# Patient Record
Sex: MALE | Race: WHITE | NOT HISPANIC OR LATINO | Employment: OTHER | URBAN - METROPOLITAN AREA
[De-identification: names, ages, dates, MRNs, and addresses within clinical notes are randomized per-mention and may not be internally consistent; named-entity substitution may affect disease eponyms.]

---

## 2017-05-22 ENCOUNTER — APPOINTMENT (EMERGENCY)
Dept: RADIOLOGY | Facility: HOSPITAL | Age: 62
End: 2017-05-22
Payer: COMMERCIAL

## 2017-05-22 ENCOUNTER — APPOINTMENT (EMERGENCY)
Dept: CT IMAGING | Facility: HOSPITAL | Age: 62
End: 2017-05-22
Payer: COMMERCIAL

## 2017-05-22 ENCOUNTER — HOSPITAL ENCOUNTER (OUTPATIENT)
Facility: HOSPITAL | Age: 62
Setting detail: OBSERVATION
Discharge: HOME | End: 2017-05-24
Attending: EMERGENCY MEDICINE | Admitting: INTERNAL MEDICINE
Payer: COMMERCIAL

## 2017-05-22 DIAGNOSIS — R07.9 CHEST PAIN, UNSPECIFIED TYPE: Primary | ICD-10-CM

## 2017-05-22 LAB
ALBUMIN SERPL BCP-MCNC: 4 G/DL (ref 3.5–5)
ALP SERPL-CCNC: 88 U/L (ref 46–116)
ALT SERPL W P-5'-P-CCNC: 53 U/L (ref 12–78)
ANION GAP SERPL CALCULATED.3IONS-SCNC: 9 MMOL/L (ref 4–13)
AST SERPL W P-5'-P-CCNC: 33 U/L (ref 5–45)
ATRIAL RATE: 85 BPM
BASOPHILS # BLD AUTO: 0.05 THOUSANDS/ΜL (ref 0–0.1)
BASOPHILS NFR BLD AUTO: 1 % (ref 0–1)
BILIRUB SERPL-MCNC: 0.18 MG/DL (ref 0.2–1)
BUN SERPL-MCNC: 19 MG/DL (ref 5–25)
CALCIUM SERPL-MCNC: 9.3 MG/DL (ref 8.3–10.1)
CHLORIDE SERPL-SCNC: 104 MMOL/L (ref 100–108)
CO2 SERPL-SCNC: 26 MMOL/L (ref 21–32)
CREAT SERPL-MCNC: 1.11 MG/DL (ref 0.6–1.3)
EOSINOPHIL # BLD AUTO: 0.16 THOUSAND/ΜL (ref 0–0.61)
EOSINOPHIL NFR BLD AUTO: 2 % (ref 0–6)
ERYTHROCYTE [DISTWIDTH] IN BLOOD BY AUTOMATED COUNT: 12.9 % (ref 11.6–15.1)
GFR SERPL CREATININE-BSD FRML MDRD: >60 ML/MIN/1.73SQ M
GLUCOSE SERPL-MCNC: 102 MG/DL (ref 65–140)
HCT VFR BLD AUTO: 38.8 % (ref 36.5–49.3)
HGB BLD-MCNC: 13.5 G/DL (ref 12–17)
LYMPHOCYTES # BLD AUTO: 2.72 THOUSANDS/ΜL (ref 0.6–4.47)
LYMPHOCYTES NFR BLD AUTO: 27 % (ref 14–44)
MCH RBC QN AUTO: 31.3 PG (ref 26.8–34.3)
MCHC RBC AUTO-ENTMCNC: 34.8 G/DL (ref 31.4–37.4)
MCV RBC AUTO: 90 FL (ref 82–98)
MONOCYTES # BLD AUTO: 1.08 THOUSAND/ΜL (ref 0.17–1.22)
MONOCYTES NFR BLD AUTO: 11 % (ref 4–12)
NEUTROPHILS # BLD AUTO: 6.18 THOUSANDS/ΜL (ref 1.85–7.62)
NEUTS SEG NFR BLD AUTO: 59 % (ref 43–75)
NRBC BLD AUTO-RTO: 0 /100 WBCS
P AXIS: 66 DEGREES
PLATELET # BLD AUTO: 258 THOUSANDS/UL (ref 149–390)
PMV BLD AUTO: 12.4 FL (ref 8.9–12.7)
POTASSIUM SERPL-SCNC: 4 MMOL/L (ref 3.5–5.3)
PR INTERVAL: 138 MS
PROT SERPL-MCNC: 8.3 G/DL (ref 6.4–8.2)
QRS AXIS: 83 DEGREES
QRSD INTERVAL: 84 MS
QT INTERVAL: 330 MS
QTC INTERVAL: 392 MS
RBC # BLD AUTO: 4.31 MILLION/UL (ref 3.88–5.62)
SODIUM SERPL-SCNC: 139 MMOL/L (ref 136–145)
SPECIMEN SOURCE: NORMAL
T WAVE AXIS: 74 DEGREES
TROPONIN I BLD-MCNC: 0 NG/ML (ref 0–0.08)
TROPONIN I SERPL-MCNC: <0.02 NG/ML
TSH SERPL DL<=0.05 MIU/L-ACNC: 1.24 UIU/ML (ref 0.36–3.74)
VENTRICULAR RATE: 85 BPM
WBC # BLD AUTO: 10.19 THOUSAND/UL (ref 4.31–10.16)

## 2017-05-22 PROCEDURE — 99285 EMERGENCY DEPT VISIT HI MDM: CPT

## 2017-05-22 PROCEDURE — 96360 HYDRATION IV INFUSION INIT: CPT

## 2017-05-22 PROCEDURE — 71020 HB CHEST X-RAY 2VW FRONTAL&LATL: CPT

## 2017-05-22 PROCEDURE — 71275 CT ANGIOGRAPHY CHEST: CPT

## 2017-05-22 PROCEDURE — 85025 COMPLETE CBC W/AUTO DIFF WBC: CPT

## 2017-05-22 PROCEDURE — 84484 ASSAY OF TROPONIN QUANT: CPT

## 2017-05-22 PROCEDURE — 74175 CTA ABDOMEN W/CONTRAST: CPT

## 2017-05-22 PROCEDURE — 84484 ASSAY OF TROPONIN QUANT: CPT | Performed by: PHYSICIAN ASSISTANT

## 2017-05-22 PROCEDURE — 93005 ELECTROCARDIOGRAM TRACING: CPT

## 2017-05-22 PROCEDURE — 84443 ASSAY THYROID STIM HORMONE: CPT | Performed by: EMERGENCY MEDICINE

## 2017-05-22 PROCEDURE — 80053 COMPREHEN METABOLIC PANEL: CPT

## 2017-05-22 PROCEDURE — 36415 COLL VENOUS BLD VENIPUNCTURE: CPT

## 2017-05-22 RX ORDER — ACETAMINOPHEN 325 MG/1
975 TABLET ORAL ONCE
Status: COMPLETED | OUTPATIENT
Start: 2017-05-22 | End: 2017-05-22

## 2017-05-22 RX ORDER — ACETAMINOPHEN 325 MG/1
650 TABLET ORAL EVERY 4 HOURS PRN
Status: DISCONTINUED | OUTPATIENT
Start: 2017-05-22 | End: 2017-05-24 | Stop reason: HOSPADM

## 2017-05-22 RX ORDER — ASPIRIN 325 MG
325 TABLET ORAL ONCE
Status: COMPLETED | OUTPATIENT
Start: 2017-05-22 | End: 2017-05-22

## 2017-05-22 RX ORDER — PANTOPRAZOLE SODIUM 40 MG/1
40 TABLET, DELAYED RELEASE ORAL
Status: DISCONTINUED | OUTPATIENT
Start: 2017-05-23 | End: 2017-05-24 | Stop reason: HOSPADM

## 2017-05-22 RX ORDER — SIMVASTATIN 40 MG
40 TABLET ORAL
COMMUNITY
Start: 2015-09-07 | End: 2017-11-25

## 2017-05-22 RX ORDER — PRAVASTATIN SODIUM 80 MG/1
80 TABLET ORAL
Status: DISCONTINUED | OUTPATIENT
Start: 2017-05-23 | End: 2017-05-24 | Stop reason: HOSPADM

## 2017-05-22 RX ORDER — DIPHENOXYLATE HYDROCHLORIDE AND ATROPINE SULFATE 2.5; .025 MG/1; MG/1
1 TABLET ORAL DAILY
COMMUNITY
End: 2021-08-19 | Stop reason: HOSPADM

## 2017-05-22 RX ORDER — MORPHINE SULFATE 2 MG/ML
2 INJECTION, SOLUTION INTRAMUSCULAR; INTRAVENOUS EVERY 4 HOURS PRN
Status: DISCONTINUED | OUTPATIENT
Start: 2017-05-22 | End: 2017-05-24 | Stop reason: HOSPADM

## 2017-05-22 RX ORDER — ASPIRIN 81 MG/1
81 TABLET, CHEWABLE ORAL DAILY
Status: DISCONTINUED | OUTPATIENT
Start: 2017-05-23 | End: 2017-05-24 | Stop reason: HOSPADM

## 2017-05-22 RX ORDER — FLUTICASONE PROPIONATE 50 MCG
SPRAY, SUSPENSION (ML) NASAL
COMMUNITY
Start: 2016-08-24 | End: 2017-11-25

## 2017-05-22 RX ORDER — ONDANSETRON 2 MG/ML
4 INJECTION INTRAMUSCULAR; INTRAVENOUS EVERY 6 HOURS PRN
Status: DISCONTINUED | OUTPATIENT
Start: 2017-05-22 | End: 2017-05-24 | Stop reason: HOSPADM

## 2017-05-22 RX ORDER — MAGNESIUM HYDROXIDE/ALUMINUM HYDROXICE/SIMETHICONE 120; 1200; 1200 MG/30ML; MG/30ML; MG/30ML
30 SUSPENSION ORAL EVERY 6 HOURS PRN
Status: DISCONTINUED | OUTPATIENT
Start: 2017-05-22 | End: 2017-05-24 | Stop reason: HOSPADM

## 2017-05-22 RX ORDER — NITROGLYCERIN 0.4 MG/1
0.4 TABLET SUBLINGUAL
Status: DISCONTINUED | OUTPATIENT
Start: 2017-05-22 | End: 2017-05-24 | Stop reason: HOSPADM

## 2017-05-22 RX ORDER — HEPARIN SODIUM 5000 [USP'U]/ML
5000 INJECTION, SOLUTION INTRAVENOUS; SUBCUTANEOUS EVERY 8 HOURS SCHEDULED
Status: DISCONTINUED | OUTPATIENT
Start: 2017-05-22 | End: 2017-05-24 | Stop reason: HOSPADM

## 2017-05-22 RX ORDER — FENOFIBRATE 145 MG/1
145 TABLET, COATED ORAL DAILY
Status: DISCONTINUED | OUTPATIENT
Start: 2017-05-23 | End: 2017-05-24 | Stop reason: HOSPADM

## 2017-05-22 RX ADMIN — ASPIRIN 325 MG: 325 TABLET ORAL at 22:15

## 2017-05-22 RX ADMIN — ACETAMINOPHEN 975 MG: 325 TABLET, FILM COATED ORAL at 17:21

## 2017-05-22 RX ADMIN — IOHEXOL 100 ML: 350 INJECTION, SOLUTION INTRAVENOUS at 18:05

## 2017-05-22 RX ADMIN — HEPARIN SODIUM 5000 UNITS: 5000 INJECTION, SOLUTION INTRAVENOUS; SUBCUTANEOUS at 22:15

## 2017-05-22 RX ADMIN — SODIUM CHLORIDE 1000 ML: 0.9 INJECTION, SOLUTION INTRAVENOUS at 17:22

## 2017-05-23 LAB
ALBUMIN SERPL BCP-MCNC: 3.3 G/DL (ref 3.5–5)
ALP SERPL-CCNC: 74 U/L (ref 46–116)
ALT SERPL W P-5'-P-CCNC: 41 U/L (ref 12–78)
ANION GAP SERPL CALCULATED.3IONS-SCNC: 9 MMOL/L (ref 4–13)
AST SERPL W P-5'-P-CCNC: 25 U/L (ref 5–45)
BILIRUB SERPL-MCNC: 0.23 MG/DL (ref 0.2–1)
BUN SERPL-MCNC: 15 MG/DL (ref 5–25)
CALCIUM SERPL-MCNC: 8.8 MG/DL (ref 8.3–10.1)
CHLORIDE SERPL-SCNC: 106 MMOL/L (ref 100–108)
CO2 SERPL-SCNC: 25 MMOL/L (ref 21–32)
CREAT SERPL-MCNC: 1.05 MG/DL (ref 0.6–1.3)
ERYTHROCYTE [DISTWIDTH] IN BLOOD BY AUTOMATED COUNT: 12.8 % (ref 11.6–15.1)
GFR SERPL CREATININE-BSD FRML MDRD: >60 ML/MIN/1.73SQ M
GLUCOSE SERPL-MCNC: 115 MG/DL (ref 65–140)
HCT VFR BLD AUTO: 36.6 % (ref 36.5–49.3)
HGB BLD-MCNC: 11.9 G/DL (ref 12–17)
MCH RBC QN AUTO: 29.6 PG (ref 26.8–34.3)
MCHC RBC AUTO-ENTMCNC: 32.5 G/DL (ref 31.4–37.4)
MCV RBC AUTO: 91 FL (ref 82–98)
PLATELET # BLD AUTO: 226 THOUSANDS/UL (ref 149–390)
PMV BLD AUTO: 12.5 FL (ref 8.9–12.7)
POTASSIUM SERPL-SCNC: 3.9 MMOL/L (ref 3.5–5.3)
PROT SERPL-MCNC: 6.9 G/DL (ref 6.4–8.2)
RBC # BLD AUTO: 4.02 MILLION/UL (ref 3.88–5.62)
SODIUM SERPL-SCNC: 140 MMOL/L (ref 136–145)
TROPONIN I SERPL-MCNC: <0.02 NG/ML
WBC # BLD AUTO: 6.86 THOUSAND/UL (ref 4.31–10.16)

## 2017-05-23 PROCEDURE — 85027 COMPLETE CBC AUTOMATED: CPT | Performed by: PHYSICIAN ASSISTANT

## 2017-05-23 PROCEDURE — 80053 COMPREHEN METABOLIC PANEL: CPT | Performed by: PHYSICIAN ASSISTANT

## 2017-05-23 PROCEDURE — 84484 ASSAY OF TROPONIN QUANT: CPT | Performed by: INTERNAL MEDICINE

## 2017-05-23 RX ORDER — TEMAZEPAM 15 MG/1
15 CAPSULE ORAL
Status: DISCONTINUED | OUTPATIENT
Start: 2017-05-23 | End: 2017-05-24 | Stop reason: HOSPADM

## 2017-05-23 RX ADMIN — HEPARIN SODIUM 5000 UNITS: 5000 INJECTION, SOLUTION INTRAVENOUS; SUBCUTANEOUS at 06:21

## 2017-05-23 RX ADMIN — HEPARIN SODIUM 5000 UNITS: 5000 INJECTION, SOLUTION INTRAVENOUS; SUBCUTANEOUS at 13:17

## 2017-05-23 RX ADMIN — TEMAZEPAM 15 MG: 15 CAPSULE ORAL at 22:19

## 2017-05-23 RX ADMIN — ACETAMINOPHEN 650 MG: 325 TABLET, FILM COATED ORAL at 16:33

## 2017-05-23 RX ADMIN — FENOFIBRATE 145 MG: 145 TABLET ORAL at 08:55

## 2017-05-23 RX ADMIN — PANTOPRAZOLE SODIUM 40 MG: 40 TABLET, DELAYED RELEASE ORAL at 06:21

## 2017-05-23 RX ADMIN — ASPIRIN 81 MG: 81 TABLET, CHEWABLE ORAL at 08:55

## 2017-05-23 RX ADMIN — ACETAMINOPHEN 650 MG: 325 TABLET, FILM COATED ORAL at 09:03

## 2017-05-23 RX ADMIN — HEPARIN SODIUM 5000 UNITS: 5000 INJECTION, SOLUTION INTRAVENOUS; SUBCUTANEOUS at 22:19

## 2017-05-23 RX ADMIN — PRAVASTATIN SODIUM 80 MG: 80 TABLET ORAL at 16:33

## 2017-05-24 ENCOUNTER — APPOINTMENT (OUTPATIENT)
Dept: NUCLEAR MEDICINE | Facility: HOSPITAL | Age: 62
End: 2017-05-24
Payer: COMMERCIAL

## 2017-05-24 ENCOUNTER — APPOINTMENT (OUTPATIENT)
Dept: NON INVASIVE DIAGNOSTICS | Facility: HOSPITAL | Age: 62
End: 2017-05-24
Payer: COMMERCIAL

## 2017-05-24 VITALS
SYSTOLIC BLOOD PRESSURE: 132 MMHG | HEIGHT: 68 IN | WEIGHT: 145.06 LBS | DIASTOLIC BLOOD PRESSURE: 81 MMHG | HEART RATE: 87 BPM | TEMPERATURE: 98.3 F | RESPIRATION RATE: 18 BRPM | BODY MASS INDEX: 21.99 KG/M2 | OXYGEN SATURATION: 97 %

## 2017-05-24 LAB
CHOLEST SERPL-MCNC: 175 MG/DL (ref 50–200)
HDLC SERPL-MCNC: 28 MG/DL (ref 40–60)
LDLC SERPL CALC-MCNC: 69 MG/DL (ref 0–100)
TRIGL SERPL-MCNC: 390 MG/DL

## 2017-05-24 PROCEDURE — 78452 HT MUSCLE IMAGE SPECT MULT: CPT

## 2017-05-24 PROCEDURE — A9502 TC99M TETROFOSMIN: HCPCS

## 2017-05-24 PROCEDURE — 80061 LIPID PANEL: CPT | Performed by: INTERNAL MEDICINE

## 2017-05-24 PROCEDURE — 93017 CV STRESS TEST TRACING ONLY: CPT

## 2017-05-24 RX ADMIN — FENOFIBRATE 145 MG: 145 TABLET ORAL at 09:18

## 2017-05-24 RX ADMIN — HEPARIN SODIUM 5000 UNITS: 5000 INJECTION, SOLUTION INTRAVENOUS; SUBCUTANEOUS at 05:56

## 2017-05-24 RX ADMIN — PANTOPRAZOLE SODIUM 40 MG: 40 TABLET, DELAYED RELEASE ORAL at 05:56

## 2017-05-24 RX ADMIN — ASPIRIN 81 MG: 81 TABLET, CHEWABLE ORAL at 09:18

## 2017-05-24 RX ADMIN — HEPARIN SODIUM 5000 UNITS: 5000 INJECTION, SOLUTION INTRAVENOUS; SUBCUTANEOUS at 13:34

## 2017-05-26 ENCOUNTER — HOSPITAL ENCOUNTER (OUTPATIENT)
Dept: RADIOLOGY | Facility: HOSPITAL | Age: 62
Discharge: HOME | End: 2017-05-26
Payer: COMMERCIAL

## 2017-05-26 ENCOUNTER — TRANSCRIBE ORDERS (OUTPATIENT)
Dept: ADMINISTRATIVE | Facility: HOSPITAL | Age: 62
End: 2017-05-26

## 2017-05-26 DIAGNOSIS — M54.5 LOW BACK PAIN, UNSPECIFIED BACK PAIN LATERALITY, UNSPECIFIED CHRONICITY, WITH SCIATICA PRESENCE UNSPECIFIED: Primary | ICD-10-CM

## 2017-05-26 DIAGNOSIS — M54.5 LOW BACK PAIN, UNSPECIFIED BACK PAIN LATERALITY, UNSPECIFIED CHRONICITY, WITH SCIATICA PRESENCE UNSPECIFIED: ICD-10-CM

## 2017-05-26 PROCEDURE — 72110 X-RAY EXAM L-2 SPINE 4/>VWS: CPT

## 2017-05-26 PROCEDURE — 73030 X-RAY EXAM OF SHOULDER: CPT

## 2017-05-26 PROCEDURE — 72070 X-RAY EXAM THORAC SPINE 2VWS: CPT

## 2017-09-19 ENCOUNTER — HOSPITAL ENCOUNTER (OUTPATIENT)
Dept: RADIOLOGY | Facility: HOSPITAL | Age: 62
Discharge: HOME | End: 2017-09-19
Payer: COMMERCIAL

## 2017-09-19 ENCOUNTER — TRANSCRIBE ORDERS (OUTPATIENT)
Dept: ADMINISTRATIVE | Facility: HOSPITAL | Age: 62
End: 2017-09-19

## 2017-09-19 DIAGNOSIS — M77.8 LEFT SHOULDER TENDONITIS: ICD-10-CM

## 2017-09-19 DIAGNOSIS — M77.8 LEFT SHOULDER TENDONITIS: Primary | ICD-10-CM

## 2017-09-19 PROCEDURE — 73030 X-RAY EXAM OF SHOULDER: CPT

## 2017-09-25 ENCOUNTER — TRANSCRIBE ORDERS (OUTPATIENT)
Dept: ADMINISTRATIVE | Facility: HOSPITAL | Age: 62
End: 2017-09-25

## 2017-09-25 DIAGNOSIS — G89.29 CHRONIC LEFT SHOULDER PAIN: Primary | ICD-10-CM

## 2017-09-25 DIAGNOSIS — M25.512 CHRONIC LEFT SHOULDER PAIN: Primary | ICD-10-CM

## 2017-09-29 ENCOUNTER — HOSPITAL ENCOUNTER (OUTPATIENT)
Dept: MRI IMAGING | Facility: HOSPITAL | Age: 62
Discharge: HOME | End: 2017-09-29
Payer: COMMERCIAL

## 2017-09-29 DIAGNOSIS — G89.29 CHRONIC LEFT SHOULDER PAIN: ICD-10-CM

## 2017-09-29 DIAGNOSIS — M25.512 CHRONIC LEFT SHOULDER PAIN: ICD-10-CM

## 2017-09-29 PROCEDURE — 73221 MRI JOINT UPR EXTREM W/O DYE: CPT

## 2017-10-06 ENCOUNTER — TRANSCRIBE ORDERS (OUTPATIENT)
Dept: ADMINISTRATIVE | Facility: HOSPITAL | Age: 62
End: 2017-10-06

## 2017-10-06 DIAGNOSIS — M54.12 BRACHIAL NEURITIS: Primary | ICD-10-CM

## 2017-10-12 ENCOUNTER — HOSPITAL ENCOUNTER (OUTPATIENT)
Dept: MRI IMAGING | Facility: HOSPITAL | Age: 62
Discharge: HOME | End: 2017-10-12
Payer: COMMERCIAL

## 2017-10-12 DIAGNOSIS — M54.12 BRACHIAL NEURITIS: ICD-10-CM

## 2017-10-12 PROCEDURE — 72141 MRI NECK SPINE W/O DYE: CPT

## 2017-11-02 ENCOUNTER — HOSPITAL ENCOUNTER (EMERGENCY)
Facility: HOSPITAL | Age: 62
Discharge: HOME | End: 2017-11-02
Attending: EMERGENCY MEDICINE | Admitting: EMERGENCY MEDICINE
Payer: COMMERCIAL

## 2017-11-02 VITALS
HEART RATE: 97 BPM | DIASTOLIC BLOOD PRESSURE: 82 MMHG | OXYGEN SATURATION: 99 % | BODY MASS INDEX: 22.12 KG/M2 | RESPIRATION RATE: 18 BRPM | SYSTOLIC BLOOD PRESSURE: 132 MMHG | WEIGHT: 145.5 LBS | TEMPERATURE: 97.7 F

## 2017-11-02 DIAGNOSIS — S16.1XXA CERVICAL MUSCLE STRAIN, INITIAL ENCOUNTER: ICD-10-CM

## 2017-11-02 DIAGNOSIS — J06.9 VIRAL URI: Primary | ICD-10-CM

## 2017-11-02 LAB
BILIRUB UR QL STRIP: NEGATIVE
CLARITY UR: CLEAR
CLARITY, POC: YELLOW
COLOR UR: YELLOW
COLOR, POC: CLEAR
GLUCOSE UR STRIP-MCNC: NEGATIVE MG/DL
HGB UR QL STRIP.AUTO: NEGATIVE
KETONES UR STRIP-MCNC: NEGATIVE MG/DL
LEUKOCYTE ESTERASE UR QL STRIP: NEGATIVE
NITRITE UR QL STRIP: NEGATIVE
PH UR STRIP.AUTO: 7 [PH] (ref 4.5–8)
PROT UR STRIP-MCNC: NEGATIVE MG/DL
SP GR UR STRIP.AUTO: 1.02 (ref 1–1.03)
UROBILINOGEN UR QL STRIP.AUTO: 0.2 E.U./DL

## 2017-11-02 PROCEDURE — 99283 EMERGENCY DEPT VISIT LOW MDM: CPT

## 2017-11-02 PROCEDURE — 81002 URINALYSIS NONAUTO W/O SCOPE: CPT | Performed by: EMERGENCY MEDICINE

## 2017-11-02 PROCEDURE — 81003 URINALYSIS AUTO W/O SCOPE: CPT

## 2017-11-02 RX ORDER — CYCLOBENZAPRINE HCL 10 MG
10 TABLET ORAL 3 TIMES DAILY PRN
Qty: 20 TABLET | Refills: 0 | Status: SHIPPED | OUTPATIENT
Start: 2017-11-02 | End: 2017-11-25

## 2017-11-02 RX ORDER — IBUPROFEN 600 MG/1
600 TABLET ORAL EVERY 6 HOURS PRN
Qty: 30 TABLET | Refills: 0 | Status: SHIPPED | OUTPATIENT
Start: 2017-11-02 | End: 2017-11-25

## 2017-11-02 RX ORDER — IBUPROFEN 600 MG/1
600 TABLET ORAL ONCE
Status: COMPLETED | OUTPATIENT
Start: 2017-11-02 | End: 2017-11-02

## 2017-11-02 RX ADMIN — IBUPROFEN 600 MG: 600 TABLET, FILM COATED ORAL at 09:11

## 2017-11-02 NOTE — ED NOTES
Unable to urinate at this time  Give urine cup and showed where bathroom is for when he is able to go       Jay Ayala, RN  11/02/17 1946

## 2017-11-02 NOTE — DISCHARGE INSTRUCTIONS
Viral Syndrome   WHAT YOU NEED TO KNOW:   Viral syndrome is a term used for a viral infection that has no clear cause  Viruses are spread easily from person to person through the air and on shared items  DISCHARGE INSTRUCTIONS:   Call 911 for the following:   · You have a seizure  · You cannot be woken  · You have chest pain or trouble breathing  Seek care immediately if:   · You have a stiff neck, a bad headache, and sensitivity to light  · You feel weak, dizzy, or confused  · You stop urinating or urinate a lot less than normal      · You cough up blood or thick, yellow or green, mucus  · You have severe abdominal pain or your abdomen is larger than usual   Contact your healthcare provider if:   · Your symptoms do not get better with treatment, or get worse, after 3 days  · You have a rash or ear pain  · You have burning when you urinate  · You have questions or concerns about your condition or care  Medicines: You may  need any of the following:  · Acetaminophen  decreases pain and fever  It is available without a doctor's order  Ask how much medicine to take and how often to take it  Follow directions  Acetaminophen can cause liver damage if not taken correctly  · NSAIDs , such as ibuprofen, help decrease swelling, pain, and fever  NSAIDs can cause stomach bleeding or kidney problems in certain people  If you take blood thinner medicine, always ask your healthcare provider if NSAIDs are safe for you  Always read the medicine label and follow directions  · Cold medicine  helps decrease swelling, control a cough, and relieve chest or nasal congestion  · Saline nasal spray  helps decrease nasal congestion  · Take your medicine as directed  Contact your healthcare provider if you think your medicine is not helping or if you have side effects  Tell him of her if you are allergic to any medicine  Keep a list of the medicines, vitamins, and herbs you take  Include the amounts, and when and why you take them  Bring the list or the pill bottles to follow-up visits  Carry your medicine list with you in case of an emergency  Manage your symptoms:   · Drink liquids as directed  to prevent dehydration  Ask how much liquid to drink each day and which liquids are best for you  Ask if you should drink an oral rehydration solution (ORS)  An ORS has the right amounts of water, salts, and sugar you need to replace body fluids  This may help prevent dehydration caused by vomiting or diarrhea  Do not drink liquids with caffeine  Drinks with caffeine can make dehydration worse  · Get plenty of rest  to help your body heal  Take naps throughout the day  Ask your healthcare provider when you can return to work and your normal activities  · Use a cool mist humidifier  to help you breathe easier if you have nasal or chest congestion  Ask your healthcare provider how to use a cool mist humidifier  · Eat honey or use cough drops  to help decrease throat discomfort  Ask your healthcare provider how much honey you should eat each day  Cough drops are available without a doctor's order  Follow directions for taking cough drops  · Do not smoke and stay away from others who smoke  Nicotine and other chemicals in cigarettes and cigars can cause lung damage  Smoking can also delay healing  Ask your healthcare provider for information if you currently smoke and need help to quit  E-cigarettes or smokeless tobacco still contain nicotine  Talk to your healthcare provider before you use these products  · Wash your hands frequently  to prevent the spread of germs to others  Use soap and water  Use gel hand  when soap and water are not available  Wash your hands after you use the bathroom, cough, or sneeze  Wash your hands before you prepare or eat food    Follow up with your healthcare provider as directed:  Write down your questions so you remember to ask them during your visits  © 2017 [facility] Information is for End User's use only and may not be sold, redistributed or otherwise used for commercial purposes  All illustrations and images included in CareNotes® are the copyrighted property of A D [employer] M [employer]  or [employer] Jackson  The above information is an  only  It is not intended as medical advice for individual conditions or treatments  Talk to your doctor, nurse or pharmacist before following any medical regimen to see if it is safe and effective for you  Cervical Strain   WHAT YOU NEED TO KNOW:   A cervical strain is a stretched or torn muscle or tendon in your neck  Tendons are strong tissues that connect muscles to bones  Common causes of cervical strains include a car accident, a fall, or a sports injury  DISCHARGE INSTRUCTIONS:   Seek care immediately if:   · You have pain or numbness from your shoulder down to your hand  · You have problems with your vision, hearing, or balance  · You feel confused or cannot concentrate  · You have problems with movement and strength  Contact your healthcare provider if:   · You have increased swelling or pain in your neck  · You have questions or concerns about your condition or care  Medicines: You may need any of the following:  · Acetaminophen  decreases pain and fever  It is available without a doctor's order  Ask how much to take and how often to take it  Follow directions  Read the labels of all other medicines you are using to see if they also contain acetaminophen, or ask your doctor or pharmacist  Acetaminophen can cause liver damage if not taken correctly  Do not use more than 4 grams (4,000 milligrams) total of acetaminophen in one day  · NSAIDs , such as ibuprofen, help decrease swelling, pain, and fever  This medicine is available with or without a doctor's order  NSAIDs can cause stomach bleeding or kidney problems in certain people   If you take blood thinner medicine, always ask your healthcare provider if NSAIDs are safe for you  Always read the medicine label and follow directions  · Muscle relaxers  help decrease pain and muscle spasms  · Prescription pain medicine  may be given  Ask your healthcare provider how to take this medicine safely  Some prescription pain medicines contain acetaminophen  Do not take other medicines that contain acetaminophen without talking to your healthcare provider  Too much acetaminophen may cause liver damage  Prescription pain medicine may cause constipation  Ask your healthcare provider how to prevent or treat constipation  · Take your medicine as directed  Contact your healthcare provider if you think your medicine is not helping or if you have side effects  Tell him or her if you are allergic to any medicine  Keep a list of the medicines, vitamins, and herbs you take  Include the amounts, and when and why you take them  Bring the list or the pill bottles to follow-up visits  Carry your medicine list with you in case of an emergency  Manage your symptoms:   · Apply heat  on your neck for 15 to 20 minutes, 4 to 6 times a day or as directed  Heat helps decrease pain, stiffness, and muscle spasms  · Begin gentle neck exercises  as soon as you can move your neck without pain  Exercises will help decrease stiffness and improve the strength and movement of your neck  Ask your healthcare provider what kind of exercises you should do  · Gradually return to your usual activities as directed  Stop if you have pain  Avoid activities that can cause more damage to your neck, such as heavy lifting or strenuous exercise  · Sleep without a pillow  to help decrease pain  Instead, roll a small towel tightly and place it under your neck  · Go to physical therapy as directed  A physical therapist teaches you exercises to help improve movement and strength, and to decrease pain  Prevent neck injury:   · Drive safely  Make sure everyone in your car wears a seatbelt  A seatbelt can save your life if you are in an accident  Do not use your cell phone when you are driving  This could distract you and cause an accident  Pull over if you need to make a call or send a text message  · Wear helmets, lifejackets, and protective gear  Always wear a helmet when you ride a bike or motorcycle, go skiing, or play sports that could cause a head injury  Wear protective equipment when you play sports  Wear a lifejacket when you are on a boat or doing water sports  Follow up with your healthcare provider as directed: You may be referred to an orthopedist or physical therapies  Write down your questions so you remember to ask them during your visits  © 2017 [employer] [employer] Information is for End User's use only and may not be sold, redistributed or otherwise used for commercial purposes  All illustrations and images included in CareNotes® are the copyrighted property of A D [employer] M [employer]  or [employer]in Jackson  The above information is an  only  It is not intended as medical advice for individual conditions or treatments  Talk to your doctor, nurse or pharmacist before following any medical regimen to see if it is safe and effective for you

## 2017-11-02 NOTE — ED ATTENDING ATTESTATION
Adalberto Carranza MD, saw and evaluated the patient  I have discussed the patient with the resident/non-physician practitioner and agree with the resident's/non-physician practitioner's findings, Plan of Care, and MDM as documented in the resident's/non-physician practitioner's note, except where noted  All available labs and Radiology studies were reviewed  At this point I agree with the current assessment done in the Emergency Department  I have conducted an independent evaluation of this patient a history and physical is as follows:      Critical Care Time  CritCare Time     Pt  Has a h/o copd but not taking any meds    C/o subjective fevers/chill/sore throat/body aches/ear fullness/some loose stools since yest   No sick contacts  C/o pain behind L ear  No headaches  Pt  Didn't get the flu shot this year        Well-appearing, no distress, TM's clear, swelling in nasal turbinates, L neck tenderness in paraspinal musculature, ooropharynx erythematous, no exudate, RRR, CTA b/l, abd  -nontender, ext  - no edema

## 2017-11-02 NOTE — ED PROVIDER NOTES
History  Chief Complaint   Patient presents with    Headache     accompanied by body aches, sneezing started yesterday     Patient is a 65year old male with PMH significant for COPD presenting with sore throat, ear fullness, malaise, body aches, non-bloody loose bowel movements, and subjective fevers and chills that began yesterday  He states he felt very tired yesterday, went home from work and slept for most of the day  He also admits to urinary frequency and dark colored urine, denies nausea, vomiting, or abdominal pain  He also admits to neck and shoulder pain that has been present for several weeks, he states he has cervical stenosis and rotator cuff tendonitis and has been going to physical therapy biweekly for the past 4 weeks  Yesterday he noticed worsening pain behind his left ear and neck, feels like cramping and stiffness, but denies vision changes, headaches, lightheadedness or dizziness  He has not has the flu shot this year, denies any sick contacts at home or at work  History provided by:  Patient   used: No        Prior to Admission Medications   Prescriptions Last Dose Informant Patient Reported? Taking? Fenofibrate (TRICOR PO)   Yes No   Sig: Take by mouth daily   fluticasone (FLONASE) 50 mcg/act nasal spray   Yes No   Sig: instill 1 spray into each nostril twice a day   multivitamin (THERAGRAN) TABS   Yes No   Sig: Take 1 tablet by mouth   omeprazole (PriLOSEC) 20 mg delayed release capsule   Yes No   Sig: Take 20 mg by mouth daily     simvastatin (ZOCOR) 40 mg tablet   Yes No   Si mg      Facility-Administered Medications: None       Past Medical History:   Diagnosis Date    COPD (chronic obstructive pulmonary disease) (HCC)     GERD (gastroesophageal reflux disease)     Hyperlipidemia        Past Surgical History:   Procedure Laterality Date    CHOLECYSTECTOMY      CYST REMOVAL      FEMUR SURGERY      KNEE SURGERY      ROTATOR CUFF REPAIR History reviewed  No pertinent family history  I have reviewed and agree with the history as documented  Social History   Substance Use Topics    Smoking status: Current Every Day Smoker     Packs/day: 0 50    Smokeless tobacco: Never Used    Alcohol use Yes      Comment: occasional         Review of Systems   Constitutional: Positive for appetite change, chills and fatigue  Negative for fever  HENT: Positive for congestion and sore throat  Negative for hearing loss and trouble swallowing  Eyes: Negative for pain, redness and visual disturbance  Respiratory: Negative for cough, chest tightness, shortness of breath, wheezing and stridor  Cardiovascular: Negative for chest pain  Gastrointestinal: Positive for diarrhea  Negative for abdominal pain, blood in stool, constipation, nausea and vomiting  Genitourinary: Positive for frequency  Negative for dysuria  Skin: Negative for pallor and rash  Neurological: Negative for dizziness and light-headedness  All other systems reviewed and are negative  Physical Exam  ED Triage Vitals [11/02/17 0838]   Temperature Pulse Respirations Blood Pressure SpO2   97 7 °F (36 5 °C) 97 18 132/82 99 %      Temp Source Heart Rate Source Patient Position - Orthostatic VS BP Location FiO2 (%)   Oral -- -- -- --      Pain Score       5           Orthostatic Vital Signs  Vitals:    11/02/17 0838   BP: 132/82   Pulse: 97       Physical Exam   Constitutional: He is oriented to person, place, and time  He appears well-developed and well-nourished  No distress  HENT:   Head: Normocephalic and atraumatic  Right Ear: External ear normal    Left Ear: External ear normal    Nose: Nose normal    Mouth/Throat: Oropharynx is clear and moist    Eyes: Conjunctivae and EOM are normal  Pupils are equal, round, and reactive to light  Neck: Neck supple  Cervical ROM decreased with rotation and side bending, ropey paraspinal muscles, left worse than right   No ecchymosis or erythema behind left ear  No tenderness to palpation of mastoid    Cardiovascular: Normal rate, regular rhythm and normal heart sounds  No murmur heard  Pulmonary/Chest: Effort normal and breath sounds normal  No respiratory distress  He has no wheezes  He has no rales  Abdominal: Soft  Bowel sounds are normal  He exhibits no distension  There is no tenderness  There is no rebound and no guarding  Musculoskeletal: He exhibits no edema  Lymphadenopathy:     He has no cervical adenopathy  Neurological: He is alert and oriented to person, place, and time  He exhibits normal muscle tone  Skin: Skin is warm  No rash noted  He is not diaphoretic  No erythema  No pallor  Psychiatric: He has a normal mood and affect  His behavior is normal    Vitals reviewed        ED Medications  Medications   ibuprofen (MOTRIN) tablet 600 mg (600 mg Oral Given 11/2/17 0911)       Diagnostic Studies  Results Reviewed     Procedure Component Value Units Date/Time    POCT urinalysis dipstick [[id number]]  (Normal) Resulted:  11/02/17 0932    Lab Status:  Final result Specimen:  Urine Updated:  11/02/17 0932     Color, UA clear     Clarity, UA yellow    ED Urine Macroscopic [[id number]]  (Normal) Collected:  11/02/17 0946    Lab Status:  Final result Specimen:  Urine Updated:  11/02/17 0932     Color, UA Yellow     Clarity, UA Clear     pH, UA 7 0     Leukocytes, UA Negative     Nitrite, UA Negative     Protein, UA Negative mg/dl      Glucose, UA Negative mg/dl      Ketones, UA Negative mg/dl      Urobilinogen, UA 0 2 E U /dl      Bilirubin, UA Negative     Blood, UA Negative     Specific Gravity, UA 1 020    Narrative:       CLIN[facility]K RESULT                 No orders to display         Procedures  Procedures      Phone Consults  ED Phone Contact    ED Course  ED Course            MDM  Number of Diagnoses or Management Options  Cervical muscle strain, initial encounter:   Viral URI:   Diagnosis management comments: Symptoms consistent with a likely viral URI, pain behind left ear is consistent with cervical muscle strain in the setting of chronic musculoskeletal pain and rotator cuff tendonitis  Instructed patient to take Motrin and Flexeril as needed for pain, and to follow-up with his PCP  Amount and/or Complexity of Data Reviewed  Discuss the patient with other providers: yes      CritCare Time    I reviewed all testing with the patient  I gave oral return precautions for what to return for in addition to the written return precautions  The patient verbalized understanding of the discharge instructions and warnings that would necessitate return to the Emergency Department  I specifically highlighted areas of special concern regarding the written and verbal discharge instructions and return precautions  All questions were answered prior to discharge  Disposition  Final diagnoses:   Viral URI   Cervical muscle strain, initial encounter     Time reflects when diagnosis was documented in both MDM as applicable and the Disposition within this note     Time User Action Codes Description Comment    11/2/2017  9:24 AM Meenakshi Freyald Add [J06 9,  B97 89] Viral URI     11/2/2017  9:24 AM Donovan Link [J06 9,  B97 89] Viral URI     11/2/2017  9:31 AM Duana Juan Add [J06 9,  B97 89] Viral URI     11/2/2017  9:32 AM Duana Juan Add Marky Mayans  1XXA] Cervical muscle strain, initial encounter       ED Disposition     ED Disposition Condition Comment    Discharge  [place] discharge to home/self care      Condition at discharge: Good        Follow-up Information     Follow up With Specialties Details Why Sneha Stone MD  Schedule an appointment as soon as possible for a visit in 1 week Follow-up, or sooner if symptoms worsen [phone]  [facility]  [phone]          Patient's Medications   Discharge Prescriptions    CYCLOBENZAPRINE (FLEXERIL) 10 MG TABLET    Take 1 tablet by mouth 3 (three) times a day as needed for muscle spasms       Start Date: 11/2/2017 End Date: --       Order Dose: 10 mg       Quantity: 20 tablet    Refills: 0    IBUPROFEN (MOTRIN) 600 MG TABLET    Take 1 tablet by mouth every 6 (six) hours as needed for mild pain       Start Date: 11/2/2017 End Date: --       Order Dose: 600 mg       Quantity: 30 tablet    Refills: 0     No discharge procedures on file  ED Provider  Attending physically available and evaluated Elvin Barry I managed the patient along with the ED Attending      Electronically Signed by         Carry Gosselin, DO  11/02/17 8135

## 2017-11-25 ENCOUNTER — HOSPITAL ENCOUNTER (EMERGENCY)
Facility: HOSPITAL | Age: 62
Discharge: HOME | End: 2017-11-25
Attending: EMERGENCY MEDICINE | Admitting: EMERGENCY MEDICINE
Payer: COMMERCIAL

## 2017-11-25 ENCOUNTER — APPOINTMENT (EMERGENCY)
Dept: RADIOLOGY | Facility: HOSPITAL | Age: 62
End: 2017-11-25
Payer: COMMERCIAL

## 2017-11-25 VITALS
HEART RATE: 84 BPM | SYSTOLIC BLOOD PRESSURE: 142 MMHG | TEMPERATURE: 98.7 F | OXYGEN SATURATION: 95 % | RESPIRATION RATE: 16 BRPM | WEIGHT: 145.8 LBS | BODY MASS INDEX: 22.17 KG/M2 | DIASTOLIC BLOOD PRESSURE: 68 MMHG

## 2017-11-25 DIAGNOSIS — J06.9 UPPER RESPIRATORY INFECTION: Primary | ICD-10-CM

## 2017-11-25 LAB
ALBUMIN SERPL BCP-MCNC: 4.2 G/DL (ref 3.5–5)
ALP SERPL-CCNC: 73 U/L (ref 46–116)
ALT SERPL W P-5'-P-CCNC: 54 U/L (ref 12–78)
ANION GAP SERPL CALCULATED.3IONS-SCNC: 11 MMOL/L (ref 4–13)
AST SERPL W P-5'-P-CCNC: 33 U/L (ref 5–45)
BASOPHILS # BLD AUTO: 0.06 THOUSANDS/ΜL (ref 0–0.1)
BASOPHILS NFR BLD AUTO: 1 % (ref 0–1)
BILIRUB DIRECT SERPL-MCNC: 0.1 MG/DL (ref 0–0.2)
BILIRUB SERPL-MCNC: 0.28 MG/DL (ref 0.2–1)
BUN SERPL-MCNC: 21 MG/DL (ref 5–25)
CALCIUM SERPL-MCNC: 9.5 MG/DL (ref 8.3–10.1)
CHLORIDE SERPL-SCNC: 104 MMOL/L (ref 100–108)
CO2 SERPL-SCNC: 24 MMOL/L (ref 21–32)
CREAT SERPL-MCNC: 1.08 MG/DL (ref 0.6–1.3)
EOSINOPHIL # BLD AUTO: 0.03 THOUSAND/ΜL (ref 0–0.61)
EOSINOPHIL NFR BLD AUTO: 0 % (ref 0–6)
ERYTHROCYTE [DISTWIDTH] IN BLOOD BY AUTOMATED COUNT: 13.3 % (ref 11.6–15.1)
GFR SERPL CREATININE-BSD FRML MDRD: 74 ML/MIN/1.73SQ M
GLUCOSE SERPL-MCNC: 94 MG/DL (ref 65–140)
HCT VFR BLD AUTO: 39.7 % (ref 36.5–49.3)
HGB BLD-MCNC: 13.4 G/DL (ref 12–17)
LIPASE SERPL-CCNC: 268 U/L (ref 73–393)
LYMPHOCYTES # BLD AUTO: 2.24 THOUSANDS/ΜL (ref 0.6–4.47)
LYMPHOCYTES NFR BLD AUTO: 19 % (ref 14–44)
MAGNESIUM SERPL-MCNC: 2.1 MG/DL (ref 1.6–2.6)
MCH RBC QN AUTO: 31.2 PG (ref 26.8–34.3)
MCHC RBC AUTO-ENTMCNC: 33.8 G/DL (ref 31.4–37.4)
MCV RBC AUTO: 92 FL (ref 82–98)
MONOCYTES # BLD AUTO: 1.35 THOUSAND/ΜL (ref 0.17–1.22)
MONOCYTES NFR BLD AUTO: 11 % (ref 4–12)
NEUTROPHILS # BLD AUTO: 8.22 THOUSANDS/ΜL (ref 1.85–7.62)
NEUTS SEG NFR BLD AUTO: 69 % (ref 43–75)
NRBC BLD AUTO-RTO: 0 /100 WBCS
PLATELET # BLD AUTO: 257 THOUSANDS/UL (ref 149–390)
PMV BLD AUTO: 12.2 FL (ref 8.9–12.7)
POTASSIUM SERPL-SCNC: 4.3 MMOL/L (ref 3.5–5.3)
PROT SERPL-MCNC: 8.3 G/DL (ref 6.4–8.2)
RBC # BLD AUTO: 4.3 MILLION/UL (ref 3.88–5.62)
SODIUM SERPL-SCNC: 139 MMOL/L (ref 136–145)
WBC # BLD AUTO: 11.9 THOUSAND/UL (ref 4.31–10.16)

## 2017-11-25 PROCEDURE — 83690 ASSAY OF LIPASE: CPT | Performed by: EMERGENCY MEDICINE

## 2017-11-25 PROCEDURE — 96374 THER/PROPH/DIAG INJ IV PUSH: CPT

## 2017-11-25 PROCEDURE — 96375 TX/PRO/DX INJ NEW DRUG ADDON: CPT

## 2017-11-25 PROCEDURE — 80048 BASIC METABOLIC PNL TOTAL CA: CPT | Performed by: EMERGENCY MEDICINE

## 2017-11-25 PROCEDURE — 80076 HEPATIC FUNCTION PANEL: CPT | Performed by: EMERGENCY MEDICINE

## 2017-11-25 PROCEDURE — 36415 COLL VENOUS BLD VENIPUNCTURE: CPT | Performed by: EMERGENCY MEDICINE

## 2017-11-25 PROCEDURE — 93005 ELECTROCARDIOGRAM TRACING: CPT | Performed by: EMERGENCY MEDICINE

## 2017-11-25 PROCEDURE — 99284 EMERGENCY DEPT VISIT MOD MDM: CPT

## 2017-11-25 PROCEDURE — 83735 ASSAY OF MAGNESIUM: CPT | Performed by: EMERGENCY MEDICINE

## 2017-11-25 PROCEDURE — 85025 COMPLETE CBC W/AUTO DIFF WBC: CPT | Performed by: EMERGENCY MEDICINE

## 2017-11-25 PROCEDURE — 96361 HYDRATE IV INFUSION ADD-ON: CPT

## 2017-11-25 PROCEDURE — 71020 HB CHEST X-RAY 2VW FRONTAL&LATL: CPT

## 2017-11-25 RX ORDER — ROSUVASTATIN CALCIUM 5 MG/1
5 TABLET, COATED ORAL DAILY
COMMUNITY
End: 2020-02-09 | Stop reason: DRUGHIGH

## 2017-11-25 RX ORDER — AZITHROMYCIN 250 MG/1
500 TABLET, FILM COATED ORAL ONCE
Status: COMPLETED | OUTPATIENT
Start: 2017-11-25 | End: 2017-11-25

## 2017-11-25 RX ORDER — BENZONATATE 100 MG/1
100 CAPSULE ORAL EVERY 8 HOURS
Qty: 21 CAPSULE | Refills: 0 | Status: SHIPPED | OUTPATIENT
Start: 2017-11-25 | End: 2018-09-24

## 2017-11-25 RX ORDER — KETOROLAC TROMETHAMINE 30 MG/ML
15 INJECTION, SOLUTION INTRAMUSCULAR; INTRAVENOUS ONCE
Status: COMPLETED | OUTPATIENT
Start: 2017-11-25 | End: 2017-11-25

## 2017-11-25 RX ORDER — ONDANSETRON 2 MG/ML
4 INJECTION INTRAMUSCULAR; INTRAVENOUS ONCE
Status: COMPLETED | OUTPATIENT
Start: 2017-11-25 | End: 2017-11-25

## 2017-11-25 RX ORDER — AZITHROMYCIN 250 MG/1
250 TABLET, FILM COATED ORAL DAILY
Qty: 4 TABLET | Refills: 0 | Status: SHIPPED | OUTPATIENT
Start: 2017-11-25 | End: 2017-11-29

## 2017-11-25 RX ADMIN — KETOROLAC TROMETHAMINE 15 MG: 30 INJECTION, SOLUTION INTRAMUSCULAR at 18:44

## 2017-11-25 RX ADMIN — ONDANSETRON 4 MG: 2 INJECTION INTRAMUSCULAR; INTRAVENOUS at 18:44

## 2017-11-25 RX ADMIN — AZITHROMYCIN 500 MG: 250 TABLET, FILM COATED ORAL at 19:34

## 2017-11-25 RX ADMIN — SODIUM CHLORIDE 1000 ML: 0.9 INJECTION, SOLUTION INTRAVENOUS at 18:40

## 2017-11-25 NOTE — ED NOTES
Patient reports feeling like his heart is racing when laying down        Dong Brothers RN  11/25/17 0862

## 2017-11-25 NOTE — ED PROVIDER NOTES
History  Chief Complaint   Patient presents with    Cough     seen here two weeks ago diagnosed with URI, feels as though symptoms have not improved  64 YO male presents with URI symptoms  Pt states he did have a URI weeks prior and was seen in the ED at the time of onset  These symptoms did improve somewhat with supportive care, he does state the cough never completely resolved  Pt states he woke this morning with worsening cough, runny nose, sinus pressure, sore throat, loss of voice this morning  Pt complains of a headache, constant nausea and abdominal discomfort with coughing  States he was playing with a nephew yesterday who had similar symptoms  Pt denies CP/SOB/F/C/D/C, no dysuria, burning on urination or blood in urine  History provided by:  Patient   used: No    Cough   Cough characteristics:  Non-productive  Severity:  Moderate  Onset quality:  Gradual  Duration:  1 day  Timing:  Constant  Progression:  Unchanged  Chronicity:  New  Smoker: yes    Context: sick contacts and upper respiratory infection    Relieved by:  Nothing  Worsened by:  Nothing  Ineffective treatments:  None tried  Associated symptoms: rhinorrhea, sinus congestion and sore throat    Associated symptoms: no chest pain, no ear pain, no fever, no rash and no shortness of breath    Rhinorrhea:     Quality:  Clear    Severity:  Moderate    Duration:  1 day    Timing:  Constant    Progression:  Unchanged  Sore throat:     Severity:  Moderate    Onset quality:  Gradual    Duration:  1 day    Timing:  Constant    Progression:  Unchanged      Prior to Admission Medications   Prescriptions Last Dose Informant Patient Reported? Taking? Fenofibrate (TRICOR PO)   Yes Yes   Sig: Take by mouth daily   multivitamin (THERAGRAN) TABS   Yes Yes   Sig: Take 1 tablet by mouth   omeprazole (PriLOSEC) 20 mg delayed release capsule   Yes Yes   Sig: Take 20 mg by mouth daily     rosuvastatin (CRESTOR) 5 mg tablet   Yes Yes Sig: Take 5 mg by mouth daily      Facility-Administered Medications: None       Past Medical History:   Diagnosis Date    COPD (chronic obstructive pulmonary disease) (HCC)     GERD (gastroesophageal reflux disease)     Hyperlipidemia        Past Surgical History:   Procedure Laterality Date    CHOLECYSTECTOMY      CYST REMOVAL      FEMUR SURGERY      KNEE SURGERY      ROTATOR CUFF REPAIR         History reviewed  No pertinent family history  I have reviewed and agree with the history as documented  Social History   Substance Use Topics    Smoking status: Current Every Day Smoker     Packs/day: 0 50    Smokeless tobacco: Never Used    Alcohol use Yes      Comment: occasional         Review of Systems   Constitutional: Negative for fever  HENT: Positive for rhinorrhea and sore throat  Negative for dental problem and ear pain  Eyes: Negative for visual disturbance  Respiratory: Positive for cough  Negative for shortness of breath  Cardiovascular: Negative for chest pain  Gastrointestinal: Negative for abdominal pain, nausea and vomiting  Genitourinary: Negative for dysuria and frequency  Musculoskeletal: Negative for neck pain and neck stiffness  Skin: Negative for rash  Neurological: Negative for dizziness, weakness and light-headedness  Psychiatric/Behavioral: Negative for agitation, behavioral problems and confusion  All other systems reviewed and are negative        Physical Exam  ED Triage Vitals [11/25/17 1634]   Temperature Pulse Respirations Blood Pressure SpO2   98 7 °F (37 1 °C) (!) 116 18 170/88 95 %      Temp Source Heart Rate Source Patient Position - Orthostatic VS BP Location FiO2 (%)   Temporal Monitor Sitting Right arm --      Pain Score       6           Orthostatic Vital Signs  Vitals:    11/25/17 1634 11/25/17 1850 11/25/17 1938   BP: 170/88 141/67 142/68   Pulse: (!) 116 86 84   Patient Position - Orthostatic VS: Sitting Lying Sitting       Physical Exam Constitutional: He is oriented to person, place, and time  He appears well-developed and well-nourished  HENT:   Head: Normocephalic and atraumatic  Mouth/Throat: Oropharynx is clear and moist    Eyes: EOM are normal    Neck: Normal range of motion  Cardiovascular: Normal rate, regular rhythm and normal heart sounds  Pulmonary/Chest: Effort normal and breath sounds normal    Abdominal: Soft  Musculoskeletal: Normal range of motion  Neurological: He is alert and oriented to person, place, and time  Skin: Skin is warm and dry  Psychiatric: He has a normal mood and affect  His behavior is normal    Nursing note and vitals reviewed  ED Medications  Medications   sodium chloride 0 9 % bolus 1,000 mL (0 mL Intravenous Stopped 11/25/17 1938)   ondansetron (ZOFRAN) injection 4 mg (4 mg Intravenous Given 11/25/17 1844)   ketorolac (TORADOL) 30 mg/mL injection 15 mg (15 mg Intravenous Given 11/25/17 1844)   azithromycin (ZITHROMAX) tablet 500 mg (500 mg Oral Given 11/25/17 1934)       Diagnostic Studies  Results Reviewed     Procedure Component Value Units Date/Time    Basic metabolic panel [[id number]] Collected:  11/25/17 1839    Lab Status:  Final result Specimen:  Blood from Arm, Left Updated:  11/25/17 1859     Sodium 139 mmol/L      Potassium 4 3 mmol/L      Chloride 104 mmol/L      CO2 24 mmol/L      Anion Gap 11 mmol/L      BUN 21 mg/dL      Creatinine 1 08 mg/dL      Glucose 94 mg/dL      Calcium 9 5 mg/dL      eGFR 74 ml/min/1 73sq m     Narrative:         National Kidney Disease Education Program recommendations are as follows:  GFR calculation is accurate only with a steady state creatinine  Chronic Kidney disease less than 60 ml/min/1 73 sq  meters  Kidney failure less than 15 ml/min/1 73 sq  meters      Hepatic function panel [[id number]]  (Abnormal) Collected:  11/25/17 1839    Lab Status:  Final result Specimen:  Blood from Arm, Left Updated:  11/25/17 1859     Total Bilirubin 0 28 mg/dL Bilirubin, Direct 0 10 mg/dL      Alkaline Phosphatase 73 U/L      AST 33 U/L      ALT 54 U/L      Total Protein 8 3 (H) g/dL      Albumin 4 2 g/dL     Magnesium [[id number]]  (Normal) Collected:  11/25/17 1839    Lab Status:  Final result Specimen:  Blood from Arm, Left Updated:  11/25/17 1859     Magnesium 2 1 mg/dL     Lipase [[id number]]  (Normal) Collected:  11/25/17 1839    Lab Status:  Final result Specimen:  Blood from Arm, Left Updated:  11/25/17 1859     Lipase 268 u/L     CBC and differential [[id number]]  (Abnormal) Collected:  11/25/17 1839    Lab Status:  Final result Specimen:  Blood from Arm, Left Updated:  11/25/17 1848     WBC 11 90 (H) Thousand/uL      RBC 4 30 Million/uL      Hemoglobin 13 4 g/dL      Hematocrit 39 7 %      MCV 92 fL      MCH 31 2 pg      MCHC 33 8 g/dL      RDW 13 3 %      MPV 12 2 fL      Platelets 226 Thousands/uL      nRBC 0 /100 WBCs      Neutrophils Relative 69 %      Lymphocytes Relative 19 %      Monocytes Relative 11 %      Eosinophils Relative 0 %      Basophils Relative 1 %      Neutrophils Absolute 8 22 (H) Thousands/µL      Lymphocytes Absolute 2 24 Thousands/µL      Monocytes Absolute 1 35 (H) Thousand/µL      Eosinophils Absolute 0 03 Thousand/µL      Basophils Absolute 0 06 Thousands/µL                  XR chest 2 views   ED Interpretation by Kim Vázquez MD (11/25 1908)   No PNA                 Procedures  ECG 12 Lead Documentation  Date/Time: 11/25/2017 7:28 PM  Performed by: Dimitris Cruz by: Elio MARAVILLA     ECG reviewed by me, the ED Provider: yes    Patient location:  ED  Interpretation:     Interpretation: normal    Rate:     ECG rate:  106    ECG rate assessment: normal    Rhythm:     Rhythm: sinus rhythm and sinus tachycardia    Ectopy:     Ectopy: none    QRS:     QRS axis:  Normal    QRS intervals:  Normal  Conduction:     Conduction: normal    ST segments:     ST segments:  Normal  T waves:     T waves: normal             Phone Contacts  ED Phone Contact    ED Course  ED Course                                MDM  Number of Diagnoses or Management Options  Upper respiratory infection: new and requires workup  Diagnosis management comments: 1  URI - Pt with continuing symptoms for the last 2 weeks, worsening after contact with sick family member  Pt is having headaches and abdominal pain, will obtain CBC for markers of inflammation, electrolytes, LFT's and lipase  Will check CXR for PNA  Give fluids, anti-emetics  Amount and/or Complexity of Data Reviewed  Clinical lab tests: ordered and reviewed  Tests in the radiology section of CPT®: ordered and reviewed  Independent visualization of images, tracings, or specimens: yes    Patient Progress  Patient progress: stable    CritCare Time    Disposition  Final diagnoses:   Upper respiratory infection     Time reflects when diagnosis was documented in both MDM as applicable and the Disposition within this note     Time User Action Codes Description Comment    11/25/2017  7:24 PM Grecia Tim [J06 9] Upper respiratory infection       ED Disposition     ED Disposition Condition Comment    Discharge  Grover Thomas discharge to home/self care  Condition at discharge: Stable        Follow-up Information    None       Discharge Medication List as of 11/25/2017  7:27 PM      START taking these medications    Details   azithromycin (ZITHROMAX) 250 mg tablet Take 1 tablet by mouth daily for 4 days, Starting Sat 11/25/2017, Until Wed 11/29/2017, Print      benzonatate (TESSALON PERLES) 100 mg capsule Take 1 capsule by mouth every 8 (eight) hours, Starting Sat 11/25/2017, Print         CONTINUE these medications which have NOT CHANGED    Details   Fenofibrate (TRICOR PO) Take by mouth daily, Until Discontinued, Historical Med      multivitamin (THERAGRAN) TABS Take 1 tablet by mouth, Until Discontinued, Historical Med      omeprazole (PriLOSEC) 20 mg delayed release capsule Take 20 mg by mouth daily  , Until Discontinued, Historical Med      rosuvastatin (CRESTOR) 5 mg tablet Take 5 mg by mouth daily, Historical Med           No discharge procedures on file      ED Provider  Electronically Signed by           Telma Oneill MD  11/25/17 6790

## 2017-11-25 NOTE — ED NOTES
Dr Sepulveda Score at pts bedside      Caylavalentin Fuentes, [facility], [id number] [facility]  11/25/17 4550

## 2017-11-26 LAB
ATRIAL RATE: 106 BPM
P AXIS: 72 DEGREES
PR INTERVAL: 142 MS
QRS AXIS: 84 DEGREES
QRSD INTERVAL: 80 MS
QT INTERVAL: 312 MS
QTC INTERVAL: 414 MS
T WAVE AXIS: 70 DEGREES
VENTRICULAR RATE: 106 BPM

## 2017-11-26 NOTE — DISCHARGE INSTRUCTIONS
Start taking the azithromycin tomorrow, take this once daily for the next 4 days  You can try the tessalon for cough  Call your family doctor, you should be seen in the office to make sure your symptoms are improving  Upper Respiratory Infection   WHAT YOU NEED TO KNOW:   An upper respiratory infection is also called the common cold  It is an infection that can affect your nose, throat, ears, and sinuses  For healthy people, the common cold is usually not serious and does not need special treatment  Cold symptoms are usually worst for the first 3 to 5 days  Most people get better in 7 to 14 days  You may continue to cough for 2 to 3 weeks  Colds are caused by viruses and do not get better with antibiotics  DISCHARGE INSTRUCTIONS:   Return to the emergency department if:   · You have chest pain or trouble breathing  Contact your healthcare provider if:   · You have a fever over 102ºF (39°C)  · Your sore throat gets worse or you see white or yellow spots in your throat  · Your symptoms get worse after 3 to 5 days or your cold is not better in 14 days  · You have a rash anywhere on your skin  · You have large, tender lumps in your neck  · You have thick, green or yellow drainage from your nose  · You cough up thick yellow, green, or bloody mucus  · You have vomiting for more than 24 hours and cannot keep fluids down  · You have a bad earache  · You have questions or concerns about your condition or care  Medicines: You may need any of the following:  · Decongestants  help reduce nasal congestion and help you breathe more easily  If you take decongestant pills, they may make you feel restless or cause problems with your sleep  Do not use decongestant sprays for more than a few days  · Cough suppressants  help reduce coughing  Ask your healthcare provider which type of cough medicine is best for you  · NSAIDs , such as ibuprofen, help decrease swelling, pain, and fever  NSAIDs can cause stomach bleeding or kidney problems in certain people  If you take blood thinner medicine, always ask your healthcare provider if NSAIDs are safe for you  Always read the medicine label and follow directions  · Acetaminophen  decreases pain and fever  It is available without a doctor's order  Ask how much to take and how often to take it  Follow directions  Read the labels of all other medicines you are using to see if they also contain acetaminophen, or ask your doctor or pharmacist  Acetaminophen can cause liver damage if not taken correctly  Do not use more than 4 grams (4,000 milligrams) total of acetaminophen in one day  · Take your medicine as directed  Contact your healthcare provider if you think your medicine is not helping or if you have side effects  Tell him or her if you are allergic to any medicine  Keep a list of the medicines, vitamins, and herbs you take  Include the amounts, and when and why you take them  Bring the list or the pill bottles to follow-up visits  Carry your medicine list with you in case of an emergency  Follow up with your healthcare provider as directed:  Write down your questions so you remember to ask them during your visits  Self-care:   · Rest as much as possible  Slowly start to do more each day  · Drink more liquids as directed  Liquids will help thin and loosen mucus so you can cough it up  Liquids will also help prevent dehydration  Liquids that help prevent dehydration include water, fruit juice, and broth  Do not drink liquids that contain caffeine  Caffeine can increase your risk for dehydration  Ask your healthcare provider how much liquid to drink each day  · Soothe a sore throat  Gargle with warm salt water  This helps your sore throat feel better  Make salt water by dissolving ¼ teaspoon salt in 1 cup warm water  You may also suck on hard candy or throat lozenges  You may use a sore throat spray      · Use a humidifier or vaporizer  Use a cool mist humidifier or a vaporizer to increase air moisture in your home  This may make it easier for you to breathe and help decrease your cough  · Use saline nasal drops as directed  These help relieve congestion  · Apply petroleum-based jelly around the outside of your nostrils  This can decrease irritation from blowing your nose  · Do not smoke  Nicotine and other chemicals in cigarettes and cigars can make your symptoms worse  They can also cause infections such as bronchitis or pneumonia  Ask your healthcare provider for information if you currently smoke and need help to quit  E-cigarettes or smokeless tobacco still contain nicotine  Talk to your healthcare provider before you use these products  Prevent spreading your cold to others:   · Try to stay away from other people during the first 2 to 3 days of your cold when it is more easily spread  · Do not share food or drinks  · Do not share hand towels with household members  · Wash your hands often, especially after you blow your nose  Turn away from other people and cover your mouth and nose with a tissue when you sneeze or cough  © 2017 [address] [facility] Information is for End User's use only and may not be sold, redistributed or otherwise used for commercial purposes  All illustrations and images included in CareNotes® are the copyrighted property of "[employer]  or [employer] Jackson  The above information is an  only  It is not intended as medical advice for individual conditions or treatments  Talk to your doctor, nurse or pharmacist before following any medical regimen to see if it is safe and effective for you

## 2018-09-24 ENCOUNTER — HOSPITAL ENCOUNTER (EMERGENCY)
Facility: HOSPITAL | Age: 63
Discharge: HOME | End: 2018-09-24
Attending: EMERGENCY MEDICINE
Payer: COMMERCIAL

## 2018-09-24 ENCOUNTER — APPOINTMENT (EMERGENCY)
Dept: CT IMAGING | Facility: HOSPITAL | Age: 63
End: 2018-09-24
Payer: COMMERCIAL

## 2018-09-24 VITALS
BODY MASS INDEX: 25.09 KG/M2 | RESPIRATION RATE: 18 BRPM | OXYGEN SATURATION: 96 % | TEMPERATURE: 98.2 F | SYSTOLIC BLOOD PRESSURE: 125 MMHG | HEART RATE: 75 BPM | DIASTOLIC BLOOD PRESSURE: 75 MMHG | WEIGHT: 165 LBS

## 2018-09-24 DIAGNOSIS — S16.1XXA STRAIN OF NECK MUSCLE, INITIAL ENCOUNTER: ICD-10-CM

## 2018-09-24 DIAGNOSIS — S29.012A UPPER BACK STRAIN, INITIAL ENCOUNTER: ICD-10-CM

## 2018-09-24 DIAGNOSIS — S09.90XA CLOSED HEAD INJURY, INITIAL ENCOUNTER: Primary | ICD-10-CM

## 2018-09-24 PROCEDURE — 99284 EMERGENCY DEPT VISIT MOD MDM: CPT

## 2018-09-24 PROCEDURE — 70450 CT HEAD/BRAIN W/O DYE: CPT

## 2018-09-24 PROCEDURE — 72128 CT CHEST SPINE W/O DYE: CPT

## 2018-09-24 PROCEDURE — 72125 CT NECK SPINE W/O DYE: CPT

## 2018-09-24 RX ORDER — CYCLOBENZAPRINE HCL 10 MG
10 TABLET ORAL 2 TIMES DAILY PRN
Qty: 20 TABLET | Refills: 0 | Status: SHIPPED | OUTPATIENT
Start: 2018-09-24 | End: 2020-02-09 | Stop reason: ALTCHOICE

## 2018-09-24 NOTE — DISCHARGE INSTRUCTIONS
Head Injury   WHAT YOU NEED TO KNOW:   A head injury is most often caused by a blow to the head  This may occur from a fall, bicycle injury, sports injury, being struck in the head, or a motor vehicle accident  DISCHARGE INSTRUCTIONS:   Call 911 or have someone else call for any of the following:   · You cannot be woken  · You have a seizure  · You stop responding to others or you faint  · You have blurry or double vision  · Your speech becomes slurred or confused  · You have arm or leg weakness, loss of feeling, or new problems with coordination  · Your pupils are larger than usual or one pupil is a different size than the other  · You have blood or clear fluid coming out of your ears or nose  Return to the emergency department if:   · You have repeated or forceful vomiting  · You feel confused  · Your headache gets worse or becomes severe  · You or someone caring for you notices that you are harder to wake than usual   Contact your healthcare provider if:   · Your symptoms last longer than 6 weeks after the injury  · You have questions or concerns about your condition or care  Medicines:   · Acetaminophen  decreases pain  Acetaminophen is available without a doctor's order  Ask how much to take and how often to take it  Follow directions  Acetaminophen can cause liver damage if not taken correctly  · Take your medicine as directed  Contact your healthcare provider if you think your medicine is not helping or if you have side effects  Tell him or her if you are allergic to any medicine  Keep a list of the medicines, vitamins, and herbs you take  Include the amounts, and when and why you take them  Bring the list or the pill bottles to follow-up visits  Carry your medicine list with you in case of an emergency  Self-care:   · Rest  or do quiet activities for 24 to 48 hours  Limit your time watching TV, using the computer, or doing tasks that require a lot of thinking  Slowly return to your normal activities as directed  Do not play sports or do activities that may cause you to get hit in the head  Ask your healthcare provider when you can return to sports  · Apply ice  on your head for 15 to 20 minutes every hour or as directed  Use an ice pack, or put crushed ice in a plastic bag  Cover it with a towel before you apply it to your skin  Ice helps prevent tissue damage and decreases swelling and pain  · Have someone stay with you for 24 hours  or as directed  This person can monitor you for complications and call [phone]  When you are awake the person should ask you a few questions to see if you are thinking clearly  An example would be to ask your name or your address  Prevent another head injury:   · Wear a helmet that fits properly  Do this when you play sports, or ride a bike, scooter, or skateboard  Helmets help decrease your risk of a serious head injury  Talk to your healthcare provider about other ways you can protect yourself if you play sports  · Wear your seat belt every time you are in a car  This helps to decrease your risk for a head injury if you are in a car accident  Follow up with your healthcare provider as directed:  Write down your questions so you remember to ask them during your visits  © 2017 [address] Information is for End User's use only and may not be sold, redistributed or otherwise used for commercial purposes  All illustrations and images included in CareNotes® are the copyrighted property of A D A M , Inc  or [employer] Jackson  The above information is an  only  It is not intended as medical advice for individual conditions or treatments  Talk to your doctor, nurse or pharmacist before following any medical regimen to see if it is safe and effective for you  Cervical Strain   WHAT YOU NEED TO KNOW:   A cervical strain is a stretched or torn muscle or tendon in your neck   Tendons are strong tissues that connect muscles to bones  Common causes of cervical strains include a car accident, a fall, or a sports injury  DISCHARGE INSTRUCTIONS:   Return to the emergency department if:   · You have pain or numbness from your shoulder down to your hand  · You have problems with your vision, hearing, or balance  · You feel confused or cannot concentrate  · You have problems with movement and strength  Contact your healthcare provider if:   · You have increased swelling or pain in your neck  · You have questions or concerns about your condition or care  Medicines: You may need any of the following:  · Acetaminophen  decreases pain and fever  It is available without a doctor's order  Ask how much to take and how often to take it  Follow directions  Read the labels of all other medicines you are using to see if they also contain acetaminophen, or ask your doctor or pharmacist  Acetaminophen can cause liver damage if not taken correctly  Do not use more than 4 grams (4,000 milligrams) total of acetaminophen in one day  · NSAIDs , such as ibuprofen, help decrease swelling, pain, and fever  This medicine is available with or without a doctor's order  NSAIDs can cause stomach bleeding or kidney problems in certain people  If you take blood thinner medicine, always ask your healthcare provider if NSAIDs are safe for you  Always read the medicine label and follow directions  · Muscle relaxers  help decrease pain and muscle spasms  · Prescription pain medicine  may be given  Ask your healthcare provider how to take this medicine safely  Some prescription pain medicines contain acetaminophen  Do not take other medicines that contain acetaminophen without talking to your healthcare provider  Too much acetaminophen may cause liver damage  Prescription pain medicine may cause constipation  Ask your healthcare provider how to prevent or treat constipation  · Take your medicine as directed  Contact your healthcare provider if you think your medicine is not helping or if you have side effects  Tell him or her if you are allergic to any medicine  Keep a list of the medicines, vitamins, and herbs you take  Include the amounts, and when and why you take them  Bring the list or the pill bottles to follow-up visits  Carry your medicine list with you in case of an emergency  Manage your symptoms:   · Apply heat  on your neck for 15 to 20 minutes, 4 to 6 times a day or as directed  Heat helps decrease pain, stiffness, and muscle spasms  · Begin gentle neck exercises  as soon as you can move your neck without pain  Exercises will help decrease stiffness and improve the strength and movement of your neck  Ask your healthcare provider what kind of exercises you should do  · Gradually return to your usual activities as directed  Stop if you have pain  Avoid activities that can cause more damage to your neck, such as heavy lifting or strenuous exercise  · Sleep without a pillow  to help decrease pain  Instead, roll a small towel tightly and place it under your neck  · Go to physical therapy as directed  A physical therapist teaches you exercises to help improve movement and strength, and to decrease pain  Prevent neck injury:   · Drive safely  Make sure everyone in your car wears a seatbelt  A seatbelt can save your life if you are in an accident  Do not use your cell phone when you are driving  This could distract you and cause an accident  Pull over if you need to make a call or send a text message  · Wear helmets, lifejackets, and protective gear  Always wear a helmet when you ride a bike or motorcycle, go skiing, or play sports that could cause a head injury  Wear protective equipment when you play sports  Wear a lifejacket when you are on a boat or doing water sports  Follow up with your healthcare provider as directed: You may be referred to an orthopedist or physical therapies   Write down your questions so you remember to ask them during your visits  © 2017 [address] Information is for End User's use only and may not be sold, redistributed or otherwise used for commercial purposes  All illustrations and images included in CareNotes® are the copyrighted property of A D A M [employer] Inc  or [employer]enzin Jackson  The above information is an  only  It is not intended as medical advice for individual conditions or treatments  Talk to your doctor, nurse or pharmacist before following any medical regimen to see if it is safe and effective for you

## 2018-09-24 NOTE — ED NOTES
Patient transported to [address] [place] [address] Drive, [facility]  09/24/18 9595

## 2018-09-24 NOTE — ED PROVIDER NOTES
History  Chief Complaint   Patient presents with    Head Injury     Patient reports playing with grandson and lost balance and hit his head on drawer of desk  Denies LOC, reports he was dazed  +nausea  Pt  Was playing with his grandson yest  And fell backwards , hitting his head , neck and upper back against a wooden desk door  ?LOC, felt dazed for about 10 sec , +nausea, no vomiting  Pt  Ate okay today  Prior to Admission Medications   Prescriptions Last Dose Informant Patient Reported? Taking? ALBUTEROL IN   Yes Yes   Sig: Inhale as needed   Budesonide-Formoterol Fumarate (SYMBICORT IN)   Yes Yes   Sig: Inhale 2 puffs 2 (two) times a day   Fenofibrate (TRICOR PO)   Yes Yes   Sig: Take by mouth daily   multivitamin (THERAGRAN) TABS   Yes Yes   Sig: Take 1 tablet by mouth   omeprazole (PriLOSEC) 20 mg delayed release capsule   Yes Yes   Sig: Take 20 mg by mouth daily  rosuvastatin (CRESTOR) 5 mg tablet   Yes Yes   Sig: Take 5 mg by mouth daily      Facility-Administered Medications: None       Past Medical History:   Diagnosis Date    COPD (chronic obstructive pulmonary disease) (HCC)     Fibromyalgia     GERD (gastroesophageal reflux disease)     Hyperlipidemia        Past Surgical History:   Procedure Laterality Date    CHOLECYSTECTOMY      CYST REMOVAL      FEMUR SURGERY      KNEE SURGERY      ROTATOR CUFF REPAIR         History reviewed  No pertinent family history  I have reviewed and agree with the history as documented  Social History   Substance Use Topics    Smoking status: Current Every Day Smoker     Packs/day: 0 50    Smokeless tobacco: Never Used    Alcohol use Yes      Comment: occasional         Review of Systems   Constitutional: Negative for appetite change, fatigue and fever  HENT: Negative for rhinorrhea and sore throat  Respiratory: Negative for cough, shortness of breath and wheezing  Cardiovascular: Negative for chest pain and leg swelling  Gastrointestinal: Positive for nausea  Negative for abdominal pain, diarrhea and vomiting  Genitourinary: Negative for dysuria and flank pain  Musculoskeletal: Positive for back pain and neck pain  Skin: Negative for rash  Neurological: Positive for headaches  Negative for syncope  Psychiatric/Behavioral:        Mood normal       Physical Exam  Physical Exam   Constitutional: He is oriented to person, place, and time  He appears well-developed and well-nourished  HENT:   Head: Normocephalic and atraumatic  Mouth/Throat: Oropharynx is clear and moist    Eyes: Pupils are equal, round, and reactive to light  Neck: Neck supple  Lower cspine/upper tspine tenderness, mild (no point tenderness)   Cardiovascular: Normal rate and regular rhythm  Pulmonary/Chest: Effort normal and breath sounds normal    Abdominal: Soft  There is no tenderness  Musculoskeletal: Normal range of motion  Neurological: He is alert and oriented to person, place, and time  Skin: Skin is warm and dry  Nursing note and vitals reviewed  Vital Signs  ED Triage Vitals [09/24/18 1152]   Temperature Pulse Respirations Blood Pressure SpO2   98 2 °F (36 8 °C) 94 16 156/78 96 %      Temp Source Heart Rate Source Patient Position - Orthostatic VS BP Location FiO2 (%)   Oral Monitor Sitting Right arm --      Pain Score       5           Vitals:    09/24/18 1152 09/24/18 1347   BP: 156/78 125/75   Pulse: 94 75   Patient Position - Orthostatic VS: Sitting Sitting       Visual Acuity  Visual Acuity      Most Recent Value   L Pupil Size (mm)  6   R Pupil Size (mm)  6          ED Medications  Medications - No data to display    Diagnostic Studies  Results Reviewed     None                 CT thoracic spine without contrast   Final Result by Cielo Helton MD (09/24 1412)      No acute osseous abnormality in the thoracic spine              Workstation performed: [id number]         CT cervical spine without contrast   Final Result by Dian Muro MD (09/24 1401)      No cervical spine fracture or traumatic malalignment  Workstation performed: [id number]         CT head without contrast   Final Result by Dian Muro MD (09/24 1405)      No acute intracranial abnormality  Workstation performed: [id number]                    Procedures  Procedures       Phone Contacts  ED Phone Contact    ED Course                               MDM  Number of Diagnoses or Management Options  Closed head injury, initial encounter:   Strain of neck muscle, initial encounter:   Upper back strain, initial encounter:      Amount and/or Complexity of Data Reviewed  Tests in the radiology section of CPT®: ordered and reviewed    Risk of Complications, Morbidity, and/or Mortality  Presenting problems: moderate      CritCare Time    Disposition  Final diagnoses:   Closed head injury, initial encounter   Strain of neck muscle, initial encounter   Upper back strain, initial encounter     Time reflects when diagnosis was documented in both MDM as applicable and the Disposition within this note     Time User Action Codes Description Comment    9/24/2018  2:42 PM Patience Velazquez Add [S09 90XA] Closed head injury, initial encounter     9/24/2018  2:42 PM Min Velazquez Add Cayenne Copping  1XXA] Strain of neck muscle, initial encounter     9/24/2018  2:42 PM Min Velazquez Add [S29 012A] Upper back strain, initial encounter       ED Disposition     ED Disposition Condition Comment    Discharge  [place] discharge to home/self care      Condition at discharge: Stable        Follow-up Information     Follow up With Specialties Details Why Contact Info    Skye Aburto MD Family Medicine   [place] [id number] [address] [place]   [phone]            Discharge Medication List as of 9/24/2018  2:43 PM      CONTINUE these medications which have NOT CHANGED    Details   ALBUTEROL IN Inhale as needed, Historical Med      Budesonide-Formoterol Fumarate (SYMBICORT IN) Inhale 2 puffs 2 (two) times a day, Historical Med      Fenofibrate (TRICOR PO) Take by mouth daily, Until Discontinued, Historical Med      multivitamin (THERAGRAN) TABS Take 1 tablet by mouth, Until Discontinued, Historical Med      omeprazole (PriLOSEC) 20 mg delayed release capsule Take 20 mg by mouth daily  , Until Discontinued, Historical Med      rosuvastatin (CRESTOR) 5 mg tablet Take 5 mg by mouth daily, Historical Med           No discharge procedures on file      ED Provider  Electronically Signed by           Yousif Tsai MD  09/25/18 4181

## 2018-10-01 ENCOUNTER — HOSPITAL ENCOUNTER (OUTPATIENT)
Dept: RADIOLOGY | Facility: HOSPITAL | Age: 63
Discharge: HOME | End: 2018-10-01
Payer: COMMERCIAL

## 2018-10-01 ENCOUNTER — TRANSCRIBE ORDERS (OUTPATIENT)
Dept: ADMINISTRATIVE | Facility: HOSPITAL | Age: 63
End: 2018-10-01

## 2018-10-01 DIAGNOSIS — R05.9 COUGH: Primary | ICD-10-CM

## 2018-10-01 DIAGNOSIS — R05.9 COUGH: ICD-10-CM

## 2018-10-01 PROCEDURE — 71046 X-RAY EXAM CHEST 2 VIEWS: CPT

## 2018-12-20 ENCOUNTER — HOSPITAL ENCOUNTER (OUTPATIENT)
Dept: RADIOLOGY | Facility: HOSPITAL | Age: 63
Discharge: HOME | End: 2018-12-20
Payer: OTHER MISCELLANEOUS

## 2018-12-20 ENCOUNTER — TRANSCRIBE ORDERS (OUTPATIENT)
Dept: ADMINISTRATIVE | Facility: HOSPITAL | Age: 63
End: 2018-12-20

## 2018-12-20 DIAGNOSIS — S90.02XA CONTUSION OF LEFT ANKLE, INITIAL ENCOUNTER: ICD-10-CM

## 2018-12-20 DIAGNOSIS — S90.02XA CONTUSION OF LEFT ANKLE, INITIAL ENCOUNTER: Primary | ICD-10-CM

## 2018-12-20 PROCEDURE — 73610 X-RAY EXAM OF ANKLE: CPT

## 2019-02-17 ENCOUNTER — HOSPITAL ENCOUNTER (EMERGENCY)
Facility: HOSPITAL | Age: 64
Discharge: HOME | End: 2019-02-17
Attending: EMERGENCY MEDICINE | Admitting: EMERGENCY MEDICINE
Payer: COMMERCIAL

## 2019-02-17 ENCOUNTER — APPOINTMENT (EMERGENCY)
Dept: CT IMAGING | Facility: HOSPITAL | Age: 64
End: 2019-02-17
Payer: COMMERCIAL

## 2019-02-17 ENCOUNTER — APPOINTMENT (EMERGENCY)
Dept: RADIOLOGY | Facility: HOSPITAL | Age: 64
End: 2019-02-17
Payer: COMMERCIAL

## 2019-02-17 VITALS
BODY MASS INDEX: 25.74 KG/M2 | OXYGEN SATURATION: 98 % | HEART RATE: 89 BPM | DIASTOLIC BLOOD PRESSURE: 85 MMHG | SYSTOLIC BLOOD PRESSURE: 163 MMHG | TEMPERATURE: 97.6 F | RESPIRATION RATE: 22 BRPM | WEIGHT: 169.31 LBS

## 2019-02-17 DIAGNOSIS — S52.502A CLOSED FRACTURE OF DISTAL END OF LEFT RADIUS, UNSPECIFIED FRACTURE MORPHOLOGY, INITIAL ENCOUNTER: ICD-10-CM

## 2019-02-17 DIAGNOSIS — S52.601A CLOSED FRACTURE OF DISTAL END OF RIGHT ULNA, UNSPECIFIED FRACTURE MORPHOLOGY, INITIAL ENCOUNTER: ICD-10-CM
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
## 2019-02-17 DIAGNOSIS — S22.32XA CLOSED FRACTURE OF ONE RIB OF LEFT SIDE, INITIAL ENCOUNTER: ICD-10-CM

## 2019-02-17 PROCEDURE — 99284 EMERGENCY DEPT VISIT MOD MDM: CPT

## 2019-02-17 PROCEDURE — 73110 X-RAY EXAM OF WRIST: CPT

## 2019-02-17 PROCEDURE — 71250 CT THORAX DX C-: CPT

## 2019-02-17 PROCEDURE — 71101 X-RAY EXAM UNILAT RIBS/CHEST: CPT

## 2019-02-17 RX ORDER — IBUPROFEN 400 MG/1
400 TABLET ORAL ONCE
Status: COMPLETED | OUTPATIENT
Start: 2019-02-17 | End: 2019-02-17

## 2019-02-17 RX ORDER — ACETAMINOPHEN 325 MG/1
650 TABLET ORAL ONCE
Status: COMPLETED | OUTPATIENT
Start: 2019-02-17 | End: 2019-02-17

## 2019-02-17 RX ORDER — IBUPROFEN 600 MG/1
600 TABLET ORAL EVERY 6 HOURS PRN
Qty: 30 TABLET | Refills: 0 | Status: SHIPPED | OUTPATIENT
Start: 2019-02-17 | End: 2021-08-19 | Stop reason: HOSPADM

## 2019-02-17 RX ORDER — OXYCODONE HYDROCHLORIDE AND ACETAMINOPHEN 5; 325 MG/1; MG/1
1 TABLET ORAL EVERY 6 HOURS PRN
Qty: 9 TABLET | Refills: 0 | Status: SHIPPED | OUTPATIENT
Start: 2019-02-17 | End: 2019-02-20

## 2019-02-17 RX ADMIN — IBUPROFEN 400 MG: 400 TABLET ORAL at 03:09

## 2019-02-17 RX ADMIN — ACETAMINOPHEN 650 MG: 325 TABLET, FILM COATED ORAL at 03:09

## 2019-02-17 NOTE — ED PROVIDER NOTES
History  Chief Complaint   Patient presents with    Rib Pain     Patient presents via EMS, tripped and fell at wedding yesterday evening, striking left hand/wrist and ribs on the left side  Hand swollen  Reports pain with breathing leading to him feeling "short of breath"  Denies head strike/loc  This is a 59year old male who states was at a wedding yesterday and tripped and fell injuring his left wrist and rib cage  He states he is SOB with inspiration  He denies taking anything for pain  He states he went home and tried to sleep but due to the pain was unable  He was brought to the ED by EMS and states will take a taxi home  History provided by:  Patient and medical records   used: No        Prior to Admission Medications   Prescriptions Last Dose Informant Patient Reported? Taking? ALBUTEROL IN   Yes No   Sig: Inhale as needed   Budesonide-Formoterol Fumarate (SYMBICORT IN)   Yes No   Sig: Inhale 2 puffs 2 (two) times a day   Fenofibrate (TRICOR PO)   Yes No   Sig: Take by mouth daily   cyclobenzaprine (FLEXERIL) 10 mg tablet   No No   Sig: Take 1 tablet (10 mg total) by mouth 2 (two) times a day as needed for muscle spasms   multivitamin (THERAGRAN) TABS   Yes No   Sig: Take 1 tablet by mouth   omeprazole (PriLOSEC) 20 mg delayed release capsule   Yes No   Sig: Take 20 mg by mouth daily  rosuvastatin (CRESTOR) 5 mg tablet   Yes No   Sig: Take 5 mg by mouth daily      Facility-Administered Medications: None       Past Medical History:   Diagnosis Date    COPD (chronic obstructive pulmonary disease) (HCC)     Fibromyalgia     GERD (gastroesophageal reflux disease)     Hyperlipidemia        Past Surgical History:   Procedure Laterality Date    ANKLE FRACTURE SURGERY      Left Side    CHOLECYSTECTOMY      CYST REMOVAL      FEMUR SURGERY      KNEE SURGERY      ROTATOR CUFF REPAIR         History reviewed  No pertinent family history    I have reviewed and agree with the history as documented  Social History     Tobacco Use    Smoking status: Current Every Day Smoker     Packs/day: 0 50    Smokeless tobacco: Never Used   Substance Use Topics    Alcohol use: Yes     Comment: occasional     Drug use: No        Review of Systems   Musculoskeletal:        Left wrist and rib pain    All other systems reviewed and are negative  Physical Exam  Physical Exam   Constitutional: He is oriented to person, place, and time  He appears well-developed and well-nourished  No distress  HENT:   Head: Normocephalic and atraumatic  Eyes: Pupils are equal, round, and reactive to light  EOM are normal    Neck: Normal range of motion  Neck supple  Cardiovascular: Normal rate, regular rhythm and normal heart sounds  Pulmonary/Chest: Effort normal    Left lobe exhibits rubbing in all fields     Abdominal: Soft  Bowel sounds are normal  He exhibits no distension  There is no tenderness  Musculoskeletal: He exhibits edema, tenderness and deformity  Left wrist - slight deformity, edema and TTP  Unable to flex or extend  + radial pulse    Left ribs TTP  No step offs palpated ribs 7-12        Neurological: He is alert and oriented to person, place, and time  He displays normal reflexes  No cranial nerve deficit or sensory deficit  He exhibits normal muscle tone  Coordination normal    Skin: Skin is warm and dry  Capillary refill takes less than 2 seconds  He is not diaphoretic  Psychiatric: He has a normal mood and affect  His behavior is normal  Judgment and thought content normal    Nursing note and vitals reviewed        Vital Signs  ED Triage Vitals [02/17/19 0245]   Temperature Pulse Respirations Blood Pressure SpO2   97 6 °F (36 4 °C) 89 22 163/85 98 %      Temp Source Heart Rate Source Patient Position - Orthostatic VS BP Location FiO2 (%)   Oral Monitor Sitting Right arm --      Pain Score       Worst Possible Pain           Vitals:    02/17/19 0245   BP: 163/85   Pulse: 89 Patient Position - Orthostatic VS: Sitting       Visual Acuity      ED Medications  Medications   acetaminophen (TYLENOL) tablet 650 mg (650 mg Oral Given 2/17/19 0309)   ibuprofen (MOTRIN) tablet 400 mg (400 mg Oral Given 2/17/19 0309)       Diagnostic Studies  Results Reviewed     None                 CT chest without contrast   Final Result by Luz Blackwood DO (02/17 0041)      Nondisplaced fracture of the left lateral 5th rib  The study was marked in [facility] for immediate notification  Workstation performed: [id number]         XR wrist 3+ views LEFT    (Results Pending)   XR ribs with pa chest min 3 views LEFT    (Results Pending)              Procedures  Procedures       Phone Contacts  ED Phone Contact    ED Course  ED Course as of Feb 17 0507   Sun Feb 17, 2019   5841 Preliminary reading CXR ribs - negative  Pt states that they are really painful  Will CT  Xray left wrist + fracture distal radius and ulnar - will splint  5694 IMPRESSION:     Nondisplaced fracture of the left lateral 5th rib       [id number] Radiology results reviewed and discussed with pt  Pt verbalizes understanding of all dc instructions and follow up                                     MDM  Number of Diagnoses or Management Options  Diagnosis management comments: Differential diagnosis  Left rib fracture   Left wrist fracture  Pneumothorax    Plan  CXR with left ribs  Wrist xray  Tylenol and motrin        Disposition  Final diagnoses:   Closed fracture of distal end of left radius, unspecified fracture morphology, initial encounter   Closed fracture of distal end of right ulna, unspecified fracture morphology, initial encounter   Fall, initial encounter   Closed fracture of one rib of left side, initial encounter - 5th rib     Time reflects when diagnosis was documented in both MDM as applicable and the Disposition within this note     Time User Action Codes Description Comment    2/17/2019  3:37 AM Garrett Tim [S20 212A] Rib contusion, left, initial encounter     2/17/2019  3:37 AM Sang Homans Add [S52 502A] Closed fracture of distal end of left radius, unspecified fracture morphology, initial encounter     2/17/2019  3:37 AM Sang Homans Add [S52 601A] Closed fracture of distal end of right ulna, unspecified fracture morphology, initial encounter     2/17/2019  3:38 AM Sang Homans Add Iris Hallmark  MKBM] Fall, initial encounter     2/17/2019  3:38 AM Sang Homans Modify [J34 634K] Rib contusion, left, initial encounter     2/17/2019  3:38 AM Sang Homans Modify [S21  YHWP] Fall, initial encounter     2/17/2019  4:35 AM Sang Homans Remove [M81 837I] Rib contusion, left, initial encounter     2/17/2019  4:36 AM Sang Homans Add [S22 32XA] Closed fracture of one rib of left side, initial encounter     2/17/2019  4:36 AM Sang Homans Remove [S22 32XA] Closed fracture of one rib of left side, initial encounter     2/17/2019  4:36 AM Sang Homans Add [S22 32XA] Closed fracture of one rib of left side, initial encounter     2/17/2019  4:36 AM Sang Homans Modify [S22 32XA] Closed fracture of one rib of left side, initial encounter 5th rib      ED Disposition     ED Disposition Condition Date/Time Comment    Discharge Stable Sun Feb 17, 2019  3:37 AM Olamide Weaver discharge to home/self care              Follow-up Information     Follow up With Specialties Details Why Contact Info Additional Information    Bing Candelaria MD Family Medicine Schedule an appointment as soon as possible for a visit in 2 days  [facility] [address] Emergency Department Emergency Medicine  If symptoms worsen [facility] [id number]  [phone] [facility], [address] [place], [place], [address] [place] [address] Orthopedic Surgery Schedule an appointment as soon as possible for a visit in 2 days  [address] [facility] [address] [phone] [address] [facility], [address] [place] [address],  [address] [place] [address], [phone]          Discharge Medication List as of 2/17/2019  4:39 AM      START taking these medications    Details   ibuprofen (MOTRIN) 600 mg tablet Take 1 tablet (600 mg total) by mouth every 6 (six) hours as needed for mild pain or moderate pain, Starting Sun 2/17/2019, Print      oxyCODONE-acetaminophen (PERCOCET) 5-325 mg per tablet Take 1 tablet by mouth every 6 (six) hours as needed for severe pain for up to 3 daysMax Daily Amount: 4 tablets, Starting Sun 2/17/2019, Until Wed 2/20/2019, Print         CONTINUE these medications which have NOT CHANGED    Details   ALBUTEROL IN Inhale as needed, Historical Med      Budesonide-Formoterol Fumarate (SYMBICORT IN) Inhale 2 puffs 2 (two) times a day, Historical Med      cyclobenzaprine (FLEXERIL) 10 mg tablet Take 1 tablet (10 mg total) by mouth 2 (two) times a day as needed for muscle spasms, Starting Mon 9/24/2018, Print      Fenofibrate (TRICOR PO) Take by mouth daily, Until Discontinued, Historical Med      multivitamin (THERAGRAN) TABS Take 1 tablet by mouth, Until Discontinued, Historical Med      omeprazole (PriLOSEC) 20 mg delayed release capsule Take 20 mg by mouth daily  , Until Discontinued, Historical Med      rosuvastatin (CRESTOR) 5 mg tablet Take 5 mg by mouth daily, Historical Med           No discharge procedures on file      ED Provider  Electronically Signed by           Vanna Cannon  02/17/19 0199

## 2019-02-17 NOTE — DISCHARGE INSTRUCTIONS
You have a left 5th rib fracture  You also have a wrist fracture and have a splint in place  You are not to remove the splint - you need to see ortho  You are to use the incentive spirometer every hour while awake    You are to take the percocet as needed for severe pain -  do not drink alcohol or drive machinery while taking  You may take motrin for pain  Follow up with ortho and your PCP

## 2019-03-06 ENCOUNTER — HOSPITAL ENCOUNTER (EMERGENCY)
Facility: HOSPITAL | Age: 64
Discharge: HOME | End: 2019-03-06
Attending: EMERGENCY MEDICINE
Payer: COMMERCIAL

## 2019-03-06 ENCOUNTER — APPOINTMENT (EMERGENCY)
Dept: RADIOLOGY | Facility: HOSPITAL | Age: 64
End: 2019-03-06
Payer: COMMERCIAL

## 2019-03-06 VITALS
WEIGHT: 164 LBS | OXYGEN SATURATION: 98 % | SYSTOLIC BLOOD PRESSURE: 123 MMHG | DIASTOLIC BLOOD PRESSURE: 89 MMHG | HEART RATE: 82 BPM | BODY MASS INDEX: 24.94 KG/M2 | TEMPERATURE: 97.8 F | RESPIRATION RATE: 20 BRPM

## 2019-03-06 DIAGNOSIS — M25.532 LEFT WRIST PAIN: Primary | ICD-10-CM

## 2019-03-06 PROCEDURE — 99283 EMERGENCY DEPT VISIT LOW MDM: CPT

## 2019-03-06 PROCEDURE — 73110 X-RAY EXAM OF WRIST: CPT

## 2019-03-06 RX ORDER — IBUPROFEN 600 MG/1
600 TABLET ORAL ONCE
Status: COMPLETED | OUTPATIENT
Start: 2019-03-06 | End: 2019-03-06

## 2019-03-06 RX ADMIN — IBUPROFEN 600 MG: 600 TABLET ORAL at 18:15

## 2019-03-06 NOTE — ED PROVIDER NOTES
History  Chief Complaint   Patient presents with    Wrist Injury     Patient reports he fx L wrist on the February the16th, had sx on the 19th involving hardware  Patient reports falling today on L hand, now c/o increased pain to wrist  L radial pulse present, denies numbness/tingling  This is a 62year old male presenting for left wrist pain after a fall about 1 5 hours prior to arrival  He was leaving work when he slipped on ice and caught himself with his left hand out stretched  He is now having pain to the left wrist  No numbness, tingling, or weakness  He fractured this wrist 20 days ago and had surgery with plate placement 18 days ago  He was still having some minor soreness after his surgery but the pain is much worse now  Mild edema but no visible deformity  The pain is throughout the whole wrist but does not extend into the hand or proximal forearm  He also was having some right knee pain but that is now resolved  No head injury, LOC, nausea, vomiting, abdominal pain, chest pain, shortness of breath  No treatments for this yet  Prior to Admission Medications   Prescriptions Last Dose Informant Patient Reported? Taking? ALBUTEROL IN   Yes No   Sig: Inhale as needed   Budesonide-Formoterol Fumarate (SYMBICORT IN)   Yes No   Sig: Inhale 2 puffs 2 (two) times a day   Fenofibrate (TRICOR PO)   Yes No   Sig: Take by mouth daily   cyclobenzaprine (FLEXERIL) 10 mg tablet   No No   Sig: Take 1 tablet (10 mg total) by mouth 2 (two) times a day as needed for muscle spasms   ibuprofen (MOTRIN) 600 mg tablet   No No   Sig: Take 1 tablet (600 mg total) by mouth every 6 (six) hours as needed for mild pain or moderate pain   multivitamin (THERAGRAN) TABS   Yes No   Sig: Take 1 tablet by mouth   omeprazole (PriLOSEC) 20 mg delayed release capsule   Yes No   Sig: Take 20 mg by mouth daily     rosuvastatin (CRESTOR) 5 mg tablet   Yes No   Sig: Take 5 mg by mouth daily      Facility-Administered Medications: None       Past Medical History:   Diagnosis Date    COPD (chronic obstructive pulmonary disease) (HCC)     Fibromyalgia     GERD (gastroesophageal reflux disease)     Hyperlipidemia        Past Surgical History:   Procedure Laterality Date    ANKLE FRACTURE SURGERY      Left Side    CHOLECYSTECTOMY      CYST REMOVAL      FEMUR SURGERY      KNEE SURGERY      ROTATOR CUFF REPAIR      WRIST FRACTURE SURGERY Left        History reviewed  No pertinent family history  I have reviewed and agree with the history as documented  Social History     Tobacco Use    Smoking status: Current Every Day Smoker     Packs/day: 0 50    Smokeless tobacco: Never Used   Substance Use Topics    Alcohol use: Yes     Comment: occasional     Drug use: No        Review of Systems   Constitutional: Negative for fever  Respiratory: Negative for chest tightness and shortness of breath  Cardiovascular: Negative for chest pain  Gastrointestinal: Negative for abdominal pain, nausea and vomiting  Musculoskeletal: Positive for arthralgias  Negative for back pain  Skin: Positive for wound  Neurological: Negative for dizziness, syncope, weakness, numbness and headaches  Physical Exam  Physical Exam   Constitutional: He appears well-developed and well-nourished  No distress  HENT:   Head: Normocephalic and atraumatic  Mouth/Throat: Oropharynx is clear and moist    Eyes: Pupils are equal, round, and reactive to light  Conjunctivae and EOM are normal    Cardiovascular: Normal rate, regular rhythm and normal heart sounds  Pulmonary/Chest: Effort normal and breath sounds normal  No stridor  No respiratory distress  He has no wheezes  He has no rales  Musculoskeletal:   Left wrist: There is a healed surgical scar on the palmar aspect of the distal forearm  Mild edema to the wrist, no erythema or acute wound  Tenderness to palpation throughout the wrist, no tenderness to the hand or proximal forearm   Sensation intact throughout  3/5  strength compared to the right  FAROM of the fingers, ROM of the wrist slightly limited  2+ radial and ulnar pulses bilaterally  Right knee: There is a mild abrasion to the knee but no bony tenderness, full range of motion, no pain  Neurological: He is alert  GCS eye subscore is 4  GCS verbal subscore is 5  GCS motor subscore is 6  Skin: He is not diaphoretic  Nursing note and vitals reviewed  Vital Signs  ED Triage Vitals [03/06/19 1712]   Temperature Pulse Respirations Blood Pressure SpO2   97 8 °F (36 6 °C) 82 20 123/89 98 %      Temp Source Heart Rate Source Patient Position - Orthostatic VS BP Location FiO2 (%)   Oral Monitor Sitting Right arm --      Pain Score       6           Vitals:    03/06/19 1712   BP: 123/89   Pulse: 82   Patient Position - Orthostatic VS: Sitting       Visual Acuity      ED Medications  Medications   ibuprofen (MOTRIN) tablet 600 mg (600 mg Oral Given 3/6/19 1815)       Diagnostic Studies  Results Reviewed     None                 XR wrist 3+ views LEFT   ED Interpretation by Deandre Castillo PA-C (03/06 1805)   No acute fracture      Final Result by Lauren Forman MD (03/06 1836)      Volar plate screw fixation of the distal radial fracture satisfactory alignment  Hardware intact  Stable displaced ulnar styloid process fracture  Significant soft tissue swelling surrounds the fracture site  Workstation performed: [id number]                    Procedures  Procedures       Phone Contacts  ED Phone Contact    ED Course                               MDM  Number of Diagnoses or Management Options  Left wrist pain:   Diagnosis management comments: Patient with recent left wrist surgery s/p distal radius fracture and new [address] injury today  No numbness, tingling, or weakness  Xray with stable fracture and hardware appears intact  Likely wrist sprain  Patient has a splint at home that he will use   Continue to ice, tylenol, and motrin  If pain persisting for longer than a few days, he will call his surgeon at [facility] for follow up  Patient understands and in agreement  Amount and/or Complexity of Data Reviewed  Tests in the radiology section of CPT®: ordered and reviewed  Independent visualization of images, tracings, or specimens: yes    Patient Progress  Patient progress: stable      Disposition  Final diagnoses:   Left wrist pain     Time reflects when diagnosis was documented in both MDM as applicable and the Disposition within this note     Time User Action Codes Description Comment    3/6/2019  6:43 PM Raymond Tim [M25 [id number]] Left wrist pain       ED Disposition     ED Disposition Condition Date/Time Comment    Discharge Stable Wed Mar 6, 2019  6:43 PM Troy Valdez discharge to home/self care              Follow-up Information     Follow up With Specialties Details Why Contact Info Additional [address] [facility] \[facility] [place] [facility]\"" Orthopedic Surgery Schedule an appointment as soon as possible for a visit in 3 days  [address] [facility] [address]  [address] [place] [address], [address] [facility],  [address] [place] [address], [phone] [address] [facility] Emergency Department Emergency Medicine Go to  If symptoms worsen [facility] [address]  [phone] [place] ED, [address] [facility]  , [place], [place], [address]          Discharge Medication List as of 3/6/2019  6:45 PM      CONTINUE these medications which have NOT CHANGED    Details   ALBUTEROL IN Inhale as needed, Historical Med      Budesonide-Formoterol Fumarate (SYMBICORT IN) Inhale 2 puffs 2 (two) times a day, Historical Med      cyclobenzaprine (FLEXERIL) 10 mg tablet Take 1 tablet (10 mg total) by mouth 2 (two) times a day as needed for muscle spasms, Starting Mon 9/24/2018, Print      Fenofibrate (TRICOR PO) Take by mouth daily, Until Discontinued, Historical Med      ibuprofen (MOTRIN) 600 mg tablet Take 1 tablet (600 mg total) by mouth every 6 (six) hours as needed for mild pain or moderate pain, Starting Sun 2/17/2019, Print      multivitamin (THERAGRAN) TABS Take 1 tablet by mouth, Until Discontinued, Historical Med      omeprazole (PriLOSEC) 20 mg delayed release capsule Take 20 mg by mouth daily  , Until Discontinued, Historical Med      rosuvastatin (CRESTOR) 5 mg tablet Take 5 mg by mouth daily, Historical Med           No discharge procedures on file      ED Provider  Electronically Signed by           Zachary Stovall PA-C  03/06/19 1353

## 2019-05-14 ENCOUNTER — TRANSCRIBE ORDERS (OUTPATIENT)
Dept: ADMINISTRATIVE | Facility: HOSPITAL | Age: 64
End: 2019-05-14

## 2019-05-14 ENCOUNTER — HOSPITAL ENCOUNTER (OUTPATIENT)
Dept: RADIOLOGY | Facility: HOSPITAL | Age: 64
Discharge: HOME | End: 2019-05-14
Payer: COMMERCIAL

## 2019-05-14 DIAGNOSIS — M24.672: ICD-10-CM

## 2019-05-14 DIAGNOSIS — M25.572 ACUTE LEFT ANKLE PAIN: Primary | ICD-10-CM

## 2019-05-14 DIAGNOSIS — M25.572 ACUTE LEFT ANKLE PAIN: ICD-10-CM

## 2019-05-14 PROCEDURE — 73610 X-RAY EXAM OF ANKLE: CPT

## 2019-11-08 ENCOUNTER — HOSPITAL ENCOUNTER (OUTPATIENT)
Dept: RADIOLOGY | Facility: HOSPITAL | Age: 64
Discharge: HOME | End: 2019-11-08
Payer: COMMERCIAL

## 2019-11-08 ENCOUNTER — TRANSCRIBE ORDERS (OUTPATIENT)
Dept: ADMINISTRATIVE | Facility: HOSPITAL | Age: 64
End: 2019-11-08

## 2019-11-08 DIAGNOSIS — M79.641 BILATERAL HAND PAIN: Primary | ICD-10-CM

## 2019-11-08 DIAGNOSIS — M79.641 BILATERAL HAND PAIN: ICD-10-CM

## 2019-11-08 DIAGNOSIS — M79.642 BILATERAL HAND PAIN: ICD-10-CM

## 2019-11-08 DIAGNOSIS — M79.642 BILATERAL HAND PAIN: Primary | ICD-10-CM

## 2019-11-08 PROCEDURE — 73120 X-RAY EXAM OF HAND: CPT

## 2019-12-16 ENCOUNTER — TRANSCRIBE ORDERS (OUTPATIENT)
Dept: ADMINISTRATIVE | Facility: HOSPITAL | Age: 64
End: 2019-12-16

## 2019-12-16 ENCOUNTER — HOSPITAL ENCOUNTER (OUTPATIENT)
Dept: RADIOLOGY | Facility: HOSPITAL | Age: 64
Discharge: HOME | End: 2019-12-16
Payer: COMMERCIAL

## 2019-12-16 DIAGNOSIS — J43.1 PANACINAR EMPHYSEMA (HCC): Primary | ICD-10-CM

## 2019-12-16 DIAGNOSIS — J43.1 PANACINAR EMPHYSEMA (HCC): ICD-10-CM

## 2019-12-16 PROCEDURE — 71046 X-RAY EXAM CHEST 2 VIEWS: CPT

## 2020-02-09 ENCOUNTER — HOSPITAL ENCOUNTER (INPATIENT)
Facility: HOSPITAL | Age: 65
LOS: 2 days | Discharge: HOME | DRG: 190 | End: 2020-02-11
Attending: EMERGENCY MEDICINE | Admitting: INTERNAL MEDICINE
Payer: COMMERCIAL

## 2020-02-09 ENCOUNTER — APPOINTMENT (EMERGENCY)
Dept: RADIOLOGY | Facility: HOSPITAL | Age: 65
DRG: 190 | End: 2020-02-09
Payer: COMMERCIAL

## 2020-02-09 DIAGNOSIS — R00.0 SINUS TACHYCARDIA: Primary | ICD-10-CM

## 2020-02-09 DIAGNOSIS — R65.10 SIRS (SYSTEMIC INFLAMMATORY RESPONSE SYNDROME) (HCC): ICD-10-CM

## 2020-02-09 DIAGNOSIS — J44.9 COPD (CHRONIC OBSTRUCTIVE PULMONARY DISEASE) (HCC): ICD-10-CM

## 2020-02-09 DIAGNOSIS — R05.9 COUGH: ICD-10-CM

## 2020-02-09 DIAGNOSIS — R50.9 FEVER: ICD-10-CM

## 2020-02-09 DIAGNOSIS — J44.1 COPD EXACERBATION (HCC): ICD-10-CM

## 2020-02-09 PROBLEM — N18.30 CKD (CHRONIC KIDNEY DISEASE) STAGE 3, GFR 30-59 ML/MIN (HCC): Status: ACTIVE | Noted: 2020-02-09

## 2020-02-09 PROBLEM — F17.200 NICOTINE DEPENDENCE: Status: ACTIVE | Noted: 2020-02-09

## 2020-02-09 PROBLEM — J15.9 COMMUNITY ACQUIRED BACTERIAL PNEUMONIA: Status: ACTIVE | Noted: 2020-02-09

## 2020-02-09 LAB
ALBUMIN SERPL BCP-MCNC: 4.1 G/DL (ref 3.5–5)
ALP SERPL-CCNC: 78 U/L (ref 46–116)
ALT SERPL W P-5'-P-CCNC: 39 U/L (ref 12–78)
ANION GAP SERPL CALCULATED.3IONS-SCNC: 12 MMOL/L (ref 4–13)
APTT PPP: 26 SECONDS (ref 23–37)
AST SERPL W P-5'-P-CCNC: 25 U/L (ref 5–45)
ATRIAL RATE: 110 BPM
BASOPHILS # BLD AUTO: 0.07 THOUSANDS/ΜL (ref 0–0.1)
BASOPHILS NFR BLD AUTO: 1 % (ref 0–1)
BILIRUB SERPL-MCNC: 0.51 MG/DL (ref 0.2–1)
BUN SERPL-MCNC: 19 MG/DL (ref 5–25)
CALCIUM SERPL-MCNC: 9.2 MG/DL (ref 8.3–10.1)
CHLORIDE SERPL-SCNC: 100 MMOL/L (ref 100–108)
CO2 SERPL-SCNC: 24 MMOL/L (ref 21–32)
CREAT SERPL-MCNC: 1.38 MG/DL (ref 0.6–1.3)
EOSINOPHIL # BLD AUTO: 0.08 THOUSAND/ΜL (ref 0–0.61)
EOSINOPHIL NFR BLD AUTO: 1 % (ref 0–6)
ERYTHROCYTE [DISTWIDTH] IN BLOOD BY AUTOMATED COUNT: 12.6 % (ref 11.6–15.1)
FLUAV RNA NPH QL NAA+PROBE: NORMAL
FLUBV RNA NPH QL NAA+PROBE: NORMAL
GFR SERPL CREATININE-BSD FRML MDRD: 54 ML/MIN/1.73SQ M
GLUCOSE SERPL-MCNC: 140 MG/DL (ref 65–140)
HCT VFR BLD AUTO: 40.3 % (ref 36.5–49.3)
HGB BLD-MCNC: 13.3 G/DL (ref 12–17)
IMM GRANULOCYTES # BLD AUTO: 0.07 THOUSAND/UL (ref 0–0.2)
IMM GRANULOCYTES NFR BLD AUTO: 1 % (ref 0–2)
INR PPP: 1 (ref 0.84–1.19)
LACTATE SERPL-SCNC: 1.8 MMOL/L (ref 0.5–2)
LIPASE SERPL-CCNC: 213 U/L (ref 73–393)
LYMPHOCYTES # BLD AUTO: 0.91 THOUSANDS/ΜL (ref 0.6–4.47)
LYMPHOCYTES NFR BLD AUTO: 9 % (ref 14–44)
MCH RBC QN AUTO: 30.8 PG (ref 26.8–34.3)
MCHC RBC AUTO-ENTMCNC: 33 G/DL (ref 31.4–37.4)
MCV RBC AUTO: 93 FL (ref 82–98)
MONOCYTES # BLD AUTO: 1.14 THOUSAND/ΜL (ref 0.17–1.22)
MONOCYTES NFR BLD AUTO: 11 % (ref 4–12)
NEUTROPHILS # BLD AUTO: 8.29 THOUSANDS/ΜL (ref 1.85–7.62)
NEUTS SEG NFR BLD AUTO: 77 % (ref 43–75)
NRBC BLD AUTO-RTO: 0 /100 WBCS
NT-PROBNP SERPL-MCNC: 162 PG/ML
P AXIS: 35 DEGREES
PLATELET # BLD AUTO: 201 THOUSANDS/UL (ref 149–390)
PLATELET # BLD AUTO: 235 THOUSANDS/UL (ref 149–390)
PMV BLD AUTO: 12.2 FL (ref 8.9–12.7)
PMV BLD AUTO: 12.5 FL (ref 8.9–12.7)
POTASSIUM SERPL-SCNC: 4.1 MMOL/L (ref 3.5–5.3)
PR INTERVAL: 138 MS
PROCALCITONIN SERPL-MCNC: 0.11 NG/ML
PROT SERPL-MCNC: 8.4 G/DL (ref 6.4–8.2)
PROTHROMBIN TIME: 13.3 SECONDS (ref 11.6–14.5)
QRS AXIS: 81 DEGREES
QRSD INTERVAL: 80 MS
QT INTERVAL: 298 MS
QTC INTERVAL: 403 MS
RBC # BLD AUTO: 4.32 MILLION/UL (ref 3.88–5.62)
RSV RNA NPH QL NAA+PROBE: NORMAL
SODIUM SERPL-SCNC: 136 MMOL/L (ref 136–145)
T WAVE AXIS: 57 DEGREES
TROPONIN I SERPL-MCNC: <0.02 NG/ML
TROPONIN I SERPL-MCNC: <0.02 NG/ML
VENTRICULAR RATE: 110 BPM
WBC # BLD AUTO: 10.56 THOUSAND/UL (ref 4.31–10.16)

## 2020-02-09 PROCEDURE — 83880 ASSAY OF NATRIURETIC PEPTIDE: CPT | Performed by: EMERGENCY MEDICINE

## 2020-02-09 PROCEDURE — 84484 ASSAY OF TROPONIN QUANT: CPT | Performed by: INTERNAL MEDICINE

## 2020-02-09 PROCEDURE — 96361 HYDRATE IV INFUSION ADD-ON: CPT

## 2020-02-09 PROCEDURE — 99223 1ST HOSP IP/OBS HIGH 75: CPT | Performed by: INTERNAL MEDICINE

## 2020-02-09 PROCEDURE — 83605 ASSAY OF LACTIC ACID: CPT | Performed by: EMERGENCY MEDICINE

## 2020-02-09 PROCEDURE — 96374 THER/PROPH/DIAG INJ IV PUSH: CPT

## 2020-02-09 PROCEDURE — 83690 ASSAY OF LIPASE: CPT | Performed by: EMERGENCY MEDICINE

## 2020-02-09 PROCEDURE — 85730 THROMBOPLASTIN TIME PARTIAL: CPT | Performed by: EMERGENCY MEDICINE

## 2020-02-09 PROCEDURE — 96375 TX/PRO/DX INJ NEW DRUG ADDON: CPT

## 2020-02-09 PROCEDURE — 87040 BLOOD CULTURE FOR BACTERIA: CPT | Performed by: EMERGENCY MEDICINE

## 2020-02-09 PROCEDURE — 93010 ELECTROCARDIOGRAM REPORT: CPT | Performed by: INTERNAL MEDICINE

## 2020-02-09 PROCEDURE — 99285 EMERGENCY DEPT VISIT HI MDM: CPT

## 2020-02-09 PROCEDURE — 85049 AUTOMATED PLATELET COUNT: CPT | Performed by: INTERNAL MEDICINE

## 2020-02-09 PROCEDURE — 93005 ELECTROCARDIOGRAM TRACING: CPT

## 2020-02-09 PROCEDURE — 85025 COMPLETE CBC W/AUTO DIFF WBC: CPT | Performed by: EMERGENCY MEDICINE

## 2020-02-09 PROCEDURE — 36415 COLL VENOUS BLD VENIPUNCTURE: CPT | Performed by: EMERGENCY MEDICINE

## 2020-02-09 PROCEDURE — 84145 PROCALCITONIN (PCT): CPT | Performed by: INTERNAL MEDICINE

## 2020-02-09 PROCEDURE — 85610 PROTHROMBIN TIME: CPT | Performed by: EMERGENCY MEDICINE

## 2020-02-09 PROCEDURE — 80053 COMPREHEN METABOLIC PANEL: CPT | Performed by: EMERGENCY MEDICINE

## 2020-02-09 PROCEDURE — 71046 X-RAY EXAM CHEST 2 VIEWS: CPT

## 2020-02-09 PROCEDURE — 87631 RESP VIRUS 3-5 TARGETS: CPT | Performed by: EMERGENCY MEDICINE

## 2020-02-09 PROCEDURE — 84484 ASSAY OF TROPONIN QUANT: CPT | Performed by: EMERGENCY MEDICINE

## 2020-02-09 PROCEDURE — 99285 EMERGENCY DEPT VISIT HI MDM: CPT | Performed by: EMERGENCY MEDICINE

## 2020-02-09 PROCEDURE — 94640 AIRWAY INHALATION TREATMENT: CPT

## 2020-02-09 RX ORDER — METHYLPREDNISOLONE SODIUM SUCCINATE 125 MG/2ML
125 INJECTION, POWDER, LYOPHILIZED, FOR SOLUTION INTRAMUSCULAR; INTRAVENOUS ONCE
Status: COMPLETED | OUTPATIENT
Start: 2020-02-09 | End: 2020-02-09

## 2020-02-09 RX ORDER — ASPIRIN 325 MG
325 TABLET ORAL ONCE
Status: COMPLETED | OUTPATIENT
Start: 2020-02-09 | End: 2020-02-09

## 2020-02-09 RX ORDER — PRAVASTATIN SODIUM 80 MG/1
80 TABLET ORAL
Status: DISCONTINUED | OUTPATIENT
Start: 2020-02-09 | End: 2020-02-11 | Stop reason: HOSPADM

## 2020-02-09 RX ORDER — LEVALBUTEROL 1.25 MG/.5ML
1.25 SOLUTION, CONCENTRATE RESPIRATORY (INHALATION) EVERY 6 HOURS
Status: DISCONTINUED | OUTPATIENT
Start: 2020-02-09 | End: 2020-02-11

## 2020-02-09 RX ORDER — NICOTINE 21 MG/24HR
1 PATCH, TRANSDERMAL 24 HOURS TRANSDERMAL DAILY
Status: DISCONTINUED | OUTPATIENT
Start: 2020-02-09 | End: 2020-02-11 | Stop reason: HOSPADM

## 2020-02-09 RX ORDER — CALCIUM CARBONATE 200(500)MG
1000 TABLET,CHEWABLE ORAL DAILY PRN
Status: DISCONTINUED | OUTPATIENT
Start: 2020-02-09 | End: 2020-02-11 | Stop reason: HOSPADM

## 2020-02-09 RX ORDER — SODIUM CHLORIDE, SODIUM GLUCONATE, SODIUM ACETATE, POTASSIUM CHLORIDE, MAGNESIUM CHLORIDE, SODIUM PHOSPHATE, DIBASIC, AND POTASSIUM PHOSPHATE .53; .5; .37; .037; .03; .012; .00082 G/100ML; G/100ML; G/100ML; G/100ML; G/100ML; G/100ML; G/100ML
100 INJECTION, SOLUTION INTRAVENOUS CONTINUOUS
Status: DISCONTINUED | OUTPATIENT
Start: 2020-02-09 | End: 2020-02-10

## 2020-02-09 RX ORDER — GUAIFENESIN 600 MG
600 TABLET, EXTENDED RELEASE 12 HR ORAL 2 TIMES DAILY
Status: DISCONTINUED | OUTPATIENT
Start: 2020-02-09 | End: 2020-02-11 | Stop reason: HOSPADM

## 2020-02-09 RX ORDER — ICOSAPENT ETHYL 500 MG/1
2 CAPSULE ORAL 2 TIMES DAILY
COMMUNITY

## 2020-02-09 RX ORDER — FLUTICASONE PROPIONATE 50 MCG
1 SPRAY, SUSPENSION (ML) NASAL DAILY
Status: DISCONTINUED | OUTPATIENT
Start: 2020-02-09 | End: 2020-02-11 | Stop reason: HOSPADM

## 2020-02-09 RX ORDER — PREGABALIN 75 MG/1
150 CAPSULE ORAL 2 TIMES DAILY
Status: DISCONTINUED | OUTPATIENT
Start: 2020-02-09 | End: 2020-02-11 | Stop reason: HOSPADM

## 2020-02-09 RX ORDER — SIMETHICONE 80 MG
80 TABLET,CHEWABLE ORAL 4 TIMES DAILY PRN
Status: DISCONTINUED | OUTPATIENT
Start: 2020-02-09 | End: 2020-02-11 | Stop reason: HOSPADM

## 2020-02-09 RX ORDER — FENOFIBRATE 145 MG/1
145 TABLET, COATED ORAL DAILY
COMMUNITY

## 2020-02-09 RX ORDER — FENOFIBRATE 145 MG/1
145 TABLET, COATED ORAL DAILY
Status: DISCONTINUED | OUTPATIENT
Start: 2020-02-09 | End: 2020-02-11 | Stop reason: HOSPADM

## 2020-02-09 RX ORDER — AZITHROMYCIN 250 MG/1
500 TABLET, FILM COATED ORAL EVERY 24 HOURS
Status: DISCONTINUED | OUTPATIENT
Start: 2020-02-10 | End: 2020-02-11 | Stop reason: HOSPADM

## 2020-02-09 RX ORDER — PREGABALIN 150 MG/1
150 CAPSULE ORAL 2 TIMES DAILY
COMMUNITY
End: 2021-08-30

## 2020-02-09 RX ORDER — MAGNESIUM HYDROXIDE/ALUMINUM HYDROXICE/SIMETHICONE 120; 1200; 1200 MG/30ML; MG/30ML; MG/30ML
30 SUSPENSION ORAL EVERY 6 HOURS PRN
Status: DISCONTINUED | OUTPATIENT
Start: 2020-02-09 | End: 2020-02-11 | Stop reason: HOSPADM

## 2020-02-09 RX ORDER — IPRATROPIUM BROMIDE AND ALBUTEROL SULFATE 2.5; .5 MG/3ML; MG/3ML
3 SOLUTION RESPIRATORY (INHALATION) EVERY 4 HOURS PRN
Status: DISCONTINUED | OUTPATIENT
Start: 2020-02-09 | End: 2020-02-11 | Stop reason: HOSPADM

## 2020-02-09 RX ORDER — METHYLPREDNISOLONE SODIUM SUCCINATE 40 MG/ML
40 INJECTION, POWDER, LYOPHILIZED, FOR SOLUTION INTRAMUSCULAR; INTRAVENOUS EVERY 8 HOURS
Status: DISCONTINUED | OUTPATIENT
Start: 2020-02-09 | End: 2020-02-10

## 2020-02-09 RX ORDER — IPRATROPIUM BROMIDE AND ALBUTEROL SULFATE 2.5; .5 MG/3ML; MG/3ML
3 SOLUTION RESPIRATORY (INHALATION)
Status: DISCONTINUED | OUTPATIENT
Start: 2020-02-09 | End: 2020-02-09

## 2020-02-09 RX ORDER — ACETAMINOPHEN 325 MG/1
975 TABLET ORAL ONCE
Status: COMPLETED | OUTPATIENT
Start: 2020-02-09 | End: 2020-02-09

## 2020-02-09 RX ORDER — FLUTICASONE PROPIONATE 50 MCG
1 SPRAY, SUSPENSION (ML) NASAL DAILY
Status: ON HOLD | COMMUNITY
End: 2021-09-08 | Stop reason: ALTCHOICE

## 2020-02-09 RX ORDER — ROSUVASTATIN CALCIUM 10 MG/1
5 TABLET, COATED ORAL
COMMUNITY
End: 2022-05-11

## 2020-02-09 RX ORDER — LORATADINE 10 MG/1
10 TABLET ORAL DAILY
Status: DISCONTINUED | OUTPATIENT
Start: 2020-02-09 | End: 2020-02-11 | Stop reason: HOSPADM

## 2020-02-09 RX ORDER — LANOLIN ALCOHOL/MO/W.PET/CERES
3 CREAM (GRAM) TOPICAL
Status: DISCONTINUED | OUTPATIENT
Start: 2020-02-09 | End: 2020-02-11 | Stop reason: HOSPADM

## 2020-02-09 RX ORDER — BENZONATATE 100 MG/1
100 CAPSULE ORAL 3 TIMES DAILY PRN
Status: DISCONTINUED | OUTPATIENT
Start: 2020-02-09 | End: 2020-02-11 | Stop reason: HOSPADM

## 2020-02-09 RX ORDER — IBUPROFEN 400 MG/1
400 TABLET ORAL ONCE
Status: DISCONTINUED | OUTPATIENT
Start: 2020-02-09 | End: 2020-02-09

## 2020-02-09 RX ORDER — NICOTINE 21 MG/24HR
1 PATCH, TRANSDERMAL 24 HOURS TRANSDERMAL DAILY
Status: DISCONTINUED | OUTPATIENT
Start: 2020-02-09 | End: 2020-02-09 | Stop reason: SDUPTHER

## 2020-02-09 RX ORDER — DOCUSATE SODIUM 100 MG/1
100 CAPSULE, LIQUID FILLED ORAL 2 TIMES DAILY
Status: DISCONTINUED | OUTPATIENT
Start: 2020-02-09 | End: 2020-02-11 | Stop reason: HOSPADM

## 2020-02-09 RX ORDER — CETIRIZINE HYDROCHLORIDE 10 MG/1
10 TABLET ORAL DAILY
COMMUNITY

## 2020-02-09 RX ORDER — CHLORAL HYDRATE 500 MG
2000 CAPSULE ORAL 2 TIMES DAILY
Status: DISCONTINUED | OUTPATIENT
Start: 2020-02-09 | End: 2020-02-11 | Stop reason: HOSPADM

## 2020-02-09 RX ORDER — SODIUM CHLORIDE FOR INHALATION 0.9 %
VIAL, NEBULIZER (ML) INHALATION
Status: COMPLETED
Start: 2020-02-09 | End: 2020-02-09

## 2020-02-09 RX ORDER — ALBUTEROL SULFATE 2.5 MG/3ML
5 SOLUTION RESPIRATORY (INHALATION) ONCE
Status: COMPLETED | OUTPATIENT
Start: 2020-02-09 | End: 2020-02-09

## 2020-02-09 RX ORDER — ACETAMINOPHEN 325 MG/1
650 TABLET ORAL EVERY 6 HOURS PRN
Status: DISCONTINUED | OUTPATIENT
Start: 2020-02-09 | End: 2020-02-10

## 2020-02-09 RX ORDER — ONDANSETRON 2 MG/ML
4 INJECTION INTRAMUSCULAR; INTRAVENOUS EVERY 6 HOURS PRN
Status: DISCONTINUED | OUTPATIENT
Start: 2020-02-09 | End: 2020-02-11 | Stop reason: HOSPADM

## 2020-02-09 RX ORDER — IBUPROFEN AND FAMOTIDINE 26.6; 8 MG/1; MG/1
1 TABLET, FILM COATED ORAL 2 TIMES DAILY
COMMUNITY
End: 2020-11-16 | Stop reason: ALTCHOICE

## 2020-02-09 RX ORDER — SODIUM CHLORIDE FOR INHALATION 0.9 %
3 VIAL, NEBULIZER (ML) INHALATION
Status: DISCONTINUED | OUTPATIENT
Start: 2020-02-10 | End: 2020-02-11

## 2020-02-09 RX ORDER — PANTOPRAZOLE SODIUM 20 MG/1
20 TABLET, DELAYED RELEASE ORAL
Status: DISCONTINUED | OUTPATIENT
Start: 2020-02-09 | End: 2020-02-11 | Stop reason: HOSPADM

## 2020-02-09 RX ADMIN — AZITHROMYCIN MONOHYDRATE 500 MG: 500 INJECTION, POWDER, LYOPHILIZED, FOR SOLUTION INTRAVENOUS at 11:16

## 2020-02-09 RX ADMIN — ASPIRIN 325 MG ORAL TABLET 325 MG: 325 PILL ORAL at 19:24

## 2020-02-09 RX ADMIN — SODIUM CHLORIDE, SODIUM GLUCONATE, SODIUM ACETATE, POTASSIUM CHLORIDE, MAGNESIUM CHLORIDE, SODIUM PHOSPHATE, DIBASIC, AND POTASSIUM PHOSPHATE 100 ML/HR: .53; .5; .37; .037; .03; .012; .00082 INJECTION, SOLUTION INTRAVENOUS at 23:55

## 2020-02-09 RX ADMIN — Medication 2000 MG: at 22:20

## 2020-02-09 RX ADMIN — PREGABALIN 150 MG: 75 CAPSULE ORAL at 22:20

## 2020-02-09 RX ADMIN — MELATONIN 3 MG: 3 TAB ORAL at 22:41

## 2020-02-09 RX ADMIN — DOCUSATE SODIUM 100 MG: 100 CAPSULE, LIQUID FILLED ORAL at 16:41

## 2020-02-09 RX ADMIN — SODIUM CHLORIDE, SODIUM GLUCONATE, SODIUM ACETATE, POTASSIUM CHLORIDE, MAGNESIUM CHLORIDE, SODIUM PHOSPHATE, DIBASIC, AND POTASSIUM PHOSPHATE 100 ML/HR: .53; .5; .37; .037; .03; .012; .00082 INJECTION, SOLUTION INTRAVENOUS at 13:18

## 2020-02-09 RX ADMIN — CEFTRIAXONE 1000 MG: 1 INJECTION, POWDER, FOR SOLUTION INTRAMUSCULAR; INTRAVENOUS at 10:48

## 2020-02-09 RX ADMIN — IPRATROPIUM BROMIDE 0.5 MG: 0.5 SOLUTION RESPIRATORY (INHALATION) at 09:46

## 2020-02-09 RX ADMIN — SODIUM CHLORIDE 1000 ML: 0.9 INJECTION, SOLUTION INTRAVENOUS at 09:37

## 2020-02-09 RX ADMIN — ENOXAPARIN SODIUM 40 MG: 40 INJECTION SUBCUTANEOUS at 11:19

## 2020-02-09 RX ADMIN — PANTOPRAZOLE SODIUM 20 MG: 20 TABLET, DELAYED RELEASE ORAL at 12:29

## 2020-02-09 RX ADMIN — DOCUSATE SODIUM 100 MG: 100 CAPSULE, LIQUID FILLED ORAL at 11:14

## 2020-02-09 RX ADMIN — MORPHINE SULFATE 1 MG: 2 INJECTION, SOLUTION INTRAMUSCULAR; INTRAVENOUS at 19:08

## 2020-02-09 RX ADMIN — LEVALBUTEROL HYDROCHLORIDE 1.25 MG: 1.25 SOLUTION, CONCENTRATE RESPIRATORY (INHALATION) at 20:10

## 2020-02-09 RX ADMIN — PREGABALIN 150 MG: 75 CAPSULE ORAL at 12:29

## 2020-02-09 RX ADMIN — METHYLPREDNISOLONE SODIUM SUCCINATE 40 MG: 40 INJECTION, POWDER, FOR SOLUTION INTRAMUSCULAR; INTRAVENOUS at 22:20

## 2020-02-09 RX ADMIN — PRAVASTATIN SODIUM 80 MG: 80 TABLET ORAL at 16:41

## 2020-02-09 RX ADMIN — METHYLPREDNISOLONE SODIUM SUCCINATE 125 MG: 125 INJECTION, POWDER, FOR SOLUTION INTRAMUSCULAR; INTRAVENOUS at 09:51

## 2020-02-09 RX ADMIN — LORATADINE 10 MG: 10 TABLET ORAL at 12:29

## 2020-02-09 RX ADMIN — GUAIFENESIN 600 MG: 600 TABLET ORAL at 11:14

## 2020-02-09 RX ADMIN — Medication 2000 MG: at 13:18

## 2020-02-09 RX ADMIN — ACETAMINOPHEN 975 MG: 325 TABLET ORAL at 09:12

## 2020-02-09 RX ADMIN — NICOTINE 1 PATCH: 14 PATCH TRANSDERMAL at 11:18

## 2020-02-09 RX ADMIN — ISODIUM CHLORIDE 3 ML: 0.03 SOLUTION RESPIRATORY (INHALATION) at 20:10

## 2020-02-09 RX ADMIN — GUAIFENESIN 600 MG: 600 TABLET ORAL at 22:20

## 2020-02-09 RX ADMIN — FAMOTIDINE 20 MG: 10 INJECTION, SOLUTION INTRAVENOUS at 09:50

## 2020-02-09 RX ADMIN — FENOFIBRATE 145 MG: 145 TABLET, FILM COATED ORAL at 12:29

## 2020-02-09 RX ADMIN — ALBUTEROL SULFATE 5 MG: 2.5 SOLUTION RESPIRATORY (INHALATION) at 09:46

## 2020-02-09 RX ADMIN — FLUTICASONE PROPIONATE 1 SPRAY: 50 SPRAY, METERED NASAL at 13:18

## 2020-02-09 NOTE — H&P
H&P- Sy Nicolas 1955, 62 y o  male MRN: [id number]    Unit/Bed#: [facility] 2 -01 Encounter: [id number]    Primary Care Provider: Liz Duenas MD   Date and time admitted to hospital: 2/9/2020  8:55 AM        Nicotine dependence  Assessment & Plan  Patient will be given a nicotine patch, he was counseled for greater than 5 minutes that smoking may lead to significant health affects  GERD (gastroesophageal reflux disease)  Assessment & Plan  Continue current regimen    Hyperlipidemia  Assessment & Plan  Patient on Vascepa as well as Tricor and Crestor as an outpatient- auto substituted for this hospitalization    * COPD [facility]chronic obstructive [facility] disease) ([facility] )  Assessment & Plan  Patient presenting with COPD exacerbation  They are being admitted for nebulizer treatments as well as IV steriods  The meet admission criteria due to multiple nebs in the Emergency room, low oxygen saturations, and respiratory difficulty  Will continue treatments at this time with low threshold for BIPAP or other advanced airway management  Pulmonary consultation if needed to be ordered  Did discuss strict smoking cessation for greater than 5 minutes  VTE Prophylaxis: Enoxaparin (Lovenox)  / foot pump applied   Code Status:  Full code  POLST: There is no POLST form on file for this patient (pre-hospital)  Discussion with family:  Patient    Anticipated Length of Stay:  Patient will be admitted on an Inpatient basis with an anticipated length of stay of  greater than 2 midnights  Justification for Hospital Stay:  COPD with exacerbation and respiratory accessory muscle use    Total Time for Visit, including Counseling / Coordination of Care: 30 minutes  Greater than 50% of this total time spent on direct patient counseling and coordination of care      Chief Complaint:   Shortness of breath, pneumonia, COPD exacerbate    History of Present Illness:    Sy Nicolas is a 62 y o  male who presents with shortness of breath difficulty breathing  The patient reports he was at the bar last night he was drinking [employer] when he felt significantly short of breath and had cough  He states he has not felt right since at least December  He knows when he is sick because he cannot smoke as many cigarettes  He reported that at the bar last night he was dancing on the dance floor and felt significantly short of breath  He then came to the emergency room in the morning after he had sobered up  He now reports cough with productive green sputum  He has no recent travel or trip history  Denies any weight gain  Review of Systems:    A complete and comprehensive 14 point organ system review was performed and all other systems are negative other than stated above in the HPI    Past Medical and Surgical History:     Past Medical History:   Diagnosis Date    COPD (chronic obstructive pulmonary disease) ([address] )     Fibromyalgia     GERD (gastroesophageal reflux disease)     Hyperlipidemia        Past Surgical History:   Procedure Laterality Date    ANKLE FRACTURE SURGERY      Left Side    CHOLECYSTECTOMY      CYST REMOVAL      FEMUR SURGERY      KNEE SURGERY      ROTATOR CUFF REPAIR      WRIST FRACTURE SURGERY Left        Meds/Allergies:    Prior to Admission medications    Medication Sig Start Date End Date Taking? Authorizing Provider   cetirizine (ZyrTEC) 10 mg tablet Take 10 mg by mouth daily   Yes Historical Provider, MD   fenofibrate (TRICOR) 145 mg tablet Take 145 mg by mouth daily   Yes Historical Provider, MD   fluticasone (FLONASE) 50 mcg/act nasal spray 1 spray into each nostril daily   Yes Historical Provider, MD   fluticasone-umeclidinium-vilanterol (TRELEGY ELLIPTA) 100-62 5-25 MCG/INH inhaler Inhale 1 puff daily Rinse mouth after use     Yes Historical Provider, MD   ibuprofen (MOTRIN) 600 mg tablet Take 1 tablet (600 mg total) by mouth every 6 (six) hours as needed for mild pain or moderate pain 2/17/19  Yes Enzo Diaz ,    Ibuprofen-Famotidine 800-26 6 MG TABS Take 1 tablet by mouth 2 (two) times a day   Yes Historical Provider, MD   Icosapent Ethyl (VASCEPA) 0 5 g CAPS Take 2 capsules by mouth 2 (two) times a day   Yes Historical Provider, MD   multivitamin (THERAGRAN) TABS Take 1 tablet by mouth daily    Yes Historical Provider, MD   omeprazole (PriLOSEC) 20 mg delayed release capsule Take 20 mg by mouth daily  Yes Historical Provider, MD   pregabalin (LYRICA) 150 mg capsule Take 150 mg by mouth 2 (two) times a day   Yes Historical Provider, MD   rosuvastatin (CRESTOR) 10 MG tablet Take 10 mg by mouth daily at bedtime   Yes Historical Provider, MD   ALBUTEROL IN Inhale as needed    Historical Provider, MD   Budesonide-Formoterol Fumarate (SYMBICORT IN) Inhale 2 puffs 2 (two) times a day  2/9/20  Historical Provider, MD   cyclobenzaprine (FLEXERIL) 10 mg tablet Take 1 tablet (10 mg total) by mouth 2 (two) times a day as needed for muscle spasms 9/24/18 2/9/20  Jose Zuluaga MD   Fenofibrate (TRICOR PO) Take by mouth daily  2/9/20  Historical Provider, MD   rosuvastatin (CRESTOR) 5 mg tablet Take 5 mg by mouth daily  2/9/20  Historical Provider, MD     I have reviewed home medications with patient personally  Allergies: No Known Allergies    Social History:     Marital Status: /Civil Union   Occupation:  Working    Substance Use History:   Social History     Substance and Sexual Activity   Alcohol Use Yes    Comment: occasional      Social History     Tobacco Use   Smoking Status Current Every Day Smoker    Packs/day: 0 50   Smokeless Tobacco Never Used     Social History     Substance and Sexual Activity   Drug Use No       Family History:    History reviewed  No pertinent family history      Physical Exam:     Vitals:   Blood Pressure: 111/70 (02/09/20 1151)  Pulse: 103 (02/09/20 1151)  Temperature: 98 5 °F (36 9 °C) (02/09/20 1151)  Temp Source: Oral (02/09/20 1151)  Respirations: 18 (02/09/20 1151)  Height: 5' 8" (172 7 cm) (02/09/20 1151)  Weight - Scale: 75 2 kg (165 lb 12 6 oz) (02/09/20 1151)  SpO2: 97 % (02/09/20 1151)      General: ill appearing, no acute distress- strong scent of tobacco smoke  HEENT: atraumatic, PERRLA, moist mucosa, normal pharynx, normal tonsils and adenoids, normal tongue, no fluid in sinuses  Neck: Trachea midline, no carotid bruit, no masses  Respiratory:  Expiratory wheezing with decreased breath sounds on the left lower lobe, significant respiratory distress and costal retraction  Cardiovascular: RRR, no m/r/g, Normal S1 and S2  Abdomen: Soft, non-tender, non-distended, normal bowel sounds in all quadrants, no hepatosplenomegaly, no tympany  Rectal: deferred  Musculoskeletal: normal ROM in upper and lower extremities  Integumentary: warm, dry, and pink, with no rash, purpura, or petechia  Heme/Lymph: no lymphadenopathy, no bruises  Neurological: Cranial Nerves II-XII grossly intact  Psychiatric: cooperative with normal mood, affect, and cognition    Additional Data:     Lab Results: I have personally reviewed pertinent reports        Results from last 7 days   Lab Units 02/09/20  1136 02/09/20  0936   WBC Thousand/uL  --  10 56*   HEMOGLOBIN g/dL  --  13 3   HEMATOCRIT %  --  40 3   PLATELETS Thousands/uL 201 235   NEUTROS PCT %  --  77*   LYMPHS PCT %  --  9*   MONOS PCT %  --  11   EOS PCT %  --  1     Results from last 7 days   Lab Units 02/09/20  0936   SODIUM mmol/L 136   POTASSIUM mmol/L 4 1   CHLORIDE mmol/L 100   CO2 mmol/L 24   BUN mg/dL 19   CREATININE mg/dL 1 38*   ANION GAP mmol/L 12   CALCIUM mg/dL 9 2   ALBUMIN g/dL 4 1   TOTAL BILIRUBIN mg/dL 0 51   ALK PHOS U/L 78   ALT U/L 39   AST U/L 25   GLUCOSE RANDOM mg/dL 140     Results from last 7 days   Lab Units 02/09/20  0936   INR  1 00             Results from last 7 days   Lab Units 02/09/20  0936   LACTIC ACID mmol/L 1 8       Imaging: I have personally reviewed pertinent reports  XR chest 2 views   Final Result by Colin Pak MD (02/09 1030)      Left lower lobe pneumonia  The study was marked in [facility] for immediate notification  Workstation performed: [id number]             EKG, Pathology, and Other Studies Reviewed on Admission:   · Review chest x-ray, left lower lobe pneumonia present    Allscripts / Epic Records Reviewed: Yes     ** Please Note: This note has been constructed using a voice recognition system   **

## 2020-02-09 NOTE — ASSESSMENT & PLAN NOTE
Patient presenting with COPD exacerbation  They are being admitted for nebulizer treatments as well as IV steriods  The meet admission criteria due to multiple nebs in the Emergency room, low oxygen saturations, and respiratory difficulty  Will continue treatments at this time with low threshold for BIPAP or other advanced airway management  Pulmonary consultation if needed to be ordered  Did discuss strict smoking cessation for greater than 5 minutes

## 2020-02-09 NOTE — ASSESSMENT & PLAN NOTE
Patient on Vascepa as well as Tricor and Crestor as an outpatient- auto substituted for this hospitalization

## 2020-02-09 NOTE — ASSESSMENT & PLAN NOTE
Patient with evidence of left lower lobe pneumonia, no recent hospitalization - will check rapid flu, initiate antibiotics

## 2020-02-09 NOTE — H&P
H&P- Gia Pierre 1955, 62 y o  male MRN: [id number]    Unit/Bed#: [facility] -01 Encounter: [id number]    Primary Care Provider: Jeanie Anand MD   Date and time admitted to hospital: 2/9/2020  8:55 AM        Community acquired bacterial pneumonia  Assessment & Plan  Patient with evidence of left lower lobe pneumonia, no recent hospitalization - will check rapid flu, initiate antibiotics     CKD (chronic kidney disease) stage 3, GFR 30-59 ml/min ([facility])  Assessment & Plan  Renal function, slightly elevated, will give intravenous fluid - recheck in a m  Nicotine dependence  Assessment & Plan  Patient will be given a nicotine patch, he was counseled for greater than 5 minutes that smoking may lead to significant health affects  GERD (gastroesophageal reflux disease)  Assessment & Plan  Continue current regimen    Hyperlipidemia  Assessment & Plan  Patient on Vascepa as well as Tricor and Crestor as an outpatient- auto substituted for this hospitalization    * COPD (chronic obstructive pulmonary disease) ([facility] [address] )  Assessment & Plan  Patient presenting with COPD exacerbation  They are being admitted for nebulizer treatments as well as IV steriods  The meet admission criteria due to multiple nebs in the Emergency room, low oxygen saturations, and respiratory difficulty  Will continue treatments at this time with low threshold for BIPAP or other advanced airway management  Pulmonary consultation if needed to be ordered  Did discuss strict smoking cessation for greater than 5 minutes  VTE Prophylaxis: Enoxaparin (Lovenox)  / sequential compression device   Code Status:  Full  POLST: POLST form is on file already (pre-hospital) and There is no POLST form on file for this patient (pre-hospital)  Discussion with family:  Patient    Anticipated Length of Stay:  Patient will be admitted on an Inpatient basis with an anticipated length of stay of  greater than 2 midnights     Justification for Hospital Stay: COPD with significant respiratory difficulty    Total Time for Visit, including Counseling / Coordination of Care: 30 minutes  Greater than 50% of this total time spent on direct patient counseling and coordination of care  Chief Complaint:   Shortness of breath difficulty breathing    History of Present Illness:    Carli Ross is a 62 y o  male who presents with  shortness of breath difficulty breathing x1 day  He reports that he was at the bar drinking [employer] when he went on the dance floor and felt short of breath  He states he is able to at least dance for 45-50 minutes however he started developing cough and shortness of breath  He states that he has not felt well since at least December  He has had difficulty with smoking cessation, however would be agreeable to nicotine patches  He denies any recent travel history, he has not had any long car rides  He reports fever this morning, and also fatigue  His other medical problems include hyperlipidemia for which he takes a Vascepa as well as Crestor  He states good medication compliance  He denies any chest pain, no heart palpitations, no vomiting, no nausea  Review of Systems:    A complete and comprehensive 14 point organ system review was performed and all other systems are negative other than stated above in the HPI    Past Medical and Surgical History:     Past Medical History:   Diagnosis Date    COPD [facility]chronic obstructive pulmonary disease) ([facility] [address] )     Fibromyalgia     GERD (gastroesophageal reflux disease)     Hyperlipidemia        Past Surgical History:   Procedure Laterality Date    ANKLE FRACTURE SURGERY      Left Side    CHOLECYSTECTOMY      CYST REMOVAL      FEMUR SURGERY      KNEE SURGERY      ROTATOR CUFF REPAIR      WRIST FRACTURE SURGERY Left        Meds/Allergies:    Prior to Admission medications    Medication Sig Start Date End Date Taking?  Authorizing Provider   cetirizine (ZyrTEC) 10 mg tablet Take 10 mg by mouth daily   Yes Historical Provider, MD   fenofibrate (TRICOR) 145 mg tablet Take 145 mg by mouth daily   Yes Historical Provider, MD   fluticasone (FLONASE) 50 mcg/act nasal spray 1 spray into each nostril daily   Yes Historical Provider, MD   fluticasone-umeclidinium-vilanterol (TRELEGY ELLIPTA) 100-62 5-25 MCG/INH inhaler Inhale 1 puff daily Rinse mouth after use  Yes Historical Provider, MD   ibuprofen (MOTRIN) 600 mg tablet Take 1 tablet (600 mg total) by mouth every 6 (six) hours as needed for mild pain or moderate pain 2/17/19  Yes Verna Cristina ,    Ibuprofen-Famotidine 800-26 6 MG TABS Take 1 tablet by mouth 2 (two) times a day   Yes Historical Provider, MD   Icosapent Ethyl (VASCEPA) 0 5 g CAPS Take 2 capsules by mouth 2 (two) times a day   Yes Historical Provider, MD   multivitamin (THERAGRAN) TABS Take 1 tablet by mouth daily    Yes Historical Provider, MD   omeprazole (PriLOSEC) 20 mg delayed release capsule Take 20 mg by mouth daily  Yes Historical Provider, MD   pregabalin (LYRICA) 150 mg capsule Take 150 mg by mouth 2 (two) times a day   Yes Historical Provider, MD   rosuvastatin (CRESTOR) 10 MG tablet Take 10 mg by mouth daily at bedtime   Yes Historical Provider, MD   ALBUTEROL IN Inhale as needed    Historical Provider, MD   Budesonide-Formoterol Fumarate (SYMBICORT IN) Inhale 2 puffs 2 (two) times a day  2/9/20  Historical Provider, MD   cyclobenzaprine (FLEXERIL) 10 mg tablet Take 1 tablet (10 mg total) by mouth 2 (two) times a day as needed for muscle spasms 9/24/18 2/9/20  Mildred Durbin MD   Fenofibrate (TRICOR PO) Take by mouth daily  2/9/20  Historical Provider, MD   rosuvastatin (CRESTOR) 5 mg tablet Take 5 mg by mouth daily  2/9/20  Historical Provider, MD BHANDARI have reviewed home medications with patient personally      Allergies: No Known Allergies    Social History:     Marital Status: /Civil Union       Substance Use History:   Social History Substance and Sexual Activity   Alcohol Use Yes    Comment: occasional      Social History     Tobacco Use   Smoking Status Current Every Day Smoker    Packs/day: 0 50   Smokeless Tobacco Never Used     Social History     Substance and Sexual Activity   Drug Use No       Family History:    History reviewed  No pertinent family history  Physical Exam:     Vitals:   Blood Pressure: 111/70 (02/09/20 1151)  Pulse: 103 (02/09/20 1151)  Temperature: 98 5 °F (36 9 °C) (02/09/20 1151)  Temp Source: Oral (02/09/20 1151)  Respirations: 18 (02/09/20 1151)  Height: 5' 8" (172 7 cm) (02/09/20 1151)  Weight - Scale: 75 2 kg (165 lb 12 6 oz) (02/09/20 1151)  SpO2: 97 % (02/09/20 1151)      General:  Ill-appearing  HEENT: atraumatic, PERRLA, moist mucosa, normal pharynx, normal tonsils and adenoids, normal tongue, no fluid in sinuses  Neck: Trachea midline, no carotid bruit, no masses  Respiratory:  Wheezing, accessory muscle use and retractions  Cardiovascular: RRR, no m/r/g, Normal S1 and S2  Abdomen: Soft, non-tender, non-distended, normal bowel sounds in all quadrants, no hepatosplenomegaly, no tympany  Rectal: deferred  Musculoskeletal: normal ROM in upper and lower extremities  Integumentary: warm, dry, and pink, with no rash, purpura, or petechia  Heme/Lymph: no lymphadenopathy, no bruises  Neurological: Cranial Nerves II-XII grossly intact  Psychiatric: cooperative with normal mood, affect, and cognition    Additional Data:     Lab Results: I have personally reviewed pertinent reports        Results from last 7 days   Lab Units 02/09/20  1136 02/09/20  0936   WBC Thousand/uL  --  10 56*   HEMOGLOBIN g/dL  --  13 3   HEMATOCRIT %  --  40 3   PLATELETS Thousands/uL 201 235   NEUTROS PCT %  --  77*   LYMPHS PCT %  --  9*   MONOS PCT %  --  11   EOS PCT %  --  1     Results from last 7 days   Lab Units 02/09/20  0936   SODIUM mmol/L 136   POTASSIUM mmol/L 4 1   CHLORIDE mmol/L 100   CO2 mmol/L 24   BUN mg/dL 19 CREATININE mg/dL 1 38*   ANION GAP mmol/L 12   CALCIUM mg/dL 9 2   ALBUMIN g/dL 4 1   TOTAL BILIRUBIN mg/dL 0 51   ALK PHOS U/L 78   ALT U/L 39   AST U/L 25   GLUCOSE RANDOM mg/dL 140     Results from last 7 days   Lab Units 02/09/20  0936   INR  1 00             Results from last 7 days   Lab Units 02/09/20  0936   LACTIC ACID mmol/L 1 8       Imaging: I have personally reviewed pertinent reports  XR chest 2 views   Final Result by Tonny David MD (02/09 1030)      Left lower lobe pneumonia  The study was marked in [facility] for immediate notification  Workstation performed: [id number]             EKG, Pathology, and Other Studies Reviewed on Admission:   · Chest x-ray personally reviewed with evidence of left lobe pneumonia    Allscripts / Epic Records Reviewed: Yes     ** Please Note: This note has been constructed using a voice recognition system   **

## 2020-02-09 NOTE — ASSESSMENT & PLAN NOTE
Patient will be given a nicotine patch, he was counseled for greater than 5 minutes that smoking may lead to significant health affects

## 2020-02-09 NOTE — ED NOTES
Pt to be transported to [facility] [place] [facility] bed 228 at this by wheelchair      Erin Grain  [id number]

## 2020-02-09 NOTE — PLAN OF CARE
Problem: PAIN - ADULT  Goal: Verbalizes/displays adequate comfort level or baseline comfort level  Description  Interventions:  - Encourage patient to monitor pain and request assistance  - Assess pain using appropriate pain scale  - Administer analgesics based on type and severity of pain and evaluate response  - Implement non-pharmacological measures as appropriate and evaluate response  - Consider cultural and social influences on pain and pain management  - Notify physician/advanced practitioner if interventions unsuccessful or patient reports new pain  Outcome: Progressing     Problem: INFECTION - ADULT  Goal: Absence or prevention of progression during hospitalization  Description  INTERVENTIONS:  - Assess and monitor for signs and symptoms of infection  - Monitor lab/diagnostic results  - Monitor all insertion sites, i e  indwelling lines, tubes, and drains  - Monitor endotracheal if appropriate and nasal secretions for changes in amount and color  - [place] appropriate cooling/warming therapies per order  - Administer medications as ordered  - Instruct and encourage patient and family to use good hand hygiene technique  - Identify and instruct in appropriate isolation precautions for identified infection/condition  Outcome: Progressing     Problem: SAFETY ADULT  Goal: Patient will remain free of falls  Description  INTERVENTIONS:  - Assess patient frequently for physical needs  -  Identify cognitive and physical deficits and behaviors that affect risk of falls    -  [place] fall precautions as indicated by assessment   - Educate patient/family on patient safety including physical limitations  - Instruct patient to call for assistance with activity based on assessment  - Modify environment to reduce risk of injury  - Consider OT/PT consult to assist with strengthening/mobility  Outcome: Progressing  Goal: Maintain or return to baseline ADL function  Description  INTERVENTIONS:  -  Assess patient's ability to carry out ADLs; assess patient's baseline for ADL function and identify physical deficits which impact ability to perform ADLs (bathing, care of mouth/teeth, toileting, grooming, dressing, etc )  - Assess/evaluate cause of self-care deficits   - Assess range of motion  - Assess patient's mobility; develop plan if impaired  - Assess patient's need for assistive devices and provide as appropriate  - Encourage maximum independence but intervene and supervise when necessary  - Involve family in performance of ADLs  - Assess for home care needs following discharge   - Consider OT consult to assist with ADL evaluation and planning for discharge  - Provide patient education as appropriate  Outcome: Progressing  Goal: Maintain or return mobility status to optimal level  Description  INTERVENTIONS:  - Assess patient's baseline mobility status (ambulation, transfers, stairs, etc )    - Identify cognitive and physical deficits and behaviors that affect mobility  - Identify mobility aids required to assist with transfers and/or ambulation (gait belt, sit-to-stand, lift, walker, cane, etc )  - [place] fall precautions as indicated by assessment  - Record patient progress and toleration of activity level on Mobility SBAR; progress patient to next Phase/Stage  - Instruct patient to call for assistance with activity based on assessment  - Consider rehabilitation consult to assist with strengthening/weightbearing, etc   Outcome: Progressing     Problem: DISCHARGE PLANNING  Goal: Discharge to home or other facility with appropriate resources  Description  INTERVENTIONS:  - Identify barriers to discharge w/patient and caregiver  - Arrange for needed discharge resources and transportation as appropriate  - Identify discharge learning needs (meds, wound care, etc )  - Arrange for interpretive services to assist at discharge as needed  - Refer to Case Management Department for coordinating discharge planning if the patient needs post-hospital services based on physician/advanced practitioner order or complex needs related to functional status, cognitive ability, or social support system  Outcome: Progressing     Problem: Knowledge Deficit  Goal: Patient/family/caregiver demonstrates understanding of disease process, treatment plan, medications, and discharge instructions  Description  Complete learning assessment and assess knowledge base    Interventions:  - Provide teaching at level of understanding  - Provide teaching via preferred learning methods  Outcome: Progressing

## 2020-02-09 NOTE — ASSESSMENT & PLAN NOTE
Ankle Sprain  An ankle sprain is an injury to the strong, fibrous tissues (ligaments) that hold the bones of your ankle joint together.   CAUSES  An ankle sprain is usually caused by a fall or by twisting your ankle. Ankle sprains most commonly occur when you step on the outer edge of your foot, and your ankle turns inward. People who participate in sports are more prone to these types of injuries.   SYMPTOMS   · Pain in your ankle. The pain may be present at rest or only when you are trying to stand or walk.  · Swelling.  · Bruising. Bruising may develop immediately or within 1 to 2 days after your injury.  · Difficulty standing or walking, particularly when turning corners or changing directions.  DIAGNOSIS   Your caregiver will ask you details about your injury and perform a physical exam of your ankle to determine if you have an ankle sprain. During the physical exam, your caregiver will press on and apply pressure to specific areas of your foot and ankle. Your caregiver will try to move your ankle in certain ways. An X-ray exam may be done to be sure a bone was not broken or a ligament did not separate from one of the bones in your ankle (avulsion fracture).   TREATMENT   Certain types of braces can help stabilize your ankle. Your caregiver can make a recommendation for this. Your caregiver may recommend the use of medicine for pain. If your sprain is severe, your caregiver may refer you to a surgeon who helps to restore function to parts of your skeletal system (orthopedist) or a physical therapist.  HOME CARE INSTRUCTIONS   · Apply ice to your injury for 1-2 days or as directed by your caregiver. Applying ice helps to reduce inflammation and pain.  ¨ Put ice in a plastic bag.  ¨ Place a towel between your skin and the bag.  ¨ Leave the ice on for 15-20 minutes at a time, every 2 hours while you are awake.  · Only take over-the-counter or prescription medicines for pain, discomfort, or fever as directed by  Patient presenting with COPD exacerbation  They are being admitted for nebulizer treatments as well as IV steriods  The meet admission criteria due to multiple nebs in the Emergency room, low oxygen saturations, and respiratory difficulty  Will continue treatments at this time with low threshold for BIPAP or other advanced airway management  Pulmonary consultation if needed to be ordered  Did discuss strict smoking cessation for greater than 5 minutes  your caregiver.  · Elevate your injured ankle above the level of your heart as much as possible for 2-3 days.  · If your caregiver recommends crutches, use them as instructed. Gradually put weight on the affected ankle. Continue to use crutches or a cane until you can walk without feeling pain in your ankle.  · If you have a plaster splint, wear the splint as directed by your caregiver. Do not rest it on anything harder than a pillow for the first 24 hours. Do not put weight on it. Do not get it wet. You may take it off to take a shower or bath.  · You may have been given an elastic bandage to wear around your ankle to provide support. If the elastic bandage is too tight (you have numbness or tingling in your foot or your foot becomes cold and blue), adjust the bandage to make it comfortable.  · If you have an air splint, you may blow more air into it or let air out to make it more comfortable. You may take your splint off at night and before taking a shower or bath. Wiggle your toes in the splint several times per day to decrease swelling.  SEEK MEDICAL CARE IF:   · You have rapidly increasing bruising or swelling.  · Your toes feel extremely cold or you lose feeling in your foot.  · Your pain is not relieved with medicine.  SEEK IMMEDIATE MEDICAL CARE IF:  · Your toes are numb or blue.  · You have severe pain that is increasing.  MAKE SURE YOU:   · Understand these instructions.  · Will watch your condition.  · Will get help right away if you are not doing well or get worse.     This information is not intended to replace advice given to you by your health care provider. Make sure you discuss any questions you have with your health care provider.     Document Released: 12/18/2006 Document Revised: 01/08/2016 Document Reviewed: 12/29/2012  [employer] Interactive Patient Education ©2016 [employer]

## 2020-02-09 NOTE — ED PROVIDER NOTES
History  Chief Complaint   Patient presents with    Flu Symptoms     Fever & generalized body aches with cough and generalized abdominal pain that began last night; Pt did not take mny medications for symptoms       History provided by:  Patient   used: No    Flu Symptoms   Presenting symptoms: cough    Presenting symptoms: no diarrhea, no fever, no headaches, no nausea, no shortness of breath, no sore throat and no vomiting    Severity:  Moderate  Onset quality:  Gradual  Duration:  12 hours  Progression:  Worsening  Chronicity:  New  Relieved by:  Nothing  Worsened by:  Nothing  Ineffective treatments:  None tried  Associated symptoms: chills    Associated symptoms: no neck stiffness and no syncope    Risk factors comment:  COPD      Prior to Admission Medications   Prescriptions Last Dose Informant Patient Reported? Taking? ALBUTEROL IN   Yes No   Sig: Inhale as needed   Ibuprofen-Famotidine 800-26 6 MG TABS   Yes Yes   Sig: Take 1 tablet by mouth 2 (two) times a day   Icosapent Ethyl (VASCEPA) 0 5 g CAPS   Yes Yes   Sig: Take 2 capsules by mouth 2 (two) times a day   cetirizine (ZyrTEC) 10 mg tablet   Yes Yes   Sig: Take 10 mg by mouth daily   fenofibrate (TRICOR) 145 mg tablet   Yes Yes   Sig: Take 145 mg by mouth daily   fluticasone (FLONASE) 50 mcg/act nasal spray   Yes Yes   Si spray into each nostril daily   fluticasone-umeclidinium-vilanterol (TRELEGY ELLIPTA) 100-62 5-25 MCG/INH inhaler   Yes Yes   Sig: Inhale 1 puff daily Rinse mouth after use  ibuprofen (MOTRIN) 600 mg tablet   No Yes   Sig: Take 1 tablet (600 mg total) by mouth every 6 (six) hours as needed for mild pain or moderate pain   multivitamin (THERAGRAN) TABS   Yes Yes   Sig: Take 1 tablet by mouth daily    omeprazole (PriLOSEC) 20 mg delayed release capsule   Yes Yes   Sig: Take 20 mg by mouth daily     pregabalin (LYRICA) 150 mg capsule   Yes Yes   Sig: Take 150 mg by mouth 2 (two) times a day   rosuvastatin (CRESTOR) 10 MG tablet   Yes Yes   Sig: Take 10 mg by mouth daily at bedtime      Facility-Administered Medications: None       Past Medical History:   Diagnosis Date    COPD (chronic obstructive pulmonary disease) (HCC)     Fibromyalgia     GERD (gastroesophageal reflux disease)     Hyperlipidemia        Past Surgical History:   Procedure Laterality Date    ANKLE FRACTURE SURGERY      Left Side    CHOLECYSTECTOMY      CYST REMOVAL      FEMUR SURGERY      KNEE SURGERY      ROTATOR CUFF REPAIR      WRIST FRACTURE SURGERY Left        History reviewed  No pertinent family history  I have reviewed and agree with the history as documented  Social History     Tobacco Use    Smoking status: Current Every Day Smoker     Packs/day: 0 50    Smokeless tobacco: Never Used   Substance Use Topics    Alcohol use: Yes     Comment: occasional     Drug use: No        Review of Systems   Constitutional: Positive for chills  Negative for fever  HENT: Negative for facial swelling, sore throat and trouble swallowing  Eyes: Negative for pain and visual disturbance  Respiratory: Positive for cough  Negative for chest tightness and shortness of breath  Cardiovascular: Negative for chest pain and leg swelling  Gastrointestinal: Negative for abdominal pain, blood in stool, diarrhea, nausea and vomiting  Genitourinary: Negative for dysuria and flank pain  Musculoskeletal: Negative for back pain, neck pain and neck stiffness  Skin: Negative for pallor and rash  Allergic/Immunologic: Negative for environmental allergies and immunocompromised state  Neurological: Negative for dizziness and headaches  Hematological: Negative for adenopathy  Does not bruise/bleed easily  Psychiatric/Behavioral: Negative for agitation and behavioral problems  All other systems reviewed and are negative  Physical Exam  Physical Exam   Constitutional: He is oriented to person, place, and time   He appears well-developed and well-nourished  No distress  HENT:   Head: Normocephalic and atraumatic  Eyes: EOM are normal    Neck: Normal range of motion  Neck supple  Cardiovascular: Normal rate, regular rhythm, normal heart sounds and intact distal pulses  Pulmonary/Chest: No accessory muscle usage or stridor  Tachypnea noted  He has wheezes (Mild expiratory wheezing)  Abdominal: Soft  Bowel sounds are normal  There is no tenderness  There is no rebound and no guarding  Musculoskeletal: Normal range of motion  Neurological: He is alert and oriented to person, place, and time  Skin: Skin is warm and dry  Psychiatric: He has a normal mood and affect  Nursing note and vitals reviewed        Vital Signs  ED Triage Vitals   Temperature Pulse Respirations Blood Pressure SpO2   02/09/20 0904 02/09/20 0859 02/09/20 0859 02/09/20 0859 02/09/20 0859   99 6 °F (37 6 °C) (!) 116 (!) 24 146/70 94 %      Temp Source Heart Rate Source Patient Position - Orthostatic VS BP Location FiO2 (%)   02/09/20 0904 02/09/20 0859 02/09/20 1104 02/09/20 1104 --   Temporal Monitor Lying Left arm       Pain Score       02/09/20 0859       Worst Possible Pain           Vitals:    02/09/20 1030 02/09/20 1104 02/09/20 1151 02/09/20 1635   BP: 122/58 138/58 111/70 114/66   Pulse: 104 (!) 107 103 103   Patient Position - Orthostatic VS:  Lying Lying Lying         Visual Acuity      ED Medications  Medications   docusate sodium (COLACE) capsule 100 mg (100 mg Oral Given 2/9/20 1641)   ondansetron (ZOFRAN) injection 4 mg (has no administration in time range)   aluminum-magnesium hydroxide-simethicone (MYLANTA) 200-200-20 mg/5 mL oral suspension 30 mL (has no administration in time range)   calcium carbonate (TUMS) chewable tablet 1,000 mg (has no administration in time range)   simethicone (MYLICON) chewable tablet 80 mg (has no administration in time range)   nicotine (NICODERM CQ) 14 mg/24hr TD 24 hr patch 1 patch (1 patch Transdermal Medication Applied 2/9/20 1118)   enoxaparin (LOVENOX) subcutaneous injection 40 mg (40 mg Subcutaneous Given 2/9/20 1119)   acetaminophen (TYLENOL) tablet 650 mg (has no administration in time range)   methylPREDNISolone sodium succinate (Solu-MEDROL) injection 40 mg (has no administration in time range)   guaiFENesin (MUCINEX) 12 hr tablet 600 mg (600 mg Oral Given 2/9/20 1114)   benzonatate (TESSALON PERLES) capsule 100 mg (has no administration in time range)   azithromycin (ZITHROMAX) tablet 500 mg (has no administration in time range)   ceftriaxone (ROCEPHIN) 1 g/50 mL in dextrose IVPB (has no administration in time range)   ipratropium-albuterol (DUO-NEB) 0 5-2 5 mg/3 mL inhalation solution 3 mL (3 mL Nebulization Not Given 2/9/20 1433)   ipratropium-albuterol (DUO-NEB) 0 5-2 5 mg/3 mL inhalation solution 3 mL (has no administration in time range)   loratadine (CLARITIN) tablet 10 mg (10 mg Oral Given 2/9/20 1229)   fenofibrate (TRICOR) tablet 145 mg (145 mg Oral Given 2/9/20 1229)   fluticasone (FLONASE) 50 mcg/act nasal spray 1 spray (1 spray Nasal Given 2/9/20 1318)   fish oil capsule 2,000 mg (2,000 mg Oral Given 2/9/20 1318)   pantoprazole (PROTONIX) EC tablet 20 mg (20 mg Oral Given 2/9/20 1229)   pregabalin (LYRICA) capsule 150 mg (150 mg Oral Given 2/9/20 1229)   pravastatin (PRAVACHOL) tablet 80 mg (80 mg Oral Given 2/9/20 1641)   multi-electrolyte (PLASMALYTE-A/ISOLYTE-S PH 7 4) IV solution (100 mL/hr Intravenous New Bag 2/9/20 1318)   acetaminophen (TYLENOL) tablet 975 mg (975 mg Oral Given 2/9/20 0912)   sodium chloride 0 9 % bolus 1,000 mL (0 mL Intravenous Stopped 2/9/20 1033)   albuterol inhalation solution 5 mg (5 mg Nebulization Given 2/9/20 0946)   ipratropium (ATROVENT) 0 02 % inhalation solution 0 5 mg (0 5 mg Nebulization Given 2/9/20 0946)   methylPREDNISolone sodium succinate (Solu-MEDROL) injection 125 mg (125 mg Intravenous Given 2/9/20 0951)   famotidine (PEPCID) injection 20 mg (20 mg Intravenous Given 2/9/20 0950)   ceftriaxone (ROCEPHIN) 1 g/50 mL in dextrose IVPB (0 mg Intravenous Stopped 2/9/20 1118)   azithromycin (ZITHROMAX) 500 mg in sodium chloride 0 9% 250mL IVPB 500 mg (500 mg Intravenous New Bag 2/9/20 1116)       Diagnostic Studies  Results Reviewed     Procedure Component Value Units Date/Time    Blood culture #1 [[id number]] Collected:  02/09/20 0910    Lab Status:  Preliminary result Specimen:  Blood from Arm, Right Updated:  02/09/20 1601     Blood Culture Received in Microbiology Lab  Culture in Progress  Blood culture #2 [[id number]] Collected:  02/09/20 1010    Lab Status:  Preliminary result Specimen:  Blood from Hand, Left Updated:  02/09/20 1601     Blood Culture Received in Microbiology Lab  Culture in Progress      Procalcitonin [[id number]]  (Normal) Collected:  02/09/20 1136    Lab Status:  Final result Specimen:  Blood from Arm, Left Updated:  02/09/20 1521     Procalcitonin 0 11 ng/ml     Platelet count [[id number]]  (Normal) Collected:  02/09/20 1136    Lab Status:  Final result Specimen:  Blood from Arm, Left Updated:  02/09/20 1143     Platelets 076 Thousands/uL      MPV 12 2 fL     Sputum culture and Gram stain [[id number]]     Lab Status:  No result Specimen:  Induced Sputum     Lipase [[id number]]  (Normal) Collected:  02/09/20 0941    Lab Status:  Final result Specimen:  Blood from Arm, Right Updated:  02/09/20 1036     Lipase 213 u/L     Influenza A/B and RSV PCR [[id number]]  (Normal) Collected:  02/09/20 0936    Lab Status:  Final result Specimen:  Nasopharyngeal from Nose Updated:  02/09/20 1029     INFLUENZA A PCR None Detected     INFLUENZA B PCR None Detected     RSV PCR None Detected    NT-BNP PRO [[id number]]  (Abnormal) Collected:  02/09/20 0936    Lab Status:  Final result Specimen:  Blood from Arm, Right Updated:  02/09/20 1022     NT-proBNP 162 pg/mL     Troponin I [[id number]]  (Normal) Collected:  02/09/20 0936    Lab Status:  Final result Specimen: Blood from Arm, Right Updated:  02/09/20 1017     Troponin I <0 02 ng/mL     Lactic acid, plasma x2 [[id number]]  (Normal) Collected:  02/09/20 0936    Lab Status:  Final result Specimen:  Blood from Arm, Right Updated:  02/09/20 1017     LACTIC ACID 1 8 mmol/L     Narrative:       Result may be elevated if tourniquet was used during collection      Comprehensive metabolic panel [[id number]]  (Abnormal) Collected:  02/09/20 0936    Lab Status:  Final result Specimen:  Blood from Arm, Right Updated:  02/09/20 1014     Sodium 136 mmol/L      Potassium 4 1 mmol/L      Chloride 100 mmol/L      CO2 24 mmol/L      ANION GAP 12 mmol/L      BUN 19 mg/dL      Creatinine 1 38 mg/dL      Glucose 140 mg/dL      Calcium 9 2 mg/dL      AST 25 U/L      ALT 39 U/L      Alkaline Phosphatase 78 U/L      Total Protein 8 4 g/dL      Albumin 4 1 g/dL      Total Bilirubin 0 51 mg/dL      eGFR 54 ml/min/1 73sq m     Narrative:       [place] guidelines for Chronic Kidney Disease (CKD):     Stage 1 with normal or high GFR (GFR > 90 mL/min/1 73 square meters)    Stage 2 Mild CKD (GFR = 60-89 mL/min/1 73 square meters)    Stage 3A Moderate CKD (GFR = 45-59 mL/min/1 73 square meters)    Stage 3B Moderate CKD (GFR = 30-44 mL/min/1 73 square meters)    Stage 4 Severe CKD (GFR = 15-29 mL/min/1 73 square meters)    Stage 5 End Stage CKD (GFR <15 mL/min/1 73 square meters)  Note: GFR calculation is accurate only with a steady state creatinine    Protime-INR [[id number]]  (Normal) Collected:  02/09/20 0936    Lab Status:  Final result Specimen:  Blood from Arm, Right Updated:  02/09/20 1005     Protime 13 3 seconds      INR 1 00    APTT [[id number]]  (Normal) Collected:  02/09/20 0936    Lab Status:  Final result Specimen:  Blood from Arm, Right Updated:  02/09/20 1005     PTT 26 seconds     CBC and differential [[id number]]  (Abnormal) Collected:  02/09/20 0936    Lab Status:  Final result Specimen:  Blood from Arm, Right Updated:  02/09/20 0948     WBC 10 56 Thousand/uL      RBC 4 32 Million/uL      Hemoglobin 13 3 g/dL      Hematocrit 40 3 %      MCV 93 fL      MCH 30 8 pg      MCHC 33 0 g/dL      RDW 12 6 %      MPV 12 5 fL      Platelets 692 Thousands/uL      nRBC 0 /100 WBCs      Neutrophils Relative 77 %      Immat GRANS % 1 %      Lymphocytes Relative 9 %      Monocytes Relative 11 %      Eosinophils Relative 1 %      Basophils Relative 1 %      Neutrophils Absolute 8 29 Thousands/µL      Immature Grans Absolute 0 07 Thousand/uL      Lymphocytes Absolute 0 91 Thousands/µL      Monocytes Absolute 1 14 Thousand/µL      Eosinophils Absolute 0 08 Thousand/µL      Basophils Absolute 0 07 Thousands/µL                  XR chest 2 views   Final Result by Pau Zavala MD (02/09 1030)      Left lower lobe pneumonia  The study was marked in [facility] for immediate notification  Workstation performed: [id number]                    Procedures  ECG 12 Lead Documentation Only  Date/Time: 2/9/2020 9:35 AM  Performed by: Michelle Jara MD  Authorized by: Michelle Jara MD     Indications / Diagnosis:  Cough, tachypnea  ECG reviewed by me, the ED Provider: yes    Patient location:  ED  Interpretation:     Interpretation: normal    Rate:     ECG rate:  110    ECG rate assessment: tachycardic    Rhythm:     Rhythm: sinus tachycardia    Ectopy:     Ectopy: none    QRS:     QRS axis:  Normal  Conduction:     Conduction: normal    ST segments:     ST segments:  Normal  T waves:     T waves: normal               ED Course  ED Course as of Feb 09 1805   Sun Feb 09, 2020   1038 WBC(!): 10 56   1038 Hemoglobin: 13 3   1038 Platelet Count: 141   1038 Sodium: 136   1038 Potassium: 4 1   1038 Anion Gap: 12   1038 BUN: 19   1038 Creatinine(!): 1 38   1038 Troponin I: <0 02   1038 LACTIC ACID: 1 8   1038 INFLU A PCR: None Detected   1038 INFLU B PCR: None Detected   1038 Labs reviewed, LLL Pneumonia noted   We will give Ceftriaxone and Azithromycin  RSV PCR: None Detected                   Initial Sepsis Screening     Row Name 02/09/20 1038                Is the patient's history suggestive of a new or worsening infection?  --        Suspected source of infection  pneumonia  -SA        Are two or more of the following signs & symptoms of infection both present and new to the patient? (!) Yes (Proceed)  -SA        Indicate SIRS criteria  Tachycardia > 90 bpm;Tachypnea > 20 resp per min  -SA        If the answer is yes to both questions, suspicion of sepsis is present  --        If severe sepsis is present AND tissue hypoperfusion perists in the hour after fluid resuscitation or lactate > 4, the patient meets criteria for SEPTIC SHOCK  --        Are any of the following organ dysfunction criteria present within 6 hours of suspected infection and SIRS criteria that are NOT considered to be chronic conditions?   No  -SA        Organ dysfunction  --        Date of presentation of severe sepsis  --        Time of presentation of severe sepsis  --        Tissue hypoperfusion persists in the hour after crystalloid fluid administration, evidenced, by either:  --        Was hypotension present within one hour of the conclusion of crystalloid fluid administration?  --        Date of presentation of septic shock  --        Time of presentation of septic shock  --          User Key  (r) = Recorded By, (t) = Taken By, (c) = Cosigned By    [address] Name Provider Rose Valadez MD Physician                  MDM  Number of Diagnoses or Management Options  [facility] ): new and requires workup  Cough: new and requires workup  Fever: new and requires workup  Sinus tachycardia: new and requires workup  Diagnosis management comments: 61-year-old male, history of COPD, comes in with complaints of cough, generalized body aches, fever, upper abdominal pain, states that he went out to a dance, had a few drinks, symptoms started today morning; of note, patient states that since start of the year he has been having bronchitis symptoms and just got back to work last week, and when he noted that he was febrile he just call 911 she was not feeling good  On exam, patient is conscious, alert, speaking full sentences, vital signs noted for elevated temperature, tachypnea, tachycardia, low oxygen saturation, lung exam shows mild bilateral expiratory wheezing, coarse breaths, heart sounds regular, tachycardia, no peripheral edema or calf tenderness swelling/tenderness; abdomen is soft, mild tenderness epigastrium noted  Differential diagnosis:  Viral URI, COPD exacerbation, pneumonia, gastritis, pancreatitis, SIRS, sepsis  Will check sepsis/cardiac workup including labs, lipase, blood cultures, x-rays, EKG, chest x-ray, give breathing treatment, Solu-Medrol Pepcid         Amount and/or Complexity of Data Reviewed  Clinical lab tests: reviewed and ordered  Tests in the radiology section of CPT®: ordered and reviewed  Tests in the medicine section of CPT®: ordered and reviewed  Discuss the patient with other providers: yes  Independent visualization of images, tracings, or specimens: yes          Disposition  Final diagnoses:   Sinus tachycardia   Fever   COPD exacerbation ([facility] )   Cough   SIRS (systemic inflammatory response syndrome) ([facility] )     Time reflects when diagnosis was documented in both MDM as applicable and the Disposition within this note     Time User Action Codes Description Comment    2/9/2020  9:33 AM Renetta Fast Add [R00 0] Sinus tachycardia     2/9/2020  9:33 AM Renetta Fast Add [R50 9] Fever     2/9/2020  9:33 AM Renetta Fast Add [J44 1] [facility]     2/9/2020  9:33 AM Renetta Fast Add [R05] Cough     2/9/2020  6:04 PM Renetta Fast Add [R65 10] SIRS (systemic inflammatory response syndrome) [facility])       ED Disposition     ED Disposition Condition Date/Time Comment    Admit Stable Sun Feb 9, 2020 11:07 AM Case was discussed with Dr Philomena Moon and the patient's admission status was agreed to be Admission Status: inpatient status to the service of Dr Philomena Moon  Follow-up Information    None         Current Discharge Medication List      CONTINUE these medications which have NOT CHANGED    Details   cetirizine (ZyrTEC) 10 mg tablet Take 10 mg by mouth daily      fenofibrate (TRICOR) 145 mg tablet Take 145 mg by mouth daily      fluticasone (FLONASE) 50 mcg/act nasal spray 1 spray into each nostril daily      fluticasone-umeclidinium-vilanterol (TRELEGY ELLIPTA) 100-62 5-25 MCG/INH inhaler Inhale 1 puff daily Rinse mouth after use  ibuprofen (MOTRIN) 600 mg tablet Take 1 tablet (600 mg total) by mouth every 6 (six) hours as needed for mild pain or moderate pain  Qty: 30 tablet, Refills: 0    Associated Diagnoses: Closed fracture of distal end of left radius, unspecified fracture morphology, initial encounter; Closed fracture of distal end of right ulna, unspecified fracture morphology, initial encounter      Ibuprofen-Famotidine 800-26 6 MG TABS Take 1 tablet by mouth 2 (two) times a day      Icosapent Ethyl (VASCEPA) 0 5 g CAPS Take 2 capsules by mouth 2 (two) times a day      multivitamin (THERAGRAN) TABS Take 1 tablet by mouth daily       omeprazole (PriLOSEC) 20 mg delayed release capsule Take 20 mg by mouth daily  pregabalin (LYRICA) 150 mg capsule Take 150 mg by mouth 2 (two) times a day      rosuvastatin (CRESTOR) 10 MG tablet Take 10 mg by mouth daily at bedtime      ALBUTEROL IN Inhale as needed           No discharge procedures on file      ED Provider  Electronically Signed by           Jessica Camarena MD  02/09/20 6065

## 2020-02-10 LAB
ANION GAP SERPL CALCULATED.3IONS-SCNC: 11 MMOL/L (ref 4–13)
ATRIAL RATE: 118 BPM
BASOPHILS # BLD AUTO: 0.02 THOUSANDS/ΜL (ref 0–0.1)
BASOPHILS NFR BLD AUTO: 0 % (ref 0–1)
BUN SERPL-MCNC: 23 MG/DL (ref 5–25)
CALCIUM SERPL-MCNC: 8.8 MG/DL (ref 8.3–10.1)
CHLORIDE SERPL-SCNC: 104 MMOL/L (ref 100–108)
CO2 SERPL-SCNC: 25 MMOL/L (ref 21–32)
CREAT SERPL-MCNC: 1.35 MG/DL (ref 0.6–1.3)
EOSINOPHIL # BLD AUTO: 0 THOUSAND/ΜL (ref 0–0.61)
EOSINOPHIL NFR BLD AUTO: 0 % (ref 0–6)
ERYTHROCYTE [DISTWIDTH] IN BLOOD BY AUTOMATED COUNT: 12.7 % (ref 11.6–15.1)
GFR SERPL CREATININE-BSD FRML MDRD: 55 ML/MIN/1.73SQ M
GLUCOSE SERPL-MCNC: 194 MG/DL (ref 65–140)
HCT VFR BLD AUTO: 37.7 % (ref 36.5–49.3)
HGB BLD-MCNC: 12 G/DL (ref 12–17)
IMM GRANULOCYTES # BLD AUTO: 0.12 THOUSAND/UL (ref 0–0.2)
IMM GRANULOCYTES NFR BLD AUTO: 1 % (ref 0–2)
LYMPHOCYTES # BLD AUTO: 0.72 THOUSANDS/ΜL (ref 0.6–4.47)
LYMPHOCYTES NFR BLD AUTO: 8 % (ref 14–44)
MAGNESIUM SERPL-MCNC: 2.2 MG/DL (ref 1.6–2.6)
MCH RBC QN AUTO: 30.2 PG (ref 26.8–34.3)
MCHC RBC AUTO-ENTMCNC: 31.8 G/DL (ref 31.4–37.4)
MCV RBC AUTO: 95 FL (ref 82–98)
MONOCYTES # BLD AUTO: 0.44 THOUSAND/ΜL (ref 0.17–1.22)
MONOCYTES NFR BLD AUTO: 5 % (ref 4–12)
NEUTROPHILS # BLD AUTO: 7.28 THOUSANDS/ΜL (ref 1.85–7.62)
NEUTS SEG NFR BLD AUTO: 86 % (ref 43–75)
NRBC BLD AUTO-RTO: 0 /100 WBCS
P AXIS: 63 DEGREES
PLATELET # BLD AUTO: 225 THOUSANDS/UL (ref 149–390)
PMV BLD AUTO: 12.9 FL (ref 8.9–12.7)
POTASSIUM SERPL-SCNC: 4.1 MMOL/L (ref 3.5–5.3)
PR INTERVAL: 152 MS
PROCALCITONIN SERPL-MCNC: 0.1 NG/ML
QRS AXIS: 77 DEGREES
QRSD INTERVAL: 84 MS
QT INTERVAL: 308 MS
QTC INTERVAL: 431 MS
RBC # BLD AUTO: 3.98 MILLION/UL (ref 3.88–5.62)
SODIUM SERPL-SCNC: 140 MMOL/L (ref 136–145)
T WAVE AXIS: 53 DEGREES
TROPONIN I SERPL-MCNC: <0.02 NG/ML
TROPONIN I SERPL-MCNC: <0.02 NG/ML
VENTRICULAR RATE: 118 BPM
WBC # BLD AUTO: 8.58 THOUSAND/UL (ref 4.31–10.16)

## 2020-02-10 PROCEDURE — 80048 BASIC METABOLIC PNL TOTAL CA: CPT | Performed by: INTERNAL MEDICINE

## 2020-02-10 PROCEDURE — 97163 PT EVAL HIGH COMPLEX 45 MIN: CPT

## 2020-02-10 PROCEDURE — 94640 AIRWAY INHALATION TREATMENT: CPT

## 2020-02-10 PROCEDURE — 83735 ASSAY OF MAGNESIUM: CPT | Performed by: INTERNAL MEDICINE

## 2020-02-10 PROCEDURE — 99232 SBSQ HOSP IP/OBS MODERATE 35: CPT | Performed by: INTERNAL MEDICINE

## 2020-02-10 PROCEDURE — 93010 ELECTROCARDIOGRAM REPORT: CPT | Performed by: INTERNAL MEDICINE

## 2020-02-10 PROCEDURE — 85025 COMPLETE CBC W/AUTO DIFF WBC: CPT | Performed by: INTERNAL MEDICINE

## 2020-02-10 PROCEDURE — 97166 OT EVAL MOD COMPLEX 45 MIN: CPT

## 2020-02-10 PROCEDURE — 84484 ASSAY OF TROPONIN QUANT: CPT | Performed by: INTERNAL MEDICINE

## 2020-02-10 PROCEDURE — 84145 PROCALCITONIN (PCT): CPT | Performed by: INTERNAL MEDICINE

## 2020-02-10 RX ORDER — ACETAMINOPHEN 325 MG/1
650 TABLET ORAL EVERY 6 HOURS PRN
Status: DISCONTINUED | OUTPATIENT
Start: 2020-02-10 | End: 2020-02-11 | Stop reason: HOSPADM

## 2020-02-10 RX ORDER — METHYLPREDNISOLONE SODIUM SUCCINATE 40 MG/ML
40 INJECTION, POWDER, LYOPHILIZED, FOR SOLUTION INTRAMUSCULAR; INTRAVENOUS EVERY 12 HOURS SCHEDULED
Status: DISCONTINUED | OUTPATIENT
Start: 2020-02-10 | End: 2020-02-11 | Stop reason: HOSPADM

## 2020-02-10 RX ADMIN — SODIUM CHLORIDE, SODIUM GLUCONATE, SODIUM ACETATE, POTASSIUM CHLORIDE, MAGNESIUM CHLORIDE, SODIUM PHOSPHATE, DIBASIC, AND POTASSIUM PHOSPHATE 100 ML/HR: .53; .5; .37; .037; .03; .012; .00082 INJECTION, SOLUTION INTRAVENOUS at 10:04

## 2020-02-10 RX ADMIN — ISODIUM CHLORIDE 3 ML: 0.03 SOLUTION RESPIRATORY (INHALATION) at 19:45

## 2020-02-10 RX ADMIN — LEVALBUTEROL HYDROCHLORIDE 1.25 MG: 1.25 SOLUTION, CONCENTRATE RESPIRATORY (INHALATION) at 19:45

## 2020-02-10 RX ADMIN — Medication 2000 MG: at 08:18

## 2020-02-10 RX ADMIN — NICOTINE 1 PATCH: 14 PATCH TRANSDERMAL at 08:19

## 2020-02-10 RX ADMIN — ACETAMINOPHEN 650 MG: 325 TABLET ORAL at 08:16

## 2020-02-10 RX ADMIN — ALUMINUM HYDROXIDE, MAGNESIUM HYDROXIDE, AND SIMETHICONE 30 ML: 200; 200; 20 SUSPENSION ORAL at 08:16

## 2020-02-10 RX ADMIN — LEVALBUTEROL HYDROCHLORIDE 1.25 MG: 1.25 SOLUTION, CONCENTRATE RESPIRATORY (INHALATION) at 13:57

## 2020-02-10 RX ADMIN — CEFTRIAXONE SODIUM 1000 MG: 10 INJECTION, POWDER, FOR SOLUTION INTRAVENOUS at 11:46

## 2020-02-10 RX ADMIN — FENOFIBRATE 145 MG: 145 TABLET, FILM COATED ORAL at 08:18

## 2020-02-10 RX ADMIN — ISODIUM CHLORIDE 3 ML: 0.03 SOLUTION RESPIRATORY (INHALATION) at 01:16

## 2020-02-10 RX ADMIN — METHYLPREDNISOLONE SODIUM SUCCINATE 40 MG: 40 INJECTION, POWDER, FOR SOLUTION INTRAMUSCULAR; INTRAVENOUS at 05:33

## 2020-02-10 RX ADMIN — PREGABALIN 150 MG: 75 CAPSULE ORAL at 17:10

## 2020-02-10 RX ADMIN — MORPHINE SULFATE 2 MG: 2 INJECTION, SOLUTION INTRAMUSCULAR; INTRAVENOUS at 10:04

## 2020-02-10 RX ADMIN — LORATADINE 10 MG: 10 TABLET ORAL at 08:17

## 2020-02-10 RX ADMIN — ISODIUM CHLORIDE 3 ML: 0.03 SOLUTION RESPIRATORY (INHALATION) at 08:28

## 2020-02-10 RX ADMIN — MELATONIN 3 MG: 3 TAB ORAL at 21:15

## 2020-02-10 RX ADMIN — PRAVASTATIN SODIUM 80 MG: 80 TABLET ORAL at 15:45

## 2020-02-10 RX ADMIN — GUAIFENESIN 600 MG: 600 TABLET ORAL at 08:17

## 2020-02-10 RX ADMIN — BENZONATATE 100 MG: 100 CAPSULE ORAL at 09:28

## 2020-02-10 RX ADMIN — PANTOPRAZOLE SODIUM 20 MG: 20 TABLET, DELAYED RELEASE ORAL at 05:33

## 2020-02-10 RX ADMIN — LEVALBUTEROL HYDROCHLORIDE 1.25 MG: 1.25 SOLUTION, CONCENTRATE RESPIRATORY (INHALATION) at 08:28

## 2020-02-10 RX ADMIN — LEVALBUTEROL HYDROCHLORIDE 1.25 MG: 1.25 SOLUTION, CONCENTRATE RESPIRATORY (INHALATION) at 01:16

## 2020-02-10 RX ADMIN — DOCUSATE SODIUM 100 MG: 100 CAPSULE, LIQUID FILLED ORAL at 08:17

## 2020-02-10 RX ADMIN — ENOXAPARIN SODIUM 40 MG: 40 INJECTION SUBCUTANEOUS at 08:18

## 2020-02-10 RX ADMIN — ISODIUM CHLORIDE 3 ML: 0.03 SOLUTION RESPIRATORY (INHALATION) at 13:58

## 2020-02-10 RX ADMIN — GUAIFENESIN 600 MG: 600 TABLET ORAL at 17:10

## 2020-02-10 RX ADMIN — PREGABALIN 150 MG: 75 CAPSULE ORAL at 08:17

## 2020-02-10 RX ADMIN — METHYLPREDNISOLONE SODIUM SUCCINATE 40 MG: 40 INJECTION, POWDER, FOR SOLUTION INTRAMUSCULAR; INTRAVENOUS at 21:15

## 2020-02-10 RX ADMIN — DOCUSATE SODIUM 100 MG: 100 CAPSULE, LIQUID FILLED ORAL at 17:09

## 2020-02-10 RX ADMIN — METHYLPREDNISOLONE SODIUM SUCCINATE 40 MG: 40 INJECTION, POWDER, FOR SOLUTION INTRAMUSCULAR; INTRAVENOUS at 13:27

## 2020-02-10 RX ADMIN — FLUTICASONE PROPIONATE 1 SPRAY: 50 SPRAY, METERED NASAL at 08:18

## 2020-02-10 RX ADMIN — BENZONATATE 100 MG: 100 CAPSULE ORAL at 17:10

## 2020-02-10 RX ADMIN — AZITHROMYCIN 500 MG: 250 TABLET, FILM COATED ORAL at 11:46

## 2020-02-10 RX ADMIN — Medication 2000 MG: at 17:09

## 2020-02-10 NOTE — PROGRESS NOTES
Progress Note - Gia Pierre 1955, 62 y o  male MRN: [id number]    Unit/Bed#: [place] [address] -01 Encounter: [id number]    Primary Care Provider: Jeanie Anand MD   Date and time admitted to hospital: 2020  8:55 AM        * COPD (chronic obstructive pulmonary disease) ([facility] [address] )  Assessment & Plan  Patient presenting with COPD exacerbation    -likely secondary to left lower lobe pneumonia  -will taper Solu-Medrol to 40 mg IV b i d   -continue nebulizers    Community acquired bacterial pneumonia  Assessment & Plan  Flu negative  Chest x-ray reveals left lower lobe pneumonia  Continue ceftriaxone, azithromycin    CKD (chronic kidney disease) stage 3, GFR 30-59 ml/min ([facility])  Assessment & Plan  Creatinine stable at 1 35, will monitor renal function    Nicotine dependence  Assessment & Plan  Patient will be given a nicotine patch, he was counseled for greater than 5 minutes that smoking may lead to significant health affects  GERD (gastroesophageal reflux disease)  Assessment & Plan  Continue current regimen    Hyperlipidemia  Assessment & Plan  Patient on Vascepa as well as Tricor and Crestor as an outpatient- auto substituted for this hospitalization            VTE Pharmacologic Prophylaxis:   Pharmacologic:  Lovenox    Patient Centered Rounds: I have performed bedside rounds with nursing staff today  Time Spent for Care: 20 minutes  More than 50% of total time spent on counseling and coordination of care as described above      Current Length of Stay: 1 day(s)    Current Patient Status: Inpatient   Certification Statement: The patient will continue to require additional inpatient hospital stay due to COPD exacerbation, pneumonia    Discharge Plan / Estimated Discharge Date:  24-48 hours    Code Status: Level 1 - Full Code      Subjective:   Patient seen and examined at bedside, complaining of cough and pleuritic chest pain    Objective:     Vitals:   Temp (24hrs), Av 8 °F (36 6 °C), Min:97 7 °F (36 5 °C), Max:97 9 °F (36 6 °C)    Temp:  [97 7 °F (36 5 °C)-97 9 °F (36 6 °C)] 97 9 °F (36 6 °C)  HR:  [] 111  Resp:  [18] 18  BP: (111-147)/() 111/61  SpO2:  [92 %-95 %] 92 %  Body mass index is 24 77 kg/m²  Input and Output Summary (last 24 hours): Intake/Output Summary (Last 24 hours) at 2/10/2020 1708  Last data filed at 2/10/2020 1004  Gross per 24 hour   Intake 450 ml   Output 1500 ml   Net -1050 ml       Physical Exam:    Constitutional: Patient is oriented to person, place and time, no acute distress  HEENT:  Normocephalic, atraumatic  Cardiovascular: Normal S1S2, RRR, No murmurs/rubs/gallops appreciated  Pulmonary:  Bilateral air entry, No rhonchi/rales/wheezing appreciated  Abdominal: Soft, Bowel sounds present, Non-tender, Non-distended  Extremities:  No cyanosis, clubbing or edema  Neurological: Cranial nerves II-XII grossly intact, sensation intact, otherwise no focal neurological symptoms  Additional Data:     Labs:    Results from last 7 days   Lab Units 02/10/20  0434   WBC Thousand/uL 8 58   HEMOGLOBIN g/dL 12 0   HEMATOCRIT % 37 7   PLATELETS Thousands/uL 225   NEUTROS PCT % 86*   LYMPHS PCT % 8*   MONOS PCT % 5   EOS PCT % 0     Results from last 7 days   Lab Units 02/10/20  0434 02/09/20  0936   POTASSIUM mmol/L 4 1 4 1   CHLORIDE mmol/L 104 100   CO2 mmol/L 25 24   BUN mg/dL 23 19   CREATININE mg/dL 1 35* 1 38*   CALCIUM mg/dL 8 8 9 2   ALK PHOS U/L  --  78   ALT U/L  --  39   AST U/L  --  25     Results from last 7 days   Lab Units 02/09/20  0936   INR  1 00        I Have Reviewed All Lab Data Listed Above  Recent Cultures (last 7 days):     Results from last 7 days   Lab Units 02/09/20  1010 02/09/20  0910   BLOOD CULTURE  No Growth at 24 hrs  No Growth at 24 hrs         Last 24 Hours Medication List:     Current Facility-Administered Medications:  acetaminophen 650 mg Oral Q6H PRN Charis Ivory MD    aluminum-magnesium hydroxide-simethicone 30 mL Oral Q6H PRN Young Lavon, DO    azithromycin 500 mg Oral Q24H Shaun D Gill, DO    benzonatate 100 mg Oral TID PRN Young Lavon, DO    calcium carbonate 1,000 mg Oral Daily PRN Shaun D Gill, DO    cefTRIAXone 1,000 mg Intravenous Q24H Shaun D Gill, DO Last Rate: 1,000 mg (02/10/20 1146)   docusate sodium 100 mg Oral BID Shaun D Gill, DO    enoxaparin 40 mg Subcutaneous Daily Shaun D Gill, DO    fenofibrate 145 mg Oral Daily Shaun D Gill, DO    fish oil 2,000 mg Oral BID Shaun D Gill, DO    fluticasone 1 spray Nasal Daily Shaun D Gill, DO    guaiFENesin 600 mg Oral BID Shaun D Gill, DO    ipratropium-albuterol 3 mL Nebulization Q4H PRN Vu Rivera, DO    levalbuterol 1 25 mg Nebulization Q6H Shaun D Gill, DO    loratadine 10 mg Oral Daily Shaun D Gill, DO    melatonin 3 mg Oral HS Minerva Martinez PA-C    methylPREDNISolone sodium succinate 40 mg Intravenous Q12H [address] Matt Saldivar MD    morphine injection 2 mg Intravenous Q4H PRN Young Lavon, DO    nicotine 1 patch Transdermal Daily Shaun D Gill, DO    ondansetron 4 mg Intravenous Q6H PRN Shaun D Gill, DO    pantoprazole 20 mg Oral Early Morning Shaun D Gill, DO    pravastatin 80 mg Oral Daily With Dinner Shaun D Gill, DO    pregabalin 150 mg Oral BID Shaun D Gill, DO    simethicone 80 mg Oral 4x Daily PRN Shaun D Gill, DO    sodium chloride 3 mL Nebulization Q6H Young Lavon, DO         Today, Patient Was Seen By: Matt Saldivar MD

## 2020-02-10 NOTE — PROGRESS NOTES
Patient complaining of "chest pain" due to "coughing"; states he thinks his pain would be better if he could stop coughing  RN administered cough medication, with patient reporting no relief  RN then administered pain medication, as patient states pain is 7/10  Upon re-assessment, patient resting in bed in no apparent distress  Patient currently working with PT/OT; no signs of chest pain  Will continue to monitor      Faustine Buerger, RN 2/10/2020 10:55 AM

## 2020-02-10 NOTE — PLAN OF CARE
Problem: OCCUPATIONAL THERAPY ADULT  Goal: Performs self-care activities at highest level of function for planned discharge setting  See evaluation for individualized goals  Description  Treatment Interventions: ADL retraining, Functional transfer training, Endurance training, Patient/family training, Equipment evaluation/education, Energy conservation, Activityengagement          See flowsheet documentation for full assessment, interventions and recommendations  Note:   Limitation: Decreased ADL status, Decreased Safe judgement during ADL, Decreased endurance, Decreased self-care trans, Decreased high-level ADLs  Prognosis: Good  Assessment: Pt is 62 y o male adm to [address] [place] [address] on 2/9/20 presenting w/ Flu symptoms and SOB  Pt dxd w/ Pneumonia  XR chest (+) L lower lobe pneumonia  Comorbidities affecting pts functional performance include COPD, Fibromyalgia, and Hyperlipidemia  Active OT orders and activity orders are Activity as Tolerated  Pt lives w/ wife in a one level apt w/ 3 "steep" MARIELENA  At baseline, pt reports I w/ ADLs/IADLs and functional mobility w/o use of AD, (+) , and reports 0 falls PTA  Pt is FT employed  Upon evaluation, pt currently requires Supervisoin for UB ADLS, Min A for LB ADLS, Min A for toileting, Supervision for transfers and Min A for functional mobility 2* decreased endurance, impaired balance, increased pain, and decreased activity tolerance  These impairments, as well as pt's MARIELENA environment and increased difficulty performing ADLs/IADLs limit pts ability to engage in all areas of occupation  Pt scored 55/100 on the Barthel Index  Based on this OT evaluation and the pts functional performance deficits, pt has been identified as a Moderate complexity evaluation   Pt would benefit from continued OT services during hospital stay to address deficits and improve level of functional independence in these Occupational Performance areas: grooming, bathing/shower, dressing, toileting, laundry, meal prep, and functional mobility/transfers  OT recommends Home with family support   Pt will be on OT caseload 3-5x/wk to address the following goals to  in 10-14 days:     OT Discharge Recommendation: Home with family support  OT - OK to Discharge: Yes(when medically cleared )

## 2020-02-10 NOTE — QUICK NOTE
Patient seen and examined  Noted to be tachycardic and with chest pain, rated as dull 2/10  Currently zero on examination  He has multiple cardiac risk factors  - EKG obtained  No evidence of coronary ischemia  Sinus Tachycardia noted  Physical exam :  Gen: A and O x 3 in NAD   Respiratory exam with wheezing  CV: Tachycardic no heard m/r/g  Abd: Soft  Extremities: No Edema  Neuro: CN II-XII intact        A: Atypical Chest Pain      P: Full dose Aspirin 325 mg given  Morphine 1 mg IV now  Suspect tachycardic making symptom worse  Change to levabuterol from Duoneb  Serial Set of cardiac enzymes - First set is negative      D/W Nursing        DO IDALIA

## 2020-02-10 NOTE — PHYSICAL THERAPY NOTE
PHYSICAL THERAPY EVALUATION          Patient Name: Ginger Walsh  CMHXP'Q Date: 2/10/2020   PT EVALUATION    62 y o     [id number]    Cough [R05]  Sinus tachycardia [R00 0]  COP[facility] exacerbation ([facility] ) [J44 1]  Fever [R50 9]    Past Medical History:   Diagnosis Date    [facility]     Fibromyalgia     GERD (gastroesophageal reflux disease)     Hyperlipidemia      Past Surgical History:   Procedure Laterality Date    ANKLE FRACTURE SURGERY      Left Side    CHOLECYSTECTOMY      CYST REMOVAL      FEMUR SURGERY      KNEE SURGERY      ROTATOR CUFF REPAIR      WRIST FRACTURE SURGERY Left         02/10/20 1056   Note Type   Note type Eval only   Pain Assessment   Pain Assessment 0-10   Pain Score 3   Pain Location Chest   Hospital Pain Intervention(s) Repositioned; Ambulation/increased activity; Rest   Response to Interventions tolerated   Home Living   Type of [address] One level;Stairs to enter without rails   Bathroom Shower/Tub Tub/shower unit   Bathroom Toilet Standard   Bathroom Equipment Commode  (pt unsure if he has BSC)   [address] Walker;Cane;Crutches   Additional Comments pt reports residing in one level apt, 3 MARIELENA w no rails   Prior Function   Level of [place] Independent with ADLs and functional mobility   Lives With Spouse   Receives Help From Family   ADL Assistance Independent   IADLs Independent   Falls in the last 6 months 0   Vocational Full time employment   Comments pta pt reports being indep, amb w/o an AD, working FT and +  resides w wife who also works FT  denies use of DME at baseline   Restrictions/Precautions   Weight Bearing Precautions Per Order No   Other Precautions Multiple lines;Telemetry; Fall Risk   General   Additional Pertinent History pt admitted 2/9/20 for COPD, reports one episode of feeling lightheaded w mobility during hospital admission   Family/Caregiver Present No   Cognition   Overall Cognitive Status WFL   Arousal/Participation Cooperative   Attention Within functional limits   Orientation Level Oriented X4   Memory Within functional limits   Following Commands Follows one step commands without difficulty   RLE Assessment   RLE Assessment X  (reports RLE is his "bad leg", hx of femur fx w eamon placement)   Strength RLE   R Hip Flexion 4/5   R Hip ABduction 3+/5   R Ankle Dorsiflexion 4+/5   R Knee Extension 4/5   LLE Assessment   LLE Assessment X   Strength LLE   L Hip Flexion 4/5   L Hip ABduction 3+/5   L Knee Extension 4-/5   L Ankle Dorsiflexion 4+/5   Coordination   Movements are Fluid and Coordinated 0   Coordination and Movement Description unstable, off balance w ambulation   Sensation WFL   Bed Mobility   Supine to Sit 5  Supervision   Additional items HOB elevated; Bedrails; Increased time required;Verbal cues   Sit to Supine 5  Supervision   Additional items HOB elevated; Bedrails; Increased time required;Verbal cues   Additional Comments verbal cues for safety, pt w good use of bed rails to transf in bed   Transfers   Sit to Stand 5  Supervision   Additional items Bedrails;Verbal cues; Increased time required   Stand to Sit 5  Supervision   Additional items Bedrails; Increased time required;Verbal cues; Assist x 1   Stand pivot 4  Minimal assistance   Additional items Assist x 1; Increased time required;Verbal cues   Additional Comments close supervision needed for transf 2* to pt feeling lightheaded w observed changes in balance and stability   Ambulation/Elevation   Gait pattern Excessively slow; Short stride  (upon ambulation noted instability, poor balance )   Gait Assistance 4  Minimal assist   Additional items Assist x 1;Verbal cues; Tactile cues  (tactile cues for safety to maintain upright balance)   Assistive Device None   Distance 50'  (pt require transf chair to return to room given sx)   Stair Management Assistance Not tested   Balance   Static Sitting Good   Dynamic Sitting Fair +   Static Standing Fair -  (to poor + given sx and instability)   Dynamic Standing Poor +   Ambulatory Poor +  (given sx of lightheadedness, off balance)   Endurance Deficit   Endurance Deficit No  (BPs recorded: 143/116 EOB, 141/112 standing, 147/84 post amb)   Activity Tolerance   Activity Tolerance Patient tolerated treatment well;Treatment limited secondary to medical complications (Comment)  (impaired balance, sx of lightheadedness)   Medical Staff Made Aware Lyndsey OT   Nurse Made Aware Sanna RN  (per nsg pt received morphine, may be contributing to sx)   Assessment   Prognosis Good   Problem List Decreased strength; Impaired balance;Decreased mobility; Decreased coordination;Pain   Assessment Violet Cadena is a 62 y o  male admitted to [facility] on 2/9/2020 for COPD (chronic obstructive pulmonary disease) ([facility] )  Pt  has a past medical history of COPD (chronic obstructive pulmonary disease) ([facility] ), Fibromyalgia, GERD (gastroesophageal reflux disease), and Hyperlipidemia    PT was consulted and pt was seen on 2/10/2020 for mobility assessment and d/c planning  Pt presents medium fall risk precautions, PIV, continuous telemetry, monitoring abn vitals  Pt lives with his wife in a one level apt with 3 MARIELENA and accessible bed and bath  Prior to admission pt reports being indep w ADLs/IADLs, amb w/o an AD, + and working FT  Pt is currently functioning at a supervision assistance x1 level for bed mobility, supervision assistance x1 level for transfers, minimum assistance x1 level for ambulation with no assistive device  Assessment of mobility limited on IE secondary to onset of lightheadedness, off balance, instability w ambulation  Pt require min Ax1 and use of transf chair to return to room for safety given sx    Pt will benefit from continued skilled IP PT to address the above mentioned impairments  in order to maximize recovery and increase functional independence when completing mobility and ADLs  At this time PT recommendations for d/c are home w family support pending progress and achievement of IP PT goals as pt likely to resume PLOF with resolution of sx (lightheaded, off balance)  Barriers to Discharge Inaccessible home environment;Decreased caregiver support   Barriers to Discharge Comments MARIELENA, lives alone   Goals   Patient Goals to go home   STG Expiration Date 02/17/20   Short Term Goal #1 1)  Pt will perform bed mobility with Alejandra demonstrating appropriate technique 100% of the time in order to improve function  2)  Perform all transfers with Alejandra demonstrating safe and appropriate technique 100% of the time in order to improve ability to negotiate safely in home environment  3) Amb with least restrictive AD > 200'x1 with mod I in order to demonstrate ability to negotiate in home environment  4)  Improve overall strength and balance 1/2 grade in order to optimize ability to perform functional tasks and reduce fall risk  5) Increase activity tolerance to 45 minutes in order to improve endurance to functional tasks  6)  Negotiate stairs using most appropriate technique and S in order to be able to negotiate safely in home environment  7) PT for ongoing patient and family/caregiver education, DME needs and d/c planning in order to promote highest level of function in least restrictive environment  PT Treatment Day 0   Plan   Treatment/Interventions Functional transfer training;LE strengthening/ROM; Elevations; Therapeutic exercise;Patient/family training;Equipment eval/education; Bed mobility;Gait training;Spoke to nursing;OT;Continued evaluation   PT Frequency 2-3x/wk   Recommendation   Recommendation Home with family support   Equipment Recommended   (pending progress)   PT - OK to Discharge No   Additional Comments no to home pending progress   Modified Cumming Scale   Modified Roxy Scale 4   Barthel Index Feeding 10   Bathing 0   Grooming Score 5   Dressing Score 5   Bladder Score 10   Bowels Score 10   Toilet Use Score 5   Transfers (Bed/Chair) Score 10   Mobility (Level Surface) Score 0   Stairs Score 0   Barthel Index Score 55   History: co - morbidities, age, social background (rebecca), fall risk, multiple lines  Exam: impairments in systems including musculoskeletal (ROM, strength, posture), neuromuscular (balance, gait, transfers, motor function and coordination), joint integrity (reports L eamon placement following femur fx w residual L sided weakness), integumentary (skin integrity), cardiopulmonary (monitoring for orthostatic BP), cognition, barthel index 55  Clinical: unstable/unpredictable  Complexity:high      Chalmers Hamman, PT

## 2020-02-10 NOTE — OCCUPATIONAL THERAPY NOTE
Occupational Therapy Evaluation     Patient Name: José Miguel Aquino  NRQOL'E Date: 2/10/2020  Problem List  Principal Problem:    COPD (chronic obstructive pulmonary disease) ([facility] [address] )  Active Problems:    Hyperlipidemia    GERD (gastroesophageal reflux disease)    Nicotine dependence    CKD (chronic kidney disease) stage 3, GFR 30-59 ml/min ([facility] [address] )    Community acquired bacterial pneumonia    Past Medical History  Past Medical History:   Diagnosis Date    COPD (chronic obstructive pulmonary disease) ([facility] [address] )     Fibromyalgia     GERD (gastroesophageal reflux disease)     Hyperlipidemia      Past Surgical History  Past Surgical History:   Procedure Laterality Date    ANKLE FRACTURE SURGERY      Left Side    CHOLECYSTECTOMY      CYST REMOVAL      FEMUR SURGERY      KNEE SURGERY      ROTATOR CUFF REPAIR      WRIST FRACTURE SURGERY Left              02/10/20 1055   Note Type   Note type Eval only   Restrictions/Precautions   Weight Bearing Precautions Per Order No   Other Precautions Fall Risk;Pain;Multiple lines  (Des)   Pain Assessment   Pain Assessment 0-10   Pain Score 3   Pain Location Chest   Pain Orientation Mid   Effect of Pain on Daily Activities pain limiting activity tolerance   Hospital Pain Intervention(s) Medication (See MAR); Repositioned; Ambulation/increased activity; Emotional support; Rest   Response to Interventions tolerated OT   Home Living   Type of [address] One level;Stairs to enter without rails  (3 "steep" MARIELENA)   Bathroom Shower/Tub Tub/shower unit   Bathroom Toilet Standard   Bathroom Equipment Commode  (He thinks he has a commode)   [address] Walker;Cane;Crutches  (however, does not use)   Additional Comments pt has to walk outside and around apt building for laundry   Prior Function   Level of [place] Independent with ADLs and functional mobility   Lives With Spouse   Receives Help From Family   ADL Assistance Independent   IADLs Independent   Falls in the last 6 months 0   Vocational Full time employment   Comments At baseline, pt reports I w/ ADLs/IADLs and functional mobility w/o use of AD, (+) , and reports 0 falls PTA  Lifestyle   Autonomy At baseline, pt reports I w/ ADLs/IADLs and functional mobility w/o use of AD, (+) , and reports 0 falls PTA  Reciprocal Relationships wife   Service to Others works full time   Intrinsic Gratification playing computer games   Psychosocial   Psychosocial (WDL) WDL   ADL   Where Assessed Edge of bed   Eating Assistance [address]  4  [address]  [address] 4  Minimal Assistance   Bed Mobility   Supine to Sit 5  Supervision   Additional items Assist x 1;Bedrails;HOB elevated; Increased time required;Verbal cues   Sit to Supine 5  Supervision   Additional items Assist x 1;Bedrails; Increased time required;Verbal cues   Additional Comments Pt lying supine at end of session  Call bell and phone within reach  All needs met and no further questions for OT at this time  Transfers   Sit to Stand 5  Supervision   Additional items Assist x 1;Bedrails; Increased time required;Verbal cues   Stand to Sit 5  Supervision   Additional items Assist x 1;Bedrails; Increased time required;Verbal cues   Additional Comments BP EOB: 143/116; standin/112; supine: 147/84; cues for safety    Functional Mobility   Functional Mobility 4  Minimal assistance   Additional Comments assist x1, cues for safety/steadying  1 LOB noted 2* increased dizziness/lightheadedness reported requiring seated rest break     Additional items   (No AD)   Balance   Static Sitting Fair +   Dynamic Sitting Fair +   Static Standing Fair - Dynamic Standing Poor +   Ambulatory Poor +   Activity Tolerance   Activity Tolerance Patient limited by fatigue; Other (Comment)  (pt limited by impaired balance)   Medical Staff Made Aware WYATT Gil   Nurse Made Aware Sanna RN   RUE Assessment   RUE Assessment WFL   RUE Strength   RUE Overall Strength Within Functional Limits - able to perform ADL tasks with strength  (4+/5)   LUE Assessment   LUE Assessment WFL   LUE Strength   LUE Overall Strength Within Functional Limits - able to perform ADL tasks with strength  (4+/5)   Hand Function   Gross Motor Coordination Functional   Fine Motor Coordination Functional   Sensation   Light Touch No apparent deficits   Proprioception   Proprioception No apparent deficits   Vision-Basic Assessment   Current Vision Wears glasses all the time   Vision - Complex Assessment   Ocular Range of Motion [facility]   Acuity Able to read clock/calendar on wall without difficulty   Perception   Inattention/Neglect Appears intact   Cognition   Overall Cognitive Status [facility]   Arousal/Participation Alert; Responsive; Cooperative   Attention Within functional limits   Orientation Level Oriented X4   Memory Within functional limits   Following Commands Follows one step commands without difficulty   Assessment   Limitation Decreased ADL status; Decreased Safe judgement during ADL;Decreased endurance;Decreased self-care trans;Decreased high-level ADLs   Prognosis Good   Assessment Pt is 62 y o male adm to [address] on 2/9/20 presenting w/ Flu symptoms and SOB  Pt dxd w/ Pneumonia  XR chest (+) L lower lobe pneumonia  Comorbidities affecting pts functional performance include COPD, Fibromyalgia, and Hyperlipidemia  Active OT orders and activity orders are Activity as Tolerated  Pt lives w/ wife in a one level apt w/ 3 "steep" MARIELENA  At baseline, pt reports I w/ ADLs/IADLs and functional mobility w/o use of AD, (+) , and reports 0 falls PTA  Pt is FT employed   Upon evaluation, pt currently requires Supervisoin for UB ADLS, Min A for LB ADLS, Min A for toileting, Supervision for transfers and Min A for functional mobility 2* decreased endurance, impaired balance, increased pain, and decreased activity tolerance  These impairments, as well as pt's MARIELENA environment and increased difficulty performing ADLs/IADLs limit pts ability to engage in all areas of occupation  Pt scored 55/100 on the Barthel Index  Based on this OT evaluation and the pts functional performance deficits, pt has been identified as a Moderate complexity evaluation  Pt would benefit from continued OT services during hospital stay to address deficits and improve level of functional independence in these Occupational Performance areas: grooming, bathing/shower, dressing, toileting, laundry, meal prep, and functional mobility/transfers  OT recommends Home with family support  Pt will be on OT caseload 3-5x/wk to address the following goals to  in 10-14 days:   Goals   Patient Goals to go home   LTG Time Frame 10-14   Long Term Goal Please refer to LTGs listed below:   Plan   Treatment Interventions ADL retraining;Functional transfer training; Endurance training;Patient/family training;Equipment evaluation/education; Energy conservation; Activityengagement   Goal Expiration Date 20   OT Treatment Day 0   OT Frequency 3-5x/wk   Recommendation   OT Discharge Recommendation Home with family support   OT - OK to Discharge Yes  (when medically cleared )   Barthel Index   Feeding 10   Bathing 0   Grooming Score 5   Dressing Score 5   Bladder Score 10   Bowels Score 10   Toilet Use Score 5   Transfers (Bed/Chair) Score 10   Mobility (Level Surface) Score 0   Stairs Score 0   Barthel Index Score 55   Modified Davis Scale   Modified Davis Scale 4       Goals:   1  Pt will improve activity tolerance to G for 30 min treatment session to increase activity engagement     2  Pt will increase bed mobility to Mod I and transfer EOB to participate in functional activity and decrease caregiver assistance required  3  Pt will complete light grooming task w/ Mod I w/ G sequencing  4  Pt will increase independence in UB ADLs w/ use of DME to Mod I w/ G balance/safety demonstrated to increase functional activity engagement  5  Pt will increase independence in LB ADLs w/ use of DME to Mod I w/ G balance/safety demonstrated to increase functional activity engagement  6  Pt will complete toileting w/ Mod I w/ G hygiene/thoroughness w/ use of DME as needed  7  Pt will improve functional transfers on/off all surfaces using least restrictive device to Mod I to complete functional activities  8  Pt will improve functional mobility during ADL/IADL tasks to Mod I using least restrictive device w/ G safety awareness demonstrated  9  Pt will demonstrate G carryover of learned safety techniques and proper body mechanics in functional activities with use of least restrictive device  10  Pt will participate in simulated IADL task w/ Mod I to increase endurance and safety during functional activities  11  Pt will verbalize 3 potential fall risks in environment and carryover G safety through functional activity to decrease fall risk in home environment  12  Pt will demonstrate G carryover of learned energy conservation techniques during ADLs/IADLs to increase endurance during functional tasks         Collette Cook, OTS

## 2020-02-10 NOTE — ASSESSMENT & PLAN NOTE
Patient presenting with COPD exacerbation    -likely secondary to left lower lobe pneumonia  -will taper Solu-Medrol to 40 mg IV b i d   -continue nebulizers

## 2020-02-10 NOTE — PLAN OF CARE
Problem: PHYSICAL THERAPY ADULT  Goal: Performs mobility at highest level of function for planned discharge setting  See evaluation for individualized goals  Description  Treatment/Interventions: Functional transfer training, LE strengthening/ROM, Elevations, Therapeutic exercise, Patient/family training, Equipment eval/education, Bed mobility, Gait training, Spoke to nursing, OT, Continued evaluation  Equipment Recommended: (pending progress)       See flowsheet documentation for full assessment, interventions and recommendations  Note:   Prognosis: Good  Problem List: Decreased strength, Impaired balance, Decreased mobility, Decreased coordination, Pain  Assessment: Marcia Edouard is a 62 y o  male admitted to [facility] on 2/9/2020 for COPD (chronic obstructive pulmonary disease) ([facility] [address] )  Pt  has a past medical history of COPD (chronic obstructive pulmonary disease) ([facility] [address] ), Fibromyalgia, GERD (gastroesophageal reflux disease), and Hyperlipidemia    PT was consulted and pt was seen on 2/10/2020 for mobility assessment and d/c planning  Pt presents medium fall risk precautions, PIV, continuous telemetry, monitoring abn vitals  Pt lives with his wife in a one level apt with 3 MARIELENA and accessible bed and bath  Prior to admission pt reports being indep w ADLs/IADLs, amb w/o an AD, + and working FT  Pt is currently functioning at a supervision assistance x1 level for bed mobility, supervision assistance x1 level for transfers, minimum assistance x1 level for ambulation with no assistive device  Assessment of mobility limited on IE secondary to onset of lightheadedness, off balance, instability w ambulation  Pt require min Ax1 and use of transf chair to return to room for safety given sx  Pt will benefit from continued skilled IP PT to address the above mentioned impairments  in order to maximize recovery and increase functional independence when completing mobility and ADLs   At this time PT recommendations for d/c are home w family support pending progress and achievement of IP PT goals as pt likely to resume PLOF with resolution of sx (lightheaded, off balance)  Barriers to Discharge: Inaccessible home environment, Decreased caregiver support  Barriers to Discharge Comments: MARIELENA, lives alone  Recommendation: Home with family support     PT - OK to Discharge: No    See flowsheet documentation for full assessment

## 2020-02-10 NOTE — PLAN OF CARE
Problem: PAIN - ADULT  Goal: Verbalizes/displays adequate comfort level or baseline comfort level  Description  Interventions:  - Encourage patient to monitor pain and request assistance  - Assess pain using appropriate pain scale  - Administer analgesics based on type and severity of pain and evaluate response  - Implement non-pharmacological measures as appropriate and evaluate response  - Consider cultural and social influences on pain and pain management  - Notify physician/advanced practitioner if interventions unsuccessful or patient reports new pain  Outcome: Progressing     Problem: INFECTION - ADULT  Goal: Absence or prevention of progression during hospitalization  Description  INTERVENTIONS:  - Assess and monitor for signs and symptoms of infection  - Monitor lab/diagnostic results  - Monitor all insertion sites, i e  indwelling lines, tubes, and drains  - Monitor endotracheal if appropriate and nasal secretions for changes in amount and color  - [place] appropriate cooling/warming therapies per order  - Administer medications as ordered  - Instruct and encourage patient and family to use good hand hygiene technique  - Identify and instruct in appropriate isolation precautions for identified infection/condition  Outcome: Progressing     Problem: SAFETY ADULT  Goal: Patient will remain free of falls  Description  INTERVENTIONS:  - Assess patient frequently for physical needs  -  Identify cognitive and physical deficits and behaviors that affect risk of falls    -  [place] fall precautions as indicated by assessment   - Educate patient/family on patient safety including physical limitations  - Instruct patient to call for assistance with activity based on assessment  - Modify environment to reduce risk of injury  - Consider OT/PT consult to assist with strengthening/mobility  Outcome: Progressing  Goal: Maintain or return to baseline ADL function  Description  INTERVENTIONS:  -  Assess patient's ability to carry out ADLs; assess patient's baseline for ADL function and identify physical deficits which impact ability to perform ADLs (bathing, care of mouth/teeth, toileting, grooming, dressing, etc )  - Assess/evaluate cause of self-care deficits   - Assess range of motion  - Assess patient's mobility; develop plan if impaired  - Assess patient's need for assistive devices and provide as appropriate  - Encourage maximum independence but intervene and supervise when necessary  - Involve family in performance of ADLs  - Assess for home care needs following discharge   - Consider OT consult to assist with ADL evaluation and planning for discharge  - Provide patient education as appropriate  Outcome: Progressing  Goal: Maintain or return mobility status to optimal level  Description  INTERVENTIONS:  - Assess patient's baseline mobility status (ambulation, transfers, stairs, etc )    - Identify cognitive and physical deficits and behaviors that affect mobility  - Identify mobility aids required to assist with transfers and/or ambulation (gait belt, sit-to-stand, lift, walker, cane, etc )  - [place] fall precautions as indicated by assessment  - Record patient progress and toleration of activity level on Mobility SBAR; progress patient to next Phase/Stage  - Instruct patient to call for assistance with activity based on assessment  - Consider rehabilitation consult to assist with strengthening/weightbearing, etc   Outcome: Progressing     Problem: DISCHARGE PLANNING  Goal: Discharge to home or other facility with appropriate resources  Description  INTERVENTIONS:  - Identify barriers to discharge w/patient and caregiver  - Arrange for needed discharge resources and transportation as appropriate  - Identify discharge learning needs (meds, wound care, etc )  - Arrange for interpretive services to assist at discharge as needed  - Refer to Case Management Department for coordinating discharge planning if the patient needs post-hospital services based on physician/advanced practitioner order or complex needs related to functional status, cognitive ability, or social support system  Outcome: Progressing     Problem: Knowledge Deficit  Goal: Patient/family/caregiver demonstrates understanding of disease process, treatment plan, medications, and discharge instructions  Description  Complete learning assessment and assess knowledge base  Interventions:  - Provide teaching at level of understanding  - Provide teaching via preferred learning methods  Outcome: Progressing     Problem: Potential for Falls  Goal: Patient will remain free of falls  Description  INTERVENTIONS:  - Assess patient frequently for physical needs  -  Identify cognitive and physical deficits and behaviors that affect risk of falls    -  [place] fall precautions as indicated by assessment   - Educate patient/family on patient safety including physical limitations  - Instruct patient to call for assistance with activity based on assessment  - Modify environment to reduce risk of injury  - Consider OT/PT consult to assist with strengthening/mobility  Outcome: Progressing     Problem: CARDIOVASCULAR - ADULT  Goal: Maintains optimal cardiac output and hemodynamic stability  Description  INTERVENTIONS:  - Monitor I/O, vital signs and rhythm  - Monitor for S/S and trends of decreased cardiac output  - Administer and titrate ordered vasoactive medications to optimize hemodynamic stability  - Assess quality of pulses, skin color and temperature  - Assess for signs of decreased coronary artery perfusion  - Instruct patient to report change in severity of symptoms  Outcome: Progressing     Problem: RESPIRATORY - ADULT  Goal: Achieves optimal ventilation and oxygenation  Description  INTERVENTIONS:  - Assess for changes in respiratory status  - Assess for changes in mentation and behavior  - Position to facilitate oxygenation and minimize respiratory effort  - Oxygen administered by appropriate delivery if ordered  - Initiate smoking cessation education as indicated  - Encourage broncho-pulmonary hygiene including cough, deep breathe, Incentive Spirometry  - Assess the need for suctioning and aspirate as needed  - Assess and instruct to report SOB or any respiratory difficulty  - Respiratory Therapy support as indicated  Outcome: Progressing     Problem: MUSCULOSKELETAL - ADULT  Goal: Maintain or return mobility to safest level of function  Description  INTERVENTIONS:  - Assess patient's ability to carry out ADLs; assess patient's baseline for ADL function and identify physical deficits which impact ability to perform ADLs (bathing, care of mouth/teeth, toileting, grooming, dressing, etc )  - Assess/evaluate cause of self-care deficits   - Assess range of motion  - Assess patient's mobility  - Assess patient's need for assistive devices and provide as appropriate  - Encourage maximum independence but intervene and supervise when necessary  - Involve family in performance of ADLs  - Assess for home care needs following discharge   - Consider OT consult to assist with ADL evaluation and planning for discharge  - Provide patient education as appropriate  Outcome: Progressing  Goal: Maintain proper alignment of affected body part  Description  INTERVENTIONS:  - Support, maintain and protect limb and body alignment  - Provide patient/ family with appropriate education  Outcome: Progressing

## 2020-02-10 NOTE — UTILIZATION REVIEW
Initial Clinical Review    Admission: Date/Time/Statement: Admission Orders (From admission, onward)     Ordered        02/09/20 1055  Inpatient Admission  Once                   Orders Placed This Encounter   Procedures    Inpatient Admission     Standing Status:   Standing     Number of Occurrences:   1     Order Specific Question:   Admitting Physician     Answer:   Kay Strauss [[id number]]     Order Specific Question:   Level of Care     Answer:   Med Surg [16]     Order Specific Question:   Bed Type     Answer:   Nik [4]     Order Specific Question:   Estimated length of stay     Answer:   More than 2 Midnights     Order Specific Question:   Certification     Answer:   I certify that inpatient services are medically necessary for this patient for a duration of greater than two midnights  See H&P and MD Progress Notes for additional information about the patient's course of treatment  ED Arrival Information     Expected Arrival Acuity Means of Arrival Escorted By Service Admission Type    - 2/9/2020 08:55 Urgent Ambulance [facility] ([address] [facility]) Hospitalist Urgent    Arrival Complaint    fever        Chief Complaint   Patient presents with    Flu Symptoms     Fever & generalized body aches with cough and generalized abdominal pain that began last night; Pt did not take mny medications for symptoms     Assessment/Plan: 61 yo male presents to ED with SOB while on Dance floor  Normally can dance for 45 - 50 mionutes but today developed, SOB  Also reports fever this morning and fatigue  On Exam:  Fever, tachycardia, Tachypnea,  Mild wheezes  LLL Pneumonia noted on CXR  Rec'd  IV Abxs, IVF's, IV Steroid, & Nebs in ED  Admitted to IP with Cpmmunity Acquired Pneumonia, COPD Exac  Plan: IV Abxs,  IV steroids, Nebs  Low threshold for Bipap  H/O CKD 3 - Cr slightly elevated - IVF's and recheck in am  Pt had an episode of Chest Pain after admission - dull 2/10  No evidence of coronary ischemia  Given ASA and MS IV  Change to levabuterol from Duoneb due to tachycardia    2/10: + MCGRATH; C/O Chest pain - pt thinks it is due to coughing  Given Cough med w/o relief   Pain 7/10 - given MS IV x 1      ED Triage Vitals   Temperature Pulse Respirations Blood Pressure SpO2   02/09/20 0904 02/09/20 0859 02/09/20 0859 02/09/20 0859 02/09/20 0859   99 6 °F (37 6 °C) (!) 116 (!) 24 146/70 94 %      Temp Source Heart Rate Source Patient Position - Orthostatic VS BP Location FiO2 (%)   02/09/20 0904 02/09/20 0859 02/09/20 1104 02/09/20 1104 --   Temporal Monitor Lying Left arm       Pain Score       02/09/20 0859       Worst Possible Pain        Wt Readings from Last 1 Encounters:   02/10/20 73 9 kg (162 lb 14 7 oz)     Additional Vital Signs:   02/10/20 10:40:26  -- 111Abn -- 143/116 93 % -- --   02/10/20 07:16:12  97 7 °F (36 5 °C) 88 18 115/53 93 % -- --   02/10/20 0116  -- -- -- -- 93 % None (Room air) --   02/09/20 16:35:44  98 5 °F (36 9 °C) 103 18 114/66 95 % None (Room air) Lying   02/09/20 11:51:24  98 5 °F (36 9 °C) 103 18 111/70 97 % None (Room air) Lying   02/09/20 1104  -- 107Abn 18 138/58 95 % None (Room air) Lying   02/09/20 1030  -- 104 22 122/58 93 % -- --   02/09/20 1027  99 °F (37 2 °C) 104 20 122/58 93 % None (Room air) --   02/09/20 0916  -- -- -- -- -- Nasal cannula @ 2 L --   02/09/20 0909  100 1 °F (37 8 °C)             Pertinent Labs/Diagnostic Test Results:   Results from last 7 days   Lab Units 02/10/20  0434 02/09/20  1136 02/09/20  0936   WBC Thousand/uL 8 58  --  10 56*   HEMOGLOBIN g/dL 12 0  --  13 3   HEMATOCRIT % 37 7  --  40 3   PLATELETS Thousands/uL 225 201 235   NEUTROS ABS Thousands/µL 7 28  --  8 29*     Results from last 7 days   Lab Units 02/10/20  0434 02/09/20  0936   SODIUM mmol/L 140 136   POTASSIUM mmol/L 4 1 4 1   CHLORIDE mmol/L 104 100   CO2 mmol/L 25 24   ANION GAP mmol/L 11 12   BUN mg/dL 23 19   CREATININE mg/dL 1 35* 1 38*   EGFR ml/min/1 73sq m 55 54   CALCIUM mg/dL 8 8 9  2   MAGNESIUM mg/dL 2 2  --      Results from last 7 days   Lab Units 02/09/20  0936   AST U/L 25   ALT U/L 39   ALK PHOS U/L 78   TOTAL PROTEIN g/dL 8 4*   ALBUMIN g/dL 4 1   TOTAL BILIRUBIN mg/dL 0 51     Results from last 7 days   Lab Units 02/10/20  0434 02/09/20  0936   GLUCOSE RANDOM mg/dL 194* 140     Results from last 7 days   Lab Units 02/10/20  0434 02/09/20  2352 02/09/20 2005 02/09/20  0936   TROPONIN I ng/mL <0 02 <0 02 <0 02 <0 02     Results from last 7 days   Lab Units 02/09/20  0936   PROTIME seconds 13 3   INR  1 00   PTT seconds 26         Results from last 7 days   Lab Units 02/09/20  1136   PROCALCITONIN ng/ml 0 11     Results from last 7 days   Lab Units 02/09/20  0936   LACTIC ACID mmol/L 1 8     Results from last 7 days   Lab Units 02/09/20  0936   NT-PRO BNP pg/mL 162*     Results from last 7 days   Lab Units 02/09/20  0941   LIPASE u/L 213     Results from last 7 days   Lab Units 02/09/20  0936   INFLUENZA A PCR  None Detected   INFLUENZA B PCR  None Detected   RSV PCR  None Detected     Results from last 7 days   Lab Units 02/09/20  1010 02/09/20  0910   BLOOD CULTURE  Received in Microbiology Lab  Culture in Progress  Received in Microbiology Lab  Culture in Progress  2/9 CXR :Left lower lobe pneumonia    2/9 EKG: Sinus Tachycardia    ED Treatment:   Medication Administration from 02/09/2020 0855 to 02/09/2020 1150       Date/Time Order Dose Route Action     02/09/2020 0912 acetaminophen (TYLENOL) tablet 975 mg 975 mg Oral Given     02/09/2020 0937 sodium chloride 0 9 % bolus 1,000 mL 1,000 mL Intravenous New Bag     02/09/2020 0946 albuterol inhalation solution 5 mg 5 mg Nebulization Given     02/09/2020 0946 ipratropium (ATROVENT) 0 02 % inhalation solution 0 5 mg 0 5 mg Nebulization Given     02/09/2020 0951 methylPREDNISolone sodium succinate (Solu-MEDROL) injection 125 mg 125 mg Intravenous Given     02/09/2020 0950 famotidine (PEPCID) injection 20 mg 20 mg Intravenous Given 02/09/2020 1117 ceftriaxone (ROCEPHIN) 1 g/50 mL in dextrose IVPB   Intravenous Restarted     02/09/2020 1048 ceftriaxone (ROCEPHIN) 1 g/50 mL in dextrose IVPB 1,000 mg Intravenous New Bag     02/09/2020 1116 azithromycin (ZITHROMAX) 500 mg in sodium chloride 0 9% 250mL IVPB 500 mg 500 mg Intravenous New Bag     02/09/2020 1114 docusate sodium (COLACE) capsule 100 mg 100 mg Oral Given     02/09/2020 1118 nicotine (NICODERM CQ) 14 mg/24hr TD 24 hr patch 1 patch 1 patch Transdermal Medication Applied     02/09/2020 1119 enoxaparin (LOVENOX) subcutaneous injection 40 mg 40 mg Subcutaneous Given     02/09/2020 1114 guaiFENesin (MUCINEX) 12 hr tablet 600 mg 600 mg Oral Given        Past Medical History:   Diagnosis Date    COPD (chronic obstructive pulmonary disease) (HCC)     Fibromyalgia     GERD (gastroesophageal reflux disease)     Hyperlipidemia      Present on Admission:   COPD (chronic obstructive pulmonary disease) (HCC)   Hyperlipidemia   GERD (gastroesophageal reflux disease)   Nicotine dependence      Admitting Diagnosis: Cough [R05]  Sinus tachycardia [R00 0]  COPD exacerbation (HCC) [J44 1]  Fever [R50 9]     Age/Sex: 62 y o  male  Admission Orders:  Scheduled Medications:  Medications:  azithromycin 500 mg Oral Q24H   cefTRIAXone 1,000 mg Intravenous Q24H   docusate sodium 100 mg Oral BID   enoxaparin 40 mg Subcutaneous Daily   fenofibrate 145 mg Oral Daily   fish oil 2,000 mg Oral BID   fluticasone 1 spray Nasal Daily   guaiFENesin 600 mg Oral BID   levalbuterol 1 25 mg Nebulization Q6H   loratadine 10 mg Oral Daily   melatonin 3 mg Oral HS   methylPREDNISolone sodium succinate 40 mg Intravenous Q8H   nicotine 1 patch Transdermal Daily   pantoprazole 20 mg Oral Early Morning   pravastatin 80 mg Oral Daily With Dinner   pregabalin 150 mg Oral BID   sodium chloride 3 mL Nebulization Q6H     Continuous IV Infusions:  multi-electrolyte 100 mL/hr Intravenous Continuous     PRN Meds:  acetaminophen 650 mg Oral Q6H PRN x 1 2/10   aluminum-magnesium hydroxide-simethicone 30 mL Oral Q6H PRN x 1 2/10   benzonatate 100 mg Oral TID PRN x 1 2/10   calcium carbonate 1,000 mg Oral Daily PRN   ipratropium-albuterol 3 mL Nebulization Q4H PRN   morphine injection 2 mg Intravenous Q4H PRN x 1 2/10   ondansetron 4 mg Intravenous Q6H PRN   simethicone 80 mg Oral 4x Daily PRN     SCD's      Network Utilization Review Department  [email or web address]  ATTENTION: Please call with any questions or concerns to [phone] and carefully listen to the prompts so that you are directed to the right person  All voicemails are confidential   Angela Gonzalez all requests for admission clinical reviews, approved or denied determinations and any other requests to dedicated fax number below belonging to the campus where the patient is receiving treatment   List of dedicated fax numbers for the Facilities:  [address] [place] [address] DENIALS (Administrative/Medical Necessity) [phone]   [address] [place] [address] (Maternity/NICU/Pediatrics) [phone]   [facility] [phone]   Roxy Henderson [phone]   [facility] [phone]   Anders Buerger [phone]   [address] [place] [address] [phone]   [facility]  [phone]   [address] [facility]  [address] [facility] [address] [facility] [phone]

## 2020-02-10 NOTE — PLAN OF CARE
Problem: PAIN - ADULT  Goal: Verbalizes/displays adequate comfort level or baseline comfort level  Description  Interventions:  - Encourage patient to monitor pain and request assistance  - Assess pain using appropriate pain scale  - Administer analgesics based on type and severity of pain and evaluate response  - Implement non-pharmacological measures as appropriate and evaluate response  - Consider cultural and social influences on pain and pain management  - Notify physician/advanced practitioner if interventions unsuccessful or patient reports new pain  Outcome: Progressing     Problem: INFECTION - ADULT  Goal: Absence or prevention of progression during hospitalization  Description  INTERVENTIONS:  - Assess and monitor for signs and symptoms of infection  - Monitor lab/diagnostic results  - Monitor all insertion sites, i e  indwelling lines, tubes, and drains  - Monitor endotracheal if appropriate and nasal secretions for changes in amount and color  - [place] appropriate cooling/warming therapies per order  - Administer medications as ordered  - Instruct and encourage patient and family to use good hand hygiene technique  - Identify and instruct in appropriate isolation precautions for identified infection/condition  Outcome: Progressing     Problem: SAFETY ADULT  Goal: Patient will remain free of falls  Description  INTERVENTIONS:  - Assess patient frequently for physical needs  -  Identify cognitive and physical deficits and behaviors that affect risk of falls    -  [place] fall precautions as indicated by assessment   - Educate patient/family on patient safety including physical limitations  - Instruct patient to call for assistance with activity based on assessment  - Modify environment to reduce risk of injury  - Consider OT/PT consult to assist with strengthening/mobility  Outcome: Progressing  Goal: Maintain or return to baseline ADL function  Description  INTERVENTIONS:  -  Assess patient's ability to carry out ADLs; assess patient's baseline for ADL function and identify physical deficits which impact ability to perform ADLs (bathing, care of mouth/teeth, toileting, grooming, dressing, etc )  - Assess/evaluate cause of self-care deficits   - Assess range of motion  - Assess patient's mobility; develop plan if impaired  - Assess patient's need for assistive devices and provide as appropriate  - Encourage maximum independence but intervene and supervise when necessary  - Involve family in performance of ADLs  - Assess for home care needs following discharge   - Consider OT consult to assist with ADL evaluation and planning for discharge  - Provide patient education as appropriate  Outcome: Progressing  Goal: Maintain or return mobility status to optimal level  Description  INTERVENTIONS:  - Assess patient's baseline mobility status (ambulation, transfers, stairs, etc )    - Identify cognitive and physical deficits and behaviors that affect mobility  - Identify mobility aids required to assist with transfers and/or ambulation (gait belt, sit-to-stand, lift, walker, cane, etc )  - [place] fall precautions as indicated by assessment  - Record patient progress and toleration of activity level on Mobility SBAR; progress patient to next Phase/Stage  - Instruct patient to call for assistance with activity based on assessment  - Consider rehabilitation consult to assist with strengthening/weightbearing, etc   Outcome: Progressing     Problem: DISCHARGE PLANNING  Goal: Discharge to home or other facility with appropriate resources  Description  INTERVENTIONS:  - Identify barriers to discharge w/patient and caregiver  - Arrange for needed discharge resources and transportation as appropriate  - Identify discharge learning needs (meds, wound care, etc )  - Arrange for interpretive services to assist at discharge as needed  - Refer to Case Management Department for coordinating discharge planning if the patient needs post-hospital services based on physician/advanced practitioner order or complex needs related to functional status, cognitive ability, or social support system  Outcome: Progressing     Problem: Knowledge Deficit  Goal: Patient/family/caregiver demonstrates understanding of disease process, treatment plan, medications, and discharge instructions  Description  Complete learning assessment and assess knowledge base    Interventions:  - Provide teaching at level of understanding  - Provide teaching via preferred learning methods  Outcome: Progressing

## 2020-02-10 NOTE — UTILIZATION REVIEW
Notification of Inpatient Admission/Inpatient Authorization Request   This is a Notification of Inpatient Admission for [address]  Be advised that this patient was admitted to our facility under Inpatient Status  Contact Maddie Joanna at [phone] for additional admission information  [facility] DEPT  DEDICATED -[phone]  Patient Name:   Alfa Briceño   YOB: 1955       [address]   [address]:   [facility]  Tax ID: [id number]  NPI: [id number] Attending Provider/NPI: Marla Griffin Md [[id number]]   Place of Service Code: 24     Place of Service Name:  [address] [place] [address]   Start Date: 2/9/20 1055     Discharge Date & Time: No discharge date for patient encounter  Type of Admission: Inpatient Status Discharge Disposition   (if discharged): Home/Self Care   Patient Diagnoses: Cough [R05]  Sinus tachycardia [R00 0]  COPD exacerbation (HCC) [J44 1]  Fever [R50 9]     Orders: Admission Orders (From admission, onward)     Ordered        02/09/20 1055  Inpatient Admission  Once                    Assigned Utilization Review Contact: Vazquez Urrutia  Utilization   Network Utilization Review Department  Phone: [phone]; Fax [phone]  Email: [email or web address]   ATTENTION PAYERS: Please call the assigned Utilization  directly with any questions or concerns ALL voicemails in the department are confidential  Send all requests for admission clinical reviews, approved or denied determinations and any other requests to dedicated fax number belonging to the campus where the patient is receiving treatment

## 2020-02-10 NOTE — SOCIAL WORK
CM obtained all of the following info about the pt  HOME: Pt lives in an apartment with three MARIELENA; pt claims difficulty with steps, but just takes his time  LIVES W/: Wife  : Rosemary Urbina, (Spouse), [phone] (H), [phone] [facility]), [phone] (M)  INDEPENDENT: Yes  No DME, STR, HHC or D&A issues  Hx of VNA and MH; pt reports a long time ago he was hospitalized as an IP in a [facility] unit as he had a "nervous breakdown "    Pt was offered VNA for COPD, but he reported he will be headed back to work as soon as he is discharged and he wouldnt be getting home until late at night  CM will send a referral to -[facility]NA to see if they can accommodate a late night appointment, but also, pt will NOT be homebound, so he may not qualify for VNA after all  Services TBD after a response from -VNA; pt prefers -VNA to   stay within network  PCP: Dr Phan ; pt wanted his PCP top be made aware of his hospitalization  Sent an [employer] to OP CM to make PCP aware of IP admission  PHARMACY: [employer] located at [address]: Employed  INSURANCE: [address]: Wife would be making medical decisions if needed  Pts inquired about who he would speak to to have a DNR instituted; CM suggested he speak with the attending  TRANSPORTATION AT D/C: Friend     CM reviewed d/c planning process including the following: identifying help at home, patient preference for d/c planning needs, [facility] Meds to Bed program, availability of treatment team to discuss questions or concerns patient and/or family may have regarding understanding medications and recognizing signs and symptoms once discharged  CM also encouraged patient to follow up with all recommended appointments after discharge  Patient advised of importance for patient and family to participate in managing patients medical well being  Patient/caregiver received discharge checklist  Content reviewed  Patient/caregiver encouraged to participate in discharge plan of care prior to discharge home      GIL Gold  2/10/2020  2034

## 2020-02-11 VITALS
WEIGHT: 167.11 LBS | BODY MASS INDEX: 25.33 KG/M2 | SYSTOLIC BLOOD PRESSURE: 127 MMHG | RESPIRATION RATE: 19 BRPM | HEART RATE: 87 BPM | TEMPERATURE: 97.9 F | DIASTOLIC BLOOD PRESSURE: 80 MMHG | HEIGHT: 68 IN | OXYGEN SATURATION: 99 %

## 2020-02-11 LAB
ANION GAP SERPL CALCULATED.3IONS-SCNC: 11 MMOL/L (ref 4–13)
BASOPHILS # BLD AUTO: 0.03 THOUSANDS/ΜL (ref 0–0.1)
BASOPHILS NFR BLD AUTO: 0 % (ref 0–1)
BUN SERPL-MCNC: 21 MG/DL (ref 5–25)
CALCIUM SERPL-MCNC: 8.8 MG/DL (ref 8.3–10.1)
CHLORIDE SERPL-SCNC: 104 MMOL/L (ref 100–108)
CO2 SERPL-SCNC: 26 MMOL/L (ref 21–32)
CREAT SERPL-MCNC: 1.08 MG/DL (ref 0.6–1.3)
EOSINOPHIL # BLD AUTO: 0 THOUSAND/ΜL (ref 0–0.61)
EOSINOPHIL NFR BLD AUTO: 0 % (ref 0–6)
ERYTHROCYTE [DISTWIDTH] IN BLOOD BY AUTOMATED COUNT: 13.2 % (ref 11.6–15.1)
GFR SERPL CREATININE-BSD FRML MDRD: 73 ML/MIN/1.73SQ M
GLUCOSE SERPL-MCNC: 144 MG/DL (ref 65–140)
HCT VFR BLD AUTO: 34 % (ref 36.5–49.3)
HGB BLD-MCNC: 11 G/DL (ref 12–17)
IMM GRANULOCYTES # BLD AUTO: 0.19 THOUSAND/UL (ref 0–0.2)
IMM GRANULOCYTES NFR BLD AUTO: 1 % (ref 0–2)
LYMPHOCYTES # BLD AUTO: 1.33 THOUSANDS/ΜL (ref 0.6–4.47)
LYMPHOCYTES NFR BLD AUTO: 7 % (ref 14–44)
MCH RBC QN AUTO: 30.6 PG (ref 26.8–34.3)
MCHC RBC AUTO-ENTMCNC: 32.4 G/DL (ref 31.4–37.4)
MCV RBC AUTO: 95 FL (ref 82–98)
MONOCYTES # BLD AUTO: 1.41 THOUSAND/ΜL (ref 0.17–1.22)
MONOCYTES NFR BLD AUTO: 8 % (ref 4–12)
NEUTROPHILS # BLD AUTO: 15.69 THOUSANDS/ΜL (ref 1.85–7.62)
NEUTS SEG NFR BLD AUTO: 84 % (ref 43–75)
NRBC BLD AUTO-RTO: 0 /100 WBCS
PLATELET # BLD AUTO: 225 THOUSANDS/UL (ref 149–390)
PMV BLD AUTO: 12.8 FL (ref 8.9–12.7)
POTASSIUM SERPL-SCNC: 4 MMOL/L (ref 3.5–5.3)
RBC # BLD AUTO: 3.59 MILLION/UL (ref 3.88–5.62)
SODIUM SERPL-SCNC: 141 MMOL/L (ref 136–145)
WBC # BLD AUTO: 18.65 THOUSAND/UL (ref 4.31–10.16)

## 2020-02-11 PROCEDURE — 97530 THERAPEUTIC ACTIVITIES: CPT

## 2020-02-11 PROCEDURE — 85025 COMPLETE CBC W/AUTO DIFF WBC: CPT | Performed by: INTERNAL MEDICINE

## 2020-02-11 PROCEDURE — 97110 THERAPEUTIC EXERCISES: CPT

## 2020-02-11 PROCEDURE — 80048 BASIC METABOLIC PNL TOTAL CA: CPT | Performed by: INTERNAL MEDICINE

## 2020-02-11 PROCEDURE — 97116 GAIT TRAINING THERAPY: CPT

## 2020-02-11 PROCEDURE — 99239 HOSP IP/OBS DSCHRG MGMT >30: CPT | Performed by: INTERNAL MEDICINE

## 2020-02-11 PROCEDURE — 94640 AIRWAY INHALATION TREATMENT: CPT

## 2020-02-11 RX ORDER — PREDNISONE 10 MG/1
TABLET ORAL
Qty: 30 TABLET | Refills: 0 | Status: SHIPPED | OUTPATIENT
Start: 2020-02-12 | End: 2021-08-19 | Stop reason: HOSPADM

## 2020-02-11 RX ORDER — LEVALBUTEROL 1.25 MG/.5ML
1.25 SOLUTION, CONCENTRATE RESPIRATORY (INHALATION) EVERY 6 HOURS PRN
Status: DISCONTINUED | OUTPATIENT
Start: 2020-02-11 | End: 2020-02-11 | Stop reason: HOSPADM

## 2020-02-11 RX ORDER — GUAIFENESIN 600 MG
600 TABLET, EXTENDED RELEASE 12 HR ORAL 2 TIMES DAILY
Qty: 10 TABLET | Refills: 0 | Status: SHIPPED | OUTPATIENT
Start: 2020-02-11 | End: 2021-08-19 | Stop reason: HOSPADM

## 2020-02-11 RX ORDER — NICOTINE 21 MG/24HR
1 PATCH, TRANSDERMAL 24 HOURS TRANSDERMAL DAILY
Qty: 28 PATCH | Refills: 0 | Status: ON HOLD | OUTPATIENT
Start: 2020-02-12 | End: 2021-09-08 | Stop reason: ALTCHOICE

## 2020-02-11 RX ORDER — CEFDINIR 300 MG/1
300 CAPSULE ORAL EVERY 12 HOURS SCHEDULED
Qty: 8 CAPSULE | Refills: 0 | Status: SHIPPED | OUTPATIENT
Start: 2020-02-12 | End: 2020-02-16

## 2020-02-11 RX ORDER — SODIUM CHLORIDE FOR INHALATION 0.9 %
3 VIAL, NEBULIZER (ML) INHALATION EVERY 6 HOURS PRN
Status: DISCONTINUED | OUTPATIENT
Start: 2020-02-11 | End: 2020-02-11 | Stop reason: HOSPADM

## 2020-02-11 RX ORDER — BENZONATATE 100 MG/1
100 CAPSULE ORAL 3 TIMES DAILY PRN
Qty: 20 CAPSULE | Refills: 0 | Status: SHIPPED | OUTPATIENT
Start: 2020-02-11 | End: 2021-08-19 | Stop reason: HOSPADM

## 2020-02-11 RX ADMIN — PANTOPRAZOLE SODIUM 20 MG: 20 TABLET, DELAYED RELEASE ORAL at 06:35

## 2020-02-11 RX ADMIN — GUAIFENESIN 600 MG: 600 TABLET ORAL at 08:09

## 2020-02-11 RX ADMIN — NICOTINE 1 PATCH: 14 PATCH TRANSDERMAL at 08:09

## 2020-02-11 RX ADMIN — ISODIUM CHLORIDE 3 ML: 0.03 SOLUTION RESPIRATORY (INHALATION) at 01:42

## 2020-02-11 RX ADMIN — Medication 2000 MG: at 08:09

## 2020-02-11 RX ADMIN — PREGABALIN 150 MG: 75 CAPSULE ORAL at 08:09

## 2020-02-11 RX ADMIN — LEVALBUTEROL HYDROCHLORIDE 1.25 MG: 1.25 SOLUTION, CONCENTRATE RESPIRATORY (INHALATION) at 01:42

## 2020-02-11 RX ADMIN — AZITHROMYCIN 500 MG: 250 TABLET, FILM COATED ORAL at 11:44

## 2020-02-11 RX ADMIN — ENOXAPARIN SODIUM 40 MG: 40 INJECTION SUBCUTANEOUS at 08:09

## 2020-02-11 RX ADMIN — METHYLPREDNISOLONE SODIUM SUCCINATE 40 MG: 40 INJECTION, POWDER, FOR SOLUTION INTRAMUSCULAR; INTRAVENOUS at 08:09

## 2020-02-11 RX ADMIN — DOCUSATE SODIUM 100 MG: 100 CAPSULE, LIQUID FILLED ORAL at 08:09

## 2020-02-11 RX ADMIN — FENOFIBRATE 145 MG: 145 TABLET, FILM COATED ORAL at 08:09

## 2020-02-11 RX ADMIN — CEFTRIAXONE SODIUM 1000 MG: 10 INJECTION, POWDER, FOR SOLUTION INTRAVENOUS at 10:41

## 2020-02-11 RX ADMIN — FLUTICASONE PROPIONATE 1 SPRAY: 50 SPRAY, METERED NASAL at 08:10

## 2020-02-11 RX ADMIN — LORATADINE 10 MG: 10 TABLET ORAL at 08:09

## 2020-02-11 NOTE — TREATMENT PLAN
[facility]  Internal Medicine  [address] [facility], [address] [place] [address]  [phone]      RE:  Emily Rice 62 y o  male  : 1955    To whom it may concern,     Emily Rice was hospitalized under my care from 2020 to 20  Please excuse from all appointments and/or work related activities during this interim  Patient may return to work at previous activity level on/or after 20    Feel free to contact me with any questions if necessary  Sincerely,                 JR Jones [facility] Internal Medicine       Diplomate, [employer] Board of Internal Medicine

## 2020-02-11 NOTE — PROGRESS NOTES
I agree with previous nurses assessment  Pt resting in bed comfortably and all needs met  Will continue to monitor patient

## 2020-02-11 NOTE — NURSING NOTE
Patient discharged 2/11/2020 1:45 PM  AVS and discharge instructions reviewed with patient  All questions answered  Patient to be transported home via personal vehicle by family      Gracy Gamez RN 2/11/2020 1:47 PM

## 2020-02-11 NOTE — PLAN OF CARE
Problem: PAIN - ADULT  Goal: Verbalizes/displays adequate comfort level or baseline comfort level  Description  Interventions:  - Encourage patient to monitor pain and request assistance  - Assess pain using appropriate pain scale  - Administer analgesics based on type and severity of pain and evaluate response  - Implement non-pharmacological measures as appropriate and evaluate response  - Consider cultural and social influences on pain and pain management  - Notify physician/advanced practitioner if interventions unsuccessful or patient reports new pain  Outcome: Progressing     Problem: INFECTION - ADULT  Goal: Absence or prevention of progression during hospitalization  Description  INTERVENTIONS:  - Assess and monitor for signs and symptoms of infection  - Monitor lab/diagnostic results  - Monitor all insertion sites, i e  indwelling lines, tubes, and drains  - Monitor endotracheal if appropriate and nasal secretions for changes in amount and color  - [place] appropriate cooling/warming therapies per order  - Administer medications as ordered  - Instruct and encourage patient and family to use good hand hygiene technique  - Identify and instruct in appropriate isolation precautions for identified infection/condition  Outcome: Progressing     Problem: SAFETY ADULT  Goal: Patient will remain free of falls  Description  INTERVENTIONS:  - Assess patient frequently for physical needs  -  Identify cognitive and physical deficits and behaviors that affect risk of falls    -  [place] fall precautions as indicated by assessment   - Educate patient/family on patient safety including physical limitations  - Instruct patient to call for assistance with activity based on assessment  - Modify environment to reduce risk of injury  - Consider OT/PT consult to assist with strengthening/mobility  Outcome: Progressing  Goal: Maintain or return to baseline ADL function  Description  INTERVENTIONS:  -  Assess patient's ability to carry out ADLs; assess patient's baseline for ADL function and identify physical deficits which impact ability to perform ADLs (bathing, care of mouth/teeth, toileting, grooming, dressing, etc )  - Assess/evaluate cause of self-care deficits   - Assess range of motion  - Assess patient's mobility; develop plan if impaired  - Assess patient's need for assistive devices and provide as appropriate  - Encourage maximum independence but intervene and supervise when necessary  - Involve family in performance of ADLs  - Assess for home care needs following discharge   - Consider OT consult to assist with ADL evaluation and planning for discharge  - Provide patient education as appropriate  Outcome: Progressing  Goal: Maintain or return mobility status to optimal level  Description  INTERVENTIONS:  - Assess patient's baseline mobility status (ambulation, transfers, stairs, etc )    - Identify cognitive and physical deficits and behaviors that affect mobility  - Identify mobility aids required to assist with transfers and/or ambulation (gait belt, sit-to-stand, lift, walker, cane, etc )  - [place] fall precautions as indicated by assessment  - Record patient progress and toleration of activity level on Mobility SBAR; progress patient to next Phase/Stage  - Instruct patient to call for assistance with activity based on assessment  - Consider rehabilitation consult to assist with strengthening/weightbearing, etc   Outcome: Progressing     Problem: DISCHARGE PLANNING  Goal: Discharge to home or other facility with appropriate resources  Description  INTERVENTIONS:  - Identify barriers to discharge w/patient and caregiver  - Arrange for needed discharge resources and transportation as appropriate  - Identify discharge learning needs (meds, wound care, etc )  - Arrange for interpretive services to assist at discharge as needed  - Refer to Case Management Department for coordinating discharge planning if the patient needs post-hospital services based on physician/advanced practitioner order or complex needs related to functional status, cognitive ability, or social support system  Outcome: Progressing     Problem: Knowledge Deficit  Goal: Patient/family/caregiver demonstrates understanding of disease process, treatment plan, medications, and discharge instructions  Description  Complete learning assessment and assess knowledge base  Interventions:  - Provide teaching at level of understanding  - Provide teaching via preferred learning methods  Outcome: Progressing     Problem: Potential for Falls  Goal: Patient will remain free of falls  Description  INTERVENTIONS:  - Assess patient frequently for physical needs  -  Identify cognitive and physical deficits and behaviors that affect risk of falls    -  [place] fall precautions as indicated by assessment   - Educate patient/family on patient safety including physical limitations  - Instruct patient to call for assistance with activity based on assessment  - Modify environment to reduce risk of injury  - Consider OT/PT consult to assist with strengthening/mobility  Outcome: Progressing     Problem: CARDIOVASCULAR - ADULT  Goal: Maintains optimal cardiac output and hemodynamic stability  Description  INTERVENTIONS:  - Monitor I/O, vital signs and rhythm  - Monitor for S/S and trends of decreased cardiac output  - Administer and titrate ordered vasoactive medications to optimize hemodynamic stability  - Assess quality of pulses, skin color and temperature  - Assess for signs of decreased coronary artery perfusion  - Instruct patient to report change in severity of symptoms  Outcome: Progressing     Problem: RESPIRATORY - ADULT  Goal: Achieves optimal ventilation and oxygenation  Description  INTERVENTIONS:  - Assess for changes in respiratory status  - Assess for changes in mentation and behavior  - Position to facilitate oxygenation and minimize respiratory effort  - Oxygen administered by appropriate delivery if ordered  - Initiate smoking cessation education as indicated  - Encourage broncho-pulmonary hygiene including cough, deep breathe, Incentive Spirometry  - Assess the need for suctioning and aspirate as needed  - Assess and instruct to report SOB or any respiratory difficulty  - Respiratory Therapy support as indicated  Outcome: Progressing     Problem: MUSCULOSKELETAL - ADULT  Goal: Maintain or return mobility to safest level of function  Description  INTERVENTIONS:  - Assess patient's ability to carry out ADLs; assess patient's baseline for ADL function and identify physical deficits which impact ability to perform ADLs (bathing, care of mouth/teeth, toileting, grooming, dressing, etc )  - Assess/evaluate cause of self-care deficits   - Assess range of motion  - Assess patient's mobility  - Assess patient's need for assistive devices and provide as appropriate  - Encourage maximum independence but intervene and supervise when necessary  - Involve family in performance of ADLs  - Assess for home care needs following discharge   - Consider OT consult to assist with ADL evaluation and planning for discharge  - Provide patient education as appropriate  Outcome: Progressing  Goal: Maintain proper alignment of affected body part  Description  INTERVENTIONS:  - Support, maintain and protect limb and body alignment  - Provide patient/ family with appropriate education  Outcome: Progressing

## 2020-02-11 NOTE — PHYSICAL THERAPY NOTE
Physical Therapy Progress Note     02/11/20 1323   Pain Assessment   Pain Assessment No/denies pain   Pain Score No Pain   Restrictions/Precautions   Weight Bearing Precautions Per Order No   Other Precautions Fall Risk   General   Chart Reviewed Yes   Response to Previous Treatment Patient with no complaints from previous session  Family/Caregiver Present No   Subjective   Subjective Willing to participate in therapy this PM    Bed Mobility   Supine to Sit 5  Supervision   Additional items Assist x 1;HOB elevated; Bedrails;Leg ; Increased time required;Verbal cues;LE management   Sit to Supine 5  Supervision   Additional items Assist x 1;Bedrails;Leg ; Increased time required;Verbal cues;LE management   Transfers   Sit to Stand 5  Supervision   Additional items Assist x 1;Bedrails; Increased time required;Verbal cues   Stand to Sit 5  Supervision   Additional items Assist x 1;Bedrails; Increased time required;Verbal cues   Ambulation/Elevation   Gait pattern Decreased foot clearance; Forward Flexion; Short stride; Excessively slow; Inconsistent naresh; Shuffling   Gait Assistance 5  Supervision   Additional items Assist x 1; Tactile cues; Verbal cues   Assistive Device None   Distance 100' x 2 with stair training in between   Stair Management Assistance 5  Supervision   Additional items Assist x 1; Tactile cues; Verbal cues   Stair Management Technique Two rails; Step to pattern; Foreward; Alternating pattern;Reciprocal   Number of Stairs 10   Balance   Static Sitting Good   Dynamic Sitting Fair +   Static Standing Fair   Dynamic Standing Fair -   Ambulatory Fair -   Endurance Deficit   Endurance Deficit No   Activity Tolerance   Activity Tolerance Patient tolerated treatment well   Nurse Made Aware Yes   Exercises   TKR Sitting;10 reps;AAROM; Bilateral   Assessment   Prognosis Good   Problem List Decreased strength;Decreased range of motion;Decreased endurance; Impaired balance;Decreased mobility   Assessment Pt  supine in bed upon my arrival  Pt  eager to participate in therapy this PM, with hopes of going home today  Able to complete all transfers practicing proper technique with no noted LOB  Progressed with an amb  trial with use of no AD and standbyA of therapist with cues provided for LE sequencing  Progressed to stair training, being able to complete practicing proper technique with no noted LOB  Pt  returned to room and repositioned supine in bed at end of treatment session  PT will continue to recommend d/c home with family support as needed when medically stable  Barriers to Discharge Decreased caregiver support   Barriers to Discharge Comments Lives alone  Goals   Patient Goals To go home today  STG Expiration Date 02/17/20   PT Treatment Day 1   Plan   Treatment/Interventions Functional transfer training;LE strengthening/ROM; Elevations; Therapeutic exercise; Endurance training;Gait training;Bed mobility;Spoke to nursing;Spoke to case management   Progress Improving as expected   PT Frequency 2-3x/wk   Recommendation   Recommendation Home with family support   Equipment Recommended Other (Comment)  (none at present, continue to monitor)   PT - OK to Discharge Yes  (if d/c when medically stable )     Reena Galicia, PTA

## 2020-02-11 NOTE — NURSING NOTE
Nurse assumed pt care at 0300  Pt was awake and concerned about ordering brkfst at 0400  At 0500 pt wanted to shower & shave  I agree with previous nurse's assessment with the exception of pt needing a front wheeled walker  Pt ambulated independently and was a A & O x4  Pt was safe in bed with call bell and table within reach

## 2020-02-11 NOTE — PLAN OF CARE
Problem: PHYSICAL THERAPY ADULT  Goal: Performs mobility at highest level of function for planned discharge setting  See evaluation for individualized goals  Description  Treatment/Interventions: Functional transfer training, LE strengthening/ROM, Elevations, Therapeutic exercise, Patient/family training, Equipment eval/education, Bed mobility, Gait training, Spoke to nursing, OT, Continued evaluation  Equipment Recommended: (pending progress)       See flowsheet documentation for full assessment, interventions and recommendations  Outcome: Progressing  Note:   Prognosis: Good  Problem List: Decreased strength, Decreased range of motion, Decreased endurance, Impaired balance, Decreased mobility  Assessment: Pt  supine in bed upon my arrival  Pt  eager to participate in therapy this PM, with hopes of going home today  Able to complete all transfers practicing proper technique with no noted LOB  Progressed with an amb  trial with use of no AD and standbyA of therapist with cues provided for LE sequencing  Progressed to stair training, being able to complete practicing proper technique with no noted LOB  Pt  returned to room and repositioned supine in bed at end of treatment session  PT will continue to recommend d/c home with family support as needed when medically stable  Barriers to Discharge: Decreased caregiver support  Barriers to Discharge Comments: Lives alone  Recommendation: Home with family support     PT - OK to Discharge: Yes(if d/c when medically stable )    See flowsheet documentation for full assessment

## 2020-02-11 NOTE — PLAN OF CARE
Problem: PAIN - ADULT  Goal: Verbalizes/displays adequate comfort level or baseline comfort level  Description  Interventions:  - Encourage patient to monitor pain and request assistance  - Assess pain using appropriate pain scale  - Administer analgesics based on type and severity of pain and evaluate response  - Implement non-pharmacological measures as appropriate and evaluate response  - Consider cultural and social influences on pain and pain management  - Notify physician/advanced practitioner if interventions unsuccessful or patient reports new pain  2/11/2020 1304 by Franco Sullivan RN  Outcome: Adequate for Discharge  2/11/2020 0753 by Franco Sullivan RN  Outcome: Progressing     Problem: INFECTION - ADULT  Goal: Absence or prevention of progression during hospitalization  Description  INTERVENTIONS:  - Assess and monitor for signs and symptoms of infection  - Monitor lab/diagnostic results  - Monitor all insertion sites, i e  indwelling lines, tubes, and drains  - Monitor endotracheal if appropriate and nasal secretions for changes in amount and color  - [place] appropriate cooling/warming therapies per order  - Administer medications as ordered  - Instruct and encourage patient and family to use good hand hygiene technique  - Identify and instruct in appropriate isolation precautions for identified infection/condition  2/11/2020 1304 by Franco Sullivan RN  Outcome: Adequate for Discharge  2/11/2020 0753 by Franco Sullivan RN  Outcome: Progressing     Problem: SAFETY ADULT  Goal: Patient will remain free of falls  Description  INTERVENTIONS:  - Assess patient frequently for physical needs  -  Identify cognitive and physical deficits and behaviors that affect risk of falls    -  [place] fall precautions as indicated by assessment   - Educate patient/family on patient safety including physical limitations  - Instruct patient to call for assistance with activity based on assessment  - Modify environment to reduce risk of injury  - Consider OT/PT consult to assist with strengthening/mobility  2/11/2020 1304 by Sebastián Fajardo RN  Outcome: Adequate for Discharge  2/11/2020 0753 by Sebastián Fajardo RN  Outcome: Progressing  Goal: Maintain or return to baseline ADL function  Description  INTERVENTIONS:  -  Assess patient's ability to carry out ADLs; assess patient's baseline for ADL function and identify physical deficits which impact ability to perform ADLs (bathing, care of mouth/teeth, toileting, grooming, dressing, etc )  - Assess/evaluate cause of self-care deficits   - Assess range of motion  - Assess patient's mobility; develop plan if impaired  - Assess patient's need for assistive devices and provide as appropriate  - Encourage maximum independence but intervene and supervise when necessary  - Involve family in performance of ADLs  - Assess for home care needs following discharge   - Consider OT consult to assist with ADL evaluation and planning for discharge  - Provide patient education as appropriate  2/11/2020 1304 by Sebastián Fajardo RN  Outcome: Adequate for Discharge  2/11/2020 0753 by Sebastián Fajardo RN  Outcome: Progressing  Goal: Maintain or return mobility status to optimal level  Description  INTERVENTIONS:  - Assess patient's baseline mobility status (ambulation, transfers, stairs, etc )    - Identify cognitive and physical deficits and behaviors that affect mobility  - Identify mobility aids required to assist with transfers and/or ambulation (gait belt, sit-to-stand, lift, walker, cane, etc )  - [place] fall precautions as indicated by assessment  - Record patient progress and toleration of activity level on Mobility SBAR; progress patient to next Phase/Stage  - Instruct patient to call for assistance with activity based on assessment  - Consider rehabilitation consult to assist with strengthening/weightbearing, etc   2/11/2020 1304 by Sebastián Fajardo RN  Outcome: Adequate for Discharge  2/11/2020 0753 by Sebastián Fajardo RN  Outcome: Progressing     Problem: DISCHARGE PLANNING  Goal: Discharge to home or other facility with appropriate resources  Description  INTERVENTIONS:  - Identify barriers to discharge w/patient and caregiver  - Arrange for needed discharge resources and transportation as appropriate  - Identify discharge learning needs (meds, wound care, etc )  - Arrange for interpretive services to assist at discharge as needed  - Refer to Case Management Department for coordinating discharge planning if the patient needs post-hospital services based on physician/advanced practitioner order or complex needs related to functional status, cognitive ability, or social support system  2/11/2020 1304 by Sebastián Fajardo RN  Outcome: Adequate for Discharge  2/11/2020 0753 by Sebastián Fajardo RN  Outcome: Progressing     Problem: Knowledge Deficit  Goal: Patient/family/caregiver demonstrates understanding of disease process, treatment plan, medications, and discharge instructions  Description  Complete learning assessment and assess knowledge base  Interventions:  - Provide teaching at level of understanding  - Provide teaching via preferred learning methods  2/11/2020 1304 by Sebastián Fajardo RN  Outcome: Adequate for Discharge  2/11/2020 0753 by Sebastián Fajardo RN  Outcome: Progressing     Problem: Potential for Falls  Goal: Patient will remain free of falls  Description  INTERVENTIONS:  - Assess patient frequently for physical needs  -  Identify cognitive and physical deficits and behaviors that affect risk of falls    -  [place] fall precautions as indicated by assessment   - Educate patient/family on patient safety including physical limitations  - Instruct patient to call for assistance with activity based on assessment  - Modify environment to reduce risk of injury  - Consider OT/PT consult to assist with strengthening/mobility  2/11/2020 1304 by Sebastián Fajardo RN  Outcome: Adequate for Discharge  2/11/2020 0753 by Sebastián Fajardo RN  Outcome: Progressing     Problem: CARDIOVASCULAR - ADULT  Goal: Maintains optimal cardiac output and hemodynamic stability  Description  INTERVENTIONS:  - Monitor I/O, vital signs and rhythm  - Monitor for S/S and trends of decreased cardiac output  - Administer and titrate ordered vasoactive medications to optimize hemodynamic stability  - Assess quality of pulses, skin color and temperature  - Assess for signs of decreased coronary artery perfusion  - Instruct patient to report change in severity of symptoms  2/11/2020 1304 by Franco Sullivan RN  Outcome: Adequate for Discharge  2/11/2020 0753 by Franco Sullivan RN  Outcome: Progressing     Problem: RESPIRATORY - ADULT  Goal: Achieves optimal ventilation and oxygenation  Description  INTERVENTIONS:  - Assess for changes in respiratory status  - Assess for changes in mentation and behavior  - Position to facilitate oxygenation and minimize respiratory effort  - Oxygen administered by appropriate delivery if ordered  - Initiate smoking cessation education as indicated  - Encourage broncho-pulmonary hygiene including cough, deep breathe, Incentive Spirometry  - Assess the need for suctioning and aspirate as needed  - Assess and instruct to report SOB or any respiratory difficulty  - Respiratory Therapy support as indicated  2/11/2020 1304 by Franco Sullivan RN  Outcome: Adequate for Discharge  2/11/2020 0753 by Franco Sullivan RN  Outcome: Progressing     Problem: MUSCULOSKELETAL - ADULT  Goal: Maintain or return mobility to safest level of function  Description  INTERVENTIONS:  - Assess patient's ability to carry out ADLs; assess patient's baseline for ADL function and identify physical deficits which impact ability to perform ADLs (bathing, care of mouth/teeth, toileting, grooming, dressing, etc )  - Assess/evaluate cause of self-care deficits   - Assess range of motion  - Assess patient's mobility  - Assess patient's need for assistive devices and provide as appropriate  - Encourage maximum independence but intervene and supervise when necessary  - Involve family in performance of ADLs  - Assess for home care needs following discharge   - Consider OT consult to assist with ADL evaluation and planning for discharge  - Provide patient education as appropriate  2/11/2020 1304 by Priscila Cortes RN  Outcome: Adequate for Discharge  2/11/2020 0753 by Priscila Cortes RN  Outcome: Progressing  Goal: Maintain proper alignment of affected body part  Description  INTERVENTIONS:  - Support, maintain and protect limb and body alignment  - Provide patient/ family with appropriate education  2/11/2020 1304 by Priscila Cortes RN  Outcome: Adequate for Discharge  2/11/2020 0753 by Priscila Cortes RN  Outcome: Progressing

## 2020-02-11 NOTE — PROGRESS NOTES
I agree with the previous nurses assessment  Patient is resting comfortably  Will continue to monitor

## 2020-02-11 NOTE — PLAN OF CARE
Problem: PAIN - ADULT  Goal: Verbalizes/displays adequate comfort level or baseline comfort level  Description  Interventions:  - Encourage patient to monitor pain and request assistance  - Assess pain using appropriate pain scale  - Administer analgesics based on type and severity of pain and evaluate response  - Implement non-pharmacological measures as appropriate and evaluate response  - Consider cultural and social influences on pain and pain management  - Notify physician/advanced practitioner if interventions unsuccessful or patient reports new pain  Outcome: Progressing     Problem: INFECTION - ADULT  Goal: Absence or prevention of progression during hospitalization  Description  INTERVENTIONS:  - Assess and monitor for signs and symptoms of infection  - Monitor lab/diagnostic results  - Monitor all insertion sites, i e  indwelling lines, tubes, and drains  - Monitor endotracheal if appropriate and nasal secretions for changes in amount and color  - [place] appropriate cooling/warming therapies per order  - Administer medications as ordered  - Instruct and encourage patient and family to use good hand hygiene technique  - Identify and instruct in appropriate isolation precautions for identified infection/condition  Outcome: Progressing     Problem: SAFETY ADULT  Goal: Patient will remain free of falls  Description  INTERVENTIONS:  - Assess patient frequently for physical needs  -  Identify cognitive and physical deficits and behaviors that affect risk of falls    -  [place] fall precautions as indicated by assessment   - Educate patient/family on patient safety including physical limitations  - Instruct patient to call for assistance with activity based on assessment  - Modify environment to reduce risk of injury  - Consider OT/PT consult to assist with strengthening/mobility  Outcome: Progressing  Goal: Maintain or return to baseline ADL function  Description  INTERVENTIONS:  -  Assess patient's ability to carry out ADLs; assess patient's baseline for ADL function and identify physical deficits which impact ability to perform ADLs (bathing, care of mouth/teeth, toileting, grooming, dressing, etc )  - Assess/evaluate cause of self-care deficits   - Assess range of motion  - Assess patient's mobility; develop plan if impaired  - Assess patient's need for assistive devices and provide as appropriate  - Encourage maximum independence but intervene and supervise when necessary  - Involve family in performance of ADLs  - Assess for home care needs following discharge   - Consider OT consult to assist with ADL evaluation and planning for discharge  - Provide patient education as appropriate  Outcome: Progressing  Goal: Maintain or return mobility status to optimal level  Description  INTERVENTIONS:  - Assess patient's baseline mobility status (ambulation, transfers, stairs, etc )    - Identify cognitive and physical deficits and behaviors that affect mobility  - Identify mobility aids required to assist with transfers and/or ambulation (gait belt, sit-to-stand, lift, walker, cane, etc )  - [place] fall precautions as indicated by assessment  - Record patient progress and toleration of activity level on Mobility SBAR; progress patient to next Phase/Stage  - Instruct patient to call for assistance with activity based on assessment  - Consider rehabilitation consult to assist with strengthening/weightbearing, etc   Outcome: Progressing     Problem: DISCHARGE PLANNING  Goal: Discharge to home or other facility with appropriate resources  Description  INTERVENTIONS:  - Identify barriers to discharge w/patient and caregiver  - Arrange for needed discharge resources and transportation as appropriate  - Identify discharge learning needs (meds, wound care, etc )  - Arrange for interpretive services to assist at discharge as needed  - Refer to Case Management Department for coordinating discharge planning if the patient needs post-hospital services based on physician/advanced practitioner order or complex needs related to functional status, cognitive ability, or social support system  Outcome: Progressing     Problem: Knowledge Deficit  Goal: Patient/family/caregiver demonstrates understanding of disease process, treatment plan, medications, and discharge instructions  Description  Complete learning assessment and assess knowledge base  Interventions:  - Provide teaching at level of understanding  - Provide teaching via preferred learning methods  Outcome: Progressing     Problem: Potential for Falls  Goal: Patient will remain free of falls  Description  INTERVENTIONS:  - Assess patient frequently for physical needs  -  Identify cognitive and physical deficits and behaviors that affect risk of falls    -  [place] fall precautions as indicated by assessment   - Educate patient/family on patient safety including physical limitations  - Instruct patient to call for assistance with activity based on assessment  - Modify environment to reduce risk of injury  - Consider OT/PT consult to assist with strengthening/mobility  Outcome: Progressing     Problem: CARDIOVASCULAR - ADULT  Goal: Maintains optimal cardiac output and hemodynamic stability  Description  INTERVENTIONS:  - Monitor I/O, vital signs and rhythm  - Monitor for S/S and trends of decreased cardiac output  - Administer and titrate ordered vasoactive medications to optimize hemodynamic stability  - Assess quality of pulses, skin color and temperature  - Assess for signs of decreased coronary artery perfusion  - Instruct patient to report change in severity of symptoms  Outcome: Progressing     Problem: RESPIRATORY - ADULT  Goal: Achieves optimal ventilation and oxygenation  Description  INTERVENTIONS:  - Assess for changes in respiratory status  - Assess for changes in mentation and behavior  - Position to facilitate oxygenation and minimize respiratory effort  - Oxygen administered by appropriate delivery if ordered  - Initiate smoking cessation education as indicated  - Encourage broncho-pulmonary hygiene including cough, deep breathe, Incentive Spirometry  - Assess the need for suctioning and aspirate as needed  - Assess and instruct to report SOB or any respiratory difficulty  - Respiratory Therapy support as indicated  Outcome: Progressing     Problem: MUSCULOSKELETAL - ADULT  Goal: Maintain or return mobility to safest level of function  Description  INTERVENTIONS:  - Assess patient's ability to carry out ADLs; assess patient's baseline for ADL function and identify physical deficits which impact ability to perform ADLs (bathing, care of mouth/teeth, toileting, grooming, dressing, etc )  - Assess/evaluate cause of self-care deficits   - Assess range of motion  - Assess patient's mobility  - Assess patient's need for assistive devices and provide as appropriate  - Encourage maximum independence but intervene and supervise when necessary  - Involve family in performance of ADLs  - Assess for home care needs following discharge   - Consider OT consult to assist with ADL evaluation and planning for discharge  - Provide patient education as appropriate  Outcome: Progressing  Goal: Maintain proper alignment of affected body part  Description  INTERVENTIONS:  - Support, maintain and protect limb and body alignment  - Provide patient/ family with appropriate education  Outcome: Progressing

## 2020-02-14 LAB
BACTERIA BLD CULT: NORMAL
BACTERIA BLD CULT: NORMAL

## 2020-03-06 ENCOUNTER — OFFICE VISIT (OUTPATIENT)
Dept: GASTROENTEROLOGY | Facility: MEDICAL CENTER | Age: 65
End: 2020-03-06
Payer: COMMERCIAL

## 2020-03-06 VITALS
BODY MASS INDEX: 25.16 KG/M2 | SYSTOLIC BLOOD PRESSURE: 128 MMHG | TEMPERATURE: 97.5 F | HEART RATE: 104 BPM | HEIGHT: 68 IN | WEIGHT: 166 LBS | DIASTOLIC BLOOD PRESSURE: 72 MMHG

## 2020-03-06 DIAGNOSIS — Z12.11 COLON CANCER SCREENING: Primary | ICD-10-CM

## 2020-03-06 PROCEDURE — 99244 OFF/OP CNSLTJ NEW/EST MOD 40: CPT | Performed by: INTERNAL MEDICINE

## 2020-03-06 NOTE — PATIENT INSTRUCTIONS
The patient is scheduled at [facility]  for a Colon with Dr General Gaming on 06/25/2020   Miralax prep instructions have been gone over in the office, with the patient, by the MA  The patient is aware that they will receive a call with the arrival time the day prior to procedure and that they will need a  the day of the procedure  I have asked the patient to call with any questions that they might have prior to procedure  I scheduled all additional tests for the patient and instructions were given

## 2020-05-08 ENCOUNTER — HOSPITAL ENCOUNTER (OUTPATIENT)
Dept: RADIOLOGY | Facility: HOSPITAL | Age: 65
Discharge: HOME | End: 2020-05-08
Payer: COMMERCIAL

## 2020-05-08 ENCOUNTER — TRANSCRIBE ORDERS (OUTPATIENT)
Dept: ADMINISTRATIVE | Facility: HOSPITAL | Age: 65
End: 2020-05-08

## 2020-05-08 DIAGNOSIS — M25.561 RIGHT KNEE PAIN, UNSPECIFIED CHRONICITY: Primary | ICD-10-CM

## 2020-05-08 DIAGNOSIS — M25.561 RIGHT KNEE PAIN, UNSPECIFIED CHRONICITY: ICD-10-CM

## 2020-05-08 PROCEDURE — 73564 X-RAY EXAM KNEE 4 OR MORE: CPT

## 2020-05-24 ENCOUNTER — APPOINTMENT (EMERGENCY)
Dept: RADIOLOGY | Facility: HOSPITAL | Age: 65
End: 2020-05-24
Payer: COMMERCIAL

## 2020-05-24 ENCOUNTER — HOSPITAL ENCOUNTER (EMERGENCY)
Facility: HOSPITAL | Age: 65
Discharge: HOME | End: 2020-05-24
Attending: EMERGENCY MEDICINE | Admitting: EMERGENCY MEDICINE
Payer: COMMERCIAL

## 2020-05-24 VITALS
SYSTOLIC BLOOD PRESSURE: 180 MMHG | DIASTOLIC BLOOD PRESSURE: 86 MMHG | RESPIRATION RATE: 20 BRPM | TEMPERATURE: 98 F | HEART RATE: 96 BPM | OXYGEN SATURATION: 95 %

## 2020-05-24 DIAGNOSIS — S83.91XA RIGHT KNEE SPRAIN: Primary | ICD-10-CM

## 2020-05-24 PROCEDURE — 73564 X-RAY EXAM KNEE 4 OR MORE: CPT

## 2020-05-24 PROCEDURE — 99283 EMERGENCY DEPT VISIT LOW MDM: CPT

## 2020-05-24 PROCEDURE — 99283 EMERGENCY DEPT VISIT LOW MDM: CPT | Performed by: EMERGENCY MEDICINE

## 2020-05-28 ENCOUNTER — TRANSCRIBE ORDERS (OUTPATIENT)
Dept: ADMINISTRATIVE | Facility: HOSPITAL | Age: 65
End: 2020-05-28

## 2020-05-28 DIAGNOSIS — M25.561 RIGHT KNEE PAIN, UNSPECIFIED CHRONICITY: Primary | ICD-10-CM

## 2020-06-03 ENCOUNTER — TELEPHONE (OUTPATIENT)
Dept: GASTROENTEROLOGY | Facility: AMBULARY SURGERY CENTER | Age: 65
End: 2020-06-03

## 2020-06-05 ENCOUNTER — TELEPHONE (OUTPATIENT)
Dept: GASTROENTEROLOGY | Facility: CLINIC | Age: 65
End: 2020-06-05

## 2020-06-09 ENCOUNTER — HOSPITAL ENCOUNTER (OUTPATIENT)
Dept: MRI IMAGING | Facility: HOSPITAL | Age: 65
Discharge: HOME | End: 2020-06-09
Payer: COMMERCIAL

## 2020-06-09 DIAGNOSIS — M25.561 RIGHT KNEE PAIN, UNSPECIFIED CHRONICITY: ICD-10-CM

## 2020-06-09 PROCEDURE — 73721 MRI JNT OF LWR EXTRE W/O DYE: CPT

## 2020-06-15 ENCOUNTER — PREP FOR PROCEDURE (OUTPATIENT)
Dept: GASTROENTEROLOGY | Facility: CLINIC | Age: 65
End: 2020-06-15

## 2020-06-15 DIAGNOSIS — Z20.822 ENCOUNTER FOR LABORATORY TESTING FOR COVID-19 VIRUS: Primary | ICD-10-CM

## 2020-06-18 ENCOUNTER — TRANSCRIBE ORDERS (OUTPATIENT)
Dept: PHYSICAL THERAPY | Facility: CLINIC | Age: 65
End: 2020-06-18

## 2020-06-18 ENCOUNTER — EVALUATION (OUTPATIENT)
Dept: PHYSICAL THERAPY | Facility: CLINIC | Age: 65
End: 2020-06-18
Payer: COMMERCIAL

## 2020-06-18 DIAGNOSIS — M25.561 RIGHT KNEE PAIN, UNSPECIFIED CHRONICITY: Primary | ICD-10-CM

## 2020-06-18 PROCEDURE — 97110 THERAPEUTIC EXERCISES: CPT | Performed by: PHYSICAL THERAPIST

## 2020-06-18 PROCEDURE — 97161 PT EVAL LOW COMPLEX 20 MIN: CPT | Performed by: PHYSICAL THERAPIST

## 2020-06-18 PROCEDURE — 97112 NEUROMUSCULAR REEDUCATION: CPT | Performed by: PHYSICAL THERAPIST

## 2020-06-18 RX ORDER — ALLOPURINOL 100 MG/1
100 TABLET ORAL DAILY
COMMUNITY
End: 2021-08-19 | Stop reason: HOSPADM

## 2020-06-18 RX ORDER — OXYCODONE HYDROCHLORIDE AND ACETAMINOPHEN 5; 325 MG/1; MG/1
1 TABLET ORAL EVERY 4 HOURS PRN
COMMUNITY
End: 2020-08-23

## 2020-06-23 ENCOUNTER — ANESTHESIA EVENT (OUTPATIENT)
Dept: GASTROENTEROLOGY | Facility: HOSPITAL | Age: 65
End: 2020-06-23

## 2020-06-25 ENCOUNTER — ANESTHESIA (OUTPATIENT)
Dept: GASTROENTEROLOGY | Facility: HOSPITAL | Age: 65
End: 2020-06-25

## 2020-06-25 ENCOUNTER — HOSPITAL ENCOUNTER (OUTPATIENT)
Dept: GASTROENTEROLOGY | Facility: HOSPITAL | Age: 65
Setting detail: OUTPATIENT SURGERY
Discharge: HOME | End: 2020-06-25
Attending: INTERNAL MEDICINE | Admitting: INTERNAL MEDICINE
Payer: COMMERCIAL

## 2020-06-25 VITALS
TEMPERATURE: 97.5 F | SYSTOLIC BLOOD PRESSURE: 125 MMHG | HEIGHT: 68 IN | OXYGEN SATURATION: 96 % | DIASTOLIC BLOOD PRESSURE: 67 MMHG | BODY MASS INDEX: 25.46 KG/M2 | HEART RATE: 79 BPM | RESPIRATION RATE: 11 BRPM | WEIGHT: 168 LBS

## 2020-06-25 DIAGNOSIS — Z12.11 COLON CANCER SCREENING: ICD-10-CM

## 2020-06-25 LAB — SARS-COV-2 RNA RESP QL NAA+PROBE: NEGATIVE

## 2020-06-25 PROCEDURE — 45380 COLONOSCOPY AND BIOPSY: CPT | Performed by: INTERNAL MEDICINE

## 2020-06-25 PROCEDURE — 45385 COLONOSCOPY W/LESION REMOVAL: CPT | Performed by: INTERNAL MEDICINE

## 2020-06-25 PROCEDURE — 87635 SARS-COV-2 COVID-19 AMP PRB: CPT | Performed by: INTERNAL MEDICINE

## 2020-06-25 PROCEDURE — 88305 TISSUE EXAM BY PATHOLOGIST: CPT | Performed by: PATHOLOGY

## 2020-06-25 PROCEDURE — 45381 COLONOSCOPY SUBMUCOUS NJX: CPT | Performed by: INTERNAL MEDICINE

## 2020-06-25 RX ORDER — SODIUM CHLORIDE 9 MG/ML
125 INJECTION, SOLUTION INTRAVENOUS CONTINUOUS
Status: DISCONTINUED | OUTPATIENT
Start: 2020-06-25 | End: 2020-06-29 | Stop reason: HOSPADM

## 2020-06-25 RX ORDER — PROPOFOL 10 MG/ML
INJECTION, EMULSION INTRAVENOUS AS NEEDED
Status: DISCONTINUED | OUTPATIENT
Start: 2020-06-25 | End: 2020-06-25 | Stop reason: SURG

## 2020-06-25 RX ADMIN — PROPOFOL 50 MG: 10 INJECTION, EMULSION INTRAVENOUS at 14:54

## 2020-06-25 RX ADMIN — SODIUM CHLORIDE 125 ML/HR: 0.9 INJECTION, SOLUTION INTRAVENOUS at 13:55

## 2020-06-25 RX ADMIN — LIDOCAINE HYDROCHLORIDE 50 MG: 20 INJECTION, SOLUTION INTRAVENOUS at 14:41

## 2020-06-25 RX ADMIN — PROPOFOL 50 MG: 10 INJECTION, EMULSION INTRAVENOUS at 14:43

## 2020-06-25 RX ADMIN — PROPOFOL 50 MG: 10 INJECTION, EMULSION INTRAVENOUS at 14:46

## 2020-06-25 RX ADMIN — PROPOFOL 150 MG: 10 INJECTION, EMULSION INTRAVENOUS at 14:41

## 2020-06-25 RX ADMIN — PROPOFOL 50 MG: 10 INJECTION, EMULSION INTRAVENOUS at 14:51

## 2020-08-20 ENCOUNTER — HOSPITAL ENCOUNTER (OUTPATIENT)
Dept: RADIOLOGY | Facility: HOSPITAL | Age: 65
Discharge: HOME | End: 2020-08-20
Payer: COMMERCIAL

## 2020-08-20 ENCOUNTER — TRANSCRIBE ORDERS (OUTPATIENT)
Dept: ADMINISTRATIVE | Facility: HOSPITAL | Age: 65
End: 2020-08-20

## 2020-08-20 DIAGNOSIS — R06.02 SHORTNESS OF BREATH: ICD-10-CM

## 2020-08-20 DIAGNOSIS — G51.8 NEURALGIA OF 7TH CRANIAL NERVE: ICD-10-CM

## 2020-08-20 DIAGNOSIS — M54.50 LOW BACK PAIN WITHOUT SCIATICA, UNSPECIFIED BACK PAIN LATERALITY, UNSPECIFIED CHRONICITY: ICD-10-CM

## 2020-08-20 DIAGNOSIS — J43.1 PANACINAR EMPHYSEMA (HCC): Primary | ICD-10-CM

## 2020-08-20 DIAGNOSIS — J43.1 PANACINAR EMPHYSEMA (HCC): ICD-10-CM

## 2020-08-20 PROCEDURE — 71046 X-RAY EXAM CHEST 2 VIEWS: CPT

## 2020-08-20 PROCEDURE — 72050 X-RAY EXAM NECK SPINE 4/5VWS: CPT

## 2020-08-23 ENCOUNTER — HOSPITAL ENCOUNTER (EMERGENCY)
Facility: HOSPITAL | Age: 65
Discharge: HOME | End: 2020-08-23
Attending: EMERGENCY MEDICINE | Admitting: EMERGENCY MEDICINE
Payer: COMMERCIAL

## 2020-08-23 ENCOUNTER — APPOINTMENT (EMERGENCY)
Dept: RADIOLOGY | Facility: HOSPITAL | Age: 65
End: 2020-08-23
Payer: COMMERCIAL

## 2020-08-23 VITALS
OXYGEN SATURATION: 98 % | SYSTOLIC BLOOD PRESSURE: 136 MMHG | RESPIRATION RATE: 20 BRPM | DIASTOLIC BLOOD PRESSURE: 71 MMHG | WEIGHT: 172 LBS | HEART RATE: 96 BPM | BODY MASS INDEX: 26.15 KG/M2 | TEMPERATURE: 97.5 F

## 2020-08-23 DIAGNOSIS — M25.552 BILATERAL HIP PAIN: Primary | ICD-10-CM

## 2020-08-23 DIAGNOSIS — M25.551 BILATERAL HIP PAIN: Primary | ICD-10-CM

## 2020-08-23 PROCEDURE — 99284 EMERGENCY DEPT VISIT MOD MDM: CPT | Performed by: EMERGENCY MEDICINE

## 2020-08-23 PROCEDURE — 73522 X-RAY EXAM HIPS BI 3-4 VIEWS: CPT

## 2020-08-23 PROCEDURE — 99283 EMERGENCY DEPT VISIT LOW MDM: CPT

## 2020-08-23 PROCEDURE — 96372 THER/PROPH/DIAG INJ SC/IM: CPT

## 2020-08-23 RX ORDER — TRAMADOL HYDROCHLORIDE 50 MG/1
50 TABLET ORAL EVERY 6 HOURS PRN
Qty: 10 TABLET | Refills: 0 | Status: SHIPPED | OUTPATIENT
Start: 2020-08-23 | End: 2020-09-02

## 2020-08-23 RX ORDER — KETOROLAC TROMETHAMINE 30 MG/ML
30 INJECTION, SOLUTION INTRAMUSCULAR; INTRAVENOUS ONCE
Status: COMPLETED | OUTPATIENT
Start: 2020-08-23 | End: 2020-08-23

## 2020-08-23 RX ADMIN — KETOROLAC TROMETHAMINE 30 MG: 30 INJECTION, SOLUTION INTRAMUSCULAR at 15:22

## 2020-08-23 NOTE — ED PROVIDER NOTES
History  Chief Complaint   Patient presents with    Hip Pain     B/L hip pain since this am     61-year-old male with a history of GERD, COPD, arthritis, chronic pain, fibromyalgia presents with bilateral hip pain that started this morning  Patient denies trauma or previous history of hip pain  He states he was able to walk but it was very difficult  The pain is worse with trying to ambulate and located mainly in the posterior aspect of both of his hips  The pain does not radiate  He has not noticed any joint pain, swelling or redness  He did not take anything for the pain  History provided by:  Patient   used: No    Hip Pain   Location:  Bilateral  Quality:  Aching  Severity:  Unable to specify  Onset quality:  Gradual  Duration:  8 hours  Timing:  Constant  Progression:  Unchanged  Chronicity:  New  Context:  Spontaneous  Relieved by:  Nothing tried  Worsened by:  Standing  Ineffective treatments:  Nothing tried  Associated symptoms: no abdominal pain, no chest pain, no fatigue, no fever, no headaches, no myalgias, no nausea, no rash, no shortness of breath and no vomiting        Prior to Admission Medications   Prescriptions Last Dose Informant Patient Reported? Taking?    ALBUTEROL IN   Yes No   Sig: Inhale as needed Pt states no longer taking   Ascorbic Acid (VITAMIN C PO)   Yes No   Sig: Take 1 tablet by mouth daily   Cholecalciferol (VITAMIN D3) 125 MCG (5000 UT) TABS   Yes No   Sig: Take 5,000 Units by mouth daily   Ibuprofen-Famotidine 800-26 6 MG TABS   Yes No   Sig: Take 1 tablet by mouth 2 (two) times a day   Icosapent Ethyl (VASCEPA) 0 5 g CAPS   Yes No   Sig: Take 2 capsules by mouth 2 (two) times a day   allopurinol (ZYLOPRIM) 100 mg tablet   Yes No   Sig: Take 100 mg by mouth daily   benzonatate (TESSALON PERLES) 100 mg capsule   No No   Sig: Take 1 capsule (100 mg total) by mouth 3 (three) times a day as needed for cough   Patient not taking: Reported on 3/6/2020 bisacodyl (DULCOLAX) 5 mg EC tablet   No No   Sig: Take 2 tablets (10 mg total) by mouth once for 1 dose   cetirizine (ZyrTEC) 10 mg tablet   Yes No   Sig: Take 10 mg by mouth daily   fenofibrate (TRICOR) 145 mg tablet   Yes No   Sig: Take 145 mg by mouth daily   fluticasone (FLONASE) 50 mcg/act nasal spray   Yes No   Si spray into each nostril daily Pt states no longer taking   fluticasone-umeclidinium-vilanterol (TRELEGY ELLIPTA) 100-62 5-25 MCG/INH inhaler   Yes No   Sig: Inhale 1 puff daily Rinse mouth after use  guaiFENesin (MUCINEX) 600 mg 12 hr tablet   No No   Sig: Take 1 tablet (600 mg total) by mouth 2 (two) times a day   Patient taking differently: Take 600 mg by mouth 2 (two) times a day Pt states no longer taking   ibuprofen (MOTRIN) 600 mg tablet   No No   Sig: Take 1 tablet (600 mg total) by mouth every 6 (six) hours as needed for mild pain or moderate pain   multivitamin (THERAGRAN) TABS   Yes No   Sig: Take 1 tablet by mouth daily    nicotine (NICODERM CQ) 14 mg/24hr TD 24 hr patch   No No   Sig: Place 1 patch on the skin daily   Patient taking differently: Place 1 patch on the skin daily Pt not taking, he never started it   omeprazole (PriLOSEC) 20 mg delayed release capsule   Yes No   Sig: Take 20 mg by mouth daily     oxyCODONE-acetaminophen (PERCOCET) 5-325 mg per tablet   Yes No   Sig: Take 1 tablet by mouth every 4 (four) hours as needed for moderate pain   polyethylene glycol (GOLYTELY) 4000 mL solution   No No   Sig: Take 4,000 mL by mouth once for 1 dose   predniSONE 10 mg tablet   No No   Sig: Take 4 tablets daily for 3 days  Take 3 tablets daily for 3 days  Take 2 tablets daily for 3 days  Take 1 tablet daily for 3 days   Patient not taking: Reported on 3/6/2020   pregabalin (LYRICA) 150 mg capsule   Yes No   Sig: Take 150 mg by mouth 2 (two) times a day   rosuvastatin (CRESTOR) 10 MG tablet   Yes No   Sig: Take 10 mg by mouth daily at bedtime      Facility-Administered Medications: None       Past Medical History:   Diagnosis Date    Arthritis     osteoarthritis    COPD (chronic obstructive pulmonary disease) (HCC)     Fibromyalgia     GERD (gastroesophageal reflux disease)     History of transfusion     Hyperlipidemia        Past Surgical History:   Procedure Laterality Date    ANKLE FRACTURE SURGERY      Left Side    CHOLECYSTECTOMY      CYST REMOVAL      FEMUR SURGERY Left     KNEE SURGERY Left 1996    MANDIBLE SURGERY      ROTATOR CUFF REPAIR Left     WRIST FRACTURE SURGERY Left     plate in place       History reviewed  No pertinent family history  I have reviewed and agree with the history as documented  E-Cigarette/Vaping    E-Cigarette Use Never User      E-Cigarette/Vaping Substances     Social History     Tobacco Use    Smoking status: Current Every Day Smoker     Packs/day: 1 00     Types: Cigarettes    Smokeless tobacco: Never Used   Substance Use Topics    Alcohol use: Yes     Frequency: 4 or more times a week     Drinks per session: 5 or 6     Comment: 3-6 cans of beer daily    Drug use: No       Review of Systems   Constitutional: Negative  Negative for chills, fatigue and fever  HENT: Negative  Eyes: Negative  Respiratory: Negative  Negative for shortness of breath  Cardiovascular: Negative  Negative for chest pain  Gastrointestinal: Negative  Negative for abdominal pain, nausea and vomiting  Genitourinary: Negative  Musculoskeletal: Positive for arthralgias and neck pain  Negative for myalgias  Skin: Negative  Negative for rash  Allergic/Immunologic: Negative  Neurological: Negative  Negative for weakness, numbness and headaches  Hematological: Negative  Psychiatric/Behavioral: Negative  All other systems reviewed and are negative  Physical Exam  Physical Exam  Vitals signs and nursing note reviewed  Constitutional:       General: He is not in acute distress  Appearance: Normal appearance   He is well-developed  He is obese  He is not ill-appearing, toxic-appearing or diaphoretic  HENT:      Head: Normocephalic and atraumatic  Right Ear: External ear normal       Left Ear: External ear normal    Eyes:      General: No scleral icterus  Conjunctiva/sclera: Conjunctivae normal       Pupils: Pupils are equal, round, and reactive to light  Neck:      Thyroid: No thyromegaly  Vascular: No JVD  Cardiovascular:      Rate and Rhythm: Normal rate and regular rhythm  Heart sounds: Normal heart sounds  Pulmonary:      Effort: Pulmonary effort is normal       Breath sounds: Normal breath sounds  Abdominal:      General: Bowel sounds are normal  There is no distension  Palpations: Abdomen is soft  There is no mass  Tenderness: There is no abdominal tenderness  Hernia: No hernia is present  Musculoskeletal: Normal range of motion  General: No swelling, tenderness, deformity or signs of injury  Right hip: He exhibits normal range of motion, normal strength, no tenderness, no bony tenderness, no swelling, no crepitus, no deformity and no laceration  Left hip: He exhibits normal range of motion, normal strength, no tenderness, no bony tenderness, no swelling, no crepitus, no deformity and no laceration  Right lower leg: No edema  Left lower leg: No edema  Legs:    Skin:     General: Skin is warm and dry  Coloration: Skin is not jaundiced or pale  Findings: No bruising, erythema, lesion or rash  Neurological:      General: No focal deficit present  Mental Status: He is alert and oriented to person, place, and time  Motor: No weakness  Deep Tendon Reflexes: Reflexes are normal and symmetric  Psychiatric:         Mood and Affect: Mood normal          Behavior: Behavior is cooperative           Vital Signs  ED Triage Vitals [08/23/20 1427]   Temperature Pulse Respirations Blood Pressure SpO2   97 5 °F (36 4 °C) 96 20 136/71 98 %      Temp Source Heart Rate Source Patient Position - Orthostatic VS BP Location FiO2 (%)   Oral Monitor Sitting Right arm --      Pain Score       8           Vitals:    08/23/20 1427   BP: 136/71   Pulse: 96   Patient Position - Orthostatic VS: Sitting         Visual Acuity      ED Medications  Medications   ketorolac (TORADOL) injection 30 mg (has no administration in time range)       Diagnostic Studies  Results Reviewed     None                 XR hip/pelv 2-3 vws left    (Results Pending)              Procedures  Procedures         ED Course                                             MDM  Number of Diagnoses or Management Options  Diagnosis management comments: 41-year-old male presents with bilateral atraumatic hip pain that started this morning  The pain is mostly posterior hips  No radiation of the pain  Does have history of chronic arthritis but he states never in the hips  He is able to ambulate but states he has a lot of pain  He did not take anything for the pain  On exam he is alert no acute distress  He has full range of motion with minimal pain of both hips  He does have some clicking of the left hip  Will give Toradol for pain here and also x-ray the hips to rule out acute fracture which is doubtful or evidence of severe arthritis  Amount and/or Complexity of Data Reviewed  Tests in the radiology section of CPT®: ordered and reviewed          Disposition  Final diagnoses:   None     ED Disposition     None      Follow-up Information    None         Patient's Medications   Discharge Prescriptions    No medications on file     No discharge procedures on file      PDMP Review     None          ED Provider  Electronically Signed by           Tequila Goel DO  08/23/20 8431

## 2020-08-31 ENCOUNTER — HOSPITAL ENCOUNTER (OUTPATIENT)
Dept: RADIOLOGY | Facility: HOSPITAL | Age: 65
Discharge: HOME | End: 2020-08-31
Payer: COMMERCIAL

## 2020-08-31 DIAGNOSIS — M54.50 LOW BACK PAIN WITHOUT SCIATICA, UNSPECIFIED BACK PAIN LATERALITY, UNSPECIFIED CHRONICITY: ICD-10-CM

## 2020-08-31 PROCEDURE — 72110 X-RAY EXAM L-2 SPINE 4/>VWS: CPT

## 2020-09-01 ENCOUNTER — TRANSCRIBE ORDERS (OUTPATIENT)
Dept: ADMINISTRATIVE | Facility: HOSPITAL | Age: 65
End: 2020-09-01

## 2020-09-01 DIAGNOSIS — M54.2 CERVICALGIA: ICD-10-CM

## 2020-09-01 DIAGNOSIS — M54.12 CERVICAL RADICULOPATHY: Primary | ICD-10-CM

## 2020-09-08 ENCOUNTER — HOSPITAL ENCOUNTER (OUTPATIENT)
Dept: MRI IMAGING | Facility: HOSPITAL | Age: 65
Discharge: HOME | End: 2020-09-08
Payer: COMMERCIAL

## 2020-09-08 DIAGNOSIS — M54.12 CERVICAL RADICULOPATHY: ICD-10-CM

## 2020-09-08 DIAGNOSIS — M54.2 CERVICALGIA: ICD-10-CM

## 2020-09-08 PROCEDURE — 72141 MRI NECK SPINE W/O DYE: CPT

## 2020-09-08 PROCEDURE — G1004 CDSM NDSC: HCPCS

## 2020-10-22 ENCOUNTER — CONSULT (OUTPATIENT)
Dept: PAIN MEDICINE | Facility: MEDICAL CENTER | Age: 65
End: 2020-10-22
Payer: COMMERCIAL

## 2020-10-22 VITALS
BODY MASS INDEX: 25.46 KG/M2 | DIASTOLIC BLOOD PRESSURE: 85 MMHG | SYSTOLIC BLOOD PRESSURE: 144 MMHG | WEIGHT: 168 LBS | HEART RATE: 88 BPM | HEIGHT: 68 IN | TEMPERATURE: 97.2 F

## 2020-10-22 DIAGNOSIS — M47.812 CERVICAL FACET JOINT SYNDROME: ICD-10-CM

## 2020-10-22 DIAGNOSIS — M47.812 CERVICAL SPONDYLOSIS: ICD-10-CM

## 2020-10-22 DIAGNOSIS — M50.120 CERVICAL DISC DISORDER WITH RADICULOPATHY OF MID-CERVICAL REGION: Primary | ICD-10-CM

## 2020-10-22 PROCEDURE — 99244 OFF/OP CNSLTJ NEW/EST MOD 40: CPT | Performed by: PHYSICAL MEDICINE & REHABILITATION

## 2020-10-26 ENCOUNTER — TRANSCRIBE ORDERS (OUTPATIENT)
Dept: ADMINISTRATIVE | Facility: HOSPITAL | Age: 65
End: 2020-10-26

## 2020-10-26 DIAGNOSIS — G89.29 OTHER CHRONIC PAIN: Primary | ICD-10-CM

## 2020-11-04 ENCOUNTER — HOSPITAL ENCOUNTER (OUTPATIENT)
Dept: BONE DENSITY | Facility: MEDICAL CENTER | Age: 65
Discharge: HOME | End: 2020-11-04
Payer: COMMERCIAL

## 2020-11-04 DIAGNOSIS — G89.29 OTHER CHRONIC PAIN: ICD-10-CM

## 2020-11-04 PROCEDURE — 77080 DXA BONE DENSITY AXIAL: CPT

## 2020-11-16 ENCOUNTER — HOSPITAL ENCOUNTER (OUTPATIENT)
Dept: RADIOLOGY | Facility: MEDICAL CENTER | Age: 65
Discharge: HOME | End: 2020-11-16
Attending: PHYSICAL MEDICINE & REHABILITATION | Admitting: PHYSICAL MEDICINE & REHABILITATION
Payer: COMMERCIAL

## 2020-11-16 VITALS
SYSTOLIC BLOOD PRESSURE: 131 MMHG | RESPIRATION RATE: 18 BRPM | DIASTOLIC BLOOD PRESSURE: 82 MMHG | TEMPERATURE: 97.4 F | OXYGEN SATURATION: 93 % | HEART RATE: 85 BPM

## 2020-11-16 DIAGNOSIS — M47.812 CERVICAL SPONDYLOSIS: ICD-10-CM

## 2020-11-16 DIAGNOSIS — M47.812 CERVICAL FACET JOINT SYNDROME: ICD-10-CM

## 2020-11-16 DIAGNOSIS — M50.120 CERVICAL DISC DISORDER WITH RADICULOPATHY OF MID-CERVICAL REGION: ICD-10-CM

## 2020-11-16 PROCEDURE — 62321 NJX INTERLAMINAR CRV/THRC: CPT | Performed by: PHYSICAL MEDICINE & REHABILITATION

## 2020-11-16 RX ORDER — PAPAVERINE HCL 150 MG
20 CAPSULE, EXTENDED RELEASE ORAL ONCE
Status: COMPLETED | OUTPATIENT
Start: 2020-11-16 | End: 2020-11-16

## 2020-11-16 RX ORDER — 0.9 % SODIUM CHLORIDE 0.9 %
10 VIAL (ML) INJECTION ONCE
Status: COMPLETED | OUTPATIENT
Start: 2020-11-16 | End: 2020-11-16

## 2020-11-16 RX ORDER — LIDOCAINE HYDROCHLORIDE 10 MG/ML
5 INJECTION, SOLUTION EPIDURAL; INFILTRATION; INTRACAUDAL; PERINEURAL ONCE
Status: COMPLETED | OUTPATIENT
Start: 2020-11-16 | End: 2020-11-16

## 2020-11-16 RX ORDER — MELOXICAM 15 MG/1
15 TABLET ORAL DAILY
COMMUNITY
Start: 2020-08-31 | End: 2021-08-19 | Stop reason: HOSPADM

## 2020-11-16 RX ADMIN — DEXAMETHASONE SODIUM PHOSPHATE 20 MG: 10 INJECTION, SOLUTION INTRAMUSCULAR; INTRAVENOUS at 13:34

## 2020-11-16 RX ADMIN — IOHEXOL 1 ML: 300 INJECTION, SOLUTION INTRAVENOUS at 13:32

## 2020-11-16 RX ADMIN — Medication 1 ML: at 13:34

## 2020-11-16 RX ADMIN — LIDOCAINE HYDROCHLORIDE 3 ML: 10 INJECTION, SOLUTION EPIDURAL; INFILTRATION; INTRACAUDAL; PERINEURAL at 13:30

## 2020-11-23 ENCOUNTER — TELEPHONE (OUTPATIENT)
Dept: PAIN MEDICINE | Facility: CLINIC | Age: 65
End: 2020-11-23

## 2020-12-07 ENCOUNTER — TRANSCRIBE ORDERS (OUTPATIENT)
Dept: SLEEP CENTER | Facility: CLINIC | Age: 65
End: 2020-12-07

## 2020-12-07 DIAGNOSIS — E66.2 OBESITY HYPOVENTILATION SYNDROME (HCC): ICD-10-CM

## 2020-12-07 DIAGNOSIS — F39 MOOD DISORDER (HCC): ICD-10-CM

## 2020-12-07 DIAGNOSIS — G25.81 RLS (RESTLESS LEGS SYNDROME): ICD-10-CM

## 2020-12-07 DIAGNOSIS — E13.69 OTHER SPECIFIED DIABETES MELLITUS WITH OTHER SPECIFIED COMPLICATION, UNSPECIFIED WHETHER LONG TERM INSULIN USE (HCC): Primary | ICD-10-CM

## 2020-12-07 DIAGNOSIS — G47.19 EXCESSIVE DAYTIME SLEEPINESS: ICD-10-CM

## 2020-12-11 ENCOUNTER — TELEMEDICINE (OUTPATIENT)
Dept: PAIN MEDICINE | Facility: MEDICAL CENTER | Age: 65
End: 2020-12-11
Payer: COMMERCIAL

## 2020-12-11 VITALS — WEIGHT: 166 LBS | HEIGHT: 68 IN | BODY MASS INDEX: 25.16 KG/M2

## 2020-12-11 DIAGNOSIS — M50.120 CERVICAL DISC DISORDER WITH RADICULOPATHY OF MID-CERVICAL REGION: ICD-10-CM

## 2020-12-11 DIAGNOSIS — M47.812 CERVICAL SPONDYLOSIS: Primary | ICD-10-CM

## 2020-12-11 PROCEDURE — 99214 OFFICE O/P EST MOD 30 MIN: CPT | Performed by: PHYSICAL MEDICINE & REHABILITATION

## 2020-12-11 RX ORDER — OXYCODONE HYDROCHLORIDE AND ACETAMINOPHEN 5; 325 MG/1; MG/1
TABLET ORAL
COMMUNITY
Start: 2020-11-21 | End: 2021-08-19 | Stop reason: HOSPADM

## 2020-12-11 RX ORDER — MULTIVITAMIN
TABLET ORAL
COMMUNITY

## 2020-12-11 RX ORDER — ALBUTEROL SULFATE 90 UG/1
AEROSOL, METERED RESPIRATORY (INHALATION)
COMMUNITY
End: 2021-12-15

## 2020-12-11 RX ORDER — BUDESONIDE AND FORMOTEROL FUMARATE DIHYDRATE 160; 4.5 UG/1; UG/1
AEROSOL RESPIRATORY (INHALATION)
COMMUNITY
End: 2021-12-15

## 2020-12-11 RX ORDER — ALLOPURINOL 300 MG/1
300 TABLET ORAL 2 TIMES DAILY
COMMUNITY
Start: 2020-09-07

## 2021-01-04 ENCOUNTER — HOSPITAL ENCOUNTER (OUTPATIENT)
Dept: RADIOLOGY | Facility: MEDICAL CENTER | Age: 66
Discharge: HOME | End: 2021-01-04
Attending: PHYSICAL MEDICINE & REHABILITATION

## 2021-01-04 VITALS
SYSTOLIC BLOOD PRESSURE: 146 MMHG | HEART RATE: 107 BPM | DIASTOLIC BLOOD PRESSURE: 86 MMHG | OXYGEN SATURATION: 95 % | RESPIRATION RATE: 20 BRPM | TEMPERATURE: 97.9 F

## 2021-01-04 DIAGNOSIS — M47.812 CERVICAL SPONDYLOSIS: ICD-10-CM

## 2021-01-04 DIAGNOSIS — M50.120 CERVICAL DISC DISORDER WITH RADICULOPATHY OF MID-CERVICAL REGION: ICD-10-CM

## 2021-01-04 RX ORDER — LEVOFLOXACIN 500 MG/1
TABLET, FILM COATED ORAL
COMMUNITY
Start: 2021-01-01 | End: 2021-08-19 | Stop reason: HOSPADM

## 2021-01-04 NOTE — PROGRESS NOTES
Pt was ordered levaquin on 1/1, pt stated he did not take them b/c he knew about the procedure and on saturday the S&S went away  KW aware  Pt stated he has no S&S today  KW cancelled the procedure and KW told the  Sanna  Pt to be rescheduled

## 2021-01-14 ENCOUNTER — TELEPHONE (OUTPATIENT)
Dept: PAIN MEDICINE | Facility: MEDICAL CENTER | Age: 66
End: 2021-01-14

## 2021-01-14 NOTE — TELEPHONE ENCOUNTER
Gita Farris is calling in to let the Dr know that her  is now done with the antibiotic   Please call back for scheduling, thank you

## 2021-01-15 NOTE — TELEPHONE ENCOUNTER
Left message for patient to call me back DIRECTLY to schedule  Left my DIRECT phone # [phone] on message

## 2021-02-08 ENCOUNTER — HOSPITAL ENCOUNTER (OUTPATIENT)
Dept: RADIOLOGY | Facility: MEDICAL CENTER | Age: 66
Discharge: HOME | End: 2021-02-08
Attending: PHYSICAL MEDICINE & REHABILITATION | Admitting: PHYSICAL MEDICINE & REHABILITATION
Payer: COMMERCIAL

## 2021-02-08 VITALS
SYSTOLIC BLOOD PRESSURE: 120 MMHG | TEMPERATURE: 98.3 F | RESPIRATION RATE: 20 BRPM | HEART RATE: 83 BPM | DIASTOLIC BLOOD PRESSURE: 80 MMHG | OXYGEN SATURATION: 97 %

## 2021-02-08 PROCEDURE — 64492 INJ PARAVERT F JNT C/T 3 LEV: CPT | Performed by: PHYSICAL MEDICINE & REHABILITATION

## 2021-02-08 PROCEDURE — 64491 INJ PARAVERT F JNT C/T 2 LEV: CPT | Performed by: PHYSICAL MEDICINE & REHABILITATION

## 2021-02-08 PROCEDURE — 64490 INJ PARAVERT F JNT C/T 1 LEV: CPT | Performed by: PHYSICAL MEDICINE & REHABILITATION

## 2021-02-08 RX ORDER — BUPIVACAINE HCL/PF 2.5 MG/ML
10 VIAL (ML) INJECTION ONCE
Status: COMPLETED | OUTPATIENT
Start: 2021-02-08 | End: 2021-02-08

## 2021-02-08 RX ADMIN — Medication 2 ML: at 09:54

## 2021-02-08 NOTE — H&P
History of Present Illness:  The patient is a 60 y o  male who presents with complaints of Left-sided neck pain    Patient Active Problem List   Diagnosis    Hyperlipidemia    GERD (gastroesophageal reflux disease)    COPD (chronic obstructive pulmonary disease) ([facility] )    Chest pain    Closed fracture of one rib of left side    Nicotine dependence    CKD (chronic kidney disease) stage 3, GFR 30-59 ml/min    Community acquired bacterial pneumonia    Cervical disc disorder with radiculopathy of mid-cervical region    Cervical spondylosis    Cervical facet joint syndrome       Past Medical History:   Diagnosis Date    Arthritis     osteoarthritis    [facility]) [facility]     Fibromyalgia     GERD (gastroesophageal reflux disease)     History of transfusion     Hyperlipidemia        Past Surgical History:   Procedure Laterality Date    ANKLE FRACTURE SURGERY      Left Side    CHOLECYSTECTOMY      CYST REMOVAL      FEMUR SURGERY Left     KNEE SURGERY Left 1996    MANDIBLE SURGERY      ROTATOR CUFF REPAIR Left     WRIST FRACTURE SURGERY Left     plate in place         Current Outpatient Medications:     albuterol (PROVENTIL HFA,VENTOLIN HFA) 90 mcg/act inhaler, INHALE 1 TO 2 PUFFS EVERY 4 TO 6 HOURS AS NEEDED AND AS DIRECTED , Disp: , Rfl:     ALBUTEROL IN, Inhale as needed Pt states no longer taking, Disp: , Rfl:     allopurinol (ZYLOPRIM) 100 mg tablet, Take 100 mg by mouth daily, Disp: , Rfl:     allopurinol (ZYLOPRIM) 300 mg tablet, Take 300 mg by mouth daily, Disp: , Rfl:     Ascorbic Acid (VITAMIN C PO), Take 1 tablet by mouth daily, Disp: , Rfl:     benzonatate (TESSALON PERLES) 100 mg capsule, Take 1 capsule (100 mg total) by mouth 3 (three) times a day as needed for cough (Patient not taking: Reported on 3/6/2020), Disp: 20 capsule, Rfl: 0    bisacodyl (DULCOLAX) 5 mg EC tablet, Take 2 tablets (10 mg total) by mouth once for 1 dose, Disp: 2 tablet, Rfl: 0    budesonide-formoterol (Symbicort) 160-4 5 mcg/act inhaler, , Disp: , Rfl:     cetirizine (ZyrTEC) 10 mg tablet, Take 10 mg by mouth daily, Disp: , Rfl:     Cholecalciferol (VITAMIN D3) 125 MCG (5000 UT) TABS, Take 5,000 Units by mouth daily, Disp: , Rfl:     fenofibrate (TRICOR) 145 mg tablet, Take 145 mg by mouth daily, Disp: , Rfl:     fluticasone (FLONASE) 50 mcg/act nasal spray, 1 spray into each nostril daily Pt states no longer taking, Disp: , Rfl:     fluticasone-umeclidinium-vilanterol (TRELEGY ELLIPTA) 100-62 5-25 MCG/INH inhaler, Inhale 1 puff daily Rinse mouth after use , Disp: , Rfl:     guaiFENesin (MUCINEX) 600 mg 12 hr tablet, Take 1 tablet (600 mg total) by mouth 2 (two) times a day (Patient not taking: Reported on 10/22/2020), Disp: 10 tablet, Rfl: 0    ibuprofen (MOTRIN) 600 mg tablet, Take 1 tablet (600 mg total) by mouth every 6 (six) hours as needed for mild pain or moderate pain (Patient not taking: Reported on 10/22/2020), Disp: 30 tablet, Rfl: 0    Icosapent Ethyl (Vascepa) 0 5 g CAPS, Take 2 capsules by mouth 2 (two) times a day , Disp: , Rfl:     levofloxacin (LEVAQUIN) 500 mg tablet, take 1 tablet by mouth once daily for 10 days, Disp: , Rfl:     meloxicam (MOBIC) 15 mg tablet, Take 15 mg by mouth daily, Disp: , Rfl:     metFORMIN (GLUCOPHAGE) 500 mg tablet, TAKE 1 TABLET BY MOUTH EVERY MORNING WITH BREAKFAST , Disp: , Rfl:     metFORMIN (GLUCOPHAGE) 500 mg tablet, Take 500 mg by mouth, Disp: , Rfl:     Multiple Vitamin (multivitamin) tablet, TAKE 1 TABLET DAILY  , Disp: , Rfl:     multivitamin (THERAGRAN) TABS, Take 1 tablet by mouth daily , Disp: , Rfl:     Naproxen Sodium (ALEVE PO), Take by mouth, Disp: , Rfl:     nicotine (NICODERM CQ) 14 mg/24hr TD 24 hr patch, Place 1 patch on the skin daily (Patient not taking: Reported on 10/22/2020), Disp: 28 patch, Rfl: 0    omeprazole (PriLOSEC) 20 mg delayed release capsule, Take 20 mg by mouth daily  , Disp: , Rfl:    oxyCODONE-acetaminophen (PERCOCET) 5-325 mg per tablet, take 1 tablet by mouth every 6 hours if needed moderate pain maximum daily dose of 4 tablet, Disp: , Rfl:     polyethylene glycol (GOLYTELY) 4000 mL solution, Take 4,000 mL by mouth once for 1 dose, Disp: 4000 mL, Rfl: 0    predniSONE 10 mg tablet, Take 4 tablets daily for 3 days Take 3 tablets daily for 3 days Take 2 tablets daily for 3 days Take 1 tablet daily for 3 days (Patient not taking: Reported on 3/6/2020), Disp: 30 tablet, Rfl: 0    pregabalin (LYRICA) 150 mg capsule, Take 150 mg by mouth 2 (two) times a day, Disp: , Rfl:     rosuvastatin (CRESTOR) 10 MG tablet, Take 10 mg by mouth daily at bedtime, Disp: , Rfl:     triazolam (HALCION) 0 25 MG tablet, take 1 tablet by mouth nightly if needed for sleep, Disp: , Rfl:     Current Facility-Administered Medications:     bupivacaine (PF) (MARCAINE) 0 25 % injection 10 mL, 10 mL, Perineural, Once, Tierra Clink, DO    No Known Allergies    Physical Exam: There were no vitals filed for this visit  General: Awake, Alert, Oriented x 3, Mood and affect appropriate  Respiratory: Respirations even and unlabored  Cardiovascular: Peripheral pulses intact; no edema  Musculoskeletal Exam:  Tenderness to palpation left-sided cervical paraspinals  ASA Score: 2    Patient/Chart Verification  Patient ID Verified: Verbal  Consents Confirmed: Procedural, To be obtained in the Pre-Procedure area  H&P( within 30 days) Verified: To be obtained in the Pre-Procedure area  Allergies Reviewed: Yes  Anticoag/NSAID held?: NA  Currently on antibiotics?: No  Pregnancy denied?: NA    Assessment: No diagnosis found      Plan: Left sided C3, C4, C5, C6 MBB #1

## 2021-02-08 NOTE — DISCHARGE INSTRUCTIONS

## 2021-02-11 ENCOUNTER — TELEPHONE (OUTPATIENT)
Dept: RADIOLOGY | Facility: MEDICAL CENTER | Age: 66
End: 2021-02-11

## 2021-02-11 NOTE — TELEPHONE ENCOUNTER
Pain diary reviewed by Dr Debbie Castillo; poor response to left C3-6 MBB #1  Scheduled f/u with NP as appropriate: review cervical MBB response

## 2021-02-11 NOTE — TELEPHONE ENCOUNTER
Pt is scheduled for 3/10/21, does not need to come in earlier for a follow up  DISPLAY PLAN FREE TEXT

## 2021-03-10 ENCOUNTER — OFFICE VISIT (OUTPATIENT)
Dept: PAIN MEDICINE | Facility: MEDICAL CENTER | Age: 66
End: 2021-03-10
Payer: COMMERCIAL

## 2021-03-10 VITALS
TEMPERATURE: 97.3 F | HEIGHT: 68 IN | DIASTOLIC BLOOD PRESSURE: 79 MMHG | SYSTOLIC BLOOD PRESSURE: 147 MMHG | BODY MASS INDEX: 25.01 KG/M2 | WEIGHT: 165 LBS | HEART RATE: 105 BPM

## 2021-03-10 DIAGNOSIS — G89.4 CHRONIC PAIN SYNDROME: Primary | ICD-10-CM

## 2021-03-10 DIAGNOSIS — M50.120 CERVICAL DISC DISORDER WITH RADICULOPATHY OF MID-CERVICAL REGION: ICD-10-CM

## 2021-03-10 DIAGNOSIS — M47.812 CERVICAL SPONDYLOSIS: ICD-10-CM

## 2021-03-10 DIAGNOSIS — M47.812 CERVICAL FACET JOINT SYNDROME: ICD-10-CM

## 2021-03-10 PROCEDURE — 99213 OFFICE O/P EST LOW 20 MIN: CPT | Performed by: NURSE PRACTITIONER

## 2021-03-10 RX ORDER — CYCLOBENZAPRINE HCL 5 MG
TABLET ORAL
COMMUNITY
Start: 2021-02-23 | End: 2021-08-19 | Stop reason: HOSPADM

## 2021-03-10 RX ORDER — DULOXETIN HYDROCHLORIDE 30 MG/1
30 CAPSULE, DELAYED RELEASE ORAL DAILY
COMMUNITY
Start: 2021-03-01 | End: 2021-08-30 | Stop reason: SDUPTHER

## 2021-03-10 NOTE — PROGRESS NOTES
Assessment  1  Cervical disc disorder with radiculopathy of mid-cervical region    2  Cervical spondylosis    3  Cervical facet joint syndrome    4  Chronic pain syndrome        Plan  Patient is a 30-year-old male who is here for follow-up for chronic neck pain  He had a  Left C3-C6 medial branch block on 02/08/2021 which provided him no relief from his pain  He had a cervical epidural steroid injection on 11/16/2020 which provided little relief of his pain  He has recently started physical therapy 3 times a week, and his primary care provider started him on duloxetine 30 mg at bedtime and Flexeril  He states that this is helping his pain by 90%  At this time I feel it is appropriate for him to be seen on as needed basis and to call the office if he has worsening pain or new areas of pain  I did discuss with the patient that there are other types of injections that Dr Debbie Castillo may think is appropriate if PT fails or his pain worsens  He understands that he can call us anytime he feels he needs evaluation for his pain  My impressions and treatment recommendations were discussed in detail with the patient who verbalized understanding and had no further questions  Discharge instructions were provided  I personally saw and examined the patient and I agree with the above discussed plan of care  No orders of the defined types were placed in this encounter  New Medications Ordered This Visit   Medications    cyclobenzaprine (FLEXERIL) 5 mg tablet     Sig: take 1 tablet by mouth twice a day if needed for muscle spasm after meals    DULoxetine (Cymbalta) 30 mg delayed release capsule     Sig: Take 30 mg by mouth daily       History of Present Illness    Bolivar Billings is a 73 y o  male   Who is here today for follow-up after a left C3-C6 medial branch block  His pain diary showed no relief from his pain   Today he reports his pain as a 4/10, he states the pain is constant to some degree he can be as low as a 2/10 or as high as a 10/10 depending on activity  He describes the quality of his pain as dull aching, and pins and needles  His primary care started him on duloxetine 30 mg at bedtime and Flexeril and he states this is providing 90% relief from his pain  He has also recently started physical therapy 3 times a week  He states this is for fibromyalgia so they are working on multiple areas  He feels that this is beneficial     I have personally reviewed and/or updated the patient's past medical history, past surgical history, family history, social history, current medications, allergies, and vital signs today  Review of Systems   Respiratory: Positive for shortness of breath  Cardiovascular: Negative for chest pain  Gastrointestinal: Positive for constipation  Negative for diarrhea, nausea and vomiting  Musculoskeletal: Positive for back pain, gait problem and neck pain  Negative for arthralgias, joint swelling and myalgias  Skin: Negative for rash  Neurological: Negative for dizziness, seizures and weakness  All other systems reviewed and are negative        Patient Active Problem List   Diagnosis    Hyperlipidemia    GERD (gastroesophageal reflux disease)    COPD [facility]chronic obstructive pulmonary disease) ([facility] [address] )    Chest pain    Closed fracture of one rib of left side    Nicotine dependence    CKD (chronic kidney disease) stage 3, GFR 30-59 ml/min    Community acquired bacterial pneumonia    Cervical disc disorder with radiculopathy of mid-cervical region    Cervical spondylosis    Cervical facet joint syndrome    Chronic pain syndrome       Past Medical History:   Diagnosis Date    Arthritis     osteoarthritis    [facility]     Fibromyalgia     GERD (gastroesophageal reflux disease)     History of transfusion     Hyperlipidemia        Past Surgical History:   Procedure Laterality Date    ANKLE FRACTURE SURGERY      Left Side    CHOLECYSTECTOMY      CYST REMOVAL      FEMUR SURGERY Left     KNEE SURGERY Left 1996    MANDIBLE SURGERY      ROTATOR CUFF REPAIR Left     WRIST FRACTURE SURGERY Left     plate in place       Family History   Problem Relation Age of Onset    No Known Problems Mother     No Known Problems Father        Social History     Occupational History    Not on file   Tobacco Use    Smoking status: Current Every Day Smoker     Packs/day: 1 00     Types: Cigarettes    Smokeless tobacco: Never Used   Substance and Sexual Activity    Alcohol use: Yes     Frequency: 4 or more times a week     Drinks per session: 5 or 6     Comment: 3-6 cans of beer daily    Drug use: No    Sexual activity: Not Currently       Current Outpatient Medications on File Prior to Visit   Medication Sig    ALBUTEROL IN Inhale as needed Pt states no longer taking    allopurinol (ZYLOPRIM) 100 mg tablet Take 100 mg by mouth daily    Ascorbic Acid (VITAMIN C PO) Take 1 tablet by mouth daily    cetirizine (ZyrTEC) 10 mg tablet Take 10 mg by mouth daily    Cholecalciferol (VITAMIN D3) 125 MCG (5000 UT) TABS Take 5,000 Units by mouth daily    cyclobenzaprine (FLEXERIL) 5 mg tablet take 1 tablet by mouth twice a day if needed for muscle spasm after meals    DULoxetine (Cymbalta) 30 mg delayed release capsule Take 30 mg by mouth daily    fenofibrate (TRICOR) 145 mg tablet Take 145 mg by mouth daily    fluticasone-umeclidinium-vilanterol (TRELEGY ELLIPTA) 100-62 5-25 MCG/INH inhaler Inhale 1 puff daily Rinse mouth after use   Icosapent Ethyl (Vascepa) 0 5 g CAPS Take 2 capsules by mouth 2 (two) times a day     meloxicam (MOBIC) 15 mg tablet Take 15 mg by mouth daily    metFORMIN (GLUCOPHAGE) 500 mg tablet TAKE 1 TABLET BY MOUTH EVERY MORNING WITH BREAKFAST   Multiple Vitamin (multivitamin) tablet TAKE 1 TABLET DAILY   omeprazole (PriLOSEC) 20 mg delayed release capsule Take 20 mg by mouth daily      pregabalin (LYRICA) 150 mg capsule Take 150 mg by mouth 2 (two) times a day    rosuvastatin (CRESTOR) 10 MG tablet Take 10 mg by mouth daily at bedtime    triazolam (HALCION) 0 25 MG tablet take 1 tablet by mouth nightly if needed for sleep    albuterol (PROVENTIL HFA,VENTOLIN HFA) 90 mcg/act inhaler INHALE 1 TO 2 PUFFS EVERY 4 TO 6 HOURS AS NEEDED AND AS DIRECTED      allopurinol (ZYLOPRIM) 300 mg tablet Take 300 mg by mouth daily    benzonatate (TESSALON PERLES) 100 mg capsule Take 1 capsule (100 mg total) by mouth 3 (three) times a day as needed for cough (Patient not taking: Reported on 3/6/2020)    bisacodyl (DULCOLAX) 5 mg EC tablet Take 2 tablets (10 mg total) by mouth once for 1 dose    budesonide-formoterol (Symbicort) 160-4 5 mcg/act inhaler     fluticasone (FLONASE) 50 mcg/act nasal spray 1 spray into each nostril daily Pt states no longer taking    guaiFENesin (MUCINEX) 600 mg 12 hr tablet Take 1 tablet (600 mg total) by mouth 2 (two) times a day (Patient not taking: Reported on 10/22/2020)    ibuprofen (MOTRIN) 600 mg tablet Take 1 tablet (600 mg total) by mouth every 6 (six) hours as needed for mild pain or moderate pain (Patient not taking: Reported on 10/22/2020)    levofloxacin (LEVAQUIN) 500 mg tablet take 1 tablet by mouth once daily for 10 days    metFORMIN (GLUCOPHAGE) 500 mg tablet Take 500 mg by mouth    multivitamin (THERAGRAN) TABS Take 1 tablet by mouth daily     Naproxen Sodium (ALEVE PO) Take by mouth    nicotine (NICODERM CQ) 14 mg/24hr TD 24 hr patch Place 1 patch on the skin daily (Patient not taking: Reported on 10/22/2020)    oxyCODONE-acetaminophen (PERCOCET) 5-325 mg per tablet take 1 tablet by mouth every 6 hours if needed moderate pain maximum daily dose of 4 tablet    polyethylene glycol (GOLYTELY) 4000 mL solution Take 4,000 mL by mouth once for 1 dose    predniSONE 10 mg tablet Take 4 tablets daily for 3 days  Take 3 tablets daily for 3 days  Take 2 tablets daily for 3 days  Take 1 tablet daily for 3 days (Patient not taking: Reported on 3/6/2020)     No current facility-administered medications on file prior to visit  No Known Allergies    Physical Exam    /79   Pulse 105   Temp (!) 97 3 °F (36 3 °C) (Temporal)   Ht 5' 8" (1 727 m)   Wt 74 8 kg (165 lb)   BMI 25 09 kg/m²     Constitutional: normal, well developed, well nourished, alert, in no distress and non-toxic and no overt pain behavior    Eyes: anicteric  HEENT: grossly intact  Neck: Symmetric, no visible masses  Pulmonary:even and unlabored  Cardiovascular: No visible edema  Skin:Normal without rashes or lesions and well hydrated  Psychiatric:Mood and affect appropriate  Neurologic:Cranial Nerves II-XII grossly intact  Musculoskeletal:normal    Imaging

## 2021-03-10 NOTE — PATIENT INSTRUCTIONS
Core Strengthening Exercises   WHAT YOU NEED TO KNOW:   Your core includes the muscles of your lower back, hip, pelvis, and abdomen  Core strengthening exercises help heal and strengthen these muscles  This helps prevent another injury, and keeps your pelvis, spine, and hips in the correct position  DISCHARGE INSTRUCTIONS:   Contact your healthcare provider if:   · You have sharp or worsening pain during exercise or at rest     · You have questions or concerns about your shoulder exercises  Safety tips:  Talk to your healthcare provider before you start an exercise program  A physical therapist can teach you how to do core strengthening exercises safely  · Do the exercises on a mat or firm surface  A firm surface will support your spine and prevent low back pain  Do not do these exercises on a bed  · Move slowly and smoothly  Avoid fast or jerky motions  · Stop if you feel pain  Core exercises should not be painful  Stop if you feel pain  · Breathe normally during core exercises  Do not hold your breath  This may cause an increase in blood pressure and prevent muscle strengthening  Your healthcare provider will tell you when to inhale and exhale during the exercise  · Begin all of your exercises with abdominal bracing  Abdominal bracing helps warm up your core muscles  You can also practice abdominal bracing throughout the day  Lie on your back with your knees bent and feet flat on the floor  Place your arms in a relaxed position beside your body  Tighten your abdominal muscles  Pull your belly button in and up toward your spine  Hold for 5 seconds  Relax your muscles  Repeat 10 times  Core strengthening exercises: Your healthcare provider will tell you how often to do these exercises  The provider will also tell you how many repetitions of each exercise you should do  Hold each exercise for 5 seconds or as directed  As you get stronger, increase your hold to 10 to 15 seconds   You can do some of these exercises on a stability ball, or with a weight  Ask your healthcare provider how to use a stability ball or weight for these exercises:  · Bridging:  Lie on your back with your knees bent and feet flat on the floor  Rest your arms at your side  Tighten your buttocks, and then lift your hips 1 inch off the floor  Hold for 5 seconds  When you can do this exercise without pain for 10 seconds, increase the distance you lift your hips  A good goal is to be able to lift your hips so that your shoulders, hips, and knees are in a straight line  · Dead bug:  Lie on your back with your knees bent and feet flat on the floor  Place your arms in a relaxed position beside your body  Begin with abdominal bracing  Next, raise one leg, keeping your knee bent  Hold for 5 seconds  Repeat with the other leg  When you can do this exercise without pain for 10 to 15 seconds, you may raise one straight leg and hold  Repeat with the other leg  · Quadruped:  Place your hands and knees on the floor  Keep your wrists directly below your shoulders and your knees directly below your hips  Pull your belly button in toward your spine  Do not flatten or arch your back  Tighten your abdominal muscles below your belly button  Hold for 5 seconds  When you can do this exercise without pain for 10 to 15 seconds, you may extend one arm and hold  Repeat on the other side  · Side bridge exercises:      ? Standing side bridge:  Stand next to a wall and extend one arm toward the wall  Place your palm flat on the wall with your fingers pointing upward  Begin with abdominal bracing  Next, without moving your feet, slowly bend your arm to 90 degrees  Hold for 5 seconds  Repeat on the other side  When you can do this exercise without pain for 10 to 15 seconds, you may do the bent leg side bridge on the floor  ? Bent leg side bridge:  Lie on one side with your legs, hips, and shoulders in a straight line   Prop yourself up onto your forearm so your elbow is directly below your shoulder  Bend your knees back to 90 degrees  Begin with abdominal bracing  Next, lift your hips and balance yourself on your forearm and knees  Hold for 5 seconds  Repeat on the other side  When you can do this exercise without pain for 10 to 15 seconds, you may do the straight leg side bridge on the floor  ? Straight leg side bridge:  Lie on one side with your legs, hips, and shoulders in a straight line  Prop yourself up onto your forearm so your elbow is directly below your shoulder  Begin with abdominal bracing  Lift your hips off the floor and balance yourself on your forearm and the outside of your flexed foot  Do not let your ankle bend sideways  Hold for 5 seconds  Repeat on the other side  When you can do this exercise without pain for 10 to 15 seconds, ask your healthcare provider for more advanced exercises  · Superman:  Lie on your stomach  Extend your arms forward on the floor  Tighten your abdominal muscles and lift your right hand and left leg off the floor  Hold this position  Slowly return to the starting position  Tighten your abdominal muscles and lift your left hand and right leg off the floor  Hold this position  Slowly return to the starting position  · Clam:  Lie on your side with your knees bent  Put your bottom arm under your head to keep your neck in line  Put your top hand on your hip to keep your pelvis from moving  Put your heels together, and keep them together during this exercise  Slowly raise your top knee toward the ceiling  Then lower your leg so your knees are together  Repeat this exercise 10 times  Then switch sides and do the exercise 10 times with the other leg  · Curl up:  Lie on your back with your knees bent and feet flat on the floor  Place your hands, palms down, underneath your lower back   Next, with your elbows on the floor, lift your shoulders and chest 2 to 3 inches off the floor  Keep your head in line with your shoulders  Hold this position  Slowly return to the starting position  · Straight leg raises:  Lie on your back with one leg straight  Bend the other knee and place your foot flat on the floor  Tighten your abdominal muscles  Keep your leg straight and slowly lift it straight up 6 to 12 inches off the floor  Hold this position  Lower your leg slowly  Do as many repetitions as directed on this side  Repeat with the other leg  · Plank:  Lie on your stomach  Bend your elbows and place your forearms flat on the floor  Lift your chest, stomach, and knees off the floor  Make sure your elbows are below your shoulders  Your body should be in a straight line  Do not let your hips or lower back sink to the ground  Squeeze your abdominal muscles together and hold for 15 seconds  To make this exercise harder, hold for 30 seconds or lift 1 leg at a time  · Bicycles:  Lie on your back  Bend both knees and bring them toward your chest  Your calves should be parallel to the floor  Place the palms of your hands on the back of your head  Straighten your right leg and keep it lifted 2 inches off the floor  Raise your head and shoulders off the floor and twist towards your left  Keep your head and shoulders lifted  Bend your right knee while you straighten your left leg  Keep your left leg 2 inches off the floor  Twist your head and chest towards the left leg  Continue to straighten 1 leg at a time and twist        Follow up with your healthcare provider as directed:  Write down your questions so you remember to ask them during your visits  © Copyright [address] Information is for End User's use only and may not be sold, redistributed or otherwise used for commercial purposes  All illustrations and images included in CareNotes® are the copyrighted property of [employer]  or [address] [place], [place] [address]toni [place]   The above information is an  only   It is not intended as medical advice for individual conditions or treatments  Talk to your doctor, nurse or pharmacist before following any medical regimen to see if it is safe and effective for you  Neck Exercises   AMBULATORY CARE:   Neck exercises  help reduce neck pain, and improve neck movement and strength  Neck exercises also help prevent long-term neck problems  What you need to know about neck exercises:   · Do the exercises every day,  or as often as directed by your healthcare provider  · Move slowly, gently, and smoothly  Avoid fast or jerky motions  · Stand and sit the way your healthcare provider shows you  Good posture may reduce your neck pain  Check your posture often, even when you are not doing your neck exercises  How to perform neck exercises safely:   · Exercise position:  You may sit or stand while you do neck exercises  Face forward  Your shoulders should be straight and relaxed, with a good posture  · Head tilts, forward and back:  Gently bow your head and try to touch your chin to your chest  Your healthcare provider may tell you to push on the back of your neck to help bow your head  Raise your chin back to the starting position  Tilt your head back as far as possible so you are looking up at the ceiling  Your healthcare provider may tell you to lift your chin to help tilt your head back  Return your head to the starting position  · Head tilts, side to side:  Tilt your head, bringing your ear toward your shoulder  Then tilt your head toward the other shoulder  · Head turns:  Turn your head to look over your shoulder  Tilt your chin down and try to touch it to your shoulder  Do not raise your shoulder to your chin  Face forward again  Do the same on the other side  · Head rolls:  Slowly bring your chin toward your chest  Next, roll your head to the right  Your ear should be positioned over your shoulder  Hold this position for 5 seconds   Roll your head back toward your chest and to the left into the same position  Hold for 5 seconds  Gently roll your head back and around in a clockwise [place] 3 times  Next, move your head in the reverse direction (counterclockwise) in a [place] 3 times  Do not shrug your shoulders upwards while you do this exercise  Contact your healthcare provider if:   · Your pain does not get better, or gets worse  · You have questions or concerns about your condition, care, or exercise program     © Copyright [address] Information is for End User's use only and may not be sold, redistributed or otherwise used for commercial purposes  All illustrations and images included in CareNotes® are the copyrighted property of [employer]  or [address] [place], [place] [address]avel   The above information is an  only  It is not intended as medical advice for individual conditions or treatments  Talk to your doctor, nurse or pharmacist before following any medical regimen to see if it is safe and effective for you

## 2021-03-15 DIAGNOSIS — Z12.11 COLON CANCER SCREENING: ICD-10-CM

## 2021-03-15 RX ORDER — IBUPROFEN 200 MG/1
TABLET ORAL
Qty: 2 TABLET | Refills: 0 | Status: SHIPPED | OUTPATIENT
Start: 2021-03-15 | End: 2021-09-01 | Stop reason: ALTCHOICE

## 2021-03-18 ENCOUNTER — TRANSCRIBE ORDERS (OUTPATIENT)
Dept: ADMINISTRATIVE | Facility: HOSPITAL | Age: 66
End: 2021-03-18

## 2021-03-18 DIAGNOSIS — F17.200 TOBACCO USE DISORDER: Primary | ICD-10-CM

## 2021-04-02 ENCOUNTER — TRANSCRIBE ORDERS (OUTPATIENT)
Dept: ADMINISTRATIVE | Facility: HOSPITAL | Age: 66
End: 2021-04-02

## 2021-04-02 ENCOUNTER — HOSPITAL ENCOUNTER (OUTPATIENT)
Dept: RADIOLOGY | Facility: HOSPITAL | Age: 66
Discharge: HOME | End: 2021-04-02
Payer: COMMERCIAL

## 2021-04-02 DIAGNOSIS — M62.542 ATROPHY OF MUSCLE OF LEFT HAND: Primary | ICD-10-CM

## 2021-04-02 DIAGNOSIS — M62.542 ATROPHY OF MUSCLE OF LEFT HAND: ICD-10-CM

## 2021-04-02 PROCEDURE — 73110 X-RAY EXAM OF WRIST: CPT

## 2021-08-16 ENCOUNTER — APPOINTMENT (EMERGENCY)
Dept: CT IMAGING | Facility: HOSPITAL | Age: 66
End: 2021-08-16
Payer: COMMERCIAL

## 2021-08-16 ENCOUNTER — HOSPITAL ENCOUNTER (OUTPATIENT)
Facility: HOSPITAL | Age: 66
Setting detail: OBSERVATION
Discharge: HOME | End: 2021-08-19
Attending: EMERGENCY MEDICINE | Admitting: INTERNAL MEDICINE
Payer: COMMERCIAL

## 2021-08-16 DIAGNOSIS — S72.052A CLOSED FRACTURE OF HEAD OF LEFT FEMUR, INITIAL ENCOUNTER (HCC): Primary | ICD-10-CM

## 2021-08-16 DIAGNOSIS — R26.2 AMBULATORY DYSFUNCTION: ICD-10-CM

## 2021-08-16 DIAGNOSIS — J44.9 CHRONIC OBSTRUCTIVE PULMONARY DISEASE, UNSPECIFIED COPD TYPE (HCC): ICD-10-CM

## 2021-08-16 DIAGNOSIS — S72.009A FEMORAL NECK FRACTURE (HCC): ICD-10-CM

## 2021-08-16 PROBLEM — R73.03 PREDIABETES: Status: ACTIVE | Noted: 2021-08-16

## 2021-08-16 LAB
ANION GAP SERPL CALCULATED.3IONS-SCNC: 5 MMOL/L (ref 4–13)
APTT PPP: 27 SECONDS (ref 23–37)
BASOPHILS # BLD AUTO: 0.07 THOUSANDS/ΜL (ref 0–0.1)
BASOPHILS NFR BLD AUTO: 1 % (ref 0–1)
BUN SERPL-MCNC: 28 MG/DL (ref 5–25)
CALCIUM SERPL-MCNC: 9.5 MG/DL (ref 8.3–10.1)
CHLORIDE SERPL-SCNC: 104 MMOL/L (ref 100–108)
CO2 SERPL-SCNC: 30 MMOL/L (ref 21–32)
CREAT SERPL-MCNC: 1.47 MG/DL (ref 0.6–1.3)
EOSINOPHIL # BLD AUTO: 0.48 THOUSAND/ΜL (ref 0–0.61)
EOSINOPHIL NFR BLD AUTO: 5 % (ref 0–6)
ERYTHROCYTE [DISTWIDTH] IN BLOOD BY AUTOMATED COUNT: 13.3 % (ref 11.6–15.1)
EST. AVERAGE GLUCOSE BLD GHB EST-MCNC: 151 MG/DL
GFR SERPL CREATININE-BSD FRML MDRD: 49 ML/MIN/1.73SQ M
GLUCOSE SERPL-MCNC: 148 MG/DL (ref 65–140)
GLUCOSE SERPL-MCNC: 191 MG/DL (ref 65–140)
HBA1C MFR BLD: 6.9 %
HCT VFR BLD AUTO: 38.2 % (ref 36.5–49.3)
HGB BLD-MCNC: 12.4 G/DL (ref 12–17)
IMM GRANULOCYTES # BLD AUTO: 0.06 THOUSAND/UL (ref 0–0.2)
IMM GRANULOCYTES NFR BLD AUTO: 1 % (ref 0–2)
INR PPP: 0.99 (ref 0.84–1.19)
LYMPHOCYTES # BLD AUTO: 2.17 THOUSANDS/ΜL (ref 0.6–4.47)
LYMPHOCYTES NFR BLD AUTO: 24 % (ref 14–44)
MCH RBC QN AUTO: 29.2 PG (ref 26.8–34.3)
MCHC RBC AUTO-ENTMCNC: 32.5 G/DL (ref 31.4–37.4)
MCV RBC AUTO: 90 FL (ref 82–98)
MONOCYTES # BLD AUTO: 0.82 THOUSAND/ΜL (ref 0.17–1.22)
MONOCYTES NFR BLD AUTO: 9 % (ref 4–12)
NEUTROPHILS # BLD AUTO: 5.48 THOUSANDS/ΜL (ref 1.85–7.62)
NEUTS SEG NFR BLD AUTO: 60 % (ref 43–75)
NRBC BLD AUTO-RTO: 0 /100 WBCS
PLATELET # BLD AUTO: 290 THOUSANDS/UL (ref 149–390)
PMV BLD AUTO: 11.8 FL (ref 8.9–12.7)
POTASSIUM SERPL-SCNC: 4.8 MMOL/L (ref 3.5–5.3)
PROTHROMBIN TIME: 12.9 SECONDS (ref 11.6–14.5)
RBC # BLD AUTO: 4.25 MILLION/UL (ref 3.88–5.62)
SODIUM SERPL-SCNC: 139 MMOL/L (ref 136–145)
WBC # BLD AUTO: 9.08 THOUSAND/UL (ref 4.31–10.16)

## 2021-08-16 PROCEDURE — 85025 COMPLETE CBC W/AUTO DIFF WBC: CPT | Performed by: PHYSICIAN ASSISTANT

## 2021-08-16 PROCEDURE — 85730 THROMBOPLASTIN TIME PARTIAL: CPT | Performed by: PHYSICIAN ASSISTANT

## 2021-08-16 PROCEDURE — 99220 PR INITIAL OBSERVATION CARE/DAY 70 MINUTES: CPT | Performed by: INTERNAL MEDICINE

## 2021-08-16 PROCEDURE — 85610 PROTHROMBIN TIME: CPT | Performed by: PHYSICIAN ASSISTANT

## 2021-08-16 PROCEDURE — 80048 BASIC METABOLIC PNL TOTAL CA: CPT | Performed by: PHYSICIAN ASSISTANT

## 2021-08-16 PROCEDURE — 99285 EMERGENCY DEPT VISIT HI MDM: CPT | Performed by: PHYSICIAN ASSISTANT

## 2021-08-16 PROCEDURE — 96372 THER/PROPH/DIAG INJ SC/IM: CPT

## 2021-08-16 PROCEDURE — 36415 COLL VENOUS BLD VENIPUNCTURE: CPT | Performed by: PHYSICIAN ASSISTANT

## 2021-08-16 PROCEDURE — 99285 EMERGENCY DEPT VISIT HI MDM: CPT

## 2021-08-16 PROCEDURE — 82948 REAGENT STRIP/BLOOD GLUCOSE: CPT

## 2021-08-16 PROCEDURE — 83036 HEMOGLOBIN GLYCOSYLATED A1C: CPT | Performed by: INTERNAL MEDICINE

## 2021-08-16 PROCEDURE — 73700 CT LOWER EXTREMITY W/O DYE: CPT

## 2021-08-16 RX ORDER — ALBUTEROL SULFATE 90 UG/1
2 AEROSOL, METERED RESPIRATORY (INHALATION) EVERY 4 HOURS PRN
Status: DISCONTINUED | OUTPATIENT
Start: 2021-08-16 | End: 2021-08-19 | Stop reason: HOSPADM

## 2021-08-16 RX ORDER — OXYCODONE HYDROCHLORIDE 5 MG/1
5 TABLET ORAL EVERY 4 HOURS PRN
Status: DISCONTINUED | OUTPATIENT
Start: 2021-08-16 | End: 2021-08-16

## 2021-08-16 RX ORDER — ONDANSETRON 2 MG/ML
4 INJECTION INTRAMUSCULAR; INTRAVENOUS EVERY 4 HOURS PRN
Status: DISCONTINUED | OUTPATIENT
Start: 2021-08-16 | End: 2021-08-19 | Stop reason: HOSPADM

## 2021-08-16 RX ORDER — ALLOPURINOL 300 MG/1
300 TABLET ORAL DAILY
Status: DISCONTINUED | OUTPATIENT
Start: 2021-08-17 | End: 2021-08-19 | Stop reason: HOSPADM

## 2021-08-16 RX ORDER — HYDROMORPHONE HCL/PF 1 MG/ML
0.2 SYRINGE (ML) INJECTION EVERY 4 HOURS PRN
Status: DISCONTINUED | OUTPATIENT
Start: 2021-08-16 | End: 2021-08-16

## 2021-08-16 RX ORDER — PANTOPRAZOLE SODIUM 40 MG/1
40 TABLET, DELAYED RELEASE ORAL
Status: DISCONTINUED | OUTPATIENT
Start: 2021-08-17 | End: 2021-08-19 | Stop reason: HOSPADM

## 2021-08-16 RX ORDER — AMOXICILLIN 250 MG
1 CAPSULE ORAL 2 TIMES DAILY
Status: DISCONTINUED | OUTPATIENT
Start: 2021-08-16 | End: 2021-08-19 | Stop reason: HOSPADM

## 2021-08-16 RX ORDER — DOCUSATE SODIUM 100 MG/1
100 CAPSULE, LIQUID FILLED ORAL 2 TIMES DAILY
Status: DISCONTINUED | OUTPATIENT
Start: 2021-08-16 | End: 2021-08-19 | Stop reason: HOSPADM

## 2021-08-16 RX ORDER — OXYCODONE HYDROCHLORIDE 10 MG/1
10 TABLET ORAL EVERY 4 HOURS PRN
Status: DISCONTINUED | OUTPATIENT
Start: 2021-08-16 | End: 2021-08-19 | Stop reason: HOSPADM

## 2021-08-16 RX ORDER — ZOLPIDEM TARTRATE 5 MG/1
5 TABLET ORAL
Status: DISCONTINUED | OUTPATIENT
Start: 2021-08-16 | End: 2021-08-19 | Stop reason: HOSPADM

## 2021-08-16 RX ORDER — CYCLOBENZAPRINE HCL 10 MG
5 TABLET ORAL 3 TIMES DAILY PRN
Status: DISCONTINUED | OUTPATIENT
Start: 2021-08-16 | End: 2021-08-19 | Stop reason: HOSPADM

## 2021-08-16 RX ORDER — CHLORAL HYDRATE 500 MG
2000 CAPSULE ORAL 2 TIMES DAILY
Status: DISCONTINUED | OUTPATIENT
Start: 2021-08-16 | End: 2021-08-19 | Stop reason: HOSPADM

## 2021-08-16 RX ORDER — DIAZEPAM 5 MG/ML
5 INJECTION, SOLUTION INTRAMUSCULAR; INTRAVENOUS ONCE
Status: COMPLETED | OUTPATIENT
Start: 2021-08-16 | End: 2021-08-16

## 2021-08-16 RX ORDER — SODIUM CHLORIDE, SODIUM GLUCONATE, SODIUM ACETATE, POTASSIUM CHLORIDE, MAGNESIUM CHLORIDE, SODIUM PHOSPHATE, DIBASIC, AND POTASSIUM PHOSPHATE .53; .5; .37; .037; .03; .012; .00082 G/100ML; G/100ML; G/100ML; G/100ML; G/100ML; G/100ML; G/100ML
75 INJECTION, SOLUTION INTRAVENOUS CONTINUOUS
Status: DISPENSED | OUTPATIENT
Start: 2021-08-16 | End: 2021-08-17

## 2021-08-16 RX ORDER — OXYCODONE HYDROCHLORIDE 5 MG/1
5 TABLET ORAL EVERY 4 HOURS PRN
Status: DISCONTINUED | OUTPATIENT
Start: 2021-08-16 | End: 2021-08-19 | Stop reason: HOSPADM

## 2021-08-16 RX ORDER — ACETAMINOPHEN 325 MG/1
650 TABLET ORAL EVERY 6 HOURS PRN
Status: DISCONTINUED | OUTPATIENT
Start: 2021-08-16 | End: 2021-08-19 | Stop reason: HOSPADM

## 2021-08-16 RX ORDER — BUDESONIDE AND FORMOTEROL FUMARATE DIHYDRATE 160; 4.5 UG/1; UG/1
2 AEROSOL RESPIRATORY (INHALATION) 2 TIMES DAILY
Status: DISCONTINUED | OUTPATIENT
Start: 2021-08-16 | End: 2021-08-16

## 2021-08-16 RX ORDER — PREGABALIN 75 MG/1
150 CAPSULE ORAL 2 TIMES DAILY
Status: DISCONTINUED | OUTPATIENT
Start: 2021-08-16 | End: 2021-08-19 | Stop reason: HOSPADM

## 2021-08-16 RX ORDER — HEPARIN SODIUM 5000 [USP'U]/ML
5000 INJECTION, SOLUTION INTRAVENOUS; SUBCUTANEOUS EVERY 8 HOURS SCHEDULED
Status: DISCONTINUED | OUTPATIENT
Start: 2021-08-16 | End: 2021-08-19 | Stop reason: HOSPADM

## 2021-08-16 RX ORDER — KETOROLAC TROMETHAMINE 30 MG/ML
15 INJECTION, SOLUTION INTRAMUSCULAR; INTRAVENOUS ONCE
Status: COMPLETED | OUTPATIENT
Start: 2021-08-16 | End: 2021-08-16

## 2021-08-16 RX ORDER — FENOFIBRATE 145 MG/1
145 TABLET, COATED ORAL DAILY
Status: DISCONTINUED | OUTPATIENT
Start: 2021-08-17 | End: 2021-08-19 | Stop reason: HOSPADM

## 2021-08-16 RX ORDER — ATORVASTATIN CALCIUM 40 MG/1
40 TABLET, FILM COATED ORAL
Status: DISCONTINUED | OUTPATIENT
Start: 2021-08-16 | End: 2021-08-19 | Stop reason: HOSPADM

## 2021-08-16 RX ORDER — HYDROMORPHONE HCL/PF 1 MG/ML
0.5 SYRINGE (ML) INJECTION ONCE
Status: COMPLETED | OUTPATIENT
Start: 2021-08-16 | End: 2021-08-16

## 2021-08-16 RX ORDER — MORPHINE SULFATE 4 MG/ML
4 INJECTION, SOLUTION INTRAMUSCULAR; INTRAVENOUS ONCE
Status: COMPLETED | OUTPATIENT
Start: 2021-08-16 | End: 2021-08-16

## 2021-08-16 RX ORDER — HYDROMORPHONE HCL/PF 1 MG/ML
0.5 SYRINGE (ML) INJECTION EVERY 4 HOURS PRN
Status: DISCONTINUED | OUTPATIENT
Start: 2021-08-16 | End: 2021-08-19 | Stop reason: HOSPADM

## 2021-08-16 RX ORDER — ONDANSETRON 2 MG/ML
4 INJECTION INTRAMUSCULAR; INTRAVENOUS EVERY 6 HOURS PRN
Status: DISCONTINUED | OUTPATIENT
Start: 2021-08-16 | End: 2021-08-16

## 2021-08-16 RX ORDER — FLUTICASONE FUROATE AND VILANTEROL 200; 25 UG/1; UG/1
1 POWDER RESPIRATORY (INHALATION) DAILY
Status: DISCONTINUED | OUTPATIENT
Start: 2021-08-17 | End: 2021-08-19 | Stop reason: HOSPADM

## 2021-08-16 RX ORDER — OXYCODONE HYDROCHLORIDE 5 MG/1
2.5 TABLET ORAL EVERY 4 HOURS PRN
Status: DISCONTINUED | OUTPATIENT
Start: 2021-08-16 | End: 2021-08-16

## 2021-08-16 RX ADMIN — DOCUSATE SODIUM 100 MG: 100 CAPSULE ORAL at 18:26

## 2021-08-16 RX ADMIN — OXYCODONE HYDROCHLORIDE 5 MG: 5 TABLET ORAL at 22:36

## 2021-08-16 RX ADMIN — DOCUSATE SODIUM AND SENNOSIDES 1 TABLET: 8.6; 5 TABLET, FILM COATED ORAL at 18:26

## 2021-08-16 RX ADMIN — ATORVASTATIN CALCIUM 40 MG: 40 TABLET, FILM COATED ORAL at 18:26

## 2021-08-16 RX ADMIN — KETOROLAC TROMETHAMINE 15 MG: 30 INJECTION, SOLUTION INTRAMUSCULAR; INTRAVENOUS at 15:04

## 2021-08-16 RX ADMIN — HYDROMORPHONE HYDROCHLORIDE 0.5 MG: 1 INJECTION, SOLUTION INTRAMUSCULAR; INTRAVENOUS; SUBCUTANEOUS at 23:34

## 2021-08-16 RX ADMIN — PREGABALIN 150 MG: 75 CAPSULE ORAL at 18:26

## 2021-08-16 RX ADMIN — OMEGA-3 FATTY ACIDS CAP 1000 MG 2000 MG: 1000 CAP at 18:26

## 2021-08-16 RX ADMIN — HEPARIN SODIUM 5000 UNITS: 5000 INJECTION INTRAVENOUS; SUBCUTANEOUS at 18:27

## 2021-08-16 RX ADMIN — HYDROMORPHONE HYDROCHLORIDE 0.2 MG: 1 INJECTION, SOLUTION INTRAMUSCULAR; INTRAVENOUS; SUBCUTANEOUS at 20:38

## 2021-08-16 RX ADMIN — HEPARIN SODIUM 5000 UNITS: 5000 INJECTION INTRAVENOUS; SUBCUTANEOUS at 22:37

## 2021-08-16 RX ADMIN — OXYCODONE HYDROCHLORIDE 5 MG: 5 TABLET ORAL at 18:27

## 2021-08-16 RX ADMIN — ZOLPIDEM TARTRATE 5 MG: 5 TABLET, FILM COATED ORAL at 22:36

## 2021-08-16 RX ADMIN — DIAZEPAM 5 MG: 5 INJECTION, SOLUTION INTRAMUSCULAR; INTRAVENOUS at 15:05

## 2021-08-16 RX ADMIN — CYCLOBENZAPRINE HYDROCHLORIDE 5 MG: 10 TABLET, FILM COATED ORAL at 20:39

## 2021-08-16 RX ADMIN — SODIUM CHLORIDE, SODIUM GLUCONATE, SODIUM ACETATE, POTASSIUM CHLORIDE, MAGNESIUM CHLORIDE, SODIUM PHOSPHATE, DIBASIC, AND POTASSIUM PHOSPHATE 75 ML/HR: .53; .5; .37; .037; .03; .012; .00082 INJECTION, SOLUTION INTRAVENOUS at 22:37

## 2021-08-16 RX ADMIN — MORPHINE SULFATE 4 MG: 4 INJECTION INTRAVENOUS at 17:06

## 2021-08-16 NOTE — PLAN OF CARE
Problem: Potential for Falls  Goal: Patient will remain free of falls  Description: INTERVENTIONS:  - Educate patient/family on patient safety including physical limitations  - Instruct patient to call for assistance with activity   - Consult OT/PT to assist with strengthening/mobility   - Keep Call bell within reach  - Keep bed low and locked with side rails adjusted as appropriate  - Keep care items and personal belongings within reach  - Initiate and maintain comfort rounds  - Make Fall Risk Sign visible to staff  - Offer Toileting every 2 Hours, in advance of need  - Apply yellow socks and bracelet for high fall risk patients  - Consider moving patient to room near nurses station  Outcome: Progressing     Problem: PAIN - ADULT  Goal: Verbalizes/displays adequate comfort level or baseline comfort level  Description: Interventions:  - Encourage patient to monitor pain and request assistance  - Assess pain using appropriate pain scale  - Administer analgesics based on type and severity of pain and evaluate response  - Implement non-pharmacological measures as appropriate and evaluate response  - Consider cultural and social influences on pain and pain management  - Notify physician/advanced practitioner if interventions unsuccessful or patient reports new pain  Outcome: Progressing     Problem: INFECTION - ADULT  Goal: Absence or prevention of progression during hospitalization  Description: INTERVENTIONS:  - Assess and monitor for signs and symptoms of infection  - Monitor lab/diagnostic results  - Monitor all insertion sites, i e  indwelling lines, tubes, and drains  - Monitor endotracheal if appropriate and nasal secretions for changes in amount and color  - [place] appropriate cooling/warming therapies per order  - Administer medications as ordered  - Instruct and encourage patient and family to use good hand hygiene technique  - Identify and instruct in appropriate isolation precautions for identified infection/condition  Outcome: Progressing  Goal: Absence of fever/infection during neutropenic period  Description: INTERVENTIONS:  - Monitor WBC    Outcome: Progressing     Problem: SAFETY ADULT  Goal: Patient will remain free of falls  Description: INTERVENTIONS:  - Educate patient/family on patient safety including physical limitations  - Instruct patient to call for assistance with activity   - Consult OT/PT to assist with strengthening/mobility   - Keep Call bell within reach  - Keep bed low and locked with side rails adjusted as appropriate  - Keep care items and personal belongings within reach  - Initiate and maintain comfort rounds  - Make Fall Risk Sign visible to staff  - Offer Toileting every 2 Hours, in advance of need  - Apply yellow socks and bracelet for high fall risk patients  - Consider moving patient to room near nurses station  Outcome: Progressing  Goal: Maintain or return to baseline ADL function  Description: INTERVENTIONS:  -  Assess patient's ability to carry out ADLs; assess patient's baseline for ADL function and identify physical deficits which impact ability to perform ADLs (bathing, care of mouth/teeth, toileting, grooming, dressing, etc )  - Assess/evaluate cause of self-care deficits   - Assess range of motion  - Assess patient's mobility; develop plan if impaired  - Assess patient's need for assistive devices and provide as appropriate  - Encourage maximum independence but intervene and supervise when necessary  - Involve family in performance of ADLs  - Assess for home care needs following discharge   - Consider OT consult to assist with ADL evaluation and planning for discharge  - Provide patient education as appropriate  Outcome: Progressing  Goal: Maintains/Returns to pre admission functional level  Description: INTERVENTIONS:  - Perform BMAT or MOVE assessment daily    - Set and communicate daily mobility goal to care team and patient/family/caregiver     - Collaborate with rehabilitation services on mobility goals if consulted  - Perform Range of Motion 3 times a day  - Reposition patient every 2 hours    - Dangle patient 3 times a day  - Stand patient 3 times a day  - Ambulate patient 3 times a day  - Out of bed to chair 3 times a day   - Out of bed for meals 3 times a day  - Out of bed for toileting  - Record patient progress and toleration of activity level   Outcome: Progressing     Problem: DISCHARGE PLANNING  Goal: Discharge to home or other facility with appropriate resources  Description: INTERVENTIONS:  - Identify barriers to discharge w/patient and caregiver  - Arrange for needed discharge resources and transportation as appropriate  - Identify discharge learning needs (meds, wound care, etc )  - Arrange for interpretive services to assist at discharge as needed  - Refer to Case Management Department for coordinating discharge planning if the patient needs post-hospital services based on physician/advanced practitioner order or complex needs related to functional status, cognitive ability, or social support system  Outcome: Progressing     Problem: MUSCULOSKELETAL - ADULT  Goal: Maintain or return mobility to safest level of function  Description: INTERVENTIONS:  - Assess patient's ability to carry out ADLs; assess patient's baseline for ADL function and identify physical deficits which impact ability to perform ADLs (bathing, care of mouth/teeth, toileting, grooming, dressing, etc )  - Assess/evaluate cause of self-care deficits   - Assess range of motion  - Assess patient's mobility  - Assess patient's need for assistive devices and provide as appropriate  - Encourage maximum independence but intervene and supervise when necessary  - Involve family in performance of ADLs  - Assess for home care needs following discharge   - Consider OT consult to assist with ADL evaluation and planning for discharge  - Provide patient education as appropriate  Outcome: Progressing  Goal: Maintain proper alignment of affected body part  Description: INTERVENTIONS:  - Support, maintain and protect limb and body alignment  - Provide patient/ family with appropriate education  Outcome: Progressing
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
## 2021-08-16 NOTE — ASSESSMENT & PLAN NOTE
History of diet-controlled diabetes  Will monitor blood sugar while admitted  Sliding scale and basal bolus protocol
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
## 2021-08-16 NOTE — ASSESSMENT & PLAN NOTE
Lab Results   Component Value Date    EGFR 73 02/11/2020    EGFR 55 02/10/2020    EGFR 54 02/09/2020    CREATININE 1 08 02/11/2020    CREATININE 1 35 (H) 02/10/2020    CREATININE 1 38 (H) 02/09/2020   No BMP ordered by the ER  Will place routine labs

## 2021-08-16 NOTE — ASSESSMENT & PLAN NOTE
Patient states he quit smoking, encouraged him on a smoking cessation efforts  Nicotine patch ordered while admitted

## 2021-08-16 NOTE — ASSESSMENT & PLAN NOTE
Patient reports that he cannot afford his Trelegy inhaler - will resume current inhaler therapy  Case management may need to discuss alternative therapy at discharge

## 2021-08-17 ENCOUNTER — APPOINTMENT (OUTPATIENT)
Dept: RADIOLOGY | Facility: HOSPITAL | Age: 66
End: 2021-08-17
Payer: COMMERCIAL

## 2021-08-17 LAB
ANION GAP SERPL CALCULATED.3IONS-SCNC: 7 MMOL/L (ref 4–13)
BUN SERPL-MCNC: 33 MG/DL (ref 5–25)
CALCIUM SERPL-MCNC: 9.1 MG/DL (ref 8.3–10.1)
CHLORIDE SERPL-SCNC: 104 MMOL/L (ref 100–108)
CO2 SERPL-SCNC: 30 MMOL/L (ref 21–32)
CREAT SERPL-MCNC: 1.52 MG/DL (ref 0.6–1.3)
ERYTHROCYTE [DISTWIDTH] IN BLOOD BY AUTOMATED COUNT: 13.5 % (ref 11.6–15.1)
GFR SERPL CREATININE-BSD FRML MDRD: 47 ML/MIN/1.73SQ M
GLUCOSE SERPL-MCNC: 132 MG/DL (ref 65–140)
GLUCOSE SERPL-MCNC: 146 MG/DL (ref 65–140)
GLUCOSE SERPL-MCNC: 168 MG/DL (ref 65–140)
GLUCOSE SERPL-MCNC: 182 MG/DL (ref 65–140)
HCT VFR BLD AUTO: 37.1 % (ref 36.5–49.3)
HGB BLD-MCNC: 11.7 G/DL (ref 12–17)
MCH RBC QN AUTO: 29 PG (ref 26.8–34.3)
MCHC RBC AUTO-ENTMCNC: 31.5 G/DL (ref 31.4–37.4)
MCV RBC AUTO: 92 FL (ref 82–98)
PLATELET # BLD AUTO: 270 THOUSANDS/UL (ref 149–390)
PMV BLD AUTO: 11.8 FL (ref 8.9–12.7)
POTASSIUM SERPL-SCNC: 5 MMOL/L (ref 3.5–5.3)
RBC # BLD AUTO: 4.04 MILLION/UL (ref 3.88–5.62)
SODIUM SERPL-SCNC: 141 MMOL/L (ref 136–145)
WBC # BLD AUTO: 7.12 THOUSAND/UL (ref 4.31–10.16)

## 2021-08-17 PROCEDURE — 99225 PR SBSQ OBSERVATION CARE/DAY 25 MINUTES: CPT | Performed by: INTERNAL MEDICINE

## 2021-08-17 PROCEDURE — 94660 CPAP INITIATION&MGMT: CPT

## 2021-08-17 PROCEDURE — 97163 PT EVAL HIGH COMPLEX 45 MIN: CPT | Performed by: PHYSICAL THERAPIST

## 2021-08-17 PROCEDURE — 85027 COMPLETE CBC AUTOMATED: CPT | Performed by: INTERNAL MEDICINE

## 2021-08-17 PROCEDURE — 99204 OFFICE O/P NEW MOD 45 MIN: CPT | Performed by: PHYSICAL MEDICINE & REHABILITATION

## 2021-08-17 PROCEDURE — 99204 OFFICE O/P NEW MOD 45 MIN: CPT | Performed by: ORTHOPAEDIC SURGERY

## 2021-08-17 PROCEDURE — 73552 X-RAY EXAM OF FEMUR 2/>: CPT

## 2021-08-17 PROCEDURE — 94760 N-INVAS EAR/PLS OXIMETRY 1: CPT

## 2021-08-17 PROCEDURE — 82948 REAGENT STRIP/BLOOD GLUCOSE: CPT

## 2021-08-17 PROCEDURE — 80048 BASIC METABOLIC PNL TOTAL CA: CPT | Performed by: INTERNAL MEDICINE

## 2021-08-17 RX ADMIN — ATORVASTATIN CALCIUM 40 MG: 40 TABLET, FILM COATED ORAL at 16:25

## 2021-08-17 RX ADMIN — OXYCODONE HYDROCHLORIDE 5 MG: 5 TABLET ORAL at 05:26

## 2021-08-17 RX ADMIN — OMEGA-3 FATTY ACIDS CAP 1000 MG 2000 MG: 1000 CAP at 09:19

## 2021-08-17 RX ADMIN — HEPARIN SODIUM 5000 UNITS: 5000 INJECTION INTRAVENOUS; SUBCUTANEOUS at 22:10

## 2021-08-17 RX ADMIN — HEPARIN SODIUM 5000 UNITS: 5000 INJECTION INTRAVENOUS; SUBCUTANEOUS at 05:27

## 2021-08-17 RX ADMIN — INSULIN LISPRO 1 UNITS: 100 INJECTION, SOLUTION INTRAVENOUS; SUBCUTANEOUS at 11:48

## 2021-08-17 RX ADMIN — OXYCODONE HYDROCHLORIDE 10 MG: 10 TABLET ORAL at 22:08

## 2021-08-17 RX ADMIN — DOCUSATE SODIUM AND SENNOSIDES 1 TABLET: 8.6; 5 TABLET, FILM COATED ORAL at 09:19

## 2021-08-17 RX ADMIN — DOCUSATE SODIUM AND SENNOSIDES 1 TABLET: 8.6; 5 TABLET, FILM COATED ORAL at 17:44

## 2021-08-17 RX ADMIN — ALLOPURINOL 300 MG: 300 TABLET ORAL at 09:19

## 2021-08-17 RX ADMIN — OMEGA-3 FATTY ACIDS CAP 1000 MG 2000 MG: 1000 CAP at 17:44

## 2021-08-17 RX ADMIN — PREGABALIN 150 MG: 75 CAPSULE ORAL at 17:45

## 2021-08-17 RX ADMIN — INSULIN LISPRO 1 UNITS: 100 INJECTION, SOLUTION INTRAVENOUS; SUBCUTANEOUS at 16:25

## 2021-08-17 RX ADMIN — OXYCODONE HYDROCHLORIDE 10 MG: 10 TABLET ORAL at 13:24

## 2021-08-17 RX ADMIN — PANTOPRAZOLE SODIUM 40 MG: 40 TABLET, DELAYED RELEASE ORAL at 05:26

## 2021-08-17 RX ADMIN — PREGABALIN 150 MG: 75 CAPSULE ORAL at 09:19

## 2021-08-17 RX ADMIN — HEPARIN SODIUM 5000 UNITS: 5000 INJECTION INTRAVENOUS; SUBCUTANEOUS at 13:25

## 2021-08-17 RX ADMIN — DOCUSATE SODIUM 100 MG: 100 CAPSULE ORAL at 09:19

## 2021-08-17 RX ADMIN — OXYCODONE HYDROCHLORIDE 5 MG: 5 TABLET ORAL at 09:20

## 2021-08-17 RX ADMIN — FLUTICASONE FUROATE AND VILANTEROL TRIFENATATE 1 PUFF: 200; 25 POWDER RESPIRATORY (INHALATION) at 09:19

## 2021-08-17 RX ADMIN — DOCUSATE SODIUM 100 MG: 100 CAPSULE ORAL at 17:44

## 2021-08-17 RX ADMIN — ZOLPIDEM TARTRATE 5 MG: 5 TABLET, FILM COATED ORAL at 22:08

## 2021-08-17 RX ADMIN — CYCLOBENZAPRINE HYDROCHLORIDE 5 MG: 10 TABLET, FILM COATED ORAL at 13:56

## 2021-08-17 RX ADMIN — OXYCODONE HYDROCHLORIDE 10 MG: 10 TABLET ORAL at 17:45

## 2021-08-17 RX ADMIN — FENOFIBRATE 145 MG: 145 TABLET, FILM COATED ORAL at 09:19
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
## 2021-08-17 NOTE — PHYSICAL THERAPY NOTE
Physical Therapy Evaluation    Performed at least 2 patient identifiers during session:  Patient Active Problem List   Diagnosis    Hyperlipidemia    GERD (gastroesophageal reflux disease)    [facility]    Chest pain    Closed fracture of one rib of left side    Nicotine dependence    CKD (chronic kidney disease) stage 3, GFR 30-59 ml/min ([facility])    Community acquired bacterial pneumonia    Cervical disc disorder with radiculopathy of mid-cervical region    Cervical spondylosis    Cervical facet joint syndrome    Chronic pain syndrome    Prediabetes    Fem[facility] fracture [facility] )       Past Medical History:   Diagnosis Date    Arthritis     osteoarthritis    COPD (chronic obstructive pulmonary disease) ([facility])     Fibromyalgia     GERD (gastroesophageal reflux disease)     History of transfusion     Hyperlipidemia        Past Surgical History:   Procedure Laterality Date    ANKLE FRACTURE SURGERY      Left Side    CHOLECYSTECTOMY      CYST REMOVAL      FEMUR SURGERY Left     KNEE SURGERY Left 1996    MANDIBLE SURGERY      ROTATOR CUFF REPAIR Left     WRIST FRACTURE SURGERY Left     plate in place        08/17/21 1348   PT Last Visit   PT Visit Date 08/17/21   Note Type   Note type Evaluation   Pain Assessment   Pain Assessment Tool 0-10   Pain Score 8   Pain Location/Orientation Orientation: Left; Location: Hip   Hospital Pain Intervention(s) Repositioned; Ambulation/increased activity; Elevated; Emotional support  (education)   Home Living   Type of Home Apartment   Home Layout Stairs to enter with rails  (3-4 + 5 stairs to enter, first set of stairs is deep)   Bathroom Equipment Grab bars in shower; Shower chair;Commode   Home Equipment Walker;Cane;Crutches   Additional Comments pt reports also having cam boot and leg immmobilizer, bilat wrist braces and elbow brace     Prior Function   Level of [place] Independent with ADLs and functional mobility  (needing rw recently)   [facility]   IADLs Independent   Falls in the last 6 months 0   Comments pt lives with sife who works  pt  had been using rw recently due to increasing pain  pt unable to weight bear lle  pt drives, wife does not  Restrictions/Precautions   Weight Bearing Precautions Per Order Yes   LLE Weight Bearing Per Order WBAT   Other Precautions WBS; Fall Risk;Pain   General   Additional Pertinent History pt reports numerous fractures in his life, l knee, l femur, bialt wrist and ankles, elbow   Family/Caregiver Present No   Cognition   Overall Cognitive Status WFL   Orientation Level Oriented X4   RUE Assessment   RUE Assessment WNL   LUE Assessment   LUE Assessment WNL   RLE Assessment   RLE Assessment WNL   LLE Assessment   LLE Assessment   (hip not tested due to severe pain  , quad set strong, ankle )   Coordination   Movements are Fluid and Coordinated 1   Sensation WFL   Bed Mobility   Supine to Sit 5  Supervision   Additional items Increased time required;Verbal cues;LE management  (hooks r foot under left)   Sit to Supine 5  Supervision   Additional items Increased time required;Verbal cues;LE management  (hooks r foot under left)   Transfers   Sit to Stand 5  Supervision   Additional items Bedrails; Increased time required;Verbal cues   Stand to Sit 5  Supervision   Additional items Bedrails; Increased time required;Verbal cues   Ambulation/Elevation   Gait pattern Antalgic; Excessively slow  (toe touch lle most comfortable  is able to hop )   Gait Assistance 5  Supervision   Additional items Verbal cues; Tactile cues   Assistive Device Rolling walker   Distance 15'   Balance   Static Sitting Good   Dynamic Sitting Fair   Static Standing [address]  (with rw)   Endurance Deficit   Endurance Deficit Yes   Endurance Deficit Description hip pain and mild sabillon   Activity Tolerance   Activity Tolerance Patient limited by pain;Treatment limited secondary to medical complications (Comment)   Nurse Made Aware yes   Assessment   Prognosis Good   Problem List Decreased strength;Decreased range of motion;Decreased endurance; Impaired balance;Decreased mobility; Decreased safety awareness;Orthopedic restrictions;Pain   Assessment pt admitted with progressive l hip pain for 4-6 weeks  became unbearable and pt came to hospital  dx wtih femoral head and neck fractue l hip, possible AVN  pt was seen by ortho and PM&R for eval and is also referred to PT  pt was living with wife who works but does not drive  pt was indep but recently using rw  pt has 3-4 steep stairs + 5 regular height stairs to enter apt  pt demonstrated moderate functional limitatoins due to recent exacerbation of L hip pain, spasms and inability to bear weight  pt was able to mobilize in bed with supervision  pt was able to transfer with supervision using rw  pt amb 5' nwb lle and 10' with TTWB lle  pt perferred ttwb lle as more comfortable  pt has current deficits in strength, balance, gait sequencing and stability, joint integrity and pain control  pt has used crutches before and is willing to try them for stairs  pt currently preferring ttwb lle  pt will need skilled PT  d/c recommendatoins pending stair training for either hhpt vs rehab  Barriers to Discharge Inaccessible home environment;Decreased caregiver support   Goals   Patient Goals less pain   STG Expiration Date 08/24/21   Short Term Goal #1 indep with bed mobility , transfers, amb using rw for 25-45' wbat lle  indep w/c mobility on level surfaces  min assist for stairs using crutch and railing wbat lle  demonstrate good safety practices  Plan   Treatment/Interventions Functional transfer training;LE strengthening/ROM; Elevations; Therapeutic exercise; Endurance training;Patient/family training;Equipment eval/education; Bed mobility;Gait training;Spoke to nursing  (w/c training)   PT Frequency (4-5x/week, weekend if needed)   Recommendation   PT Discharge Recommendation   (pending stair training- rhb vs hhpt / w/c mobile)   Equipment Recommended   (tbd)   PT - OK to Discharge No   AM-PAC Basic Mobility Inpatient   Turning in Bed Without Bedrails 4   Lying on Back to Sitting on Edge of Flat Bed 4   Moving Bed to Chair 4   Standing Up From Chair 4   Walk in Room 3   Climb 3-5 Stairs 1   Basic Mobility Inpatient Raw Score 20   Basic Mobility Standardized Score 43 99       An AM-PAC Basic Mobility standardized score less than 42 9 suggests the patient may benefit from discharge to post-acute rehab services  History: co - morbidities, fall risk, use of assistive device, assist for iadl's,  Exam: impairments in locomotion, musculoskeletal, balance, joint integrity, pain    Clinical: unstable/unpredictable  Complexity:high  Tatum Wood, PT

## 2021-08-17 NOTE — CASE MANAGEMENT
LOS: 1 GMLOS: N/A RISK OF READMISSION: N/A BUNDLE: NO    CM met with pt at bedside to discuss discharge needs  Pt lives in an apartment with his wife  ADL's are completed independently  Pt recently started using a RW  He is in need of STR at this time  PMR feels acute and subacute are going to be too intensive at this time  Recommending SNF  Pt reviewed SNF list and has chosen [facility] d/t location  CM has submitted a referral and will advise if they are able to accept  A post acute care recommendation was made by your care team for STR  Discussed Freedom of Choice with patient  List of facilities given to patient via in person  patient aware the list is custom filtered for them by preference  and that [facility] post acute providers are designated  CM reviewed d/c planning process including the following: identifying help at home, patient preference for d/c planning needs, Discharge Lounge, [facility] Meds to Bed program, availability of treatment team to discuss questions or concerns patient and/or family may have regarding understanding medications and recognizing signs and symptoms once discharged  CM also encouraged patient to follow up with all recommended appointments after discharge  Patient advised of importance for patient and family to participate in managing patients medical well being

## 2021-08-17 NOTE — OCCUPATIONAL THERAPY NOTE
Occupational Therapy         Patient Name: Roula Huber  JXOXS'E Date: 8/17/2021      MD's orders received  Attempted evaluation, but pt off the flr for testing  Will continue when appropriate   Ammon Tijerina OT

## 2021-08-17 NOTE — CONSULTS
PHYSICAL MEDICINE AND REHABILITATION CONSULT NOTE  Jordan White 73 y o  male MRN: [id number]  Unit/Bed#: E2 -01 Encounter: [id number]    Requested by (Physician/Service): Anay Mcdaniels MD  Reason for Consultation:  Assessment of rehabilitation needs  Chief Complaint:  Severe L hip pain    Assessment:  Rehabilitation Diagnosis:    L femoral head fracture, and possible avascular necrosis?  Impaired mobility and self care    Recommendations:  Rehabilitation Plan:   Continue PT/OT while on acute care   Aggressive therapy may actually make things worse rather than better  Would recommend a slower/gentle course of rehab   Per Ortho, plan is to make patient protected weight bearing, but have a feeling he will self limit given the extent of pain he is in   Without definitive treatment, I have concerns about his ability to get up his stairs - has a full flight to enter his apartment   Discussed with  - SNF may be the correct answer here vs  If there is a way to get him in his home, gentle home therapies   Should probably get an MRI to examine the line at the subcapital area/eval neck itself  Per Ortho this will be done outpatient  Medical Co-morbidities Pending Issues:  Femoral neck fracture  Prediabetes  Chronic Pain  LORETTA on CKD3  Anemia  COPD (unable to afford inhaler)  GERD  HLD    #Vitals: Stable  #Labs:  Crea trending up  Previously 1 08, now 1 4-1 5  #Pain: On Lyrica 150mg BID, Tylenol PRN, Dilaudid 0 5mg IV, and Oxycodone 5-10mg PRN  Received IM diazepam and IV toradol and morphine for pain once as well  #Bowel: Last BM 8/14 On Senokot-S BID  #Bladder: Voiding and continent   #Skin/Pressure Injury Prevention: Turn Q2hr in bed, with weight shifts [id number] in wheelchair  #DVT Prophylaxis: Heparin, SCDs  #GI Prophylaxis: Protonix    Thank you for allowing the PM&R service to participate in the care of this patient  We will continue to follow Coleen Peralta progress with you   Please do not hesitate to call with questions or concerns    History of Present Illness:  Lane Atwood is a 73 y o  male with PMH of Arthritis, COPD, Fibromyalgia, GERD, HLD who presented to [place] on 08/16 with L hip pain for 1 month, and no inability to ambulate  CT of his LLE showed an acute fracture of the femoral head articular surface, and a sclerotic band noted across the femoral neck and near the articular surface suspicious for avascular necrosis  Ortho has ordered a L femur XR, but states that patient should be PWB and non-op for now  He has pain that radiates to his back and down to his knee  He has had multiple surgeries in that leg  It has been increasingly difficult for him to get up his stairs, and he has started using a RW in the past two days  No bowel/bladder dysfunction  No CP, SOB, fevers, chills, N/V  Review of Systems: 10 point ROS negative except for what is noted in HPI    CURRENT GAP IN FUNCTION     Prior to Admission:     Functional Status: Patient was independent with mobility/ambulation, transfers, ADL's, IADL's  Uses a cane normally, but in the past couple of days was using a walker  FUNCTIONAL STATUS:  Physical Therapy:  Pending     Occupational Therapy: pending     Social History:    Lane Atwood Lives with: lives with their spouse  [facility]  The living area: can live on one level  Equipment in home: [facility], [address] [place] [address], Crutches and Multiple braces, I think a CAM boot, and a cane  There is a full flight of steps to get to his apartment  Patient/family's goals: Return to previous home/apartment  The patient will not have 24 hour ARC Supervision/physical assistance: supervision/physical assistance available upon discharge    Social History     Socioeconomic History    Marital status: /Civil Union     Spouse name: None    Number of children: None    Years of education: None    Highest education level: None   Occupational History    None   Tobacco Use    Smoking status: Former Smoker     Packs/day: 1 00     Types: Cigarettes    Smokeless tobacco: Never Used   Vaping Use    Vaping Use: Never used   Substance and Sexual Activity    Alcohol use: Not Currently     Comment: last drink 1 year ago     Drug use: No    Sexual activity: Not Currently   Other Topics Concern    None   Social History Narrative    None     Social Determinants of Health     Financial Resource Strain:     Difficulty of Paying Living Expenses:    Food Insecurity:     Worried About Running Out of Food in the Last Year:     Ran Out of Food in the Last Year:    Transportation Needs:     Lack of Transportation (Medical):      Lack of Transportation (Non-Medical):    Physical Activity:     Days of Exercise per Week:     Minutes of Exercise per Session:    Stress:     Feeling of Stress :    Social Connections:     Frequency of Communication with Friends and Family:     Frequency of Social Gatherings with Friends and Family:     Attends Sabianism Services:     Active Member of Clubs or Organizations:     Attends Club or Organization Meetings:     Marital Status:    Intimate Partner Violence:     Fear of Current or Ex-Partner:     Emotionally Abused:     Physically Abused:     Sexually Abused:         Family History:    Family History   Problem Relation Age of Onset    No Known Problems Mother     No Known Problems Father          MEDICATIONS:     Current Facility-Administered Medications:     acetaminophen (TYLENOL) tablet 650 mg, 650 mg, Oral, Q6H PRN, Vu Bonilla DO    albuterol (PROVENTIL HFA,VENTOLIN HFA) inhaler 2 puff, 2 puff, Inhalation, Q4H PRN, Vu Bonilla DO    allopurinol (ZYLOPRIM) tablet 300 mg, 300 mg, Oral, Daily, Vu Bonilla DO    atorvastatin (LIPITOR) tablet 40 mg, 40 mg, Oral, Daily With Dinner, Vu Bonilla DO, 40 mg at 08/16/21 1826    cyclobenzaprine (FLEXERIL) tablet 5 mg, 5 mg, Oral, TID PRN, Vu Bonilla DO, 5 mg at 08/16/21 2039    docusate sodium (COLACE) capsule 100 mg, 100 mg, Oral, BID, Vu Bonilla DO, 100 mg at 08/16/21 1826    fenofibrate (TRICOR) tablet 145 mg, 145 mg, Oral, Daily, Vu Bonilla DO    fish oil capsule 2,000 mg, 2,000 mg, Oral, BID, Vu Bonilla DO, 2,000 mg at 08/16/21 1826    fluticasone-vilanterol (BREO ELLIPTA) 200-25 MCG/INH inhaler 1 puff, 1 puff, Inhalation, Daily, Vu Bonilla DO    heparin (porcine) subcutaneous injection 5,000 Units, 5,000 Units, Subcutaneous, Q8H [facility], 5,000 Units at 08/17/21 0527 **AND** [CANCELED] Platelet count, , , Once, Vu Bonilla DO    HYDROmorphone (DILAUDID) injection 0 5 mg, 0 5 mg, Intravenous, Q4H PRN, Vu Bonilla DO    insulin lispro (HumaLOG) 100 units/mL subcutaneous injection 1-6 Units, 1-6 Units, Subcutaneous, TID AC **AND** Fingerstick Glucose (POCT), , , TID AC, Vu Bonilla DO    naloxone (NARCAN) 0 04 mg/mL syringe 0 04 mg, 0 04 mg, Intravenous, Q1MIN PRN, Vu Bonilla DO    ondansetron (ZOFRAN) injection 4 mg, 4 mg, Intravenous, Q4H PRN, Vu Bonilla DO    oxyCODONE (ROXICODONE) immediate release tablet 10 mg, 10 mg, Oral, Q4H PRN, Vu Bonilla DO    oxyCODONE (ROXICODONE) IR tablet 5 mg, 5 mg, Oral, Q4H PRN, Vu Bonilla DO, 5 mg at 08/17/21 0526    pantoprazole (PROTONIX) EC tablet 40 mg, 40 mg, Oral, Early Morning, Vu Bonilla DO, 40 mg at 08/17/21 0526    pregabalin (LYRICA) capsule 150 mg, 150 mg, Oral, BID, Vu Bonilla DO, 150 mg at 08/16/21 1826    senna-docusate sodium (SENOKOT S) 8 6-50 mg per tablet 1 tablet, 1 tablet, Oral, BID, Vu Bonilla DO, 1 tablet at 08/16/21 1826    zolpidem (AMBIEN) tablet 5 mg, 5 mg, Oral, HS PRN, Vu Bonilla DO, 5 mg at 08/16/21 2236    Past Medical History:     Past Medical History:   Diagnosis Date    Arthritis     osteoarthritis    COPD (chronic obstructive pulmonary disease) ([facility] [address] )     Fibromyalgia     GERD (gastroesophageal reflux disease)     History of transfusion     Hyperlipidemia         Past Surgical History:     Past Surgical History:   Procedure Laterality Date    ANKLE FRACTURE SURGERY      Left Side    CHOLECYSTECTOMY      CYST REMOVAL      FEMUR SURGERY Left     KNEE SURGERY Left 1996    MANDIBLE SURGERY      ROTATOR CUFF REPAIR Left     WRIST FRACTURE SURGERY Left     plate in place         Allergies:     No Known Allergies        Physical Exam:  /56 (BP Location: Right arm)   Pulse 84   Temp (!) 97 4 °F (36 3 °C) (Temporal)   Resp 20   SpO2 95%        Intake/Output Summary (Last 24 hours) at 8/17/2021 0819  Last data filed at 8/17/2021 0501  Gross per 24 hour   Intake --   Output 300 ml   Net -300 ml       There is no height or weight on file to calculate BMI  Gen: No acute distress, Well-nourished, well-appearing  HEENT: Moist mucus membranes, Normocephalic/Atraumatic  Poor dentition  Cardiovascular: Regular rate, rhythm, S1/S2  Distal pulses palpable  Heme/Extr: No edema  Pulmonary: Non-labored breathing  Lungs CTAB  : No villa  GI: Soft, non-tender, non-distended  BS+  MSK: Atrophy L thigh, healed incisions     Neuro: AAOx3, Sensation intact to light touch throughout  Speech is intact  Appropriate to questioning  Tone is normal    MMT:   Strength:   Right  Left  Site  Right  Left  Site    5 5  S Ab: Shoulder Abductors  5  3  HF: Hip Flexors    5 5  EF: Elbow Flexors  5  3 KF: Knee Flexors    5  5  EE: Elbow Extensors  5  4  KE: Knee Extensors    5  5  WE: Wrist Extensors  5  5  DR: Dorsi Flexors    5  5  FF: Finger Flexors  5  5  PF: Plantar Flexors    5  5  HI: Hand Intrinsics  5  5  EHL: Extensor Hallucis Longus   Psych: Normal mood and affect         LABORATORY RESULTS:      Lab Results   Component Value Date    HGB 11 7 (L) 08/17/2021    HGB 9 4 (L) 03/09/2015    HCT 37 1 08/17/2021    HCT 28 8 (L) 03/09/2015    WBC 7 12 08/17/2021    WBC 12 05 (H) 03/09/2015     Lab Results   Component Value Date    BUN 33 (H) 08/17/2021    BUN 19 03/09/2015     (L) 03/09/2015    K 5 0 08/17/2021    K 4 0 03/09/2015     08/17/2021    CL 98 (L) 03/09/2015    GLUCOSE 114 03/09/2015    CREATININE 1 52 (H) 08/17/2021    CREATININE 0 83 03/09/2015     Lab Results   Component Value Date    PROTIME 12 9 08/16/2021    PROTIME 13 3 03/05/2015    INR 0 99 08/16/2021    INR 0 99 03/05/2015        DIAGNOSTIC STUDIES: Reviewed  CT lower extremity wo contrast left    Result Date: 8/16/2021  Impression: 1  Acute fracture at the femoral head articular surface 2  Sclerotic band noted across the femoral neck and near the articular surface, suspicious for avascular necrosis  Additionally the portion near the neck may represent a stress fracture   I personally discussed this study  with Adonis Quiñonez on 8/16/2021 at 4:12 PM   Workstation performed: [id number]

## 2021-08-17 NOTE — PLAN OF CARE
Problem: PHYSICAL THERAPY ADULT  Goal: Performs mobility at highest level of function for planned discharge setting  See evaluation for individualized goals  Description: Treatment/Interventions: Functional transfer training, LE strengthening/ROM, Elevations, Therapeutic exercise, Endurance training, Patient/family training, Equipment eval/education, Bed mobility, Gait training, Spoke to nursing (w/c training)  Equipment Recommended:  (tbd)       See flowsheet documentation for full assessment, interventions and recommendations  Note: Prognosis: Good  Problem List: Decreased strength, Decreased range of motion, Decreased endurance, Impaired balance, Decreased mobility, Decreased safety awareness, Orthopedic restrictions, Pain  Assessment: pt admitted with progressive l hip pain for 4-6 weeks  became unbearable and pt came to hospital  dx wtih femoral head and neck fractue l hip, possible AVN  pt was seen by ortho and PM&R for eval and is also referred to PT  pt was living with wife who works but does not drive  pt was indep but recently using rw  pt has 3-4 steep stairs + 5 regular height stairs to enter apt  pt demonstrated moderate functional limitatoins due to recent exacerbation of L hip pain, spasms and inability to bear weight  pt was able to mobilize in bed with supervision  pt was able to transfer with supervision using rw  pt amb 5' nwb lle and 10' with TTWB lle  pt perferred ttwb lle as more comfortable  pt has current deficits in strength, balance, gait sequencing and stability, joint integrity and pain control  pt has used crutches before and is willing to try them for stairs  pt currently preferring ttwb lle  pt will need skilled PT  d/c recommendatoins pending stair training for either hhpt vs rehab     Barriers to Discharge: Inaccessible home environment, Decreased caregiver support        PT Discharge Recommendation:  (pending stair training- rhb vs hhpt / w/c mobile)     PT - OK to Discharge: No    See flowsheet documentation for full assessment

## 2021-08-17 NOTE — PLAN OF CARE
Problem: Potential for Falls  Goal: Patient will remain free of falls  Description: INTERVENTIONS:  - Educate patient/family on patient safety including physical limitations  - Instruct patient to call for assistance with activity   - Consult OT/PT to assist with strengthening/mobility   - Keep Call bell within reach  - Keep bed low and locked with side rails adjusted as appropriate  - Keep care items and personal belongings within reach  - Initiate and maintain comfort rounds  - Make Fall Risk Sign visible to staff  - Offer Toileting every 2 Hours, in advance of need  - Initiate/Maintain bed alarm  - Apply yellow socks and bracelet for high fall risk patients  - Consider moving patient to room near nurses station  Outcome: Progressing     Problem: PAIN - ADULT  Goal: Verbalizes/displays adequate comfort level or baseline comfort level  Description: Interventions:  - Encourage patient to monitor pain and request assistance  - Assess pain using appropriate pain scale  - Administer analgesics based on type and severity of pain and evaluate response  - Implement non-pharmacological measures as appropriate and evaluate response  - Consider cultural and social influences on pain and pain management  - Notify physician/advanced practitioner if interventions unsuccessful or patient reports new pain  Outcome: Progressing     Problem: INFECTION - ADULT  Goal: Absence or prevention of progression during hospitalization  Description: INTERVENTIONS:  - Assess and monitor for signs and symptoms of infection  - Monitor lab/diagnostic results  - Monitor all insertion sites, i e  indwelling lines, tubes, and drains  - Monitor endotracheal if appropriate and nasal secretions for changes in amount and color  - Sabael appropriate cooling/warming therapies per order  - Administer medications as ordered  - Instruct and encourage patient and family to use good hand hygiene technique  - Identify and instruct in appropriate isolation precautions for identified infection/condition  Outcome: Progressing  Goal: Absence of fever/infection during neutropenic period  Description: INTERVENTIONS:  - Monitor WBC    Outcome: Progressing     Problem: SAFETY ADULT  Goal: Patient will remain free of falls  Description: INTERVENTIONS:  - Educate patient/family on patient safety including physical limitations  - Instruct patient to call for assistance with activity   - Consult OT/PT to assist with strengthening/mobility   - Keep Call bell within reach  - Keep bed low and locked with side rails adjusted as appropriate  - Keep care items and personal belongings within reach  - Initiate and maintain comfort rounds  - Make Fall Risk Sign visible to staff  - Offer Toileting every 2 Hours, in advance of need  - Initiate/Maintain bed alarm  - Apply yellow socks and bracelet for high fall risk patients  - Consider moving patient to room near nurses station  Outcome: Progressing  Goal: Maintain or return to baseline ADL function  Description: INTERVENTIONS:  -  Assess patient's ability to carry out ADLs; assess patient's baseline for ADL function and identify physical deficits which impact ability to perform ADLs (bathing, care of mouth/teeth, toileting, grooming, dressing, etc )  - Assess/evaluate cause of self-care deficits   - Assess range of motion  - Assess patient's mobility; develop plan if impaired  - Assess patient's need for assistive devices and provide as appropriate  - Encourage maximum independence but intervene and supervise when necessary  - Involve family in performance of ADLs  - Assess for home care needs following discharge   - Consider OT consult to assist with ADL evaluation and planning for discharge  - Provide patient education as appropriate  Outcome: Progressing  Goal: Maintains/Returns to pre admission functional level  Description: INTERVENTIONS:  - Perform BMAT or MOVE assessment daily    - Set and communicate daily mobility goal to care team and patient/family/caregiver  - Collaborate with rehabilitation services on mobility goals if consulted  - Perform Range of Motion 3 times a day  - Reposition patient every 2 hours    - Dangle patient 3 times a day  - Stand patient 3 times a day  - Ambulate patient 3 times a day  - Out of bed to chair 3 times a day   - Out of bed for meals 3 times a day  - Out of bed for toileting  - Record patient progress and toleration of activity level   Outcome: Progressing     Problem: DISCHARGE PLANNING  Goal: Discharge to home or other facility with appropriate resources  Description: INTERVENTIONS:  - Identify barriers to discharge w/patient and caregiver  - Arrange for needed discharge resources and transportation as appropriate  - Identify discharge learning needs (meds, wound care, etc )  - Arrange for interpretive services to assist at discharge as needed  - Refer to Case Management Department for coordinating discharge planning if the patient needs post-hospital services based on physician/advanced practitioner order or complex needs related to functional status, cognitive ability, or social support system  Outcome: Progressing     Problem: MUSCULOSKELETAL - ADULT  Goal: Maintain or return mobility to safest level of function  Description: INTERVENTIONS:  - Assess patient's ability to carry out ADLs; assess patient's baseline for ADL function and identify physical deficits which impact ability to perform ADLs (bathing, care of mouth/teeth, toileting, grooming, dressing, etc )  - Assess/evaluate cause of self-care deficits   - Assess range of motion  - Assess patient's mobility  - Assess patient's need for assistive devices and provide as appropriate  - Encourage maximum independence but intervene and supervise when necessary  - Involve family in performance of ADLs  - Assess for home care needs following discharge   - Consider OT consult to assist with ADL evaluation and planning for discharge  - Provide patient education as appropriate  Outcome: Progressing  Goal: Maintain proper alignment of affected body part  Description: INTERVENTIONS:  - Support, maintain and protect limb and body alignment  - Provide patient/ family with appropriate education  Outcome: Progressing     Problem: MOBILITY - ADULT  Goal: Maintain or return to baseline ADL function  Description: INTERVENTIONS:  -  Assess patient's ability to carry out ADLs; assess patient's baseline for ADL function and identify physical deficits which impact ability to perform ADLs (bathing, care of mouth/teeth, toileting, grooming, dressing, etc )  - Assess/evaluate cause of self-care deficits   - Assess range of motion  - Assess patient's mobility; develop plan if impaired  - Assess patient's need for assistive devices and provide as appropriate  - Encourage maximum independence but intervene and supervise when necessary  - Involve family in performance of ADLs  - Assess for home care needs following discharge   - Consider OT consult to assist with ADL evaluation and planning for discharge  - Provide patient education as appropriate  Outcome: Progressing  Goal: Maintains/Returns to pre admission functional level  Description: INTERVENTIONS:  - Perform BMAT or MOVE assessment daily    - Set and communicate daily mobility goal to care team and patient/family/caregiver  - Collaborate with rehabilitation services on mobility goals if consulted  - Perform Range of Motion 3 times a day  - Reposition patient every 2 hours    - Dangle patient 3 times a day  - Stand patient 3 times a day  - Ambulate patient 3 times a day  - Out of bed to chair 3 times a day   - Out of bed for meals 3 times a day  - Out of bed for toileting  - Record patient progress and toleration of activity level   Outcome: Progressing

## 2021-08-17 NOTE — ASSESSMENT & PLAN NOTE
Lab Results   Component Value Date    EGFR 49 08/16/2021    EGFR 73 02/11/2020    EGFR 55 02/10/2020    CREATININE 1 47 (H) 08/16/2021    CREATININE 1 08 02/11/2020    CREATININE 1 35 (H) 02/10/2020   No BMP ordered by the ER  Will place routine labs
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
## 2021-08-18 LAB
ANION GAP SERPL CALCULATED.3IONS-SCNC: 9 MMOL/L (ref 4–13)
BUN SERPL-MCNC: 25 MG/DL (ref 5–25)
CALCIUM SERPL-MCNC: 9.5 MG/DL (ref 8.3–10.1)
CHLORIDE SERPL-SCNC: 102 MMOL/L (ref 100–108)
CO2 SERPL-SCNC: 26 MMOL/L (ref 21–32)
CREAT SERPL-MCNC: 1.39 MG/DL (ref 0.6–1.3)
ERYTHROCYTE [DISTWIDTH] IN BLOOD BY AUTOMATED COUNT: 13.5 % (ref 11.6–15.1)
GFR SERPL CREATININE-BSD FRML MDRD: 53 ML/MIN/1.73SQ M
GLUCOSE P FAST SERPL-MCNC: 153 MG/DL (ref 65–99)
GLUCOSE SERPL-MCNC: 103 MG/DL (ref 65–140)
GLUCOSE SERPL-MCNC: 153 MG/DL (ref 65–140)
GLUCOSE SERPL-MCNC: 180 MG/DL (ref 65–140)
GLUCOSE SERPL-MCNC: 190 MG/DL (ref 65–140)
GLUCOSE SERPL-MCNC: 194 MG/DL (ref 65–140)
HCT VFR BLD AUTO: 38.3 % (ref 36.5–49.3)
HGB BLD-MCNC: 12.2 G/DL (ref 12–17)
MCH RBC QN AUTO: 29 PG (ref 26.8–34.3)
MCHC RBC AUTO-ENTMCNC: 31.9 G/DL (ref 31.4–37.4)
MCV RBC AUTO: 91 FL (ref 82–98)
PLATELET # BLD AUTO: 266 THOUSANDS/UL (ref 149–390)
PMV BLD AUTO: 11.6 FL (ref 8.9–12.7)
POTASSIUM SERPL-SCNC: 4.8 MMOL/L (ref 3.5–5.3)
RBC # BLD AUTO: 4.21 MILLION/UL (ref 3.88–5.62)
SODIUM SERPL-SCNC: 137 MMOL/L (ref 136–145)
WBC # BLD AUTO: 7.55 THOUSAND/UL (ref 4.31–10.16)

## 2021-08-18 PROCEDURE — 85027 COMPLETE CBC AUTOMATED: CPT | Performed by: INTERNAL MEDICINE

## 2021-08-18 PROCEDURE — 97116 GAIT TRAINING THERAPY: CPT

## 2021-08-18 PROCEDURE — 97110 THERAPEUTIC EXERCISES: CPT

## 2021-08-18 PROCEDURE — 97530 THERAPEUTIC ACTIVITIES: CPT

## 2021-08-18 PROCEDURE — 99225 PR SBSQ OBSERVATION CARE/DAY 25 MINUTES: CPT | Performed by: INTERNAL MEDICINE

## 2021-08-18 PROCEDURE — 97166 OT EVAL MOD COMPLEX 45 MIN: CPT

## 2021-08-18 PROCEDURE — 80048 BASIC METABOLIC PNL TOTAL CA: CPT | Performed by: INTERNAL MEDICINE

## 2021-08-18 PROCEDURE — 82948 REAGENT STRIP/BLOOD GLUCOSE: CPT

## 2021-08-18 RX ADMIN — DOCUSATE SODIUM AND SENNOSIDES 1 TABLET: 8.6; 5 TABLET, FILM COATED ORAL at 08:35

## 2021-08-18 RX ADMIN — HEPARIN SODIUM 5000 UNITS: 5000 INJECTION INTRAVENOUS; SUBCUTANEOUS at 21:51

## 2021-08-18 RX ADMIN — OMEGA-3 FATTY ACIDS CAP 1000 MG 2000 MG: 1000 CAP at 08:35

## 2021-08-18 RX ADMIN — OMEGA-3 FATTY ACIDS CAP 1000 MG 2000 MG: 1000 CAP at 16:49

## 2021-08-18 RX ADMIN — OXYCODONE HYDROCHLORIDE 10 MG: 10 TABLET ORAL at 16:49

## 2021-08-18 RX ADMIN — DOCUSATE SODIUM AND SENNOSIDES 1 TABLET: 8.6; 5 TABLET, FILM COATED ORAL at 16:49

## 2021-08-18 RX ADMIN — DOCUSATE SODIUM 100 MG: 100 CAPSULE ORAL at 16:49

## 2021-08-18 RX ADMIN — ATORVASTATIN CALCIUM 40 MG: 40 TABLET, FILM COATED ORAL at 16:49

## 2021-08-18 RX ADMIN — INSULIN LISPRO 2 UNITS: 100 INJECTION, SOLUTION INTRAVENOUS; SUBCUTANEOUS at 16:51

## 2021-08-18 RX ADMIN — OXYCODONE HYDROCHLORIDE 10 MG: 10 TABLET ORAL at 02:57

## 2021-08-18 RX ADMIN — OXYCODONE HYDROCHLORIDE 10 MG: 10 TABLET ORAL at 08:35

## 2021-08-18 RX ADMIN — ACETAMINOPHEN 650 MG: 325 TABLET, FILM COATED ORAL at 14:53

## 2021-08-18 RX ADMIN — PANTOPRAZOLE SODIUM 40 MG: 40 TABLET, DELAYED RELEASE ORAL at 05:29

## 2021-08-18 RX ADMIN — DOCUSATE SODIUM 100 MG: 100 CAPSULE ORAL at 08:35

## 2021-08-18 RX ADMIN — FLUTICASONE FUROATE AND VILANTEROL TRIFENATATE 1 PUFF: 200; 25 POWDER RESPIRATORY (INHALATION) at 08:35

## 2021-08-18 RX ADMIN — ZOLPIDEM TARTRATE 5 MG: 5 TABLET, FILM COATED ORAL at 21:51

## 2021-08-18 RX ADMIN — HEPARIN SODIUM 5000 UNITS: 5000 INJECTION INTRAVENOUS; SUBCUTANEOUS at 05:29

## 2021-08-18 RX ADMIN — INSULIN LISPRO 2 UNITS: 100 INJECTION, SOLUTION INTRAVENOUS; SUBCUTANEOUS at 08:35

## 2021-08-18 RX ADMIN — HEPARIN SODIUM 5000 UNITS: 5000 INJECTION INTRAVENOUS; SUBCUTANEOUS at 13:04

## 2021-08-18 RX ADMIN — CYCLOBENZAPRINE HYDROCHLORIDE 5 MG: 10 TABLET, FILM COATED ORAL at 14:53

## 2021-08-18 RX ADMIN — PREGABALIN 150 MG: 75 CAPSULE ORAL at 16:49

## 2021-08-18 RX ADMIN — PREGABALIN 150 MG: 75 CAPSULE ORAL at 08:35

## 2021-08-18 RX ADMIN — FENOFIBRATE 145 MG: 145 TABLET, FILM COATED ORAL at 08:35

## 2021-08-18 RX ADMIN — OXYCODONE HYDROCHLORIDE 10 MG: 10 TABLET ORAL at 21:51

## 2021-08-18 RX ADMIN — OXYCODONE HYDROCHLORIDE 10 MG: 10 TABLET ORAL at 13:04

## 2021-08-18 RX ADMIN — ALLOPURINOL 300 MG: 300 TABLET ORAL at 08:35

## 2021-08-18 NOTE — PLAN OF CARE
Problem: PHYSICAL THERAPY ADULT  Goal: Performs mobility at highest level of function for planned discharge setting  See evaluation for individualized goals  Description: Treatment/Interventions: Functional transfer training, LE strengthening/ROM, Elevations, Therapeutic exercise, Endurance training, Patient/family training, Equipment eval/education, Bed mobility, Gait training, Spoke to nursing (w/c training)  Equipment Recommended:  (tbd)       See flowsheet documentation for full assessment, interventions and recommendations  Outcome: Progressing  Note: Prognosis: Good  Problem List: Impaired balance, Decreased safety awareness, Orthopedic restrictions, Pain  Assessment: Pt seen for PT treatment session this date with interventions consisting of Therapeutic exercise consisting of: AROM 20 reps B LE in sitting position, therapeutic activity consisting of training: in w/c propulsion, pt able to propel [id number] ft, indep, able to indep make turns and back up and navigating 5 stairs w/ 1 crutch and L handrail handrail with step to pattern with close S  Pt agreeable to PT treatment session upon arrival, pt found seated OOB in chair, in no apparent distress and responsive  In comparison to previous session, pt with improvements in ability to navigate stairs  Post session: pt returned back to recliner, all needs in reach and RN notified of session findings/recommendations  Continue to recommend home with home health rehabilitation at time of d/c in order to maximize pt's functional independence and safety w/ mobility  Pt continues to be functioning below baseline level, and remains limited 2* factors listed above and including decreased strength and impaired balance  PT will continue to see pt during current hospitalization in order to address the deficits listed above and provide interventions consistent w/ POC in effort to achieve STGs    Barriers to Discharge: Inaccessible home environment        PT Discharge Recommendation: Home with home health rehabilitation     PT - OK to Discharge: Yes (when medically stable)    See flowsheet documentation for full assessment

## 2021-08-18 NOTE — PHYSICIAN ADVISOR
Current patient class: Observation  The patient is currently on Hospital Day: 3 at [address] [place] [address]        The patient was admitted to the hospital  on N/A at N/A for the following diagnosis:  Hip pain [M25 559]  Closed fracture of head of left femur, initial encounter ([facility] [address] ) [S72 052A]  Ambulatory dysfunction [R26 2]     After review of the relevant documentation, labs, vital signs and test results, the patient is most appropriate for OBSERVATION CLASS  Rationale is as follows: The patient is a 73 yrs   Male who presented to the ED at 8/16/2021  2:28 PM with a chief complaint of Hip Pain (L hip pain for 1 month  Increasingly worse after recieving physical therapy  No recent injury  Inabilty to ambulate due to same  )   Patient admitted with hip pain  Xray showed acute fracture of femoral head articular surface  He has been evaluated by orthopedics and no surgery recommended  PT has evaluated patient and initially possible STR now recommending home therapy  He is currently receiving oxycodone, tylenol and flexeril for pain   Recommend continuing OBS status and reassess if remains in the hospital     The patients vitals on arrival were   ED Triage Vitals   Temperature Pulse Respirations Blood Pressure SpO2   08/16/21 1434 08/16/21 1434 08/16/21 1434 08/16/21 1435 08/16/21 1434   98 2 °F (36 8 °C) 102 20 (!) 177/78 97 %      Temp Source Heart Rate Source Patient Position - Orthostatic VS BP Location FiO2 (%)   08/16/21 1434 08/16/21 1434 08/16/21 1434 08/16/21 1434 --   Oral Monitor Sitting Right arm       Pain Score       08/16/21 1434       9           Past Medical History:   Diagnosis Date    Arthritis     osteoarthritis    COPD (chronic obstructive pulmonary disease) (HCC)     Fibromyalgia     GERD (gastroesophageal reflux disease)     History of transfusion     Hyperlipidemia      Past Surgical History:   Procedure Laterality Date    ANKLE FRACTURE SURGERY      Left Side    CHOLECYSTECTOMY      CYST REMOVAL      FEMUR SURGERY Left     KNEE SURGERY Left 1996    MANDIBLE SURGERY      ROTATOR CUFF REPAIR Left     WRIST FRACTURE SURGERY Left     plate in place           Consults have been placed to:   IP CONSULT TO ORTHOPEDIC SURGERY  IP CONSULT TO PHYSICAL MEDICINE REHAB    Vitals:    08/17/21 0700 08/17/21 1500 08/17/21 2228 08/18/21 0835   BP: 128/59 124/63 101/68 118/84   BP Location: Right arm Left arm Left arm Right arm   Pulse: 85 91 90 91   Resp: 18 18 18 18   Temp: (!) 96 8 °F (36 °C) (!) 97 2 °F (36 2 °C) (!) 97 °F (36 1 °C) (!) 97 2 °F (36 2 °C)   TempSrc: Temporal Temporal Temporal Temporal   SpO2: 96% 93% 92% 94%       Most recent labs:    Recent Labs     08/16/21  1704 08/18/21  0642   WBC 9 08 7 55   HGB 12 4 12 2   HCT 38 2 38 3    266   K 4 8 4 8   CALCIUM 9 5 9 5   BUN 28* 25   CREATININE 1 47* 1 39*   INR 0 99  --        Scheduled Meds:  Current Facility-Administered Medications   Medication Dose Route Frequency Provider Last Rate    acetaminophen  650 mg Oral Q6H PRN Carmen Tolbert, DO      albuterol  2 puff Inhalation Q4H PRN Carmen Tolbert, DO      allopurinol  300 mg Oral Daily Shaun D Lorrin Buerger, DO      atorvastatin  40 mg Oral Daily With Dinner Vu Bonilla DO      cyclobenzaprine  5 mg Oral TID PRN Carmen Tolbert, DO      docusate sodium  100 mg Oral BID Vu Bonilla, DO      fenofibrate  145 mg Oral Daily Vu Bonilla, DO      fish oil  2,000 mg Oral BID Vu Bonilla, DO      fluticasone-vilanterol  1 puff Inhalation Daily Vu Mccabe, DO      heparin (porcine)  5,000 Units Subcutaneous [facility] Carmen Tolbert, DO      HYDROmorphone  0 5 mg Intravenous Q4H PRN Carmen Tolbert, DO      insulin lispro  1-6 Units Subcutaneous TID AC Vu Bonilla, DO      naloxone  0 04 mg Intravenous Q1MIN PRN Carmen Tolbert, DO      ondansetron  4 mg Intravenous Q4H PRN Carmen Tolbert, DO      oxyCODONE  10 mg Oral Q4H PRN Carmen Tolbert DO      oxyCODONE  5 mg Oral Q4H PRN Too Petite, DO      pantoprazole  40 mg Oral Early Morning Vu Bonilla, DO      pregabalin  150 mg Oral BID Vu Bonilla, DO      senna-docusate sodium  1 tablet Oral BID Vu Bonilla, DO      zolpidem  5 mg Oral HS PRN Too Petite, DO       Continuous Infusions:   PRN Meds:   acetaminophen    albuterol    cyclobenzaprine    HYDROmorphone    naloxone    ondansetron    oxyCODONE    oxyCODONE    zolpidem

## 2021-08-18 NOTE — PLAN OF CARE
Problem: OCCUPATIONAL THERAPY ADULT  Goal: Performs self-care activities at highest level of function for planned discharge setting  See evaluation for individualized goals  Description: Treatment Interventions: ADL retraining, Functional transfer training, UE strengthening/ROM, Endurance training, Patient/family training, Equipment evaluation/education, Compensatory technique education, Activityengagement          See flowsheet documentation for full assessment, interventions and recommendations  Note: Limitation: Decreased ADL status, Decreased UE strength, Decreased endurance, Decreased self-care trans, Decreased high-level ADLs  Prognosis: Good  Assessment: Pt is a 73 y o  male seen for OT evaluation s/p adm to [facility] on [facility]16[facility] w/ L hip pain x1 month and admitted w/ L Femoral neck fracture  No surgical intervention at this time per ortho  Protected WBAT to L LE with walker for assistance  Comorbidities affecting pts functional performance include a significant PMH of Arthritis, COPD, Fibromyalgia, and HLD  Pt with active OT orders and activity orders for Up with assistance  Pt lives with spouse in a one level apt with 3-4 MARIELENA in front vs 3-4+5+3 MARIELENA in garage  Pt (+) home alone while spouse works  Spouse able to assist as needed when home  At baseline, pt was I w/ ADLs, IADLs (shares w/ spouse), and functional mobility/transfers w/ recent use of RW 2* L LE pain (previously using SPC)  (+)   Denies falls PTA  Upon evaluation, pt currently requires Supervision for UB ADLs, Min A for LB ADLs, Supervision for toileting, Supervision for bed mobility, and Supervision for functional mobility/transfers 2* the following deficits impacting occupational performance: decreased strength, decreased balance, decreased tolerance and increased pain   These impairments, as well at pts steps to enter environment, limited home support, difficulty performing ADLS and difficulty performing IADLS  limit pts ability to safely engage in all baseline areas of occupation  The patient's raw score on the AM-PAC Daily Activity inpatient short form is 21, standardized score is 44 27, greater than 39 4  Patients at this level are likely to benefit from discharge to home  Please refer to the recommendation of the Occupational Therapist for safe discharge planning  Pt to continue to benefit from continued acute OT services during hospital stay to address defined deficits and to maximize level of functional independence in the following Occupational Performance areas: grooming, bathing/shower, toilet hygiene, dressing, medication management, health maintenance, functional mobility, community mobility, clothing management, cleaning, meal prep and household maintenance  From OT standpoint, recommend Home OT upon D/C   OT will continue to follow pt 3-5x/wk to address the following goals to  w/in 10-14 days:     OT Discharge Recommendation: Home with home health rehabilitation  OT - OK to Discharge: Yes (when medically cleared )
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
## 2021-08-18 NOTE — OCCUPATIONAL THERAPY NOTE
Occupational Therapy Evaluation     Patient Name: Thalia Bright  [id number] Date: 8/18/2021  Problem List  Principal Problem:    Femoral neck fracture ([address] )  Active Problems:    Hyperlipidemia    GERD (gastroesophageal reflux disease)    COPD (chronic obstructive pulmonary disease) ([facility])    Nicotine dependence    CKD (chronic kidney disease) stage 3, GFR 30-59 ml/min ([facility])    Chronic pain syndrome    Prediabetes    Past Medical History  Past Medical History:   Diagnosis Date    Arthritis     osteoarthritis    COPD (chronic obstructive pulmonary disease) ([facility])     Fibromyalgia     GERD (gastroesophageal reflux disease)     History of transfusion     Hyperlipidemia      Past Surgical History  Past Surgical History:   Procedure Laterality Date    ANKLE FRACTURE SURGERY      Left Side    CHOLECYSTECTOMY      CYST REMOVAL      FEMUR SURGERY Left     KNEE SURGERY Left 1996    MANDIBLE SURGERY      ROTATOR CUFF REPAIR Left     WRIST FRACTURE SURGERY Left     plate in place           08/18/21 0912   Note Type   Note type Evaluation   Restrictions/Precautions   Weight Bearing Precautions Per Order Yes   LLE Weight Bearing Per Order WBAT   Other Precautions WBS; Fall Risk;Pain   Pain Assessment   Pain Assessment Tool Pain Assessment not indicated - pt denies pain   Pain Score No Pain   Home Living   Type of Home Apartment   Home Layout One level;Stairs to enter with rails  (3-4 MARIELENA in front vs 3-4 + 5 +3 via garage)   Bathroom Shower/Tub Tub/shower unit   Bathroom Toilet Standard   Bathroom Equipment Grab bars in shower; Shower chair;Commode   Bathroom Accessibility Accessible   Home Equipment Walker;Cane;Crutches   Additional Comments Pt lives with spouse in a one level apt with 3-4 MARIELENA in front vs 3-4+5+3 MARIELENA in garage  Pt (+) home alone while spouse works  Spouse able to assist as needed when home      Prior Function   Level of [place] Independent with ADLs and functional mobility   Lives With Spouse Receives Help From Family   ADL Assistance Independent   IADLs Independent  (shares w/ spouse)   Falls in the last 6 months 0   Vocational Retired   Comments At baseline, pt was I w/ ADLs, IADLs (shares w/ spouse), and functional mobility/transfers w/ recent use of RW 2* L LE pain (previously using SPC)  (+)   Denies falls PTA  Lifestyle   Autonomy At baseline, pt was I w/ ADLs, IADLs (shares w/ spouse), and functional mobility/transfers w/ recent use of RW 2* L LE pain (previously using SPC)  (+)   Denies falls PTA  Reciprocal Relationships Spouse   Service to Others Retired   [address] WDL   ADL   Where Assessed Chair   Eating Assistance 5  [address] [facility] 5  [address] [place] [address] Dr 5  Supervision/Setup    [facility] [address] 5  [address] [facility] 4  [address] [facility]  5  [address] [facility] 5  Supervision/Setup   Additional Comments Educated pt on available [address], however pt declined at this time   Bed Mobility   Supine to Sit 5  Supervision   Additional items HOB elevated; Bedrails; Increased time required   Sit to Supine Unable to assess   Additional Comments Pt seated OOB in chair at end of session w/ call bell and phone within reach  All needs met and pt reports no further questions for OT at this time   Transfers   Sit to Stand 5  Supervision   Additional items Bedrails; Increased time required;Verbal cues   Stand to Sit 5  Supervision   Additional items Armrests; Increased time required;Verbal cues   Additional Comments Cues for safe technique and hand placement   Functional Mobility   Functional Mobility 5  Supervision   Additional Comments Assist x1; Pt performed w/ TTWB L LE 2* increased pain with weightbearing through L LE   Additional items Rolling walker   Balance Static Sitting Good   Dynamic Sitting Fair +   Static Standing Fair   Dynamic Standing Fair   Ambulatory Fair -   Activity Tolerance   Activity Tolerance Patient limited by pain; Patient tolerated treatment well   Nurse Made Aware yes; Melba Dial RN   RUE Assessment   RUE Assessment WFL   RUE Strength   RUE Overall Strength Within Functional Limits - able to perform ADL tasks with strength  (4/5 throughout)   LUE Assessment   LUE Assessment WFL   LUE Strength   LUE Overall Strength Within Functional Limits - able to perform ADL tasks with strength  (4/5 throughout)   Hand Function   Gross Motor Coordination Functional   Fine Motor Coordination Functional   Sensation   Light Touch No apparent deficits   Proprioception   Proprioception No apparent deficits   Vision - Complex Assessment   Ocular Range of Motion [facility]   Acuity Able to read clock/calendar on wall without difficulty; Able to read employee name badge without difficulty   Perception   Inattention/Neglect Appears intact   Cognition   Overall Cognitive Status [facility]   Arousal/Participation Alert; Cooperative   Attention Within functional limits   Orientation Level Oriented X4   Memory Within functional limits   Following Commands Follows all commands and directions without difficulty   Comments Pleasant and cooperative  Engages in conversation appropriately   Assessment   Limitation Decreased ADL status; Decreased UE strength;Decreased endurance;Decreased self-care trans;Decreased high-level ADLs   Prognosis Good   Assessment Pt is a 73 y o  male seen for OT evaluation s/p adm to [facility] on [facility]21 w/ L hip pain x1 month and admitted w/ L Femoral neck fracture  No surgical intervention at this time per ortho  Protected WBAT to L LE with walker for assistance  Comorbidities affecting pts functional performance include a significant PMH of Arthritis, COPD, Fibromyalgia, and HLD  Pt with active OT orders and activity orders for Up with assistance   Pt lives with spouse in a one level apt with 3-4 MARIELENA in front vs 3-4+5+3 MARIELENA in garage  Pt (+) home alone while spouse works  Spouse able to assist as needed when home  At baseline, pt was I w/ ADLs, IADLs (shares w/ spouse), and functional mobility/transfers w/ recent use of RW 2* L LE pain (previously using SPC)  (+)   Denies falls PTA  Upon evaluation, pt currently requires Supervision for UB ADLs, Min A for LB ADLs, Supervision for toileting, Supervision for bed mobility, and Supervision for functional mobility/transfers 2* the following deficits impacting occupational performance: decreased strength, decreased balance, decreased tolerance and increased pain  These impairments, as well at pts steps to enter environment, limited home support, difficulty performing ADLS and difficulty performing IADLS  limit pts ability to safely engage in all baseline areas of occupation  The patient's raw score on the -PAC Daily Activity inpatient short form is 21, standardized score is 44 27, greater than 39 4  Patients at this level are likely to benefit from discharge to home  Please refer to the recommendation of the Occupational Therapist for safe discharge planning  Pt to continue to benefit from continued acute OT services during hospital stay to address defined deficits and to maximize level of functional independence in the following Occupational Performance areas: grooming, bathing/shower, toilet hygiene, dressing, medication management, health maintenance, functional mobility, community mobility, clothing management, cleaning, meal prep and household maintenance  From OT standpoint, recommend Home OT upon D/C   OT will continue to follow pt 3-5x/wk to address the following goals to  w/in 10-14 days:   Goals   Patient Goals To have less pain   LTG Time Frame 10-14   Long Term Goal Please refer to LTGs listed below   Plan   Treatment Interventions ADL retraining;Functional transfer training;UE strengthening/ROM; Endurance training;Patient/family training;Equipment evaluation/education; Compensatory technique education; Activityengagement   Goal Expiration Date 09/01/21   OT Treatment Day 0   OT Frequency 3-5x/wk   Recommendation   OT Discharge Recommendation Home with home health rehabilitation   OT - OK to Discharge Yes  (when medically cleared )   AM-PAC Daily Activity Inpatient   Lower Body Dressing 3   Bathing 3   Toileting 3   Upper Body Dressing 4   Grooming 4   Eating 4   Daily Activity Raw Score 21   Daily Activity Standardized Score (Calc for Raw Score >=11) 44 27   AM-PAC Applied Cognition Inpatient   Following a Speech/Presentation 4   Understanding Ordinary Conversation 4   Taking Medications 4   Remembering Where Things Are Placed or Put Away 4   Remembering List of 4-5 Errands 4   Taking Care of Complicated Tasks 4   Applied Cognition Raw Score 24   Applied Cognition Standardized Score 62 21          GOALS    1  Pt will improve activity tolerance to G for min 30 min txment sessions for increase engagement in functional tasks    2  Pt will complete bed mobility at a Mod I level w/ G balance/safety demonstrated to decrease caregiver assistance required     3  Pt will complete UB dressing/self care w/ mod I using adaptive device and DME as needed     4  Pt will complete LB dressing/self care w/ mod I using adaptive device and DME as needed    5  Pt will complete toileting w/ mod I w/ G hygiene/thoroughness using DME as needed    6  Pt will improve functional transfers to Mod I on/off all surfaces using DME as needed w/ G balance/safety     7  Pt will improve functional mobility during ADL/IADL/leisure tasks to Mod I using DME as needed w/ G balance/safety     8  Pt will be attentive 100% of the time during ongoing cognitive assessment w/ G participation to assist w/ safe d/c planning/recommendations    9   Pt will demonstrate G carryover of pt/caregiver education and training as appropriate w/o cues w/ good tolerance to increase safety during functional tasks    10  Pt will participate in simulated IADL management task to increase independence to Mod I w/ G safety and endurance    11  Pt will increase BUE strength by 1MM grade via AROM exercises to increase independence in ADLs and transfers    12  Pt will verbalize 3 potential fall hazards and identify appropriate compensatory techniques to decrease fall risk in home environment     13   Pt will increase standing tolerance to 8-10 mins with Fair+ dynamic standing balance to increase safety during participation in ADLs       Barbara Almanzar, OTR/L

## 2021-08-18 NOTE — PHYSICAL THERAPY NOTE
08/18/21 0948   PT Last Visit   PT Visit Date 08/18/21   Note Type   Note Type Treatment   Pain Assessment   Pain Assessment Tool Pain Assessment not indicated - pt denies pain   Pain Score No Pain   Restrictions/Precautions   Weight Bearing Precautions Per Order Yes   LLE Weight Bearing Per Order WBAT   Other Precautions WBS; Fall Risk;Pain   General   Chart Reviewed Yes   Response to Previous Treatment Patient with no complaints from previous session  Family/Caregiver Present No   Cognition   Overall Cognitive Status WFL   Arousal/Participation Alert; Cooperative   Attention Within functional limits   Orientation Level Oriented X4   Memory Within functional limits   Following Commands Follows all commands and directions without difficulty   Comments pt agreeable to PT session   Bed Mobility   Additional Comments pt OOB in chair to upon arrival   Transfers   Sit to Stand 6  Modified independent   Additional items Armrests; Increased time required;Verbal cues   Stand to Sit 6  Modified independent   Additional items Armrests; Increased time required;Verbal cues   Stand pivot 5  Supervision   Additional items Assist x 1; Increased time required;Verbal cues   Ambulation/Elevation   Gait pattern Decreased L stance; Improper Weight shift; Short stride   Gait Assistance 5  Supervision   Additional items Verbal cues   Assistive Device Rolling walker   Distance 15 ft   Stair Management Assistance 5  Supervision   Additional items Assist x 1;Verbal cues   Stair Management Technique One rail L;Other (Comment)  (1 crutch on R)   Number of Stairs 5   Wheelchair Activities   Propulsion Yes  (pt able to indep propel [id number], vc for direction)   Balance   Static Sitting Normal   Dynamic Sitting Good   Static Standing Fair +   Dynamic Standing Fair   Ambulatory Fair   Endurance Deficit   Endurance Deficit No   Activity Tolerance   Activity Tolerance Patient tolerated treatment well   Nurse Made Aware RN made aware of outcomes Exercises   Quad Sets Sitting;20 reps;AROM; Bilateral   Heelslides Sitting;20 reps;AROM; Bilateral   Glute Sets Sitting;20 reps;AROM; Bilateral   Hip Abduction Sitting;20 reps;AROM; Bilateral   Hip Adduction Sitting;20 reps;AROM; Bilateral   Knee AROM Long Arc Quad Sitting;20 reps;AROM; Bilateral   Ankle Pumps Sitting;20 reps;AROM; Bilateral   Marching Sitting;20 reps;AROM; Bilateral   Assessment   Prognosis Good   Problem List Impaired balance;Decreased safety awareness;Orthopedic restrictions;Pain   Assessment Pt seen for PT treatment session this date with interventions consisting of Therapeutic exercise consisting of: AROM 20 reps B LE in sitting position, therapeutic activity consisting of training: in w/c propulsion, pt able to propel [id number], indep, able to indep make turns and back up and navigating 5 stairs w/ 1 crutch and L handrail handrail with step to pattern with close S  Pt agreeable to PT treatment session upon arrival, pt found seated OOB in chair, in no apparent distress and responsive  In comparison to previous session, pt with improvements in ability to navigate stairs  Post session: pt returned back to recliner, all needs in reach and RN notified of session findings/recommendations  Continue to recommend home with home health rehabilitation at time of d/c in order to maximize pt's functional independence and safety w/ mobility  Pt continues to be functioning below baseline level, and remains limited 2* factors listed above and including decreased strength and impaired balance  PT will continue to see pt during current hospitalization in order to address the deficits listed above and provide interventions consistent w/ POC in effort to achieve STGs  Barriers to Discharge Inaccessible home environment   Goals   Patient Goals to go home   Plan   Treatment/Interventions Functional transfer training;LE strengthening/ROM; Therapeutic exercise; Endurance training;Elevations;Gait training   Progress Progressing toward goals   PT Frequency Other (Comment)  (4-5x/wk)   Recommendation   PT Discharge Recommendation Home with home health rehabilitation   PT - OK to Discharge Yes  (when medically stable)   Additional Comments upon conclusion, pt OOB in chair with all needs in reach   Additional Comments 2 Pt's raw score on the AM-[facility] Basic Mobility inpatient short form is 22, standardized score is 47 4  Patients at this level are likely to benefit from DC to Home, however, please refer to therapist recommendation for safe DC planning     AM-PAC Basic Mobility Inpatient   Turning in Bed Without Bedrails 4   Lying on Back to Sitting on Edge of Flat Bed 4   Moving Bed to Chair 4   Standing Up From Chair 4   Walk in Room 3   Climb 3-5 Stairs 3   Basic Mobility Inpatient Raw Score 22   Basic Mobility Standardized Score 47 4

## 2021-08-18 NOTE — PLAN OF CARE
Problem: PAIN - ADULT  Goal: Verbalizes/displays adequate comfort level or baseline comfort level  Description: Interventions:  - Encourage patient to monitor pain and request assistance  - Assess pain using appropriate pain scale  - Administer analgesics based on type and severity of pain and evaluate response  - Implement non-pharmacological measures as appropriate and evaluate response  - Consider cultural and social influences on pain and pain management  - Notify physician/advanced practitioner if interventions unsuccessful or patient reports new pain  Outcome: Progressing     Problem: INFECTION - ADULT  Goal: Absence or prevention of progression during hospitalization  Description: INTERVENTIONS:  - Assess and monitor for signs and symptoms of infection  - Monitor lab/diagnostic results  - Monitor all insertion sites, i e  indwelling lines, tubes, and drains  - Monitor endotracheal if appropriate and nasal secretions for changes in amount and color  - [place] appropriate cooling/warming therapies per order  - Administer medications as ordered  - Instruct and encourage patient and family to use good hand hygiene technique  - Identify and instruct in appropriate isolation precautions for identified infection/condition  Outcome: Progressing  Goal: Absence of fever/infection during neutropenic period  Description: INTERVENTIONS:  - Monitor WBC    Outcome: Progressing     Problem: SAFETY ADULT  Goal: Patient will remain free of falls  Description: INTERVENTIONS:  - Educate patient/family on patient safety including physical limitations  - Instruct patient to call for assistance with activity   - Consult OT/PT to assist with strengthening/mobility   - Keep Call bell within reach  - Keep bed low and locked with side rails adjusted as appropriate  - Keep care items and personal belongings within reach  - Initiate and maintain comfort rounds  - Make Fall Risk Sign visible to staff  - Consider moving patient to room near nurses station  Outcome: Progressing  Goal: Maintain or return to baseline ADL function  Description: INTERVENTIONS:  -  Assess patient's ability to carry out ADLs; assess patient's baseline for ADL function and identify physical deficits which impact ability to perform ADLs (bathing, care of mouth/teeth, toileting, grooming, dressing, etc )  - Assess/evaluate cause of self-care deficits   - Assess range of motion  - Assess patient's mobility; develop plan if impaired  - Assess patient's need for assistive devices and provide as appropriate  - Encourage maximum independence but intervene and supervise when necessary  - Involve family in performance of ADLs  - Assess for home care needs following discharge   - Consider OT consult to assist with ADL evaluation and planning for discharge  - Provide patient education as appropriate  Outcome: Progressing  Goal: Maintains/Returns to pre admission functional level  Description: INTERVENTIONS:  - Perform BMAT or MOVE assessment daily    - Set and communicate daily mobility goal to care team and patient/family/caregiver     - Out of bed for toileting  - Record patient progress and toleration of activity level   Outcome: Progressing     Problem: MUSCULOSKELETAL - ADULT  Goal: Maintain or return mobility to safest level of function  Description: INTERVENTIONS:  - Assess patient's ability to carry out ADLs; assess patient's baseline for ADL function and identify physical deficits which impact ability to perform ADLs (bathing, care of mouth/teeth, toileting, grooming, dressing, etc )  - Assess/evaluate cause of self-care deficits   - Assess range of motion  - Assess patient's mobility  - Assess patient's need for assistive devices and provide as appropriate  - Encourage maximum independence but intervene and supervise when necessary  - Involve family in performance of ADLs  - Assess for home care needs following discharge   - Consider OT consult to assist with ADL evaluation and planning for discharge  - Provide patient education as appropriate  Outcome: Progressing  Goal: Maintain proper alignment of affected body part  Description: INTERVENTIONS:  - Support, maintain and protect limb and body alignment  - Provide patient/ family with appropriate education  Outcome: Progressing

## 2021-08-18 NOTE — ASSESSMENT & PLAN NOTE
Lab Results   Component Value Date    EGFR 53 08/18/2021    EGFR 47 08/17/2021    EGFR 49 08/16/2021    CREATININE 1 39 (H) 08/18/2021    CREATININE 1 52 (H) 08/17/2021    CREATININE 1 47 (H) 08/16/2021   No BMP ordered by the ER  Will place routine labs
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
## 2021-08-19 VITALS
DIASTOLIC BLOOD PRESSURE: 81 MMHG | SYSTOLIC BLOOD PRESSURE: 137 MMHG | HEART RATE: 89 BPM | TEMPERATURE: 98 F | RESPIRATION RATE: 18 BRPM | OXYGEN SATURATION: 95 %

## 2021-08-19 LAB
ANION GAP SERPL CALCULATED.3IONS-SCNC: 7 MMOL/L (ref 4–13)
BUN SERPL-MCNC: 23 MG/DL (ref 5–25)
CALCIUM SERPL-MCNC: 9.4 MG/DL (ref 8.3–10.1)
CHLORIDE SERPL-SCNC: 103 MMOL/L (ref 100–108)
CO2 SERPL-SCNC: 29 MMOL/L (ref 21–32)
CREAT SERPL-MCNC: 1.3 MG/DL (ref 0.6–1.3)
ERYTHROCYTE [DISTWIDTH] IN BLOOD BY AUTOMATED COUNT: 13.3 % (ref 11.6–15.1)
GFR SERPL CREATININE-BSD FRML MDRD: 57 ML/MIN/1.73SQ M
GLUCOSE SERPL-MCNC: 160 MG/DL (ref 65–140)
GLUCOSE SERPL-MCNC: 161 MG/DL (ref 65–140)
GLUCOSE SERPL-MCNC: 167 MG/DL (ref 65–140)
HCT VFR BLD AUTO: 37.6 % (ref 36.5–49.3)
HGB BLD-MCNC: 12.3 G/DL (ref 12–17)
MCH RBC QN AUTO: 29.8 PG (ref 26.8–34.3)
MCHC RBC AUTO-ENTMCNC: 32.7 G/DL (ref 31.4–37.4)
MCV RBC AUTO: 91 FL (ref 82–98)
PLATELET # BLD AUTO: 267 THOUSANDS/UL (ref 149–390)
PMV BLD AUTO: 12.2 FL (ref 8.9–12.7)
POTASSIUM SERPL-SCNC: 4.6 MMOL/L (ref 3.5–5.3)
RBC # BLD AUTO: 4.13 MILLION/UL (ref 3.88–5.62)
SODIUM SERPL-SCNC: 139 MMOL/L (ref 136–145)
WBC # BLD AUTO: 7.36 THOUSAND/UL (ref 4.31–10.16)

## 2021-08-19 PROCEDURE — 85027 COMPLETE CBC AUTOMATED: CPT | Performed by: INTERNAL MEDICINE

## 2021-08-19 PROCEDURE — 82948 REAGENT STRIP/BLOOD GLUCOSE: CPT

## 2021-08-19 PROCEDURE — 97110 THERAPEUTIC EXERCISES: CPT

## 2021-08-19 PROCEDURE — 80048 BASIC METABOLIC PNL TOTAL CA: CPT | Performed by: INTERNAL MEDICINE

## 2021-08-19 PROCEDURE — 99217 PR OBSERVATION CARE DISCHARGE MANAGEMENT: CPT | Performed by: PHYSICIAN ASSISTANT

## 2021-08-19 PROCEDURE — 97116 GAIT TRAINING THERAPY: CPT

## 2021-08-19 RX ORDER — CYCLOBENZAPRINE HCL 5 MG
5 TABLET ORAL 3 TIMES DAILY PRN
Qty: 20 TABLET | Refills: 0 | Status: SHIPPED | OUTPATIENT
Start: 2021-08-19 | End: 2021-12-15

## 2021-08-19 RX ORDER — OXYCODONE HYDROCHLORIDE 5 MG/1
5 TABLET ORAL EVERY 4 HOURS PRN
Qty: 10 TABLET | Refills: 0 | Status: SHIPPED | OUTPATIENT
Start: 2021-08-19 | End: 2021-08-29

## 2021-08-19 RX ORDER — ACETAMINOPHEN 325 MG/1
650 TABLET ORAL EVERY 6 HOURS PRN
Qty: 32 TABLET | Refills: 0 | Status: SHIPPED | OUTPATIENT
Start: 2021-08-19 | End: 2021-12-15

## 2021-08-19 RX ADMIN — DOCUSATE SODIUM AND SENNOSIDES 1 TABLET: 8.6; 5 TABLET, FILM COATED ORAL at 08:34

## 2021-08-19 RX ADMIN — OMEGA-3 FATTY ACIDS CAP 1000 MG 2000 MG: 1000 CAP at 08:34

## 2021-08-19 RX ADMIN — FLUTICASONE FUROATE AND VILANTEROL TRIFENATATE 1 PUFF: 200; 25 POWDER RESPIRATORY (INHALATION) at 08:33

## 2021-08-19 RX ADMIN — PREGABALIN 150 MG: 75 CAPSULE ORAL at 08:34

## 2021-08-19 RX ADMIN — OXYCODONE HYDROCHLORIDE 10 MG: 10 TABLET ORAL at 05:15

## 2021-08-19 RX ADMIN — ALLOPURINOL 300 MG: 300 TABLET ORAL at 08:34

## 2021-08-19 RX ADMIN — HEPARIN SODIUM 5000 UNITS: 5000 INJECTION INTRAVENOUS; SUBCUTANEOUS at 05:16

## 2021-08-19 RX ADMIN — PANTOPRAZOLE SODIUM 40 MG: 40 TABLET, DELAYED RELEASE ORAL at 05:15

## 2021-08-19 RX ADMIN — FENOFIBRATE 145 MG: 145 TABLET, FILM COATED ORAL at 08:34

## 2021-08-19 RX ADMIN — OXYCODONE HYDROCHLORIDE 10 MG: 10 TABLET ORAL at 12:20

## 2021-08-19 RX ADMIN — INSULIN LISPRO 1 UNITS: 100 INJECTION, SOLUTION INTRAVENOUS; SUBCUTANEOUS at 07:30

## 2021-08-19 RX ADMIN — DOCUSATE SODIUM 100 MG: 100 CAPSULE ORAL at 08:34

## 2021-08-19 RX ADMIN — INSULIN LISPRO 1 UNITS: 100 INJECTION, SOLUTION INTRAVENOUS; SUBCUTANEOUS at 11:30

## 2021-08-19 NOTE — CASE MANAGEMENT
Pt to now discharge home with home therapies  Pt has been accepted by [facility] for HHPT/OT  Per PA pt's friend will be transporting pt home
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
## 2021-08-19 NOTE — DISCHARGE INSTR - AVS FIRST PAGE
As discussed, try to avoid taking NSAIDs for pain, which includes:  Aleve/naproxen, Advil/Motrin/ibuprofen, meloxicam   Follow-up with PCP for continued pain management and reassessment of kidney function

## 2021-08-19 NOTE — ASSESSMENT & PLAN NOTE
Lab Results   Component Value Date    EGFR 57 08/19/2021    EGFR 53 08/18/2021    EGFR 47 08/17/2021    CREATININE 1 30 08/19/2021    CREATININE 1 39 (H) 08/18/2021    CREATININE 1 52 (H) 08/17/2021   · Baseline Cr from 2020 1 08-1 38, recent baseline 1 39-1 52  · Today 8/19: Cr 1 3, close to baseline  eGFR 57  · Recommend patient follow-up with nephrology outpatient to re-establish care   Avoid excessive NSAID use 2/2 kidney function, D/C all NSAIDs and follow-up with PCP

## 2021-08-19 NOTE — ASSESSMENT & PLAN NOTE
· Patient reports that he cannot afford his Trelegy inhaler - will resume current inhaler therapy  · Discussed with case management, patient's insurance covers alternative LABA Sumit Brower, will send patient home with Sumit Brower

## 2021-08-19 NOTE — ASSESSMENT & PLAN NOTE
· History of diet-controlled diabetes  · Will monitor blood sugar while admitted  · Sliding scale and basal bolus protocol  · Patient can resume home meds upon discharge

## 2021-08-19 NOTE — PLAN OF CARE
Problem: PHYSICAL THERAPY ADULT  Goal: Performs mobility at highest level of function for planned discharge setting  See evaluation for individualized goals  Description: Treatment/Interventions: Functional transfer training, LE strengthening/ROM, Elevations, Therapeutic exercise, Endurance training, Patient/family training, Equipment eval/education, Bed mobility, Gait training, Spoke to nursing (w/c training)  Equipment Recommended:  (tbd)       See flowsheet documentation for full assessment, interventions and recommendations  Outcome: Adequate for Discharge  Note: Prognosis: Good  Problem List: Decreased strength, Decreased endurance, Impaired balance, Decreased mobility, Decreased safety awareness, Orthopedic restrictions, Pain  Assessment: Pt seen for PT treatment session this date with interventions consisting of gait training w/ emphasis on improving pt's ability to ambulate level surfaces x 100 ft with close S provided by therapist with RW, Therapeutic exercise consisting of: AROM 15 reps B LE in sitting position and therapeutic activity consisting of training: bed mobility, supine<>sit transfers and sit<>stand transfers  Pt agreeable to PT treatment session upon arrival, pt found supine in bed w/ HOB elevated, in no apparent distress and responsive  In comparison to previous session, pt with improvements in functional mobility and functional activity tolerance  Post session: pt returned BTB  Continue to recommend home with home health rehabilitation at time of d/c in order to maximize pt's functional independence and safety w/ mobility  Pt continues to be functioning below baseline level, and remains limited 2* factors listed above and including impaired balance and decreased safety awareness  PT will continue to see pt during current hospitalization in order to address the deficits listed above and provide interventions consistent w/ POC in effort to achieve STGs    Barriers to Discharge: Inaccessible home environment, Decreased caregiver support        PT Discharge Recommendation: Home with home health rehabilitation     PT - OK to Discharge: Yes (when medically stable)    See flowsheet documentation for full assessment

## 2021-08-19 NOTE — PHYSICAL THERAPY NOTE
08/19/21 0831   PT Last Visit   PT Visit Date 08/19/21   Note Type   Note Type Treatment   Pain Assessment   Pain Assessment Tool 0-10   Pain Score 2   Pain Location/Orientation Orientation: Left; Location: Hip;Location: Knee   Pain Onset/Description Onset: Ongoing   Patient's Stated Pain Goal No pain   Hospital Pain Intervention(s) Ambulation/increased activity   Restrictions/Precautions   Weight Bearing Precautions Per Order Yes   LLE Weight Bearing Per Order WBAT   Other Precautions WBS; Fall Risk;Pain   General   Chart Reviewed Yes   Response to Previous Treatment Patient with no complaints from previous session  Family/Caregiver Present No   Cognition   Overall Cognitive Status WFL   Arousal/Participation Alert; Cooperative   Attention Within functional limits   Orientation Level Oriented X4   Memory Within functional limits   Following Commands Follows all commands and directions without difficulty   Comments pt agreeable to PT session   Bed Mobility   Supine to Sit 6  Modified independent   Additional items HOB elevated; Bedrails   Sit to Supine 6  Modified independent   Additional items Bedrails; Increased time required   Transfers   Sit to Stand 6  Modified independent   Additional items Armrests; Increased time required   Stand to Sit 6  Modified independent   Additional items Bedrails; Increased time required   Stand pivot 5  Supervision   Additional items Increased time required   Ambulation/Elevation   Gait pattern Improper Weight shift;Decreased foot clearance;Decreased L stance; Short stride; Excessively slow   Gait Assistance 5  Supervision   Additional items Verbal cues   Assistive Device Rolling walker   Distance 100 ft   Stair Management Assistance 5  Supervision   Additional items Verbal cues   Stair Management Technique Two rails; Step to pattern   Number of Stairs 5   Balance   Static Sitting Good   Dynamic Sitting Fair +   Static Standing Fair   Dynamic Standing Jean-Pierre Welch 5179 - Endurance Deficit   Endurance Deficit No   Activity Tolerance   Activity Tolerance Patient tolerated treatment well   Nurse Made Aware yes   Exercises   Quad Sets Sitting;AROM; Bilateral;20 reps   Heelslides Sitting;20 reps;AROM; Bilateral   Glute Sets Sitting;AROM; Bilateral;20 reps   Hip Abduction Sitting;AROM; Bilateral;20 reps   Hip Adduction Sitting;AROM; Bilateral;20 reps   Knee AROM [employer]; Bilateral;20 reps   Ankle Pumps Sitting;20 reps;AROM; Bilateral   Marching Sitting;20 reps;AROM; Bilateral   Assessment   Prognosis Good   Problem List Decreased strength;Decreased endurance; Impaired balance;Decreased mobility; Decreased safety awareness;Orthopedic restrictions;Pain   Assessment Pt seen for PT treatment session this date with interventions consisting of gait training w/ emphasis on improving pt's ability to ambulate level surfaces x 100 ft with close S provided by therapist with RW, Therapeutic exercise consisting of: AROM 15 reps B LE in sitting position and therapeutic activity consisting of training: bed mobility, supine<>sit transfers and sit<>stand transfers  Pt agreeable to PT treatment session upon arrival, pt found supine in bed w/ HOB elevated, in no apparent distress and responsive  In comparison to previous session, pt with improvements in functional mobility and functional activity tolerance  Post session: pt returned BTB  Continue to recommend home with home health rehabilitation at time of d/c in order to maximize pt's functional independence and safety w/ mobility  Pt continues to be functioning below baseline level, and remains limited 2* factors listed above and including impaired balance and decreased safety awareness  PT will continue to see pt during current hospitalization in order to address the deficits listed above and provide interventions consistent w/ POC in effort to achieve STGs     Barriers to Discharge Inaccessible home environment;Decreased caregiver support Plan   Treatment/Interventions Functional transfer training;LE strengthening/ROM; Therapeutic exercise; Endurance training;Bed mobility;Gait training   Progress Progressing toward goals   PT Frequency   (4-5x/wk)   Recommendation   PT Discharge Recommendation Home with home health rehabilitation   PT - OK to Discharge Yes  (when medically stable)   Additional Comments upon conclusion, pt resting in bed with all needs in reach   Additional Comments 2 Pt's raw score on the [facility] Basic Mobility inpatient short form is 22, standardized score is 47 4  Patients at this level are likely to benefit from DC to home, however, please refer to therapist recommendation for safe DC planning     [facility] Basic Mobility Inpatient   Turning in Bed Without Bedrails 4   Lying on Back to Sitting on Edge of Flat Bed 4   Moving Bed to Chair 4   Standing Up From Chair 4   Walk in Room 3   Climb 3-5 Stairs 3   Basic Mobility Inpatient Raw Score 22   Basic Mobility Standardized Score 47 4

## 2021-08-30 RX ORDER — PREGABALIN 100 MG/1
100 CAPSULE ORAL 2 TIMES DAILY
COMMUNITY
End: 2021-12-15

## 2021-08-30 RX ORDER — BUPIVACAINE HYDROCHLORIDE 5 MG/ML
INJECTION, SOLUTION PERINEURAL
Status: ON HOLD | COMMUNITY
Start: 2021-07-07 | End: 2021-09-08 | Stop reason: ALTCHOICE

## 2021-08-30 RX ORDER — FEXOFENADINE HCL 180 MG/1
TABLET ORAL
COMMUNITY
End: 2021-09-01 | Stop reason: ALTCHOICE

## 2021-08-30 RX ORDER — MELOXICAM 15 MG/1
TABLET ORAL
COMMUNITY
Start: 2021-08-02 | End: 2021-10-24

## 2021-08-30 RX ORDER — ERGOCALCIFEROL (VITAMIN D2) 1250 MCG
CAPSULE ORAL
COMMUNITY
Start: 2021-06-17

## 2021-08-30 RX ORDER — PREGABALIN 150 MG/1
1 CAPSULE ORAL EVERY 12 HOURS
COMMUNITY
Start: 2021-07-26

## 2021-08-30 RX ORDER — GUAIFENESIN AND CODEINE PHOSPHATE 100; 10 MG/5ML; MG/5ML
SOLUTION ORAL
Status: ON HOLD | COMMUNITY
End: 2021-09-08 | Stop reason: ALTCHOICE

## 2021-08-30 RX ORDER — LIDOCAINE 50 MG/G
PATCH TOPICAL
COMMUNITY
Start: 2021-06-07 | End: 2021-09-01 | Stop reason: ALTCHOICE

## 2021-08-30 RX ORDER — UREA 10 %
1 LOTION (ML) TOPICAL DAILY
COMMUNITY
End: 2021-12-15

## 2021-08-30 RX ORDER — TRAMADOL HYDROCHLORIDE 50 MG/1
TABLET ORAL
COMMUNITY
Start: 2021-08-26 | End: 2021-11-02

## 2021-08-30 RX ORDER — ALBUTEROL SULFATE 90 UG/1
AEROSOL, METERED RESPIRATORY (INHALATION)
COMMUNITY
End: 2021-09-01 | Stop reason: SDUPTHER

## 2021-08-30 RX ORDER — OMEPRAZOLE 20 MG/1
20 TABLET, DELAYED RELEASE ORAL DAILY
COMMUNITY
Start: 2021-02-05 | End: 2022-02-05

## 2021-08-30 RX ORDER — PREGABALIN 150 MG/1
150 CAPSULE ORAL 2 TIMES DAILY
COMMUNITY
Start: 2021-03-11 | End: 2021-08-30 | Stop reason: SDUPTHER

## 2021-08-30 RX ORDER — ALENDRONATE SODIUM 70 MG/1
TABLET ORAL
COMMUNITY
Start: 2021-01-18

## 2021-08-30 RX ORDER — IPRATROPIUM BROMIDE AND ALBUTEROL SULFATE 2.5; .5 MG/3ML; MG/3ML
SOLUTION RESPIRATORY (INHALATION)
COMMUNITY
Start: 2021-03-18

## 2021-08-30 RX ORDER — ASCORBIC ACID 500 MG
500 TABLET ORAL DAILY
COMMUNITY
End: 2021-12-15

## 2021-08-30 RX ORDER — FLUTICASONE FUROATE, UMECLIDINIUM BROMIDE AND VILANTEROL TRIFENATATE 100; 62.5; 25 UG/1; UG/1; UG/1
1 POWDER RESPIRATORY (INHALATION) DAILY
COMMUNITY
Start: 2021-02-22 | End: 2021-12-15

## 2021-08-30 RX ORDER — PREDNISONE 1 MG/1
TABLET ORAL
COMMUNITY
End: 2021-12-15

## 2021-08-30 RX ORDER — DULOXETIN HYDROCHLORIDE 30 MG/1
60 CAPSULE, DELAYED RELEASE ORAL
COMMUNITY
Start: 2021-05-13

## 2021-08-30 RX ORDER — METHYLPREDNISOLONE ACETATE 40 MG/ML
INJECTION, SUSPENSION INTRA-ARTICULAR; INTRALESIONAL; INTRAMUSCULAR; SOFT TISSUE
Status: ON HOLD | COMMUNITY
Start: 2021-07-07 | End: 2021-09-08 | Stop reason: ALTCHOICE

## 2021-08-30 RX ORDER — DICLOFENAC SODIUM 20 MG/G
1 SOLUTION TOPICAL 2 TIMES DAILY
Status: ON HOLD | COMMUNITY
Start: 2021-05-27 | End: 2021-09-08 | Stop reason: ALTCHOICE

## 2021-08-30 RX ORDER — LEVOFLOXACIN 500 MG/1
TABLET, FILM COATED ORAL
COMMUNITY
End: 2021-09-01 | Stop reason: ALTCHOICE

## 2021-08-31 ENCOUNTER — OFFICE VISIT (OUTPATIENT)
Dept: OBGYN CLINIC | Facility: HOSPITAL | Age: 66
End: 2021-08-31
Payer: COMMERCIAL

## 2021-08-31 VITALS
DIASTOLIC BLOOD PRESSURE: 80 MMHG | HEART RATE: 96 BPM | WEIGHT: 166.8 LBS | BODY MASS INDEX: 25.28 KG/M2 | SYSTOLIC BLOOD PRESSURE: 139 MMHG | HEIGHT: 68 IN

## 2021-08-31 DIAGNOSIS — G89.29 CHRONIC LEFT HIP PAIN: ICD-10-CM

## 2021-08-31 DIAGNOSIS — M25.552 CHRONIC LEFT HIP PAIN: ICD-10-CM

## 2021-08-31 DIAGNOSIS — M16.12 PRIMARY OSTEOARTHRITIS OF LEFT HIP: Primary | ICD-10-CM

## 2021-08-31 DIAGNOSIS — Z96.9 RETAINED ORTHOPEDIC HARDWARE: ICD-10-CM

## 2021-08-31 PROCEDURE — 99214 OFFICE O/P EST MOD 30 MIN: CPT | Performed by: ORTHOPAEDIC SURGERY

## 2021-08-31 PROCEDURE — 1124F ACP DISCUSS-NO DSCNMKR DOCD: CPT | Performed by: ORTHOPAEDIC SURGERY

## 2021-08-31 RX ORDER — ACETAMINOPHEN 325 MG/1
975 TABLET ORAL ONCE
Status: CANCELLED | OUTPATIENT
Start: 2021-08-31 | End: 2021-08-31

## 2021-08-31 RX ORDER — GABAPENTIN 300 MG/1
300 CAPSULE ORAL ONCE
Status: CANCELLED | OUTPATIENT
Start: 2021-08-31 | End: 2021-08-31

## 2021-08-31 RX ORDER — OXYCODONE HYDROCHLORIDE 5 MG/1
CAPSULE ORAL
COMMUNITY
Start: 2021-08-24 | End: 2021-09-08 | Stop reason: HOSPADM

## 2021-08-31 RX ORDER — ROSUVASTATIN CALCIUM 5 MG/1
5 TABLET, COATED ORAL DAILY
COMMUNITY
Start: 2021-06-26 | End: 2021-09-01 | Stop reason: SDUPTHER

## 2021-08-31 RX ORDER — SODIUM CHLORIDE, SODIUM LACTATE, POTASSIUM CHLORIDE, CALCIUM CHLORIDE 600; 310; 30; 20 MG/100ML; MG/100ML; MG/100ML; MG/100ML
125 INJECTION, SOLUTION INTRAVENOUS CONTINUOUS
Status: CANCELLED | OUTPATIENT
Start: 2021-08-31

## 2021-08-31 RX ORDER — CEFAZOLIN SODIUM 2 G/50ML
2000 SOLUTION INTRAVENOUS ONCE
Status: CANCELLED | OUTPATIENT
Start: 2021-08-31 | End: 2021-08-31

## 2021-08-31 NOTE — H&P (VIEW-ONLY)
Assessment:   Diagnosis ICD-10-CM Associated Orders   1  Primary osteoarthritis of left hip  M16 12    2  Chronic left hip pain  M25 552     G89 29    3  Retained orthopedic hardware  Z96 9        Plan:  Recently taken diagnostics reviewed and physical exam performed  Diagnosis, treatment options and associated risks were discussed with the patient including no treatment, nonsurgical treatment and potential for surgical intervention  The patient was given the opportunity to ask questions regarding each  Quality of life decision to pursue elective left total hip arthroplasty however in the presence of his left femoral IM nail recommend staged surgery with the first being removal of his retained hardware and then about 3 months later performing a left total hip arthroplasty  Risks and benefits discussed  Consents obtained  To do next visit:  Return for post-op  The above stated was discussed in layman's terms and the patient expressed understanding  All questions were answered to the patient's satisfaction  Scribe Attestation    I,:  Gideon Sunshine am acting as a scribe while in the presence of the attending physician :       I,:  Michelle Booker MD personally performed the services described in this documentation    as scribed in my presence :             Subjective:   Elizabeth Jones is a 73 y o  male who presents   Today for evaluation of his left hip due to pain he has limited motion at his left hip due to pain in his groin and medial thigh area  He has a history of left total knee arthroplasty by Dr Jennifer Menjivar approximately 1996    he is status post left femur IM nail 2015 by Dr Alli Cueva  He presents today with use of a walker for ambulatory assistance  He saw Dr Saira Tsai who recommended a staged surgery with removal of hardware and then WOLFGANG       Review of systems negative unless otherwise specified in HPI  Review of Systems    Past Medical History:   Diagnosis Date    Arthritis     osteoarthritis  COPD (chronic obstructive pulmonary disease) (HCC)     Fibromyalgia     GERD (gastroesophageal reflux disease)     History of transfusion     Hyperlipidemia        Past Surgical History:   Procedure Laterality Date    ANKLE FRACTURE SURGERY      Left Side    CHOLECYSTECTOMY      CYST REMOVAL      FEMUR SURGERY Left     KNEE SURGERY Left 1996    MANDIBLE SURGERY      ROTATOR CUFF REPAIR Left     WRIST FRACTURE SURGERY Left     plate in place       Family History   Problem Relation Age of Onset    No Known Problems Mother     No Known Problems Father        Social History     Occupational History    Not on file   Tobacco Use    Smoking status: Former Smoker     Packs/day: 1 00     Types: Cigarettes    Smokeless tobacco: Never Used   Vaping Use    Vaping Use: Never used   Substance and Sexual Activity    Alcohol use: Not Currently     Comment: last drink 1 year ago     Drug use: No    Sexual activity: Not Currently         Current Outpatient Medications:     alendronate (FOSAMAX) 70 mg tablet, take 1 tablet by mouth every 7 days IN THE MORNING ON AN EMPTY ST   (REFER TO PRESCRIPTION NOTES)  , Disp: , Rfl:     bupivacaine (MARCAINE) 0 5 %, Take 2 mL by injection route , Disp: , Rfl:     Diclofenac Sodium (Pennsaid) 2 % SOLN, Apply 1 each topically 2 (two) times a day, Disp: , Rfl:     DULoxetine (CYMBALTA) 30 mg delayed release capsule, Take 60 mg by mouth daily, Disp: , Rfl:     ergocalciferol (ERGOCALCIFEROL) 1 25 MG (46930 UT) capsule, take 1 capsule by mouth every week, Disp: , Rfl:     fluticasone-umeclidinium-vilanterol (Trelegy Ellipta) 100-62 5-25 MCG/INH inhaler, Inhale 1 puff daily, Disp: , Rfl:     ipratropium-albuterol (DUO-NEB) 0 5-2 5 mg/3 mL nebulizer solution, inhale contents of 1 vial ( 3 milliliters ) in nebulizer by mouth four times a day if needed, Disp: , Rfl:     lidocaine (LIDODERM) 5 %, apply 1 patch topically to affected area once daily (MAY WEAR UP TO 12 HOURS)  , Disp: , Rfl:     meloxicam (Mobic) 15 mg tablet, take 1 tablet by oral route in the morning as needed for pain, Disp: , Rfl:     metFORMIN (GLUCOPHAGE) 1000 MG tablet, take 1 tablet by mouth every morning and evening with meals, Disp: , Rfl:     metFORMIN (Glucophage) 850 mg tablet, Take by mouth every 12 (twelve) hours, Disp: , Rfl:     methylPREDNISolone acetate (DEPO-MEDROL) 40 mg/mL injection, Take 1 mL by injection route , Disp: , Rfl:     omeprazole (PriLOSEC OTC) 20 MG tablet, Take 20 mg by mouth daily, Disp: , Rfl:     pregabalin (Lyrica) 150 mg capsule, Take 1 capsule by mouth every 12 (twelve) hours, Disp: , Rfl:     traMADol (ULTRAM) 50 mg tablet, Take 1 tablet every 6 hours by oral route , Disp: , Rfl:     acetaminophen (TYLENOL) 325 mg tablet, Take 2 tablets (650 mg total) by mouth every 6 (six) hours as needed for mild pain, Disp: 32 tablet, Rfl: 0    albuterol (PROVENTIL HFA,VENTOLIN HFA) 90 mcg/act inhaler, INHALE 1 TO 2 PUFFS EVERY 4 TO 6 HOURS AS NEEDED AND AS DIRECTED , Disp: , Rfl:     albuterol (Ventolin HFA) 90 mcg/act inhaler, inhale 1 puff by mouth every 6 hours if needed, Disp: , Rfl:     allopurinol (ZYLOPRIM) 300 mg tablet, Take 300 mg by mouth daily, Disp: , Rfl:     ascorbic acid (VITAMIN C) 500 MG tablet, Take 500 mg by mouth daily, Disp: , Rfl:     budesonide-formoterol (Symbicort) 160-4 5 mcg/act inhaler, , Disp: , Rfl:     calcium carbonate (OS-SVETLANA) 1250 (500 Ca) MG chewable tablet, Chew 1 tablet daily, Disp: , Rfl:     cetirizine (ZyrTEC) 10 mg tablet, Take 10 mg by mouth daily, Disp: , Rfl:     Cholecalciferol (VITAMIN D3) 125 MCG (5000 UT) TABS, Take 5,000 Units by mouth daily, Disp: , Rfl:     cyclobenzaprine (FLEXERIL) 5 mg tablet, Take 1 tablet (5 mg total) by mouth 3 (three) times a day as needed for muscle spasms, Disp: 20 tablet, Rfl: 0    fenofibrate (TRICOR) 145 mg tablet, Take 145 mg by mouth daily, Disp: , Rfl:     fexofenadine (ALLEGRA) 180 MG tablet, take 1 tablet by mouth once daily, Disp: , Rfl:     fluticasone (FLONASE) 50 mcg/act nasal spray, 1 spray into each nostril daily Pt states no longer taking, Disp: , Rfl:     fluticasone-salmeterol (Wixela Inhub) 100-50 mcg/dose inhaler, Inhale 1 puff 2 (two) times a day Rinse mouth after use , Disp: 60 blister, Rfl: 0    guaifenesin-codeine (GUAIFENESIN AC) 100-10 MG/5ML liquid, TAKE 5 ML BY MOUTH 3 TIMES A DAY AS NEEDED FOR COUGH, Disp: , Rfl:     Icosapent Ethyl (Vascepa) 0 5 g CAPS, Take 2 capsules by mouth 2 (two) times a day , Disp: , Rfl:     levofloxacin (LEVAQUIN) 500 mg tablet, take 1 tablet by mouth once daily for 10 days, Disp: , Rfl:     metFORMIN (GLUCOPHAGE) 500 mg tablet, TAKE 1 TABLET BY MOUTH EVERY MORNING WITH BREAKFAST , Disp: , Rfl:     Multiple Vitamin (multivitamin) tablet, TAKE 1 TABLET DAILY  , Disp: , Rfl:     nicotine (NICODERM CQ) 14 mg/24hr TD 24 hr patch, Place 1 patch on the skin daily (Patient not taking: Reported on 10/22/2020), Disp: 28 patch, Rfl: 0    oxyCODONE (OXY-IR) 5 MG capsule, take 1 capsule by mouth every 6 hours if needed for pain, Disp: , Rfl:     predniSONE 5 mg tablet, take 1 tablet by mouth once daily, Disp: , Rfl:     pregabalin (Lyrica) 100 mg capsule, , Disp: , Rfl:     Pseudoephedrine-Naproxen Na (ALEVE-D SINUS & COLD PO), , Disp: , Rfl:     RA Laxative 5 MG EC tablet, take 2 tablets once daily for 1 dose, Disp: 2 tablet, Rfl: 0    rosuvastatin (CRESTOR) 10 MG tablet, Take 10 mg by mouth daily at bedtime, Disp: , Rfl:     rosuvastatin (CRESTOR) 5 mg tablet, Take 5 mg by mouth daily, Disp: , Rfl:     triazolam (HALCION) 0 25 MG tablet, take 1 tablet by mouth nightly if needed for sleep, Disp: , Rfl:     No Known Allergies         Vitals:    08/31/21 1325   BP: 139/80   Pulse: 96       Objective:                    Left Knee Exam     Tenderness   Left knee tenderness location: laterally over his prominent distal locking screws      Range of Motion   Extension: 5   Flexion: 100     Other   Erythema: absent  Sensation: normal    Comments:    Healed incisions  No warmth  Intact extensor mechanism  Valgus alignment  Modest quadriceps atrophy      Left Hip Exam     Tenderness   The patient is experiencing tenderness in the anterior  Range of Motion   Flexion: 80 (leg goes into ER)   External rotation: 20   Internal rotation: 10 (all with groin pain)     Other   Erythema: absent  Sensation: normal            Diagnostics, reviewed and taken today if performed as documented:    None performed but reviewed: The attending physician has personally reviewed the pertinent films in PACS and interpretation is as follows:      Left femur x-rays taken 08/17/2021 were reviewed today and show:   Advanced left hip intra-articular degeneration with subchondral cystic formation and osteophyte formation present  There is an intramedullary nail with proximal and distal locking screws in reasonable position alignment with abundant callus formation distal femoral shaft area  Left total knee prosthesis in reasonable position alignment without gross signs of loosening  Procedures, if performed today:    Procedures    None performed      Portions of the record may have been created with voice recognition software  Occasional wrong word or "sound a like" substitutions may have occurred due to the inherent limitations of voice recognition software  Read the chart carefully and recognize, using context, where substitutions have occurred

## 2021-08-31 NOTE — PROGRESS NOTES
Assessment:   Diagnosis ICD-10-CM Associated Orders   1  Primary osteoarthritis of left hip  M16 12    2  Chronic left hip pain  M25 552     G89 29    3  Retained orthopedic hardware  Z96 9        Plan:  Recently taken diagnostics reviewed and physical exam performed  Diagnosis, treatment options and associated risks were discussed with the patient including no treatment, nonsurgical treatment and potential for surgical intervention  The patient was given the opportunity to ask questions regarding each  Quality of life decision to pursue elective left total hip arthroplasty however in the presence of his left femoral IM nail recommend staged surgery with the first being removal of his retained hardware and then about 3 months later performing a left total hip arthroplasty  Risks and benefits discussed  Consents obtained  To do next visit:  Return for post-op  The above stated was discussed in layman's terms and the patient expressed understanding  All questions were answered to the patient's satisfaction  Scribe Attestation    I,:  Eloy Wood am acting as a scribe while in the presence of the attending physician :       I,:  Jodi Toscano MD personally performed the services described in this documentation    as scribed in my presence :             Subjective:   Matt Rock is a 73 y o  male who presents   Today for evaluation of his left hip due to pain he has limited motion at his left hip due to pain in his groin and medial thigh area  He has a history of left total knee arthroplasty by Dr Ewa Mckeon approximately 1996    he is status post left femur IM nail 2015 by Dr Rosalva Aguirre  He presents today with use of a walker for ambulatory assistance  He saw Dr Kamila Gilbert who recommended a staged surgery with removal of hardware and then WOLFGANG       Review of systems negative unless otherwise specified in HPI  Review of Systems    Past Medical History:   Diagnosis Date    Arthritis     osteoarthritis  COPD (chronic obstructive pulmonary disease) (HCC)     Fibromyalgia     GERD (gastroesophageal reflux disease)     History of transfusion     Hyperlipidemia        Past Surgical History:   Procedure Laterality Date    ANKLE FRACTURE SURGERY      Left Side    CHOLECYSTECTOMY      CYST REMOVAL      FEMUR SURGERY Left     KNEE SURGERY Left 1996    MANDIBLE SURGERY      ROTATOR CUFF REPAIR Left     WRIST FRACTURE SURGERY Left     plate in place       Family History   Problem Relation Age of Onset    No Known Problems Mother     No Known Problems Father        Social History     Occupational History    Not on file   Tobacco Use    Smoking status: Former Smoker     Packs/day: 1 00     Types: Cigarettes    Smokeless tobacco: Never Used   Vaping Use    Vaping Use: Never used   Substance and Sexual Activity    Alcohol use: Not Currently     Comment: last drink 1 year ago     Drug use: No    Sexual activity: Not Currently         Current Outpatient Medications:     alendronate (FOSAMAX) 70 mg tablet, take 1 tablet by mouth every 7 days IN THE MORNING ON AN EMPTY ST   (REFER TO PRESCRIPTION NOTES)  , Disp: , Rfl:     bupivacaine (MARCAINE) 0 5 %, Take 2 mL by injection route , Disp: , Rfl:     Diclofenac Sodium (Pennsaid) 2 % SOLN, Apply 1 each topically 2 (two) times a day, Disp: , Rfl:     DULoxetine (CYMBALTA) 30 mg delayed release capsule, Take 60 mg by mouth daily, Disp: , Rfl:     ergocalciferol (ERGOCALCIFEROL) 1 25 MG (99903 UT) capsule, take 1 capsule by mouth every week, Disp: , Rfl:     fluticasone-umeclidinium-vilanterol (Trelegy Ellipta) 100-62 5-25 MCG/INH inhaler, Inhale 1 puff daily, Disp: , Rfl:     ipratropium-albuterol (DUO-NEB) 0 5-2 5 mg/3 mL nebulizer solution, inhale contents of 1 vial ( 3 milliliters ) in nebulizer by mouth four times a day if needed, Disp: , Rfl:     lidocaine (LIDODERM) 5 %, apply 1 patch topically to affected area once daily (MAY WEAR UP TO 12 HOURS)  , Disp: , Rfl:     meloxicam (Mobic) 15 mg tablet, take 1 tablet by oral route in the morning as needed for pain, Disp: , Rfl:     metFORMIN (GLUCOPHAGE) 1000 MG tablet, take 1 tablet by mouth every morning and evening with meals, Disp: , Rfl:     metFORMIN (Glucophage) 850 mg tablet, Take by mouth every 12 (twelve) hours, Disp: , Rfl:     methylPREDNISolone acetate (DEPO-MEDROL) 40 mg/mL injection, Take 1 mL by injection route , Disp: , Rfl:     omeprazole (PriLOSEC OTC) 20 MG tablet, Take 20 mg by mouth daily, Disp: , Rfl:     pregabalin (Lyrica) 150 mg capsule, Take 1 capsule by mouth every 12 (twelve) hours, Disp: , Rfl:     traMADol (ULTRAM) 50 mg tablet, Take 1 tablet every 6 hours by oral route , Disp: , Rfl:     acetaminophen (TYLENOL) 325 mg tablet, Take 2 tablets (650 mg total) by mouth every 6 (six) hours as needed for mild pain, Disp: 32 tablet, Rfl: 0    albuterol (PROVENTIL HFA,VENTOLIN HFA) 90 mcg/act inhaler, INHALE 1 TO 2 PUFFS EVERY 4 TO 6 HOURS AS NEEDED AND AS DIRECTED , Disp: , Rfl:     albuterol (Ventolin HFA) 90 mcg/act inhaler, inhale 1 puff by mouth every 6 hours if needed, Disp: , Rfl:     allopurinol (ZYLOPRIM) 300 mg tablet, Take 300 mg by mouth daily, Disp: , Rfl:     ascorbic acid (VITAMIN C) 500 MG tablet, Take 500 mg by mouth daily, Disp: , Rfl:     budesonide-formoterol (Symbicort) 160-4 5 mcg/act inhaler, , Disp: , Rfl:     calcium carbonate (OS-SVETLANA) 1250 (500 Ca) MG chewable tablet, Chew 1 tablet daily, Disp: , Rfl:     cetirizine (ZyrTEC) 10 mg tablet, Take 10 mg by mouth daily, Disp: , Rfl:     Cholecalciferol (VITAMIN D3) 125 MCG (5000 UT) TABS, Take 5,000 Units by mouth daily, Disp: , Rfl:     cyclobenzaprine (FLEXERIL) 5 mg tablet, Take 1 tablet (5 mg total) by mouth 3 (three) times a day as needed for muscle spasms, Disp: 20 tablet, Rfl: 0    fenofibrate (TRICOR) 145 mg tablet, Take 145 mg by mouth daily, Disp: , Rfl:     fexofenadine (ALLEGRA) 180 MG tablet, take 1 tablet by mouth once daily, Disp: , Rfl:     fluticasone (FLONASE) 50 mcg/act nasal spray, 1 spray into each nostril daily Pt states no longer taking, Disp: , Rfl:     fluticasone-salmeterol (Wixela Inhub) 100-50 mcg/dose inhaler, Inhale 1 puff 2 (two) times a day Rinse mouth after use , Disp: 60 blister, Rfl: 0    guaifenesin-codeine (GUAIFENESIN AC) 100-10 MG/5ML liquid, TAKE 5 ML BY MOUTH 3 TIMES A DAY AS NEEDED FOR COUGH, Disp: , Rfl:     Icosapent Ethyl (Vascepa) 0 5 g CAPS, Take 2 capsules by mouth 2 (two) times a day , Disp: , Rfl:     levofloxacin (LEVAQUIN) 500 mg tablet, take 1 tablet by mouth once daily for 10 days, Disp: , Rfl:     metFORMIN (GLUCOPHAGE) 500 mg tablet, TAKE 1 TABLET BY MOUTH EVERY MORNING WITH BREAKFAST , Disp: , Rfl:     Multiple Vitamin (multivitamin) tablet, TAKE 1 TABLET DAILY  , Disp: , Rfl:     nicotine (NICODERM CQ) 14 mg/24hr TD 24 hr patch, Place 1 patch on the skin daily (Patient not taking: Reported on 10/22/2020), Disp: 28 patch, Rfl: 0    oxyCODONE (OXY-IR) 5 MG capsule, take 1 capsule by mouth every 6 hours if needed for pain, Disp: , Rfl:     predniSONE 5 mg tablet, take 1 tablet by mouth once daily, Disp: , Rfl:     pregabalin (Lyrica) 100 mg capsule, , Disp: , Rfl:     Pseudoephedrine-Naproxen Na (ALEVE-D SINUS & COLD PO), , Disp: , Rfl:     RA Laxative 5 MG EC tablet, take 2 tablets once daily for 1 dose, Disp: 2 tablet, Rfl: 0    rosuvastatin (CRESTOR) 10 MG tablet, Take 10 mg by mouth daily at bedtime, Disp: , Rfl:     rosuvastatin (CRESTOR) 5 mg tablet, Take 5 mg by mouth daily, Disp: , Rfl:     triazolam (HALCION) 0 25 MG tablet, take 1 tablet by mouth nightly if needed for sleep, Disp: , Rfl:     No Known Allergies         Vitals:    08/31/21 1325   BP: 139/80   Pulse: 96       Objective:                    Left Knee Exam     Tenderness   Left knee tenderness location: laterally over his prominent distal locking screws      Range of Motion   Extension: 5   Flexion: 100     Other   Erythema: absent  Sensation: normal    Comments:    Healed incisions  No warmth  Intact extensor mechanism  Valgus alignment  Modest quadriceps atrophy      Left Hip Exam     Tenderness   The patient is experiencing tenderness in the anterior  Range of Motion   Flexion: 80 (leg goes into ER)   External rotation: 20   Internal rotation: 10 (all with groin pain)     Other   Erythema: absent  Sensation: normal            Diagnostics, reviewed and taken today if performed as documented:    None performed but reviewed: The attending physician has personally reviewed the pertinent films in PACS and interpretation is as follows:      Left femur x-rays taken 08/17/2021 were reviewed today and show:   Advanced left hip intra-articular degeneration with subchondral cystic formation and osteophyte formation present  There is an intramedullary nail with proximal and distal locking screws in reasonable position alignment with abundant callus formation distal femoral shaft area  Left total knee prosthesis in reasonable position alignment without gross signs of loosening  Procedures, if performed today:    Procedures    None performed      Portions of the record may have been created with voice recognition software  Occasional wrong word or "sound a like" substitutions may have occurred due to the inherent limitations of voice recognition software  Read the chart carefully and recognize, using context, where substitutions have occurred

## 2021-09-01 ENCOUNTER — APPOINTMENT (OUTPATIENT)
Dept: LAB | Facility: HOSPITAL | Age: 66
End: 2021-09-01
Attending: ORTHOPAEDIC SURGERY
Payer: COMMERCIAL

## 2021-09-01 ENCOUNTER — HOSPITAL ENCOUNTER (OUTPATIENT)
Dept: NON INVASIVE DIAGNOSTICS | Facility: HOSPITAL | Age: 66
Discharge: HOME | End: 2021-09-01
Attending: ORTHOPAEDIC SURGERY
Payer: COMMERCIAL

## 2021-09-01 ENCOUNTER — TELEPHONE (OUTPATIENT)
Dept: OBGYN CLINIC | Facility: HOSPITAL | Age: 66
End: 2021-09-01

## 2021-09-01 DIAGNOSIS — Z96.9 RETAINED ORTHOPEDIC HARDWARE: ICD-10-CM

## 2021-09-01 DIAGNOSIS — G89.29 CHRONIC LEFT HIP PAIN: ICD-10-CM

## 2021-09-01 DIAGNOSIS — M25.552 CHRONIC LEFT HIP PAIN: ICD-10-CM

## 2021-09-01 DIAGNOSIS — Z01.812 ENCOUNTER FOR PREPROCEDURAL LABORATORY EXAMINATION: ICD-10-CM

## 2021-09-01 DIAGNOSIS — M16.12 PRIMARY OSTEOARTHRITIS OF LEFT HIP: ICD-10-CM

## 2021-09-01 LAB
ANION GAP SERPL CALCULATED.3IONS-SCNC: 8 MMOL/L (ref 4–13)
ATRIAL RATE: 78 BPM
BUN SERPL-MCNC: 16 MG/DL (ref 5–25)
CALCIUM SERPL-MCNC: 9.4 MG/DL (ref 8.3–10.1)
CHLORIDE SERPL-SCNC: 104 MMOL/L (ref 100–108)
CO2 SERPL-SCNC: 28 MMOL/L (ref 21–32)
CREAT SERPL-MCNC: 1.01 MG/DL (ref 0.6–1.3)
GFR SERPL CREATININE-BSD FRML MDRD: 78 ML/MIN/1.73SQ M
GLUCOSE SERPL-MCNC: 172 MG/DL (ref 65–140)
P AXIS: 40 DEGREES
POTASSIUM SERPL-SCNC: 4.3 MMOL/L (ref 3.5–5.3)
PR INTERVAL: 158 MS
QRS AXIS: 69 DEGREES
QRSD INTERVAL: 94 MS
QT INTERVAL: 356 MS
QTC INTERVAL: 405 MS
SODIUM SERPL-SCNC: 140 MMOL/L (ref 136–145)
T WAVE AXIS: 54 DEGREES
VENTRICULAR RATE: 78 BPM

## 2021-09-01 PROCEDURE — 93005 ELECTROCARDIOGRAM TRACING: CPT

## 2021-09-01 PROCEDURE — 36415 COLL VENOUS BLD VENIPUNCTURE: CPT

## 2021-09-01 PROCEDURE — 93010 ELECTROCARDIOGRAM REPORT: CPT | Performed by: INTERNAL MEDICINE

## 2021-09-01 PROCEDURE — 80048 BASIC METABOLIC PNL TOTAL CA: CPT

## 2021-09-01 NOTE — PRE-PROCEDURE INSTRUCTIONS
Pre-Surgery Instructions:   Medication Instructions    acetaminophen (TYLENOL) 325 mg tablet PRN    albuterol (PROVENTIL HFA,VENTOLIN HFA) 90 mcg/act inhaler PRN    alendronate (FOSAMAX) 70 mg tablet WEEKLY    ascorbic acid (VITAMIN C) 500 MG tablet LD 9/1    calcium carbonate (OS-SVETLANA) 1250 (500 Ca) MG chewable tablet LD 9/1    cetirizine (ZyrTEC) 10 mg tablet MAY USE DOS SM SIP    cyclobenzaprine (FLEXERIL) 5 mg tablet MAY USE DOS    DULoxetine (CYMBALTA) 30 mg delayed release capsule HS MED    ergocalciferol (ERGOCALCIFEROL) 1 25 MG (85878 UT) capsule LD 9/1    fenofibrate (TRICOR) 145 mg tablet HS MED    fluticasone-salmeterol (Wixela Inhub) 100-50 mcg/dose inhaler WILL TAKE DOS     Icosapent Ethyl (Vascepa) 0 5 g CAPS WILL TAKE DOS SM SIP    ipratropium-albuterol (DUO-NEB) 0 5-2 5 mg/3 mL nebulizer solution PRN    metFORMIN (GLUCOPHAGE) 1000 MG tablet DO NOT TAKE DOS    Multiple Vitamin (multivitamin) tablet LD 9/1    omeprazole (PriLOSEC OTC) 20 MG tablet TAKE DOS SM SIP    pregabalin (Lyrica) 100 mg capsule TAKE DOS SM SIP    pregabalin (Lyrica) 664 mg capsule DUPLICATE    rosuvastatin (CRESTOR) 10 MG tablet HS MED    traMADol (ULTRAM) 50 mg tablet WILL TAKE DOS SM SIP    triazolam (HALCION) 0 25 MG tablet HS MED       INSTR  ON ASC LOC  ,BRING PHOTO ID/MED LIST/INS  INFO , SHOWER REV , NO ASA/NSAIDS/VIT 1 WEEK PREOP  Pre Procedure Consult Calls    1  Are you currently experiencing symptoms of fever >100 4, cough, or shortness of breath or sore throat? ______YES    ___X__NO   If yes, then please call your PCP immediately for further direction  If you are completing this form on site, please find the safest and most direct route to the nearest exit and avoid close contact with others until you can get further advice from your PCP     2  Have you recently traveled to any foreign country or area within the [place] that has reported cases of COVID-19? ______YES      ___X__NO   Eldonna Em, LIST LOCATION(S): __________________________   3  Have you recently been in contact with someone who is a suspected or confirmed case of COVID-19?   ______ YES   _____X_ No   INSTRUCTED ON NEW COVID VISITOR POLICY- ONLY 1 VISITOR, ALL MUST WEAR MASKS, PT VERBALIZES UNDERSTANDING

## 2021-09-01 NOTE — TELEPHONE ENCOUNTER
Patient sees Dr Dwight Raygoza  He went over to the lab for his pre admission labs and the scripts are no in the system  Can they get put in to Epic?     Call back #[phone], Lubna Rosado

## 2021-09-07 ENCOUNTER — ANESTHESIA EVENT (OUTPATIENT)
Dept: PERIOP | Facility: HOSPITAL | Age: 66
End: 2021-09-07
Payer: COMMERCIAL

## 2021-09-07 NOTE — ANESTHESIA PREPROCEDURE EVALUATION
Procedure:  REMOVAL HARDWARE HIP (Left Hip)    Relevant Problems   CARDIO   (+) Chest pain   (+) Hyperlipidemia      GI/HEPATIC   (+) GERD (gastroesophageal reflux disease)      /RENAL   (+) CKD (chronic kidney disease) stage 3, GFR 30-59 ml/min ([facility])      MUSCULOSKELETAL   (+) Cervical spondylosis      NEURO/PSYCH   (+) Chronic pain syndrome      PULMONARY   (+) COPD (chronic obstructive pulmonary disease) ([facility])   (+) Community acquired bacterial pneumonia        Physical Exam    Airway    Mallampati score: III  TM Distance: >3 FB  Neck ROM: full     Dental   No notable dental hx     Cardiovascular  Cardiovascular exam normal    Pulmonary  Pulmonary exam normal     Other Findings    Stable COPD, no increased inhaler use, recent steroids, hospitalizations  Controlled GERD  No radicular symptoms in neck  Has not been using oxycodone at home recently  Anesthesia Plan  ASA Score- 3     Anesthesia Type- general with ASA Monitors  Additional Monitors:   Airway Plan: LMA  Plan Factors-Exercise tolerance (METS): >4 METS  Chart reviewed  EKG reviewed  Existing labs reviewed  Patient summary reviewed  Patient is not a current smoker  Induction- intravenous  Postoperative Plan- Plan for postoperative opioid use  Informed Consent- Anesthetic plan and risks discussed with patient

## 2021-09-08 ENCOUNTER — APPOINTMENT (OUTPATIENT)
Dept: RADIOLOGY | Facility: HOSPITAL | Age: 66
End: 2021-09-08
Payer: COMMERCIAL

## 2021-09-08 ENCOUNTER — HOSPITAL ENCOUNTER (OUTPATIENT)
Facility: HOSPITAL | Age: 66
Setting detail: OUTPATIENT SURGERY
Discharge: HOME | End: 2021-09-08
Attending: ORTHOPAEDIC SURGERY | Admitting: ORTHOPAEDIC SURGERY
Payer: COMMERCIAL

## 2021-09-08 ENCOUNTER — ANESTHESIA (OUTPATIENT)
Dept: PERIOP | Facility: HOSPITAL | Age: 66
End: 2021-09-08
Payer: COMMERCIAL

## 2021-09-08 VITALS
HEIGHT: 68 IN | SYSTOLIC BLOOD PRESSURE: 144 MMHG | WEIGHT: 166 LBS | DIASTOLIC BLOOD PRESSURE: 83 MMHG | RESPIRATION RATE: 16 BRPM | BODY MASS INDEX: 25.16 KG/M2 | TEMPERATURE: 96 F | OXYGEN SATURATION: 97 % | HEART RATE: 74 BPM

## 2021-09-08 DIAGNOSIS — Z51.89 AFTER CARE: Primary | ICD-10-CM

## 2021-09-08 LAB
ANION GAP SERPL CALCULATED.3IONS-SCNC: 3 MMOL/L (ref 4–13)
BUN SERPL-MCNC: 21 MG/DL (ref 5–25)
CALCIUM SERPL-MCNC: 9 MG/DL (ref 8.3–10.1)
CHLORIDE SERPL-SCNC: 108 MMOL/L (ref 100–108)
CO2 SERPL-SCNC: 26 MMOL/L (ref 21–32)
CREAT SERPL-MCNC: 1.12 MG/DL (ref 0.6–1.3)
ERYTHROCYTE [DISTWIDTH] IN BLOOD BY AUTOMATED COUNT: 13.5 % (ref 11.6–15.1)
GFR SERPL CREATININE-BSD FRML MDRD: 69 ML/MIN/1.73SQ M
GLUCOSE P FAST SERPL-MCNC: 122 MG/DL (ref 65–99)
GLUCOSE SERPL-MCNC: 122 MG/DL (ref 65–140)
HCT VFR BLD AUTO: 37.2 % (ref 36.5–49.3)
HGB BLD-MCNC: 11.8 G/DL (ref 12–17)
INR PPP: 1 (ref 0.84–1.19)
MCH RBC QN AUTO: 28.7 PG (ref 26.8–34.3)
MCHC RBC AUTO-ENTMCNC: 31.7 G/DL (ref 31.4–37.4)
MCV RBC AUTO: 91 FL (ref 82–98)
PLATELET # BLD AUTO: 323 THOUSANDS/UL (ref 149–390)
PMV BLD AUTO: 11.2 FL (ref 8.9–12.7)
POTASSIUM SERPL-SCNC: 4.3 MMOL/L (ref 3.5–5.3)
PROTHROMBIN TIME: 12.8 SECONDS (ref 11.6–14.5)
RBC # BLD AUTO: 4.11 MILLION/UL (ref 3.88–5.62)
SODIUM SERPL-SCNC: 137 MMOL/L (ref 136–145)
WBC # BLD AUTO: 7.39 THOUSAND/UL (ref 4.31–10.16)

## 2021-09-08 PROCEDURE — 85027 COMPLETE CBC AUTOMATED: CPT | Performed by: ORTHOPAEDIC SURGERY

## 2021-09-08 PROCEDURE — 73552 X-RAY EXAM OF FEMUR 2/>: CPT

## 2021-09-08 PROCEDURE — 80048 BASIC METABOLIC PNL TOTAL CA: CPT | Performed by: ORTHOPAEDIC SURGERY

## 2021-09-08 PROCEDURE — 85610 PROTHROMBIN TIME: CPT | Performed by: ORTHOPAEDIC SURGERY

## 2021-09-08 PROCEDURE — 20680 REMOVAL OF IMPLANT DEEP: CPT | Performed by: ORTHOPAEDIC SURGERY

## 2021-09-08 RX ORDER — CEFAZOLIN SODIUM 2 G/50ML
2000 SOLUTION INTRAVENOUS ONCE
Status: COMPLETED | OUTPATIENT
Start: 2021-09-08 | End: 2021-09-08

## 2021-09-08 RX ORDER — ACETAMINOPHEN 325 MG/1
975 TABLET ORAL ONCE
Status: DISCONTINUED | OUTPATIENT
Start: 2021-09-08 | End: 2021-09-08

## 2021-09-08 RX ORDER — LIDOCAINE HYDROCHLORIDE 10 MG/ML
INJECTION, SOLUTION EPIDURAL; INFILTRATION; INTRACAUDAL; PERINEURAL AS NEEDED
Status: DISCONTINUED | OUTPATIENT
Start: 2021-09-08 | End: 2021-09-08

## 2021-09-08 RX ORDER — ONDANSETRON 2 MG/ML
4 INJECTION INTRAMUSCULAR; INTRAVENOUS EVERY 6 HOURS PRN
Status: DISCONTINUED | OUTPATIENT
Start: 2021-09-08 | End: 2021-09-08 | Stop reason: HOSPADM

## 2021-09-08 RX ORDER — GLYCOPYRROLATE 0.2 MG/ML
INJECTION INTRAMUSCULAR; INTRAVENOUS AS NEEDED
Status: DISCONTINUED | OUTPATIENT
Start: 2021-09-08 | End: 2021-09-08

## 2021-09-08 RX ORDER — MAGNESIUM HYDROXIDE 1200 MG/15ML
LIQUID ORAL AS NEEDED
Status: DISCONTINUED | OUTPATIENT
Start: 2021-09-08 | End: 2021-09-08 | Stop reason: HOSPADM

## 2021-09-08 RX ORDER — ONDANSETRON 2 MG/ML
INJECTION INTRAMUSCULAR; INTRAVENOUS AS NEEDED
Status: DISCONTINUED | OUTPATIENT
Start: 2021-09-08 | End: 2021-09-08

## 2021-09-08 RX ORDER — GABAPENTIN 300 MG/1
300 CAPSULE ORAL ONCE
Status: COMPLETED | OUTPATIENT
Start: 2021-09-08 | End: 2021-09-08

## 2021-09-08 RX ORDER — FENTANYL CITRATE/PF 50 MCG/ML
25 SYRINGE (ML) INJECTION
Status: COMPLETED | OUTPATIENT
Start: 2021-09-08 | End: 2021-09-08

## 2021-09-08 RX ORDER — FENTANYL CITRATE 50 UG/ML
INJECTION, SOLUTION INTRAMUSCULAR; INTRAVENOUS AS NEEDED
Status: DISCONTINUED | OUTPATIENT
Start: 2021-09-08 | End: 2021-09-08

## 2021-09-08 RX ORDER — DEXAMETHASONE SODIUM PHOSPHATE 10 MG/ML
INJECTION, SOLUTION INTRAMUSCULAR; INTRAVENOUS AS NEEDED
Status: DISCONTINUED | OUTPATIENT
Start: 2021-09-08 | End: 2021-09-08

## 2021-09-08 RX ORDER — SODIUM CHLORIDE, SODIUM LACTATE, POTASSIUM CHLORIDE, CALCIUM CHLORIDE 600; 310; 30; 20 MG/100ML; MG/100ML; MG/100ML; MG/100ML
INJECTION, SOLUTION INTRAVENOUS CONTINUOUS PRN
Status: DISCONTINUED | OUTPATIENT
Start: 2021-09-08 | End: 2021-09-08

## 2021-09-08 RX ORDER — ONDANSETRON 2 MG/ML
4 INJECTION INTRAMUSCULAR; INTRAVENOUS ONCE AS NEEDED
Status: DISCONTINUED | OUTPATIENT
Start: 2021-09-08 | End: 2021-09-08 | Stop reason: HOSPADM

## 2021-09-08 RX ORDER — NEOSTIGMINE METHYLSULFATE 1 MG/ML
INJECTION INTRAVENOUS AS NEEDED
Status: DISCONTINUED | OUTPATIENT
Start: 2021-09-08 | End: 2021-09-08

## 2021-09-08 RX ORDER — HYDROMORPHONE HCL/PF 1 MG/ML
SYRINGE (ML) INJECTION AS NEEDED
Status: DISCONTINUED | OUTPATIENT
Start: 2021-09-08 | End: 2021-09-08

## 2021-09-08 RX ORDER — ROCURONIUM BROMIDE 10 MG/ML
INJECTION, SOLUTION INTRAVENOUS AS NEEDED
Status: DISCONTINUED | OUTPATIENT
Start: 2021-09-08 | End: 2021-09-08

## 2021-09-08 RX ORDER — OXYCODONE HYDROCHLORIDE 5 MG/1
5 TABLET ORAL EVERY 4 HOURS PRN
Status: DISCONTINUED | OUTPATIENT
Start: 2021-09-08 | End: 2021-09-08 | Stop reason: HOSPADM

## 2021-09-08 RX ORDER — PROPOFOL 10 MG/ML
INJECTION, EMULSION INTRAVENOUS AS NEEDED
Status: DISCONTINUED | OUTPATIENT
Start: 2021-09-08 | End: 2021-09-08

## 2021-09-08 RX ORDER — OXYCODONE HYDROCHLORIDE 5 MG/1
TABLET ORAL
Qty: 30 TABLET | Refills: 0 | Status: ON HOLD | OUTPATIENT
Start: 2021-09-08 | End: 2022-01-21 | Stop reason: SDUPTHER

## 2021-09-08 RX ORDER — SODIUM CHLORIDE, SODIUM LACTATE, POTASSIUM CHLORIDE, CALCIUM CHLORIDE 600; 310; 30; 20 MG/100ML; MG/100ML; MG/100ML; MG/100ML
125 INJECTION, SOLUTION INTRAVENOUS CONTINUOUS
Status: DISCONTINUED | OUTPATIENT
Start: 2021-09-08 | End: 2021-09-08 | Stop reason: HOSPADM

## 2021-09-08 RX ORDER — SODIUM CHLORIDE, SODIUM LACTATE, POTASSIUM CHLORIDE, CALCIUM CHLORIDE 600; 310; 30; 20 MG/100ML; MG/100ML; MG/100ML; MG/100ML
100 INJECTION, SOLUTION INTRAVENOUS CONTINUOUS
Status: DISCONTINUED | OUTPATIENT
Start: 2021-09-08 | End: 2021-09-08 | Stop reason: HOSPADM

## 2021-09-08 RX ORDER — ACETAMINOPHEN 325 MG/1
975 TABLET ORAL ONCE
Status: COMPLETED | OUTPATIENT
Start: 2021-09-08 | End: 2021-09-08

## 2021-09-08 RX ORDER — HYDROMORPHONE HCL/PF 1 MG/ML
0.4 SYRINGE (ML) INJECTION
Status: DISCONTINUED | OUTPATIENT
Start: 2021-09-08 | End: 2021-09-08 | Stop reason: HOSPADM

## 2021-09-08 RX ADMIN — HYDROMORPHONE HYDROCHLORIDE 0.4 MG: 1 INJECTION, SOLUTION INTRAMUSCULAR; INTRAVENOUS; SUBCUTANEOUS at 09:50

## 2021-09-08 RX ADMIN — DEXAMETHASONE SODIUM PHOSPHATE 5 MG: 10 INJECTION, SOLUTION INTRAMUSCULAR; INTRAVENOUS at 08:30

## 2021-09-08 RX ADMIN — CEFAZOLIN SODIUM 2000 MG: 2 SOLUTION INTRAVENOUS at 08:22

## 2021-09-08 RX ADMIN — ACETAMINOPHEN 975 MG: 325 TABLET, FILM COATED ORAL at 06:54

## 2021-09-08 RX ADMIN — GABAPENTIN 300 MG: 300 CAPSULE ORAL at 06:54

## 2021-09-08 RX ADMIN — LIDOCAINE HYDROCHLORIDE 100 MG: 10 INJECTION, SOLUTION EPIDURAL; INFILTRATION; INTRACAUDAL; PERINEURAL at 08:15

## 2021-09-08 RX ADMIN — ROCURONIUM BROMIDE 30 MG: 50 INJECTION, SOLUTION INTRAVENOUS at 08:15

## 2021-09-08 RX ADMIN — Medication 25 MCG: at 09:45

## 2021-09-08 RX ADMIN — NEOSTIGMINE METHYLSULFATE 3 MG: 1 INJECTION INTRAVENOUS at 08:59

## 2021-09-08 RX ADMIN — ONDANSETRON 4 MG: 2 INJECTION INTRAMUSCULAR; INTRAVENOUS at 08:14

## 2021-09-08 RX ADMIN — OXYCODONE HYDROCHLORIDE 5 MG: 5 TABLET ORAL at 10:32

## 2021-09-08 RX ADMIN — PROPOFOL 200 MG: 10 INJECTION, EMULSION INTRAVENOUS at 08:15

## 2021-09-08 RX ADMIN — HYDROMORPHONE HYDROCHLORIDE 0.25 MG: 1 INJECTION, SOLUTION INTRAMUSCULAR; INTRAVENOUS; SUBCUTANEOUS at 08:48

## 2021-09-08 RX ADMIN — GLYCOPYRROLATE 0.6 MG: 0.2 INJECTION, SOLUTION INTRAMUSCULAR; INTRAVENOUS at 08:59

## 2021-09-08 RX ADMIN — Medication 25 MCG: at 09:25

## 2021-09-08 RX ADMIN — SODIUM CHLORIDE, SODIUM LACTATE, POTASSIUM CHLORIDE, AND CALCIUM CHLORIDE: .6; .31; .03; .02 INJECTION, SOLUTION INTRAVENOUS at 07:21

## 2021-09-08 RX ADMIN — Medication 25 MCG: at 09:30

## 2021-09-08 RX ADMIN — Medication 25 MCG: at 09:38

## 2021-09-08 RX ADMIN — FENTANYL CITRATE 100 MCG: 50 INJECTION INTRAMUSCULAR; INTRAVENOUS at 08:15

## 2021-09-08 NOTE — ANESTHESIA POSTPROCEDURE EVALUATION
Post-Op Assessment Note    CV Status:  Stable  Pain Score: 0    Pain management: adequate     Mental Status:  Alert   Hydration Status:  Euvolemic   PONV Controlled:  None   Airway Patency:  Patent      Post Op Vitals Reviewed: Yes      Staff: CRNA         No complications documented      /72 (09/08/21 0912)    Temp 97 6 °F (36 4 °C) (09/08/21 0912)    Pulse 88 (09/08/21 0912)   Resp 15 (09/08/21 0912)    SpO2   97%

## 2021-09-08 NOTE — OP NOTE
OPERATIVE REPORT  PATIENT NAME: Roger Jacinto    :  1955  MRN: [id number]  Pt Location: [facility] OR ROOM 18    SURGERY DATE: 2021    Surgeon(s) and Role:     * Macho Ha MD - Primary     * Alex Fink PA-C - Assisting     * Eh Pacheco MD - Assisting    Preop Diagnosis:  Retained orthopedic hardware [Z96 9]  Chronic left hip pain [M25 552, G89 29]    Post-Op Diagnosis Codes:     * Retained orthopedic hardware [Z96 9]     * Chronic left hip pain [M25 552, G89 29]    Procedure(s) (LRB):  REMOVAL HARDWARE HIP (Left)    Specimen(s):  * No specimens in log *    Estimated Blood Loss:   20 mL    Drains:  * No LDAs found *    Anesthesia Type:   General    Operative Indications:  Retained orthopedic hardware [Z96 9]  Chronic left hip pain [M25 552, G89 29]      Operative Findings:  Fluoroscopically documented hardware removal    Complications:   None    Procedure and Technique: Following induction of adequate level of general anesthesia, this patient was then placed in a modified lateral position  The left hip and lower extremity then prepped draped sterilely  Antibiotics administered  First maneuver involved open up the lateral based incision at the knee  Full-thickness flaps raised get to the iliotibial band  This was split, exposing the heads of the 3 distal-most screws  Utilizing the appropriate screwdriver, the screws were backed out without difficulty  His pre-existing hip incision was opened up  The screws of traversing from the greater trochanter and distal to the greater trochanter were identified removed without difficulty  The nail tract was then opened up, the threaded nail extraction device was then threaded onto the nail  Utilizing the back slapping device, the nail was removed out difficulty  The fluoroscope was brought in, final fluoroscopic films demonstrate absent hardware, and no nisha implant fracture    Satisfied with the extent of surgery, the wounds then flushed with saline closed  Deep layers closed number Vicryl suture  The subcu tissues closed 2 Vicryl suture  The skin was closed staples  Sterile dressings were applied    He was then awakened from general anesthesia, taken recovery room stable condition with plans to include follow up in the office as an outpatient   I was present for the entire procedure    Patient Disposition:  PACU     SIGNATURE: Cody Lopez MD  DATE: September 8, 2021  TIME: 9:06 AM

## 2021-09-08 NOTE — DISCHARGE INSTRUCTIONS
Discharge Instructions - Orthopedics  Rocio Ruiz 73 y o  male MRN: [id number]  Unit/Bed#: PACU 04    Weight Bearing Status:                                           Weight-bearing as tolerated left lower extremity  Robb Iftikhar can be used ,   If too painful    DVT prophylaxis  Not applicable    Pain:  Continue analgesics as directed    Dressing Instructions:   Please keep clean, dry and intact until follow up     Appt Instructions: If you do not have your appointment, please call the clinic at [phone] t  Otherwise followup as scheduled     Contact the office sooner if you experience any increased numbness/tingling in the extremities        Miscellaneous:

## 2021-09-08 NOTE — INTERVAL H&P NOTE
H&P reviewed  After examining the patient I find no changes in the patients condition since the H&P had been written  Vitals:    09/08/21 0643   BP: 126/76   Pulse: 81   Resp: 18   Temp: (!) 96 5 °F (35 8 °C)   SpO2: 96%   Head:  Present  CVS:  RRR  Lungs:  Clear bilateral  Abdomen:  Present  Assessment:  Symptomatic painful hardware and the left femur of this adult male who also has hip arthritis  Plan:   To OR for hardware removal from left femur to be done in a staged fashion prior to elective left total hip replacement

## 2021-09-22 ENCOUNTER — HOSPITAL ENCOUNTER (OUTPATIENT)
Dept: RADIOLOGY | Facility: HOSPITAL | Age: 66
Discharge: HOME | End: 2021-09-22
Attending: ORTHOPAEDIC SURGERY
Payer: COMMERCIAL

## 2021-09-22 ENCOUNTER — OFFICE VISIT (OUTPATIENT)
Dept: OBGYN CLINIC | Facility: HOSPITAL | Age: 66
End: 2021-09-22

## 2021-09-22 VITALS — BODY MASS INDEX: 25.24 KG/M2 | HEIGHT: 68 IN

## 2021-09-22 DIAGNOSIS — M25.552 PAIN OF LEFT HIP JOINT: ICD-10-CM

## 2021-09-22 DIAGNOSIS — Z48.89 AFTERCARE FOLLOWING SURGERY: ICD-10-CM

## 2021-09-22 DIAGNOSIS — T84.84XA PAINFUL ORTHOPAEDIC HARDWARE (HCC): ICD-10-CM

## 2021-09-22 DIAGNOSIS — Z48.89 AFTERCARE FOLLOWING SURGERY: Primary | ICD-10-CM

## 2021-09-22 PROCEDURE — 99024 POSTOP FOLLOW-UP VISIT: CPT | Performed by: ORTHOPAEDIC SURGERY

## 2021-09-22 PROCEDURE — 73552 X-RAY EXAM OF FEMUR 2/>: CPT

## 2021-09-22 RX ORDER — TRAMADOL HYDROCHLORIDE 50 MG/1
50 TABLET ORAL EVERY 6 HOURS PRN
Qty: 60 TABLET | Refills: 0 | Status: SHIPPED | OUTPATIENT
Start: 2021-09-22 | End: 2021-10-21 | Stop reason: SDUPTHER

## 2021-09-22 RX ORDER — ZOLPIDEM TARTRATE 5 MG/1
TABLET ORAL
COMMUNITY
Start: 2021-09-04 | End: 2021-11-02

## 2021-09-22 RX ORDER — ZOLPIDEM TARTRATE 5 MG/1
5 TABLET ORAL
COMMUNITY
Start: 2021-09-04 | End: 2021-12-15

## 2021-09-22 NOTE — PROGRESS NOTES
Subjective;     71-year-old male who has a history of fracture of the left hip, which was surgically affixed  he has hip arthralgia and declined of his hip, which will benefit by future hip surgery  He is now 2 weeks from a stage procedure to remove his left femoral IM nail in order to allow for healing and eventual hip replacement arthroplasty  today's appointment is for x-rays as well as for staple removal      he reports significant pain from his left hip, and is very eager to undergo, surgical replacement when the surgeon allows      Past Medical History:   Diagnosis Date    Arthritis     osteoarthritis    Cervical radiculopathy     COPD (chronic obstructive pulmonary disease) (HCC)     Fibromyalgia     GERD (gastroesophageal reflux disease)     History of transfusion     Hyperlipidemia     Osteopenia     Sleep apnea     USES CPAP HS       Past Surgical History:   Procedure Laterality Date    CHOLECYSTECTOMY      CYST REMOVAL      FEMUR SURGERY Left     JOINT REPLACEMENT      T KR LEFT    KNEE SURGERY Left 1996    MANDIBLE SURGERY      MS REMOVAL DEEP IMPLANT Left 9/8/2021    Procedure: REMOVAL HARDWARE HIP;  Surgeon: Yue King MD;  Location: [facility];  Service: Orthopedics    ROTATOR CUFF REPAIR Left     WRIST FRACTURE SURGERY Left     plate in place       Family History   Problem Relation Age of Onset    No Known Problems Mother     No Known Problems Father        Social History     Tobacco Use    Smoking status: Former Smoker     Packs/day: 1 00     Types: Cigarettes    Smokeless tobacco: Never Used   Vaping Use    Vaping Use: Never used   Substance Use Topics    Alcohol use: Not Currently     Comment: last drink 1 year ago     Drug use: No      exam;     he has to clean dry incisions overlying the hip as well as the lateral left knee   staples were removed and Steri-Strips were applied   there is no thigh engorgement no significant fluid collection in the soft tissues of his left thigh  he retains active range of motion of the left hip as well as his left knee without detriment   attempt at internal and external rotation of his hip with the hip flexed is fraught with pain     x-ray; Two views left hip show the absence of previous fracture fixation hardware in the form of an IM nail and screws  impression;     left hip arthralgia   left hip pain   2 weeks postop from removal of painful retained hardware left femur     plan;     staples removed Steri-Strips applied   he was given a a refill of tramadol   he is to return to the office in 6 weeks and at that time new x-rays will be taken the left femur   his future desire for hip replacement arthroplasty is dependent on interval healing and rest of the femur       his experience was provided by and plan formulated by the attending surgeon it was my privilege to assist him in the delivery of this gentleman's care

## 2021-10-06 ENCOUNTER — HOSPITAL ENCOUNTER (EMERGENCY)
Facility: HOSPITAL | Age: 66
Discharge: HOME | End: 2021-10-06
Attending: EMERGENCY MEDICINE | Admitting: EMERGENCY MEDICINE
Payer: COMMERCIAL

## 2021-10-06 VITALS
SYSTOLIC BLOOD PRESSURE: 154 MMHG | HEART RATE: 97 BPM | DIASTOLIC BLOOD PRESSURE: 71 MMHG | RESPIRATION RATE: 18 BRPM | TEMPERATURE: 97.4 F | OXYGEN SATURATION: 96 %

## 2021-10-06 DIAGNOSIS — M10.9 GOUT: Primary | ICD-10-CM

## 2021-10-06 PROCEDURE — 99284 EMERGENCY DEPT VISIT MOD MDM: CPT | Performed by: EMERGENCY MEDICINE

## 2021-10-06 PROCEDURE — 99282 EMERGENCY DEPT VISIT SF MDM: CPT

## 2021-10-06 RX ORDER — PREDNISONE 20 MG/1
60 TABLET ORAL DAILY
Qty: 15 TABLET | Refills: 0 | Status: SHIPPED | OUTPATIENT
Start: 2021-10-06 | End: 2021-12-15

## 2021-10-06 RX ORDER — COLCHICINE 0.6 MG/1
0.6 TABLET ORAL ONCE
Status: COMPLETED | OUTPATIENT
Start: 2021-10-06 | End: 2021-10-06

## 2021-10-06 RX ORDER — IBUPROFEN 400 MG/1
800 TABLET ORAL ONCE
Status: COMPLETED | OUTPATIENT
Start: 2021-10-06 | End: 2021-10-06

## 2021-10-06 RX ORDER — COLCHICINE 0.6 MG/1
1.2 TABLET ORAL ONCE
Status: COMPLETED | OUTPATIENT
Start: 2021-10-06 | End: 2021-10-06

## 2021-10-06 RX ADMIN — COLCHICINE 0.6 MG: 0.6 TABLET, FILM COATED ORAL at 05:49

## 2021-10-06 RX ADMIN — IBUPROFEN 800 MG: 400 TABLET, FILM COATED ORAL at 04:59

## 2021-10-06 RX ADMIN — COLCHICINE 1.2 MG: 0.6 TABLET, FILM COATED ORAL at 04:59

## 2021-10-21 ENCOUNTER — TELEPHONE (OUTPATIENT)
Dept: OBGYN CLINIC | Facility: HOSPITAL | Age: 66
End: 2021-10-21

## 2021-10-24 ENCOUNTER — HOSPITAL ENCOUNTER (EMERGENCY)
Facility: HOSPITAL | Age: 66
Discharge: HOME | End: 2021-10-24
Attending: EMERGENCY MEDICINE | Admitting: EMERGENCY MEDICINE
Payer: COMMERCIAL

## 2021-10-24 VITALS
BODY MASS INDEX: 24.34 KG/M2 | TEMPERATURE: 97.8 F | HEART RATE: 102 BPM | WEIGHT: 160.05 LBS | DIASTOLIC BLOOD PRESSURE: 63 MMHG | OXYGEN SATURATION: 95 % | RESPIRATION RATE: 18 BRPM | SYSTOLIC BLOOD PRESSURE: 143 MMHG

## 2021-10-24 DIAGNOSIS — M10.9 GOUT: Primary | ICD-10-CM

## 2021-10-24 PROCEDURE — 99283 EMERGENCY DEPT VISIT LOW MDM: CPT

## 2021-10-24 PROCEDURE — 96372 THER/PROPH/DIAG INJ SC/IM: CPT

## 2021-10-24 PROCEDURE — 99284 EMERGENCY DEPT VISIT MOD MDM: CPT | Performed by: PHYSICIAN ASSISTANT

## 2021-10-24 RX ORDER — NAPROXEN 500 MG/1
500 TABLET ORAL 2 TIMES DAILY WITH MEALS
Qty: 20 TABLET | Refills: 0 | Status: SHIPPED | OUTPATIENT
Start: 2021-10-24 | End: 2021-12-15

## 2021-10-24 RX ORDER — KETOROLAC TROMETHAMINE 30 MG/ML
15 INJECTION, SOLUTION INTRAMUSCULAR; INTRAVENOUS ONCE
Status: COMPLETED | OUTPATIENT
Start: 2021-10-24 | End: 2021-10-24

## 2021-10-24 RX ADMIN — KETOROLAC TROMETHAMINE 15 MG: 30 INJECTION, SOLUTION INTRAMUSCULAR at 09:13

## 2021-11-03 ENCOUNTER — OFFICE VISIT (OUTPATIENT)
Dept: OBGYN CLINIC | Facility: HOSPITAL | Age: 66
End: 2021-11-03

## 2021-11-03 ENCOUNTER — HOSPITAL ENCOUNTER (OUTPATIENT)
Dept: RADIOLOGY | Facility: HOSPITAL | Age: 66
Discharge: HOME | End: 2021-11-03
Attending: ORTHOPAEDIC SURGERY
Payer: COMMERCIAL

## 2021-11-03 VITALS
WEIGHT: 157 LBS | HEIGHT: 68 IN | DIASTOLIC BLOOD PRESSURE: 73 MMHG | HEART RATE: 99 BPM | SYSTOLIC BLOOD PRESSURE: 117 MMHG | BODY MASS INDEX: 23.79 KG/M2

## 2021-11-03 DIAGNOSIS — Z48.89 AFTERCARE FOLLOWING SURGERY: Primary | ICD-10-CM

## 2021-11-03 DIAGNOSIS — M25.552 LEFT HIP PAIN: ICD-10-CM

## 2021-11-03 DIAGNOSIS — M16.12 PRIMARY OSTEOARTHRITIS OF ONE HIP, LEFT: ICD-10-CM

## 2021-11-03 DIAGNOSIS — Z48.89 AFTERCARE FOLLOWING SURGERY: ICD-10-CM

## 2021-11-03 PROCEDURE — 73552 X-RAY EXAM OF FEMUR 2/>: CPT

## 2021-11-03 PROCEDURE — 99024 POSTOP FOLLOW-UP VISIT: CPT | Performed by: ORTHOPAEDIC SURGERY

## 2021-11-03 RX ORDER — SODIUM CHLORIDE, SODIUM LACTATE, POTASSIUM CHLORIDE, CALCIUM CHLORIDE 600; 310; 30; 20 MG/100ML; MG/100ML; MG/100ML; MG/100ML
125 INJECTION, SOLUTION INTRAVENOUS CONTINUOUS
Status: CANCELLED | OUTPATIENT
Start: 2021-11-03

## 2021-11-03 RX ORDER — ACETAMINOPHEN 325 MG/1
975 TABLET ORAL ONCE
Status: CANCELLED | OUTPATIENT
Start: 2021-11-03 | End: 2021-11-03

## 2021-11-03 RX ORDER — GABAPENTIN 300 MG/1
300 CAPSULE ORAL ONCE
Status: CANCELLED | OUTPATIENT
Start: 2021-11-03 | End: 2021-11-03

## 2021-11-03 RX ORDER — CEFAZOLIN SODIUM 2 G/50ML
2000 SOLUTION INTRAVENOUS ONCE
Status: CANCELLED | OUTPATIENT
Start: 2021-11-03 | End: 2021-11-03

## 2021-11-03 RX ORDER — CHLORHEXIDINE GLUCONATE 0.12 MG/ML
15 RINSE ORAL ONCE
Status: CANCELLED | OUTPATIENT
Start: 2021-11-03 | End: 2021-11-03

## 2021-11-03 RX ORDER — FERROUS SULFATE TAB EC 324 MG (65 MG FE EQUIVALENT) 324 (65 FE) MG
324 TABLET DELAYED RESPONSE ORAL
Qty: 60 TABLET | Refills: 0 | Status: SHIPPED | OUTPATIENT
Start: 2021-11-03

## 2021-11-03 RX ORDER — ASCORBIC ACID 500 MG
500 TABLET ORAL DAILY
Qty: 30 TABLET | Refills: 0 | Status: SHIPPED | OUTPATIENT
Start: 2021-11-03

## 2021-11-03 RX ORDER — FOLIC ACID 1 MG/1
1 TABLET ORAL DAILY
Qty: 30 TABLET | Refills: 0 | Status: SHIPPED | OUTPATIENT
Start: 2021-11-03

## 2021-11-04 ENCOUNTER — LAB REQUISITION (OUTPATIENT)
Dept: LAB | Facility: HOSPITAL | Age: 66
End: 2021-11-04
Payer: COMMERCIAL

## 2021-11-04 ENCOUNTER — APPOINTMENT (OUTPATIENT)
Dept: LAB | Facility: HOSPITAL | Age: 66
End: 2021-11-04
Attending: ORTHOPAEDIC SURGERY
Payer: COMMERCIAL

## 2021-11-04 DIAGNOSIS — Z01.818 OTHER SPECIFIED PRE-OPERATIVE EXAMINATION: ICD-10-CM

## 2021-11-04 DIAGNOSIS — Z01.818 ENCOUNTER FOR OTHER PREPROCEDURAL EXAMINATION: ICD-10-CM

## 2021-11-04 DIAGNOSIS — M16.12 PRIMARY OSTEOARTHRITIS OF ONE HIP, LEFT: ICD-10-CM

## 2021-11-04 LAB
ABO GROUP BLD: NORMAL
ALBUMIN SERPL BCP-MCNC: 4.1 G/DL (ref 3.5–5)
ALP SERPL-CCNC: 75 U/L (ref 46–116)
ALT SERPL W P-5'-P-CCNC: 58 U/L (ref 12–78)
ANION GAP SERPL CALCULATED.3IONS-SCNC: 14 MMOL/L (ref 4–13)
APTT PPP: 28 SECONDS (ref 23–37)
AST SERPL W P-5'-P-CCNC: 28 U/L (ref 5–45)
BASOPHILS # BLD AUTO: 0.08 THOUSANDS/ΜL (ref 0–0.1)
BASOPHILS NFR BLD AUTO: 1 % (ref 0–1)
BILIRUB SERPL-MCNC: 0.3 MG/DL (ref 0.2–1)
BLD GP AB SCN SERPL QL: NEGATIVE
BUN SERPL-MCNC: 27 MG/DL (ref 5–25)
CALCIUM SERPL-MCNC: 9.6 MG/DL (ref 8.3–10.1)
CHLORIDE SERPL-SCNC: 103 MMOL/L (ref 100–108)
CO2 SERPL-SCNC: 22 MMOL/L (ref 21–32)
CREAT SERPL-MCNC: 1.38 MG/DL (ref 0.6–1.3)
EOSINOPHIL # BLD AUTO: 0.36 THOUSAND/ΜL (ref 0–0.61)
EOSINOPHIL NFR BLD AUTO: 4 % (ref 0–6)
ERYTHROCYTE [DISTWIDTH] IN BLOOD BY AUTOMATED COUNT: 14.2 % (ref 11.6–15.1)
EST. AVERAGE GLUCOSE BLD GHB EST-MCNC: 131 MG/DL
GFR SERPL CREATININE-BSD FRML MDRD: 53 ML/MIN/1.73SQ M
GLUCOSE SERPL-MCNC: 195 MG/DL (ref 65–140)
HBA1C MFR BLD: 6.2 %
HCT VFR BLD AUTO: 44 % (ref 36.5–49.3)
HGB BLD-MCNC: 13.5 G/DL (ref 12–17)
IMM GRANULOCYTES # BLD AUTO: 0.08 THOUSAND/UL (ref 0–0.2)
IMM GRANULOCYTES NFR BLD AUTO: 1 % (ref 0–2)
INR PPP: 0.99 (ref 0.84–1.19)
LYMPHOCYTES # BLD AUTO: 3.03 THOUSANDS/ΜL (ref 0.6–4.47)
LYMPHOCYTES NFR BLD AUTO: 33 % (ref 14–44)
MCH RBC QN AUTO: 28 PG (ref 26.8–34.3)
MCHC RBC AUTO-ENTMCNC: 30.7 G/DL (ref 31.4–37.4)
MCV RBC AUTO: 91 FL (ref 82–98)
MONOCYTES # BLD AUTO: 0.68 THOUSAND/ΜL (ref 0.17–1.22)
MONOCYTES NFR BLD AUTO: 8 % (ref 4–12)
NEUTROPHILS # BLD AUTO: 4.88 THOUSANDS/ΜL (ref 1.85–7.62)
NEUTS SEG NFR BLD AUTO: 53 % (ref 43–75)
NRBC BLD AUTO-RTO: 0 /100 WBCS
PLATELET # BLD AUTO: 370 THOUSANDS/UL (ref 149–390)
PMV BLD AUTO: 12.2 FL (ref 8.9–12.7)
POTASSIUM SERPL-SCNC: 4.1 MMOL/L (ref 3.5–5.3)
PROT SERPL-MCNC: 8.3 G/DL (ref 6.4–8.2)
PROTHROMBIN TIME: 12.8 SECONDS (ref 11.6–14.5)
RBC # BLD AUTO: 4.82 MILLION/UL (ref 3.88–5.62)
RH BLD: NEGATIVE
SODIUM SERPL-SCNC: 139 MMOL/L (ref 136–145)
SPECIMEN EXPIRATION DATE: NORMAL
WBC # BLD AUTO: 9.11 THOUSAND/UL (ref 4.31–10.16)

## 2021-11-04 PROCEDURE — 86901 BLOOD TYPING SEROLOGIC RH(D): CPT | Performed by: ORTHOPAEDIC SURGERY

## 2021-11-04 PROCEDURE — 85730 THROMBOPLASTIN TIME PARTIAL: CPT

## 2021-11-04 PROCEDURE — 80053 COMPREHEN METABOLIC PANEL: CPT

## 2021-11-04 PROCEDURE — 86900 BLOOD TYPING SEROLOGIC ABO: CPT | Performed by: ORTHOPAEDIC SURGERY

## 2021-11-04 PROCEDURE — 85025 COMPLETE CBC W/AUTO DIFF WBC: CPT

## 2021-11-04 PROCEDURE — 36415 COLL VENOUS BLD VENIPUNCTURE: CPT

## 2021-11-04 PROCEDURE — 83036 HEMOGLOBIN GLYCOSYLATED A1C: CPT

## 2021-11-04 PROCEDURE — 86850 RBC ANTIBODY SCREEN: CPT | Performed by: ORTHOPAEDIC SURGERY

## 2021-11-04 PROCEDURE — 85610 PROTHROMBIN TIME: CPT

## 2021-11-18 ENCOUNTER — ANESTHESIA EVENT (OUTPATIENT)
Dept: PERIOP | Facility: HOSPITAL | Age: 66
End: 2021-11-18
Payer: COMMERCIAL

## 2021-11-30 RX ORDER — PROPRANOLOL/HYDROCHLOROTHIAZID 40 MG-25MG
TABLET ORAL DAILY
COMMUNITY

## 2021-12-01 ENCOUNTER — PATIENT OUTREACH (OUTPATIENT)
Dept: OBGYN CLINIC | Facility: HOSPITAL | Age: 66
End: 2021-12-01

## 2021-12-01 ENCOUNTER — OFFICE VISIT (OUTPATIENT)
Dept: INTERNAL MEDICINE CLINIC | Facility: CLINIC | Age: 66
End: 2021-12-01
Payer: COMMERCIAL

## 2021-12-01 VITALS
HEART RATE: 74 BPM | DIASTOLIC BLOOD PRESSURE: 65 MMHG | HEIGHT: 69 IN | SYSTOLIC BLOOD PRESSURE: 115 MMHG | BODY MASS INDEX: 23.7 KG/M2 | WEIGHT: 160 LBS

## 2021-12-01 DIAGNOSIS — J44.9 CHRONIC OBSTRUCTIVE PULMONARY DISEASE, UNSPECIFIED COPD TYPE (HCC): ICD-10-CM

## 2021-12-01 DIAGNOSIS — Z01.818 PRE-OPERATIVE CLEARANCE: ICD-10-CM

## 2021-12-01 DIAGNOSIS — M16.12 PRIMARY OSTEOARTHRITIS OF ONE HIP, LEFT: Primary | ICD-10-CM

## 2021-12-01 DIAGNOSIS — N18.30 STAGE 3 CHRONIC KIDNEY DISEASE, UNSPECIFIED WHETHER STAGE 3A OR 3B CKD (HCC): ICD-10-CM

## 2021-12-01 DIAGNOSIS — M16.12 PRIMARY OSTEOARTHRITIS OF ONE HIP, LEFT: ICD-10-CM

## 2021-12-01 DIAGNOSIS — K21.9 GASTROESOPHAGEAL REFLUX DISEASE, UNSPECIFIED WHETHER ESOPHAGITIS PRESENT: ICD-10-CM

## 2021-12-01 PROCEDURE — 4004F PT TOBACCO SCREEN RCVD TLK: CPT | Performed by: INTERNAL MEDICINE

## 2021-12-01 PROCEDURE — 99214 OFFICE O/P EST MOD 30 MIN: CPT | Performed by: INTERNAL MEDICINE

## 2021-12-09 ENCOUNTER — PATIENT OUTREACH (OUTPATIENT)
Dept: OBGYN CLINIC | Facility: HOSPITAL | Age: 66
End: 2021-12-09

## 2021-12-09 DIAGNOSIS — Z74.8 ASSISTANCE NEEDED WITH TRANSPORTATION: Primary | ICD-10-CM

## 2021-12-10 ENCOUNTER — TELEPHONE (OUTPATIENT)
Dept: OBGYN CLINIC | Facility: HOSPITAL | Age: 66
End: 2021-12-10

## 2021-12-14 ENCOUNTER — PATIENT OUTREACH (OUTPATIENT)
Dept: OBGYN CLINIC | Facility: HOSPITAL | Age: 66
End: 2021-12-14

## 2021-12-14 ENCOUNTER — TELEPHONE (OUTPATIENT)
Dept: OBGYN CLINIC | Facility: HOSPITAL | Age: 66
End: 2021-12-14

## 2021-12-17 PROCEDURE — U0003 INFECTIOUS AGENT DETECTION BY NUCLEIC ACID (DNA OR RNA); SEVERE ACUTE RESPIRATORY SYNDROME CORONAVIRUS 2 (SARS-COV-2) (CORONAVIRUS DISEASE [COVID-19]), AMPLIFIED PROBE TECHNIQUE, MAKING USE OF HIGH THROUGHPUT TECHNOLOGIES AS DESCRIBED BY CMS-2020-01-R: HCPCS | Performed by: ORTHOPAEDIC SURGERY

## 2021-12-17 PROCEDURE — U0005 INFEC AGEN DETEC AMPLI PROBE: HCPCS | Performed by: ORTHOPAEDIC SURGERY

## 2021-12-22 ENCOUNTER — HOSPITAL ENCOUNTER (OUTPATIENT)
Facility: HOSPITAL | Age: 66
Setting detail: OUTPATIENT SURGERY
Discharge: HOME | End: 2021-12-23
Attending: ORTHOPAEDIC SURGERY | Admitting: ORTHOPAEDIC SURGERY
Payer: COMMERCIAL

## 2021-12-22 ENCOUNTER — ANESTHESIA (OUTPATIENT)
Dept: PERIOP | Facility: HOSPITAL | Age: 66
End: 2021-12-22
Payer: COMMERCIAL

## 2021-12-22 DIAGNOSIS — Z96.642 S/P TOTAL LEFT HIP ARTHROPLASTY: Primary | ICD-10-CM

## 2021-12-22 DIAGNOSIS — N18.30 STAGE 3 CHRONIC KIDNEY DISEASE, UNSPECIFIED WHETHER STAGE 3A OR 3B CKD (HCC): ICD-10-CM

## 2021-12-22 DIAGNOSIS — M16.12 PRIMARY OSTEOARTHRITIS OF ONE HIP, LEFT: ICD-10-CM

## 2021-12-22 DIAGNOSIS — Z11.59 SCREENING FOR VIRAL DISEASE: ICD-10-CM

## 2021-12-22 LAB
ABO GROUP BLD: NORMAL
BLD GP AB SCN SERPL QL: NEGATIVE
FLUAV RNA RESP QL NAA+PROBE: NEGATIVE
FLUBV RNA RESP QL NAA+PROBE: NEGATIVE
GLUCOSE SERPL-MCNC: 113 MG/DL (ref 65–140)
GLUCOSE SERPL-MCNC: 133 MG/DL (ref 65–140)
GLUCOSE SERPL-MCNC: 173 MG/DL (ref 65–140)
GLUCOSE SERPL-MCNC: 98 MG/DL (ref 65–140)
RH BLD: NEGATIVE
RSV RNA RESP QL NAA+PROBE: NEGATIVE
SARS-COV-2 RNA RESP QL NAA+PROBE: NEGATIVE
SPECIMEN EXPIRATION DATE: NORMAL

## 2021-12-22 PROCEDURE — 82948 REAGENT STRIP/BLOOD GLUCOSE: CPT

## 2021-12-22 PROCEDURE — G9197 ORDER FOR CEPH: HCPCS | Performed by: ORTHOPAEDIC SURGERY

## 2021-12-22 PROCEDURE — 86900 BLOOD TYPING SEROLOGIC ABO: CPT | Performed by: STUDENT IN AN ORGANIZED HEALTH CARE EDUCATION/TRAINING PROGRAM

## 2021-12-22 PROCEDURE — 27132 TOTAL HIP ARTHROPLASTY: CPT | Performed by: ORTHOPAEDIC SURGERY

## 2021-12-22 PROCEDURE — 86901 BLOOD TYPING SEROLOGIC RH(D): CPT | Performed by: STUDENT IN AN ORGANIZED HEALTH CARE EDUCATION/TRAINING PROGRAM

## 2021-12-22 PROCEDURE — C1776 JOINT DEVICE (IMPLANTABLE): HCPCS | Performed by: ORTHOPAEDIC SURGERY

## 2021-12-22 PROCEDURE — C1713 ANCHOR/SCREW BN/BN,TIS/BN: HCPCS | Performed by: ORTHOPAEDIC SURGERY

## 2021-12-22 PROCEDURE — 99204 OFFICE O/P NEW MOD 45 MIN: CPT | Performed by: INTERNAL MEDICINE

## 2021-12-22 PROCEDURE — 99214 OFFICE O/P EST MOD 30 MIN: CPT | Performed by: INTERNAL MEDICINE

## 2021-12-22 PROCEDURE — 97163 PT EVAL HIGH COMPLEX 45 MIN: CPT

## 2021-12-22 PROCEDURE — 86850 RBC ANTIBODY SCREEN: CPT | Performed by: STUDENT IN AN ORGANIZED HEALTH CARE EDUCATION/TRAINING PROGRAM

## 2021-12-22 PROCEDURE — 97530 THERAPEUTIC ACTIVITIES: CPT

## 2021-12-22 PROCEDURE — 0241U HB NFCT DS VIR RESP RNA 4 TRGT: CPT | Performed by: ORTHOPAEDIC SURGERY

## 2021-12-22 DEVICE — PINNACLE POROCOAT ACETABULAR SHELL SECTOR II 52MM OD
Type: IMPLANTABLE DEVICE | Site: HIP | Status: FUNCTIONAL
Brand: PINNACLE POROCOAT

## 2021-12-22 DEVICE — PINNACLE HIP SOLUTIONS ALTRX POLYETHYLENE ACETABULAR LINER +4 10 DEGREE 36MM ID 52MM OD
Type: IMPLANTABLE DEVICE | Site: HIP | Status: FUNCTIONAL
Brand: PINNACLE ALTRX

## 2021-12-22 DEVICE — M-SPEC METAL FEMORAL HEAD 12/14 TAPER DIAMETER 36MM +5: Type: IMPLANTABLE DEVICE | Site: HIP | Status: FUNCTIONAL

## 2021-12-22 DEVICE — PINNACLE CANCELLOUS BONE SCREW 6.5MM X 35MM
Type: IMPLANTABLE DEVICE | Site: HIP | Status: FUNCTIONAL
Brand: PINNACLE

## 2021-12-22 DEVICE — CORAIL HIP SYSTEM CEMENTLESS FEMORAL STEM 12/14 AMT 135 DEGREES KS SIZE 11 HA COATED STANDARD NO COLLAR
Type: IMPLANTABLE DEVICE | Site: HIP | Status: FUNCTIONAL
Brand: CORAIL

## 2021-12-22 RX ORDER — ONDANSETRON 2 MG/ML
4 INJECTION INTRAMUSCULAR; INTRAVENOUS ONCE AS NEEDED
Status: DISCONTINUED | OUTPATIENT
Start: 2021-12-22 | End: 2021-12-22 | Stop reason: HOSPADM

## 2021-12-22 RX ORDER — METHOCARBAMOL 500 MG/1
500 TABLET, FILM COATED ORAL EVERY 6 HOURS PRN
Status: DISCONTINUED | OUTPATIENT
Start: 2021-12-22 | End: 2021-12-23 | Stop reason: HOSPADM

## 2021-12-22 RX ORDER — HYDROCODONE BITARTRATE AND ACETAMINOPHEN 5; 325 MG/1; MG/1
1 TABLET ORAL EVERY 6 HOURS PRN
Qty: 30 TABLET | Refills: 0 | Status: SHIPPED | OUTPATIENT
Start: 2021-12-22 | End: 2021-12-23

## 2021-12-22 RX ORDER — FENOFIBRATE 145 MG/1
145 TABLET, COATED ORAL DAILY
Status: DISCONTINUED | OUTPATIENT
Start: 2021-12-23 | End: 2021-12-23 | Stop reason: HOSPADM

## 2021-12-22 RX ORDER — ACETAMINOPHEN 325 MG/1
975 TABLET ORAL EVERY 8 HOURS
Status: DISCONTINUED | OUTPATIENT
Start: 2021-12-22 | End: 2021-12-23 | Stop reason: HOSPADM

## 2021-12-22 RX ORDER — SODIUM CHLORIDE, SODIUM LACTATE, POTASSIUM CHLORIDE, CALCIUM CHLORIDE 600; 310; 30; 20 MG/100ML; MG/100ML; MG/100ML; MG/100ML
INJECTION, SOLUTION INTRAVENOUS CONTINUOUS PRN
Status: DISCONTINUED | OUTPATIENT
Start: 2021-12-22 | End: 2021-12-22

## 2021-12-22 RX ORDER — FENTANYL CITRATE/PF 50 MCG/ML
50 SYRINGE (ML) INJECTION
Status: DISCONTINUED | OUTPATIENT
Start: 2021-12-22 | End: 2021-12-22 | Stop reason: HOSPADM

## 2021-12-22 RX ORDER — BUPIVACAINE HYDROCHLORIDE 7.5 MG/ML
INJECTION, SOLUTION INTRASPINAL AS NEEDED
Status: DISCONTINUED | OUTPATIENT
Start: 2021-12-22 | End: 2021-12-22

## 2021-12-22 RX ORDER — SODIUM CHLORIDE, SODIUM LACTATE, POTASSIUM CHLORIDE, CALCIUM CHLORIDE 600; 310; 30; 20 MG/100ML; MG/100ML; MG/100ML; MG/100ML
50 INJECTION, SOLUTION INTRAVENOUS CONTINUOUS
Status: DISCONTINUED | OUTPATIENT
Start: 2021-12-22 | End: 2021-12-23 | Stop reason: HOSPADM

## 2021-12-22 RX ORDER — PRAVASTATIN SODIUM 80 MG/1
80 TABLET ORAL
Status: DISCONTINUED | OUTPATIENT
Start: 2021-12-22 | End: 2021-12-23 | Stop reason: HOSPADM

## 2021-12-22 RX ORDER — CHLORHEXIDINE GLUCONATE 0.12 MG/ML
15 RINSE ORAL ONCE
Status: COMPLETED | OUTPATIENT
Start: 2021-12-22 | End: 2021-12-22

## 2021-12-22 RX ORDER — DOCUSATE SODIUM 100 MG/1
100 CAPSULE, LIQUID FILLED ORAL 2 TIMES DAILY
Status: DISCONTINUED | OUTPATIENT
Start: 2021-12-22 | End: 2021-12-23 | Stop reason: HOSPADM

## 2021-12-22 RX ORDER — ACETAMINOPHEN 325 MG/1
975 TABLET ORAL ONCE
Status: COMPLETED | OUTPATIENT
Start: 2021-12-22 | End: 2021-12-22

## 2021-12-22 RX ORDER — PROPOFOL 10 MG/ML
INJECTION, EMULSION INTRAVENOUS CONTINUOUS PRN
Status: DISCONTINUED | OUTPATIENT
Start: 2021-12-22 | End: 2021-12-22

## 2021-12-22 RX ORDER — GABAPENTIN 300 MG/1
300 CAPSULE ORAL ONCE
Status: COMPLETED | OUTPATIENT
Start: 2021-12-22 | End: 2021-12-22

## 2021-12-22 RX ORDER — SODIUM CHLORIDE, SODIUM LACTATE, POTASSIUM CHLORIDE, CALCIUM CHLORIDE 600; 310; 30; 20 MG/100ML; MG/100ML; MG/100ML; MG/100ML
1.5 INJECTION, SOLUTION INTRAVENOUS CONTINUOUS
Status: DISCONTINUED | OUTPATIENT
Start: 2021-12-22 | End: 2021-12-23 | Stop reason: HOSPADM

## 2021-12-22 RX ORDER — ALBUTEROL SULFATE 2.5 MG/3ML
2.5 SOLUTION RESPIRATORY (INHALATION) ONCE AS NEEDED
Status: DISCONTINUED | OUTPATIENT
Start: 2021-12-22 | End: 2021-12-22 | Stop reason: HOSPADM

## 2021-12-22 RX ORDER — LIDOCAINE HYDROCHLORIDE 10 MG/ML
INJECTION, SOLUTION EPIDURAL; INFILTRATION; INTRACAUDAL; PERINEURAL AS NEEDED
Status: DISCONTINUED | OUTPATIENT
Start: 2021-12-22 | End: 2021-12-22

## 2021-12-22 RX ORDER — KETAMINE HCL IN NACL, ISO-OSM 100MG/10ML
SYRINGE (ML) INJECTION AS NEEDED
Status: DISCONTINUED | OUTPATIENT
Start: 2021-12-22 | End: 2021-12-22

## 2021-12-22 RX ORDER — LIDOCAINE HYDROCHLORIDE 10 MG/ML
0.5 INJECTION, SOLUTION EPIDURAL; INFILTRATION; INTRACAUDAL; PERINEURAL ONCE AS NEEDED
Status: COMPLETED | OUTPATIENT
Start: 2021-12-22 | End: 2021-12-22

## 2021-12-22 RX ORDER — HYDRALAZINE HYDROCHLORIDE 25 MG/1
25 TABLET, FILM COATED ORAL EVERY 8 HOURS PRN
Status: DISCONTINUED | OUTPATIENT
Start: 2021-12-22 | End: 2021-12-23 | Stop reason: HOSPADM

## 2021-12-22 RX ORDER — PREGABALIN 75 MG/1
150 CAPSULE ORAL EVERY 12 HOURS
Status: DISCONTINUED | OUTPATIENT
Start: 2021-12-22 | End: 2021-12-23 | Stop reason: HOSPADM

## 2021-12-22 RX ORDER — TEMAZEPAM 7.5 MG/1
7.5 CAPSULE ORAL
COMMUNITY
Start: 2021-12-17

## 2021-12-22 RX ORDER — OXYCODONE HYDROCHLORIDE 10 MG/1
10 TABLET ORAL EVERY 4 HOURS PRN
Status: DISCONTINUED | OUTPATIENT
Start: 2021-12-22 | End: 2021-12-23 | Stop reason: HOSPADM

## 2021-12-22 RX ORDER — HYDROMORPHONE HCL/PF 1 MG/ML
0.5 SYRINGE (ML) INJECTION
Status: DISCONTINUED | OUTPATIENT
Start: 2021-12-22 | End: 2021-12-22 | Stop reason: HOSPADM

## 2021-12-22 RX ORDER — ONDANSETRON 2 MG/ML
4 INJECTION INTRAMUSCULAR; INTRAVENOUS EVERY 6 HOURS PRN
Status: DISCONTINUED | OUTPATIENT
Start: 2021-12-22 | End: 2021-12-23 | Stop reason: HOSPADM

## 2021-12-22 RX ORDER — CEFAZOLIN SODIUM 2 G/50ML
2000 SOLUTION INTRAVENOUS ONCE
Status: COMPLETED | OUTPATIENT
Start: 2021-12-22 | End: 2021-12-22

## 2021-12-22 RX ORDER — HYDROMORPHONE HCL/PF 1 MG/ML
0.5 SYRINGE (ML) INJECTION EVERY 2 HOUR PRN
Status: DISCONTINUED | OUTPATIENT
Start: 2021-12-22 | End: 2021-12-23 | Stop reason: HOSPADM

## 2021-12-22 RX ORDER — MIDAZOLAM HYDROCHLORIDE 2 MG/2ML
INJECTION, SOLUTION INTRAMUSCULAR; INTRAVENOUS AS NEEDED
Status: DISCONTINUED | OUTPATIENT
Start: 2021-12-22 | End: 2021-12-22

## 2021-12-22 RX ORDER — SODIUM CHLORIDE, SODIUM LACTATE, POTASSIUM CHLORIDE, CALCIUM CHLORIDE 600; 310; 30; 20 MG/100ML; MG/100ML; MG/100ML; MG/100ML
125 INJECTION, SOLUTION INTRAVENOUS CONTINUOUS
Status: DISCONTINUED | OUTPATIENT
Start: 2021-12-22 | End: 2021-12-23 | Stop reason: HOSPADM

## 2021-12-22 RX ORDER — OXYCODONE HYDROCHLORIDE 5 MG/1
5 TABLET ORAL EVERY 4 HOURS PRN
Status: DISCONTINUED | OUTPATIENT
Start: 2021-12-22 | End: 2021-12-23 | Stop reason: HOSPADM

## 2021-12-22 RX ORDER — MAGNESIUM HYDROXIDE 1200 MG/15ML
LIQUID ORAL AS NEEDED
Status: DISCONTINUED | OUTPATIENT
Start: 2021-12-22 | End: 2021-12-22 | Stop reason: HOSPADM

## 2021-12-22 RX ORDER — ALLOPURINOL 300 MG/1
300 TABLET ORAL 2 TIMES DAILY
Status: DISCONTINUED | OUTPATIENT
Start: 2021-12-22 | End: 2021-12-23 | Stop reason: HOSPADM

## 2021-12-22 RX ORDER — CEFAZOLIN SODIUM 2 G/50ML
2000 SOLUTION INTRAVENOUS EVERY 8 HOURS
Status: COMPLETED | OUTPATIENT
Start: 2021-12-22 | End: 2021-12-23

## 2021-12-22 RX ORDER — SENNOSIDES 8.6 MG
1 TABLET ORAL DAILY
Status: DISCONTINUED | OUTPATIENT
Start: 2021-12-23 | End: 2021-12-23 | Stop reason: HOSPADM

## 2021-12-22 RX ORDER — DEXAMETHASONE SODIUM PHOSPHATE 4 MG/ML
INJECTION, SOLUTION INTRA-ARTICULAR; INTRALESIONAL; INTRAMUSCULAR; INTRAVENOUS; SOFT TISSUE AS NEEDED
Status: DISCONTINUED | OUTPATIENT
Start: 2021-12-22 | End: 2021-12-22

## 2021-12-22 RX ORDER — PANTOPRAZOLE SODIUM 20 MG/1
20 TABLET, DELAYED RELEASE ORAL
Status: DISCONTINUED | OUTPATIENT
Start: 2021-12-23 | End: 2021-12-23 | Stop reason: HOSPADM

## 2021-12-22 RX ORDER — DULOXETIN HYDROCHLORIDE 60 MG/1
60 CAPSULE, DELAYED RELEASE ORAL
Status: DISCONTINUED | OUTPATIENT
Start: 2021-12-22 | End: 2021-12-23 | Stop reason: HOSPADM

## 2021-12-22 RX ORDER — NICOTINE 21 MG/24HR
1 PATCH, TRANSDERMAL 24 HOURS TRANSDERMAL DAILY
Status: DISCONTINUED | OUTPATIENT
Start: 2021-12-22 | End: 2021-12-23 | Stop reason: HOSPADM

## 2021-12-22 RX ADMIN — METFORMIN HYDROCHLORIDE 1000 MG: 500 TABLET ORAL at 17:17

## 2021-12-22 RX ADMIN — PREGABALIN 150 MG: 75 CAPSULE ORAL at 21:14

## 2021-12-22 RX ADMIN — Medication 30 MG: at 10:04

## 2021-12-22 RX ADMIN — DULOXETINE HYDROCHLORIDE 60 MG: 60 CAPSULE, DELAYED RELEASE ORAL at 21:14

## 2021-12-22 RX ADMIN — CHLORHEXIDINE GLUCONATE 15 ML: 1.2 SOLUTION ORAL at 08:46

## 2021-12-22 RX ADMIN — METHOCARBAMOL 500 MG: 500 TABLET, FILM COATED ORAL at 21:14

## 2021-12-22 RX ADMIN — DEXAMETHASONE SODIUM PHOSPHATE 8 MG: 4 INJECTION, SOLUTION INTRAMUSCULAR; INTRAVENOUS at 10:05

## 2021-12-22 RX ADMIN — ALLOPURINOL 300 MG: 300 TABLET ORAL at 17:17

## 2021-12-22 RX ADMIN — PROPOFOL 60 MCG/KG/MIN: 10 INJECTION, EMULSION INTRAVENOUS at 10:03

## 2021-12-22 RX ADMIN — Medication 20 MG: at 10:10

## 2021-12-22 RX ADMIN — ACETAMINOPHEN 975 MG: 325 TABLET, FILM COATED ORAL at 15:11

## 2021-12-22 RX ADMIN — METHOCARBAMOL 500 MG: 500 TABLET, FILM COATED ORAL at 15:11

## 2021-12-22 RX ADMIN — SODIUM CHLORIDE, SODIUM LACTATE, POTASSIUM CHLORIDE, AND CALCIUM CHLORIDE 1.5 ML/KG/HR: .6; .31; .03; .02 INJECTION, SOLUTION INTRAVENOUS at 12:10

## 2021-12-22 RX ADMIN — CEFAZOLIN SODIUM 2000 MG: 2 SOLUTION INTRAVENOUS at 17:18

## 2021-12-22 RX ADMIN — PRAVASTATIN SODIUM 80 MG: 80 TABLET ORAL at 17:18

## 2021-12-22 RX ADMIN — SODIUM CHLORIDE, SODIUM LACTATE, POTASSIUM CHLORIDE, AND CALCIUM CHLORIDE 125 ML/HR: .6; .31; .03; .02 INJECTION, SOLUTION INTRAVENOUS at 09:13

## 2021-12-22 RX ADMIN — TRANEXAMIC ACID 1000 MG: 100 INJECTION INTRAVENOUS at 11:00

## 2021-12-22 RX ADMIN — TRANEXAMIC ACID 1000 MG: 100 INJECTION INTRAVENOUS at 10:10

## 2021-12-22 RX ADMIN — DOCUSATE SODIUM 100 MG: 100 CAPSULE ORAL at 17:18

## 2021-12-22 RX ADMIN — LIDOCAINE HYDROCHLORIDE 2.5 ML: 10 INJECTION, SOLUTION EPIDURAL; INFILTRATION; INTRACAUDAL; PERINEURAL at 09:59

## 2021-12-22 RX ADMIN — ENOXAPARIN SODIUM 40 MG: 40 INJECTION SUBCUTANEOUS at 21:14

## 2021-12-22 RX ADMIN — SODIUM CHLORIDE, SODIUM LACTATE, POTASSIUM CHLORIDE, AND CALCIUM CHLORIDE 1.5 ML/KG/HR: .6; .31; .03; .02 INJECTION, SOLUTION INTRAVENOUS at 21:16

## 2021-12-22 RX ADMIN — OXYCODONE HYDROCHLORIDE 10 MG: 10 TABLET ORAL at 21:14

## 2021-12-22 RX ADMIN — BUPIVACAINE HYDROCHLORIDE IN DEXTROSE 1.4 ML: 7.5 INJECTION, SOLUTION SUBARACHNOID at 09:59

## 2021-12-22 RX ADMIN — SODIUM CHLORIDE, SODIUM LACTATE, POTASSIUM CHLORIDE, AND CALCIUM CHLORIDE: .6; .31; .03; .02 INJECTION, SOLUTION INTRAVENOUS at 09:51

## 2021-12-22 RX ADMIN — LIDOCAINE HYDROCHLORIDE 0.2 ML: 10 INJECTION, SOLUTION EPIDURAL; INFILTRATION; INTRACAUDAL; PERINEURAL at 09:13

## 2021-12-22 RX ADMIN — Medication 50 MCG: at 11:52

## 2021-12-22 RX ADMIN — CEFAZOLIN SODIUM 2000 MG: 2 SOLUTION INTRAVENOUS at 10:05

## 2021-12-22 RX ADMIN — OXYCODONE HYDROCHLORIDE 10 MG: 10 TABLET ORAL at 12:32

## 2021-12-22 RX ADMIN — PHENYLEPHRINE HYDROCHLORIDE 20 MCG/MIN: 10 INJECTION INTRAVENOUS at 10:03

## 2021-12-22 RX ADMIN — GABAPENTIN 300 MG: 300 CAPSULE ORAL at 08:46

## 2021-12-22 RX ADMIN — OXYCODONE HYDROCHLORIDE 10 MG: 10 TABLET ORAL at 17:18

## 2021-12-22 RX ADMIN — INSULIN LISPRO 1 UNITS: 100 INJECTION, SOLUTION INTRAVENOUS; SUBCUTANEOUS at 16:33

## 2021-12-22 RX ADMIN — ACETAMINOPHEN 975 MG: 325 TABLET, FILM COATED ORAL at 08:46

## 2021-12-22 RX ADMIN — MIDAZOLAM 2 MG: 1 INJECTION INTRAMUSCULAR; INTRAVENOUS at 09:52

## 2021-12-22 RX ADMIN — ACETAMINOPHEN 975 MG: 325 TABLET, FILM COATED ORAL at 21:14

## 2021-12-23 ENCOUNTER — TELEPHONE (OUTPATIENT)
Dept: OBGYN CLINIC | Facility: OTHER | Age: 66
End: 2021-12-23

## 2021-12-23 VITALS
SYSTOLIC BLOOD PRESSURE: 121 MMHG | RESPIRATION RATE: 18 BRPM | WEIGHT: 160 LBS | OXYGEN SATURATION: 91 % | DIASTOLIC BLOOD PRESSURE: 68 MMHG | TEMPERATURE: 98 F | HEART RATE: 86 BPM | HEIGHT: 69 IN | BODY MASS INDEX: 23.7 KG/M2

## 2021-12-23 PROBLEM — Z96.642 STATUS POST LEFT HIP REPLACEMENT: Status: ACTIVE | Noted: 2021-12-23

## 2021-12-23 PROBLEM — M16.12 PRIMARY OSTEOARTHRITIS OF ONE HIP, LEFT: Status: RESOLVED | Noted: 2021-11-03 | Resolved: 2021-12-23

## 2021-12-23 LAB
ANION GAP SERPL CALCULATED.3IONS-SCNC: 1 MMOL/L (ref 4–13)
BUN SERPL-MCNC: 18 MG/DL (ref 5–25)
CALCIUM SERPL-MCNC: 8.7 MG/DL (ref 8.3–10.1)
CHLORIDE SERPL-SCNC: 106 MMOL/L (ref 100–108)
CO2 SERPL-SCNC: 28 MMOL/L (ref 21–32)
CREAT SERPL-MCNC: 1.11 MG/DL (ref 0.6–1.3)
ERYTHROCYTE [DISTWIDTH] IN BLOOD BY AUTOMATED COUNT: 13.7 % (ref 11.6–15.1)
GFR SERPL CREATININE-BSD FRML MDRD: 69 ML/MIN/1.73SQ M
GLUCOSE SERPL-MCNC: 108 MG/DL (ref 65–140)
GLUCOSE SERPL-MCNC: 117 MG/DL (ref 65–140)
HCT VFR BLD AUTO: 35.8 % (ref 36.5–49.3)
HGB BLD-MCNC: 11.6 G/DL (ref 12–17)
MCH RBC QN AUTO: 28.4 PG (ref 26.8–34.3)
MCHC RBC AUTO-ENTMCNC: 32.4 G/DL (ref 31.4–37.4)
MCV RBC AUTO: 88 FL (ref 82–98)
PLATELET # BLD AUTO: 221 THOUSANDS/UL (ref 149–390)
PMV BLD AUTO: 12.6 FL (ref 8.9–12.7)
POTASSIUM SERPL-SCNC: 4 MMOL/L (ref 3.5–5.3)
RBC # BLD AUTO: 4.09 MILLION/UL (ref 3.88–5.62)
SODIUM SERPL-SCNC: 135 MMOL/L (ref 136–145)
WBC # BLD AUTO: 11.28 THOUSAND/UL (ref 4.31–10.16)

## 2021-12-23 PROCEDURE — 99024 POSTOP FOLLOW-UP VISIT: CPT | Performed by: ORTHOPAEDIC SURGERY

## 2021-12-23 PROCEDURE — 97535 SELF CARE MNGMENT TRAINING: CPT

## 2021-12-23 PROCEDURE — 97530 THERAPEUTIC ACTIVITIES: CPT

## 2021-12-23 PROCEDURE — 97116 GAIT TRAINING THERAPY: CPT

## 2021-12-23 PROCEDURE — 97166 OT EVAL MOD COMPLEX 45 MIN: CPT

## 2021-12-23 PROCEDURE — 99214 OFFICE O/P EST MOD 30 MIN: CPT | Performed by: INTERNAL MEDICINE

## 2021-12-23 PROCEDURE — 80048 BASIC METABOLIC PNL TOTAL CA: CPT | Performed by: STUDENT IN AN ORGANIZED HEALTH CARE EDUCATION/TRAINING PROGRAM

## 2021-12-23 PROCEDURE — 82948 REAGENT STRIP/BLOOD GLUCOSE: CPT

## 2021-12-23 PROCEDURE — 85027 COMPLETE CBC AUTOMATED: CPT | Performed by: STUDENT IN AN ORGANIZED HEALTH CARE EDUCATION/TRAINING PROGRAM

## 2021-12-23 RX ADMIN — ALLOPURINOL 300 MG: 300 TABLET ORAL at 08:38

## 2021-12-23 RX ADMIN — ACETAMINOPHEN 975 MG: 325 TABLET, FILM COATED ORAL at 05:39

## 2021-12-23 RX ADMIN — FENOFIBRATE 145 MG: 145 TABLET, FILM COATED ORAL at 08:39

## 2021-12-23 RX ADMIN — DOCUSATE SODIUM 100 MG: 100 CAPSULE ORAL at 08:38

## 2021-12-23 RX ADMIN — METFORMIN HYDROCHLORIDE 1000 MG: 500 TABLET ORAL at 08:38

## 2021-12-23 RX ADMIN — PANTOPRAZOLE SODIUM 20 MG: 20 TABLET, DELAYED RELEASE ORAL at 05:39

## 2021-12-23 RX ADMIN — CEFAZOLIN SODIUM 2000 MG: 2 SOLUTION INTRAVENOUS at 01:32

## 2021-12-23 RX ADMIN — OXYCODONE HYDROCHLORIDE 10 MG: 10 TABLET ORAL at 04:22

## 2021-12-23 RX ADMIN — PREGABALIN 150 MG: 75 CAPSULE ORAL at 08:38

## 2021-12-23 RX ADMIN — METHOCARBAMOL 500 MG: 500 TABLET, FILM COATED ORAL at 04:22

## 2021-12-23 RX ADMIN — STANDARDIZED SENNA CONCENTRATE 8.6 MG: 8.6 TABLET ORAL at 08:38

## 2021-12-25 ENCOUNTER — APPOINTMENT (EMERGENCY)
Dept: RADIOLOGY | Facility: HOSPITAL | Age: 66
End: 2021-12-25
Payer: COMMERCIAL

## 2021-12-25 ENCOUNTER — HOSPITAL ENCOUNTER (EMERGENCY)
Facility: HOSPITAL | Age: 66
Discharge: HOME | End: 2021-12-25
Attending: EMERGENCY MEDICINE | Admitting: EMERGENCY MEDICINE
Payer: COMMERCIAL

## 2021-12-25 VITALS
SYSTOLIC BLOOD PRESSURE: 142 MMHG | RESPIRATION RATE: 16 BRPM | DIASTOLIC BLOOD PRESSURE: 61 MMHG | OXYGEN SATURATION: 98 % | TEMPERATURE: 97.8 F | HEART RATE: 88 BPM

## 2021-12-25 DIAGNOSIS — S73.005A DISLOCATED HIP, LEFT, INITIAL ENCOUNTER (HCC): Primary | ICD-10-CM

## 2021-12-25 PROCEDURE — 96374 THER/PROPH/DIAG INJ IV PUSH: CPT

## 2021-12-25 PROCEDURE — 99152 MOD SED SAME PHYS/QHP 5/>YRS: CPT | Performed by: EMERGENCY MEDICINE

## 2021-12-25 PROCEDURE — 73501 X-RAY EXAM HIP UNI 1 VIEW: CPT

## 2021-12-25 PROCEDURE — 96375 TX/PRO/DX INJ NEW DRUG ADDON: CPT

## 2021-12-25 PROCEDURE — 73502 X-RAY EXAM HIP UNI 2-3 VIEWS: CPT

## 2021-12-25 PROCEDURE — 99024 POSTOP FOLLOW-UP VISIT: CPT | Performed by: ORTHOPAEDIC SURGERY

## 2021-12-25 PROCEDURE — NC001 PR NO CHARGE: Performed by: ORTHOPAEDIC SURGERY

## 2021-12-25 PROCEDURE — 99285 EMERGENCY DEPT VISIT HI MDM: CPT | Performed by: EMERGENCY MEDICINE

## 2021-12-25 PROCEDURE — 99284 EMERGENCY DEPT VISIT MOD MDM: CPT

## 2021-12-25 RX ORDER — PROPOFOL 10 MG/ML
75 INJECTION, EMULSION INTRAVENOUS ONCE
Status: COMPLETED | OUTPATIENT
Start: 2021-12-25 | End: 2021-12-25

## 2021-12-25 RX ORDER — PROPOFOL 10 MG/ML
100 INJECTION, EMULSION INTRAVENOUS ONCE
Status: COMPLETED | OUTPATIENT
Start: 2021-12-25 | End: 2021-12-25

## 2021-12-25 RX ORDER — PROPOFOL 10 MG/ML
75 INJECTION, EMULSION INTRAVENOUS ONCE
Status: DISCONTINUED | OUTPATIENT
Start: 2021-12-25 | End: 2021-12-25

## 2021-12-25 RX ORDER — HYDROMORPHONE HCL/PF 1 MG/ML
0.5 SYRINGE (ML) INJECTION ONCE
Status: COMPLETED | OUTPATIENT
Start: 2021-12-25 | End: 2021-12-25

## 2021-12-25 RX ORDER — ONDANSETRON 2 MG/ML
4 INJECTION INTRAMUSCULAR; INTRAVENOUS ONCE
Status: COMPLETED | OUTPATIENT
Start: 2021-12-25 | End: 2021-12-25

## 2021-12-25 RX ADMIN — PROPOFOL 75 MG: 10 INJECTION, EMULSION INTRAVENOUS at 12:03

## 2021-12-25 RX ADMIN — HYDROMORPHONE HYDROCHLORIDE 0.5 MG: 1 INJECTION, SOLUTION INTRAMUSCULAR; INTRAVENOUS; SUBCUTANEOUS at 11:27

## 2021-12-25 RX ADMIN — ONDANSETRON 4 MG: 2 INJECTION INTRAMUSCULAR; INTRAVENOUS at 11:26

## 2021-12-25 RX ADMIN — PROPOFOL 75 MG: 10 INJECTION, EMULSION INTRAVENOUS at 12:06

## 2021-12-27 ENCOUNTER — PATIENT OUTREACH (OUTPATIENT)
Dept: OBGYN CLINIC | Facility: HOSPITAL | Age: 66
End: 2021-12-27

## 2021-12-27 DIAGNOSIS — M25.552 PAIN OF LEFT HIP JOINT: ICD-10-CM

## 2021-12-27 DIAGNOSIS — Z48.89 AFTERCARE FOLLOWING SURGERY: ICD-10-CM

## 2021-12-27 DIAGNOSIS — Z96.642 STATUS POST LEFT HIP REPLACEMENT: Primary | ICD-10-CM

## 2021-12-27 RX ORDER — TRAMADOL HYDROCHLORIDE 50 MG/1
50 TABLET ORAL EVERY 6 HOURS PRN
Qty: 60 TABLET | Refills: 0 | Status: SHIPPED | OUTPATIENT
Start: 2021-12-27 | End: 2022-01-21 | Stop reason: HOSPADM

## 2021-12-28 RX ORDER — NAPROXEN 125 MG/5ML
SUSPENSION ORAL
COMMUNITY
Start: 2021-12-17 | End: 2022-06-06 | Stop reason: ALTCHOICE

## 2021-12-28 RX ORDER — ROSUVASTATIN CALCIUM 5 MG/1
5 TABLET, COATED ORAL DAILY
COMMUNITY
Start: 2021-12-18 | End: 2022-01-21 | Stop reason: HOSPADM

## 2021-12-28 RX ORDER — ICOSAPENT ETHYL 1000 MG/1
CAPSULE ORAL
COMMUNITY
Start: 2021-12-16

## 2021-12-29 ENCOUNTER — OFFICE VISIT (OUTPATIENT)
Dept: OBGYN CLINIC | Facility: HOSPITAL | Age: 66
End: 2021-12-29

## 2021-12-29 ENCOUNTER — OFFICE VISIT (OUTPATIENT)
Dept: INTERNAL MEDICINE CLINIC | Facility: CLINIC | Age: 66
End: 2021-12-29
Payer: COMMERCIAL

## 2021-12-29 ENCOUNTER — HOSPITAL ENCOUNTER (OUTPATIENT)
Dept: RADIOLOGY | Facility: HOSPITAL | Age: 66
Discharge: HOME | End: 2021-12-29
Attending: ORTHOPAEDIC SURGERY
Payer: COMMERCIAL

## 2021-12-29 VITALS
WEIGHT: 162 LBS | DIASTOLIC BLOOD PRESSURE: 70 MMHG | SYSTOLIC BLOOD PRESSURE: 138 MMHG | HEART RATE: 87 BPM | BODY MASS INDEX: 23.99 KG/M2 | HEIGHT: 69 IN

## 2021-12-29 VITALS
BODY MASS INDEX: 24.27 KG/M2 | DIASTOLIC BLOOD PRESSURE: 68 MMHG | HEIGHT: 69 IN | HEART RATE: 91 BPM | SYSTOLIC BLOOD PRESSURE: 123 MMHG

## 2021-12-29 DIAGNOSIS — R73.03 PREDIABETES: ICD-10-CM

## 2021-12-29 DIAGNOSIS — Z96.642 AFTERCARE FOLLOWING LEFT HIP JOINT REPLACEMENT SURGERY: ICD-10-CM

## 2021-12-29 DIAGNOSIS — Z47.89 ORTHOPEDIC AFTERCARE: Primary | ICD-10-CM

## 2021-12-29 DIAGNOSIS — E78.00 PURE HYPERCHOLESTEROLEMIA: ICD-10-CM

## 2021-12-29 DIAGNOSIS — Z96.642 STATUS POST LEFT HIP REPLACEMENT: Primary | ICD-10-CM

## 2021-12-29 DIAGNOSIS — Z47.1 AFTERCARE FOLLOWING LEFT HIP JOINT REPLACEMENT SURGERY: ICD-10-CM

## 2021-12-29 PROCEDURE — 3008F BODY MASS INDEX DOCD: CPT | Performed by: INTERNAL MEDICINE

## 2021-12-29 PROCEDURE — 73502 X-RAY EXAM HIP UNI 2-3 VIEWS: CPT

## 2021-12-29 PROCEDURE — 99214 OFFICE O/P EST MOD 30 MIN: CPT | Performed by: INTERNAL MEDICINE

## 2021-12-29 PROCEDURE — 1160F RVW MEDS BY RX/DR IN RCRD: CPT | Performed by: INTERNAL MEDICINE

## 2021-12-29 PROCEDURE — 99024 POSTOP FOLLOW-UP VISIT: CPT | Performed by: ORTHOPAEDIC SURGERY

## 2022-01-05 ENCOUNTER — TELEPHONE (OUTPATIENT)
Dept: OBGYN CLINIC | Facility: HOSPITAL | Age: 67
End: 2022-01-05

## 2022-01-05 ENCOUNTER — OFFICE VISIT (OUTPATIENT)
Dept: OBGYN CLINIC | Facility: HOSPITAL | Age: 67
End: 2022-01-05

## 2022-01-05 VITALS
HEIGHT: 69 IN | SYSTOLIC BLOOD PRESSURE: 143 MMHG | BODY MASS INDEX: 24.27 KG/M2 | HEART RATE: 96 BPM | DIASTOLIC BLOOD PRESSURE: 78 MMHG

## 2022-01-05 DIAGNOSIS — Z96.642 STATUS POST LEFT HIP REPLACEMENT: Primary | ICD-10-CM

## 2022-01-05 DIAGNOSIS — Z47.89 ORTHOPEDIC AFTERCARE: Primary | ICD-10-CM

## 2022-01-05 PROCEDURE — 99024 POSTOP FOLLOW-UP VISIT: CPT | Performed by: ORTHOPAEDIC SURGERY

## 2022-01-05 NOTE — TELEPHONE ENCOUNTER
Pt contacted today after I receved message frm surgeon that he has not had home health PT services as ordered  After chart review, order placed on 12/29 was an "ambulatory referral physical therapy" and in the notes section states for Home health services 3x a week for 3-4 weeks  Unfortunately, that referral did not go to [facility] and instead went to outpatient PT que  Order for [facility] services placed by myself at this time, I then contacted   Geraldine at [facility]  She is aware of situation, and why delay in referral  She will work with finance to check coverage of patients insurance for needed services  She will call me back with any issues  In the meantime I called patient, to follow up on the [facility] application submitted, and he denies hearing from them  We discussed him calling [employer] for a status update on his application to see if his is approved for transportation services that were all discussed preoperatively

## 2022-01-05 NOTE — PROGRESS NOTES
73 y o male 2 weeks following conversion left total hip replacement  Home therapy was ordered last week, he has yet to hear from anyone therapy about home therapy  He admits to ongoing pain weakness in his left hip  He can not drive in neither does wife, and Dr Ninfa Rowland needs home therapy    Review of Systems  Review of systems negative unless otherwise specified in HPI    Past Medical History  Past Medical History:   Diagnosis Date    Arthritis     osteoarthritis    Cervical radiculopathy     COPD [facility]chronic obstructive [facility]) [facility])     CPAP (continuous positive airway pressure) dependence     Diabetes mellitus ([address] )     Fibromyalgia     Fibromyalgia, primary     GERD (gastroesophageal reflux disease)     History of transfusion     Hyperlipidemia     Osteopenia     Pneumonia     Sleep apnea     USES CPAP HS       Past Surgical History  Past Surgical History:   Procedure Laterality Date    CHOLECYSTECTOMY      COLONOSCOPY      CYST REMOVAL      FEMUR SURGERY Left     JOINT REPLACEMENT      T KR LEFT    KNEE SURGERY Left 1996    MANDIBLE SURGERY      OH CONV PREV HIP SURG TO TOT HIP ARTHROPLAS Left 12/22/2021    Procedure: ARTHROPLASTY HIP TOTAL- conversion;  Surgeon: Dior Moya MD;  [facility];  Service: Orthopedics    OH REMOVAL DEEP IMPLANT Left 9/8/2021    Procedure: REMOVAL HARDWARE HIP;  Surgeon: José Becker MD;  [facility];  Service: Orthopedics    ROTATOR CUFF REPAIR Left     WRIST FRACTURE SURGERY Left     plate in place       Current Medications  Current Outpatient Medications on File Prior to Visit   Medication Sig Dispense Refill    alendronate (FOSAMAX) 70 mg tablet take 1 tablet by mouth every 7 days IN THE MORNING ON AN EMPTY ST   (REFER TO PRESCRIPTION NOTES)        allopurinol (ZYLOPRIM) 300 mg tablet Take 300 mg by mouth 2 (two) times a day        ascorbic acid (VITAMIN C) 500 MG tablet Take 1 tablet (500 mg total) by mouth daily Start 30 days prior to surgery 30 tablet 0    cetirizine (ZyrTEC) 10 mg tablet Take 10 mg by mouth daily      DULoxetine (CYMBALTA) 30 mg delayed release capsule Take 60 mg by mouth daily at bedtime       enoxaparin (LOVENOX) 40 mg/0 4 mL Inject 0 4 mL (40 mg total) under the skin daily for 28 days Start injections after surgery 11 2 mL 0    ergocalciferol (ERGOCALCIFEROL) 1 25 MG (54526 UT) capsule take 1 capsule by mouth every week      fenofibrate (TRICOR) 145 mg tablet Take 145 mg by mouth daily      ferrous sulfate 324 (65 Fe) mg Take 1 tablet (324 mg total) by mouth 2 (two) times a day before meals Start 30 days prior to surgery 60 tablet 0    fluticasone-salmeterol (Wixela Inhub) 100-50 mcg/dose inhaler Inhale 1 puff 2 (two) times a day Rinse mouth after use  (Patient taking differently: Inhale 1 puff as needed Rinse mouth after use  ) 60 blister 0    folic acid (FOLVITE) 1 mg tablet Take 1 tablet (1 mg total) by mouth daily Start 30 days prior to surgery 30 tablet 0    HYDROcodone-acetaminophen (Norco) 5-325 mg per tablet Take 1 tablet by mouth every 6 (six) hours as needed for pain Max Daily Amount: 4 tablets 30 tablet 0    Icosapent Ethyl (Vascepa) 0 5 g CAPS Take 2 capsules by mouth 2 (two) times a day       Icosapent Ethyl 1 g CAPS take 2 capsules by mouth twice a day with food SWALLOW WHOLE DO NOT CHEW, OPEN,DISSOLVE OR CRUSH      ipratropium-albuterol (DUO-NEB) 0 5-2 5 mg/3 mL nebulizer solution inhale contents of 1 vial ( 3 milliliters ) in nebulizer by mouth four times a day if needed      metFORMIN (GLUCOPHAGE) 1000 MG tablet Take 1,000 mg by mouth 2 (two) times a day with meals        Multiple Vitamin (multivitamin) tablet TAKE 1 TABLET DAILY        naproxen (NAPROSYN) 125 mg/5 mL suspension take 5 milliliter by mouth every 8 hours with food if needed      omeprazole (PriLOSEC OTC) 20 MG tablet Take 20 mg by mouth daily      oxyCODONE (ROXICODONE) 5 mg immediate release tablet 1 pill po Q4 Hrs prn 30 tablet 0    pregabalin (Lyrica) 150 mg capsule Take 1 capsule by mouth every 12 (twelve) hours      rosuvastatin (CRESTOR) 10 MG tablet Take 5 mg by mouth daily at bedtime        rosuvastatin (CRESTOR) 5 mg tablet Take 5 mg by mouth daily      temazepam (RESTORIL) 7 5 mg capsule Take 7 5 mg by mouth daily at bedtime as needed      traMADol (ULTRAM) 50 mg tablet Take 1 tablet (50 mg total) by mouth every 6 (six) hours as needed for moderate pain for up to 60 doses 60 tablet 0    Turmeric 500 MG CAPS Take by mouth daily       No current facility-administered medications on file prior to visit  Recent Labs [facility])  0   Lab Value Date/Time    HCT 35 8 (L) 12/23/2021 0435    HCT 28 8 (L) 03/09/2015 0534    HGB 11 6 (L) 12/23/2021 0435    HGB 9 4 (L) 03/09/2015 0534    WBC 11 28 (H) 12/23/2021 0435    WBC 12 05 (H) 03/09/2015 0534    INR 0 99 11/04/2021 1001    INR 0 99 03/05/2015 1315    GLUCOSE 114 03/09/2015 0534    HGBA1C 6 1 (H) 12/16/2021 0800         Physical exam  · General: Awake, Alert, Oriented  · Eyes: Pupils equal, round and reactive to light  · Heart: regular rate and rhythm  · Lungs: No audible wheezing  · Abdomen: soft  Examination finds left hip with heel postal scar  There is no limb of the quality  There is no tenderness left calf    Imaging  No new x-rays accompany him    Procedure  Staples removed, Steri-Strips not necessary    Assessment/Plan:   73 y  o male 2 weeks following left total hip replacement with well clinical appearance  He would benefit greatly from home therapy  This is ordered again, I will reach out the nurse navigator    Office follow-up in 4 weeks time is my recommendation

## 2022-01-06 ENCOUNTER — DOCUMENTATION (OUTPATIENT)
Dept: SOCIAL WORK | Facility: HOSPITAL | Age: 67
End: 2022-01-06

## 2022-01-07 NOTE — PROGRESS NOTES
Admission Report at [facility] has admitted your patient to [address] [place] [address] service with the following disciplines:      PT  This report is informational only, no responses is needed  Primary focus of home health care MUSCULOSKELETAL  Patient stated goals of care "To be able to walk again without pain  Be able to get into my car "  Anticipated visit pattern and next visit date is 1w1, 3w2, 2w1  Request for medication clarification Patient reports to not be taking the iron pill, folic acid, and oxycodone as per med list   No pill bottles found in home  Duplicate meds noted in opiod analgesics class  Needs follow up physician appointments scheduled with PCP  Potential barriers to goal achievement include observed non compliance with posterior hip precautions and slight impulsivity with movements placing him at risk for injury and or fall  Thank you for allowing us to participate in the care of your patient        Diaz Palomino, PT

## 2022-01-18 ENCOUNTER — HOSPITAL ENCOUNTER (INPATIENT)
Facility: HOSPITAL | Age: 67
LOS: 3 days | Discharge: HOME HEALTH | DRG: 556 | End: 2022-01-21
Attending: EMERGENCY MEDICINE | Admitting: INTERNAL MEDICINE
Payer: COMMERCIAL

## 2022-01-18 ENCOUNTER — APPOINTMENT (EMERGENCY)
Dept: RADIOLOGY | Facility: HOSPITAL | Age: 67
DRG: 556 | End: 2022-01-18
Payer: COMMERCIAL

## 2022-01-18 ENCOUNTER — APPOINTMENT (INPATIENT)
Dept: CT IMAGING | Facility: HOSPITAL | Age: 67
DRG: 556 | End: 2022-01-18
Payer: COMMERCIAL

## 2022-01-18 DIAGNOSIS — Z98.890 S/P CLOSED REDUCTION OF DISLOCATED TOTAL HIP PROSTHESIS: ICD-10-CM

## 2022-01-18 DIAGNOSIS — S73.005A DISLOCATION OF LEFT HIP, INITIAL ENCOUNTER (HCC): ICD-10-CM

## 2022-01-18 DIAGNOSIS — M25.552 LEFT HIP PAIN: Primary | ICD-10-CM

## 2022-01-18 DIAGNOSIS — Z51.89 AFTER CARE: ICD-10-CM

## 2022-01-18 DIAGNOSIS — R65.10 SIRS (SYSTEMIC INFLAMMATORY RESPONSE SYNDROME) (HCC): ICD-10-CM

## 2022-01-18 PROBLEM — E11.9 TYPE 2 DIABETES MELLITUS (HCC): Status: ACTIVE | Noted: 2022-01-18

## 2022-01-18 LAB
ANION GAP SERPL CALCULATED.3IONS-SCNC: 8 MMOL/L (ref 4–13)
BASOPHILS # BLD AUTO: 0.08 THOUSANDS/ΜL (ref 0–0.1)
BASOPHILS NFR BLD AUTO: 1 % (ref 0–1)
BUN SERPL-MCNC: 17 MG/DL (ref 5–25)
CALCIUM SERPL-MCNC: 9.6 MG/DL (ref 8.3–10.1)
CHLORIDE SERPL-SCNC: 102 MMOL/L (ref 100–108)
CO2 SERPL-SCNC: 25 MMOL/L (ref 21–32)
CREAT SERPL-MCNC: 1.03 MG/DL (ref 0.6–1.3)
CRP SERPL QL: 6.1 MG/L
EOSINOPHIL # BLD AUTO: 0.13 THOUSAND/ΜL (ref 0–0.61)
EOSINOPHIL NFR BLD AUTO: 1 % (ref 0–6)
ERYTHROCYTE [DISTWIDTH] IN BLOOD BY AUTOMATED COUNT: 13.8 % (ref 11.6–15.1)
ERYTHROCYTE [SEDIMENTATION RATE] IN BLOOD: 27 MM/HOUR (ref 0–19)
GFR SERPL CREATININE-BSD FRML MDRD: 75 ML/MIN/1.73SQ M
GLUCOSE SERPL-MCNC: 108 MG/DL (ref 65–140)
HCT VFR BLD AUTO: 39.7 % (ref 36.5–49.3)
HGB BLD-MCNC: 12.7 G/DL (ref 12–17)
IMM GRANULOCYTES # BLD AUTO: 0.11 THOUSAND/UL (ref 0–0.2)
IMM GRANULOCYTES NFR BLD AUTO: 1 % (ref 0–2)
LYMPHOCYTES # BLD AUTO: 2.34 THOUSANDS/ΜL (ref 0.6–4.47)
LYMPHOCYTES NFR BLD AUTO: 17 % (ref 14–44)
MCH RBC QN AUTO: 28.2 PG (ref 26.8–34.3)
MCHC RBC AUTO-ENTMCNC: 32 G/DL (ref 31.4–37.4)
MCV RBC AUTO: 88 FL (ref 82–98)
MONOCYTES # BLD AUTO: 1.32 THOUSAND/ΜL (ref 0.17–1.22)
MONOCYTES NFR BLD AUTO: 10 % (ref 4–12)
NEUTROPHILS # BLD AUTO: 9.89 THOUSANDS/ΜL (ref 1.85–7.62)
NEUTS SEG NFR BLD AUTO: 70 % (ref 43–75)
NRBC BLD AUTO-RTO: 0 /100 WBCS
PLATELET # BLD AUTO: 268 THOUSANDS/UL (ref 149–390)
PMV BLD AUTO: 12.1 FL (ref 8.9–12.7)
POTASSIUM SERPL-SCNC: 4.3 MMOL/L (ref 3.5–5.3)
RBC # BLD AUTO: 4.5 MILLION/UL (ref 3.88–5.62)
SODIUM SERPL-SCNC: 135 MMOL/L (ref 136–145)
WBC # BLD AUTO: 13.87 THOUSAND/UL (ref 4.31–10.16)

## 2022-01-18 PROCEDURE — 85025 COMPLETE CBC W/AUTO DIFF WBC: CPT | Performed by: EMERGENCY MEDICINE

## 2022-01-18 PROCEDURE — 96372 THER/PROPH/DIAG INJ SC/IM: CPT

## 2022-01-18 PROCEDURE — 99285 EMERGENCY DEPT VISIT HI MDM: CPT | Performed by: EMERGENCY MEDICINE

## 2022-01-18 PROCEDURE — 73701 CT LOWER EXTREMITY W/DYE: CPT

## 2022-01-18 PROCEDURE — G1004 CDSM NDSC: HCPCS

## 2022-01-18 PROCEDURE — 36415 COLL VENOUS BLD VENIPUNCTURE: CPT | Performed by: EMERGENCY MEDICINE

## 2022-01-18 PROCEDURE — 80048 BASIC METABOLIC PNL TOTAL CA: CPT | Performed by: EMERGENCY MEDICINE

## 2022-01-18 PROCEDURE — 99284 EMERGENCY DEPT VISIT MOD MDM: CPT

## 2022-01-18 PROCEDURE — 86140 C-REACTIVE PROTEIN: CPT | Performed by: EMERGENCY MEDICINE

## 2022-01-18 PROCEDURE — 85652 RBC SED RATE AUTOMATED: CPT | Performed by: EMERGENCY MEDICINE

## 2022-01-18 PROCEDURE — 99223 1ST HOSP IP/OBS HIGH 75: CPT | Performed by: PHYSICIAN ASSISTANT

## 2022-01-18 PROCEDURE — 73502 X-RAY EXAM HIP UNI 2-3 VIEWS: CPT

## 2022-01-18 RX ORDER — IPRATROPIUM BROMIDE AND ALBUTEROL SULFATE 2.5; .5 MG/3ML; MG/3ML
3 SOLUTION RESPIRATORY (INHALATION) EVERY 6 HOURS PRN
Status: DISCONTINUED | OUTPATIENT
Start: 2022-01-18 | End: 2022-01-21 | Stop reason: HOSPADM

## 2022-01-18 RX ORDER — PREGABALIN 75 MG/1
150 CAPSULE ORAL EVERY 12 HOURS
Status: DISCONTINUED | OUTPATIENT
Start: 2022-01-18 | End: 2022-01-21 | Stop reason: HOSPADM

## 2022-01-18 RX ORDER — OXYCODONE HYDROCHLORIDE 5 MG/1
5 TABLET ORAL EVERY 6 HOURS PRN
Status: DISCONTINUED | OUTPATIENT
Start: 2022-01-18 | End: 2022-01-19

## 2022-01-18 RX ORDER — FLUTICASONE FUROATE AND VILANTEROL 100; 25 UG/1; UG/1
1 POWDER RESPIRATORY (INHALATION)
Status: DISCONTINUED | OUTPATIENT
Start: 2022-01-19 | End: 2022-01-21 | Stop reason: HOSPADM

## 2022-01-18 RX ORDER — ACETAMINOPHEN 325 MG/1
975 TABLET ORAL EVERY 8 HOURS SCHEDULED
Status: DISCONTINUED | OUTPATIENT
Start: 2022-01-18 | End: 2022-01-21 | Stop reason: HOSPADM

## 2022-01-18 RX ORDER — FENOFIBRATE 145 MG/1
145 TABLET, COATED ORAL DAILY
Status: DISCONTINUED | OUTPATIENT
Start: 2022-01-19 | End: 2022-01-21 | Stop reason: HOSPADM

## 2022-01-18 RX ORDER — DULOXETIN HYDROCHLORIDE 30 MG/1
60 CAPSULE, DELAYED RELEASE ORAL
Status: DISCONTINUED | OUTPATIENT
Start: 2022-01-18 | End: 2022-01-21 | Stop reason: HOSPADM

## 2022-01-18 RX ORDER — MAGNESIUM HYDROXIDE/ALUMINUM HYDROXICE/SIMETHICONE 120; 1200; 1200 MG/30ML; MG/30ML; MG/30ML
30 SUSPENSION ORAL EVERY 6 HOURS PRN
Status: DISCONTINUED | OUTPATIENT
Start: 2022-01-18 | End: 2022-01-20

## 2022-01-18 RX ORDER — ALLOPURINOL 300 MG/1
300 TABLET ORAL 2 TIMES DAILY
Status: DISCONTINUED | OUTPATIENT
Start: 2022-01-18 | End: 2022-01-21 | Stop reason: HOSPADM

## 2022-01-18 RX ORDER — LORATADINE 10 MG/1
10 TABLET ORAL DAILY
Status: DISCONTINUED | OUTPATIENT
Start: 2022-01-19 | End: 2022-01-21 | Stop reason: HOSPADM

## 2022-01-18 RX ORDER — PRAVASTATIN SODIUM 40 MG
80 TABLET ORAL
Status: DISCONTINUED | OUTPATIENT
Start: 2022-01-19 | End: 2022-01-21 | Stop reason: HOSPADM

## 2022-01-18 RX ORDER — HYDROMORPHONE HCL/PF 1 MG/ML
0.5 SYRINGE (ML) INJECTION EVERY 4 HOURS PRN
Status: DISCONTINUED | OUTPATIENT
Start: 2022-01-18 | End: 2022-01-19

## 2022-01-18 RX ORDER — KETOROLAC TROMETHAMINE 30 MG/ML
30 INJECTION, SOLUTION INTRAMUSCULAR; INTRAVENOUS ONCE
Status: COMPLETED | OUTPATIENT
Start: 2022-01-18 | End: 2022-01-18

## 2022-01-18 RX ORDER — OXYCODONE HYDROCHLORIDE 5 MG/1
7.5 TABLET ORAL EVERY 6 HOURS PRN
Status: DISCONTINUED | OUTPATIENT
Start: 2022-01-18 | End: 2022-01-19

## 2022-01-18 RX ORDER — NICOTINE 21 MG/24HR
1 PATCH, TRANSDERMAL 24 HOURS TRANSDERMAL DAILY PRN
Status: DISCONTINUED | OUTPATIENT
Start: 2022-01-18 | End: 2022-01-21 | Stop reason: HOSPADM

## 2022-01-18 RX ORDER — ONDANSETRON 2 MG/ML
4 INJECTION INTRAMUSCULAR; INTRAVENOUS EVERY 6 HOURS PRN
Status: DISCONTINUED | OUTPATIENT
Start: 2022-01-18 | End: 2022-01-21 | Stop reason: HOSPADM

## 2022-01-18 RX ORDER — TEMAZEPAM 7.5 MG/1
7.5 CAPSULE ORAL
Status: DISCONTINUED | OUTPATIENT
Start: 2022-01-18 | End: 2022-01-21 | Stop reason: HOSPADM

## 2022-01-18 RX ORDER — ASCORBIC ACID 500 MG
500 TABLET ORAL DAILY
Status: DISCONTINUED | OUTPATIENT
Start: 2022-01-19 | End: 2022-01-21 | Stop reason: HOSPADM

## 2022-01-18 RX ORDER — HYDROCODONE BITARTRATE AND ACETAMINOPHEN 5; 325 MG/1; MG/1
1 TABLET ORAL ONCE
Status: COMPLETED | OUTPATIENT
Start: 2022-01-18 | End: 2022-01-18

## 2022-01-18 RX ADMIN — DULOXETINE HYDROCHLORIDE 60 MG: 30 CAPSULE, DELAYED RELEASE ORAL at 21:26

## 2022-01-18 RX ADMIN — PREGABALIN 150 MG: 75 CAPSULE ORAL at 21:26

## 2022-01-18 RX ADMIN — HYDROCODONE BITARTRATE AND ACETAMINOPHEN 1 TABLET: 5; 325 TABLET ORAL at 16:48

## 2022-01-18 RX ADMIN — ALLOPURINOL 300 MG: 300 TABLET ORAL at 21:27

## 2022-01-18 RX ADMIN — OXYCODONE HYDROCHLORIDE 5 MG: 5 TABLET ORAL at 21:27

## 2022-01-18 RX ADMIN — KETOROLAC TROMETHAMINE 30 MG: 30 INJECTION, SOLUTION INTRAMUSCULAR at 16:48

## 2022-01-18 RX ADMIN — ACETAMINOPHEN 975 MG: 325 TABLET, FILM COATED ORAL at 21:26

## 2022-01-18 RX ADMIN — IOHEXOL 100 ML: 350 INJECTION, SOLUTION INTRAVENOUS at 21:38

## 2022-01-18 NOTE — ED PROVIDER NOTES
History  Chief Complaint   Patient presents with    Hip Pain     arrives via ems per report  left hip pain recent hip sx and dislocation  71y M here w/ left hip pain x3 days  Pt has THR conversion on 12/22 w/ subsequent acute dislocation 12/25 that required orthopedic reduction under ED sedation  Pt notes 3 days ago was getting up from a chair and had a sudden, sharp pain in the left hip w/ radiation down to the foot  Pt states when he had this pain "it made my foot turn in"  Since then, pt having waxing/waning pain in the left hip, but able to ambulate  Pt states for the last 3 days, it feels like "something is moving" in his hip  About 3h pta, pt anu to sit on couch, had another sudden increase in his pain and has been unable to bear weight since then  Reports severe pain in the left hip w/ radiation down the leg w/ attempted movement  Denies any numbness or weakness  Reports hip feels swollen  Has tramadol at home to use prn for pain  Took one this am and states "that's not gonna cut it" for his current level of pain  PDMP reviewed  Since August, pt has had thirteen prescriptions for pain medications (hydrocodone 5mg #40 tab, oxycodone 5mg #60 tab, tramadol 50mg #330 tabs) in addition to regular prescriptions for triazolam, temazepam and pregabalin      History provided by:  Patient and medical records   used: No    Hip Pain  Location:  Left hip  Quality:  Shooting  Severity:  Severe  Onset quality:  Gradual  Timing:  Intermittent  Progression:  Waxing and waning  Chronicity:  New  Context:  THR conversion 12/22 w/ dislocation 12/25  Relieved by:  Nothing  Worsened by: Attempted movement  Ineffective treatments:  Tramadol  Associated symptoms: no fever and no myalgias        Prior to Admission Medications   Prescriptions Last Dose Informant Patient Reported? Taking?    DULoxetine (CYMBALTA) 30 mg delayed release capsule   Yes No   Sig: Take 60 mg by mouth daily at bedtime HYDROcodone-acetaminophen (Norco) 5-325 mg per tablet   No No   Sig: Take 1 tablet by mouth every 6 (six) hours as needed for pain Max Daily Amount: 4 tablets   Icosapent Ethyl (Vascepa) 0 5 g CAPS   Yes No   Sig: Take 2 capsules by mouth 2 (two) times a day    Icosapent Ethyl 1 g CAPS   Yes No   Sig: take 2 capsules by mouth twice a day with food SWALLOW WHOLE DO NOT CHEW, OPEN,DISSOLVE OR CRUSH   Multiple Vitamin (multivitamin) tablet   Yes No   Sig: TAKE 1 TABLET DAILY  Turmeric 500 MG CAPS   Yes No   Sig: Take by mouth daily   alendronate (FOSAMAX) 70 mg tablet   Yes No   Sig: take 1 tablet by mouth every 7 days IN THE MORNING ON AN EMPTY ST   (REFER TO PRESCRIPTION NOTES)  allopurinol (ZYLOPRIM) 300 mg tablet   Yes No   Sig: Take 300 mg by mouth 2 (two) times a day     ascorbic acid (VITAMIN C) 500 MG tablet   No No   Sig: Take 1 tablet (500 mg total) by mouth daily Start 30 days prior to surgery   cetirizine (ZyrTEC) 10 mg tablet   Yes No   Sig: Take 10 mg by mouth daily   enoxaparin (LOVENOX) 40 mg/0 4 mL   No No   Sig: Inject 0 4 mL (40 mg total) under the skin daily for 28 days Start injections after surgery   ergocalciferol (ERGOCALCIFEROL) 1 25 MG (75404 UT) capsule   Yes No   Sig: take 1 capsule by mouth every week   fenofibrate (TRICOR) 145 mg tablet   Yes No   Sig: Take 145 mg by mouth daily   ferrous sulfate 324 (65 Fe) mg   No No   Sig: Take 1 tablet (324 mg total) by mouth 2 (two) times a day before meals Start 30 days prior to surgery   fluticasone-salmeterol (Wixela Inhub) 100-50 mcg/dose inhaler   No No   Sig: Inhale 1 puff 2 (two) times a day Rinse mouth after use  Patient taking differently: Inhale 1 puff as needed Rinse mouth after use      folic acid (FOLVITE) 1 mg tablet   No No   Sig: Take 1 tablet (1 mg total) by mouth daily Start 30 days prior to surgery   ipratropium-albuterol (DUO-NEB) 0 5-2 5 mg/3 mL nebulizer solution   Yes No   Sig: inhale contents of 1 vial ( 3 milliliters ) in nebulizer by mouth four times a day if needed   metFORMIN (GLUCOPHAGE) 1000 MG tablet   Yes No   Sig: Take 1,000 mg by mouth 2 (two) times a day with meals     naproxen (NAPROSYN) 125 mg/5 mL suspension   Yes No   Sig: take 5 milliliter by mouth every 8 hours with food if needed   omeprazole (PriLOSEC OTC) 20 MG tablet   Yes No   Sig: Take 20 mg by mouth daily   oxyCODONE (ROXICODONE) 5 mg immediate release tablet   No No   Si pill po Q4 Hrs prn   pregabalin (Lyrica) 150 mg capsule   Yes No   Sig: Take 1 capsule by mouth every 12 (twelve) hours   rosuvastatin (CRESTOR) 10 MG tablet   Yes No   Sig: Take 5 mg by mouth daily at bedtime     rosuvastatin (CRESTOR) 5 mg tablet   Yes No   Sig: Take 5 mg by mouth daily   temazepam (RESTORIL) 7 5 mg capsule   Yes No   Sig: Take 7 5 mg by mouth daily at bedtime as needed   traMADol (ULTRAM) 50 mg tablet   No No   Sig: Take 1 tablet (50 mg total) by mouth every 6 (six) hours as needed for moderate pain for up to 60 doses      Facility-Administered Medications: None       Past Medical History:   Diagnosis Date    Arthritis     osteoarthritis    Cervical radiculopathy     COPD (chronic obstructive pulmonary disease) (HCC)     CPAP (continuous positive airway pressure) dependence     Diabetes mellitus (HCC)     Fibromyalgia     Fibromyalgia, primary     GERD (gastroesophageal reflux disease)     History of transfusion     Hyperlipidemia     Osteopenia     Pneumonia     Sleep apnea     USES CPAP HS       Past Surgical History:   Procedure Laterality Date    CHOLECYSTECTOMY      COLONOSCOPY      CYST REMOVAL      FEMUR SURGERY Left     JOINT REPLACEMENT      T KR LEFT    KNEE SURGERY Left     MANDIBLE SURGERY      TX CONV PREV HIP SURG TO TOT HIP ARTHROPLAS Left 2021    Procedure: ARTHROPLASTY HIP TOTAL- conversion;  Surgeon: Kecia Martinez MD;  [facility];  Service: Orthopedics    TX REMOVAL DEEP IMPLANT Left 2021 Procedure: REMOVAL HARDWARE HIP;  Surgeon: Tiera Griffin MD;  [facility];  Service: Orthopedics    ROTATOR CUFF REPAIR Left     WRIST FRACTURE SURGERY Left     plate in place       Family History   Problem Relation Age of Onset    Cancer Mother     Arthritis Father     Diabetes Sister     Cancer Brother     Diabetes Daughter     Depression Son      I have reviewed and agree with the history as documented  E-Cigarette/Vaping    E-Cigarette Use Never User      E-Cigarette/Vaping Substances    Nicotine No     THC No     CBD No     Flavoring No     Other No     Unknown No      Social History     Tobacco Use    Smoking status: Current Every Day Smoker     Packs/day: 0 50     Types: Cigarettes    Smokeless tobacco: Never Used   Vaping Use    Vaping Use: Never used   Substance Use Topics    Alcohol use: Not Currently     Comment: last drink 1 year ago     Drug use: No       Review of Systems   Constitutional: Negative for chills, diaphoresis and fever  Musculoskeletal: Positive for arthralgias, gait problem and joint swelling  Negative for back pain and myalgias  Skin: Negative for color change and wound  Neurological: Negative for weakness and numbness  All other systems reviewed and are negative  Physical Exam  Physical Exam  Vitals and nursing note reviewed  Constitutional:       Appearance: Normal appearance  Comments: Appears uncomfortable     HENT:      Nose: Nose normal    Eyes:      Conjunctiva/sclera: Conjunctivae normal    Cardiovascular:      Rate and Rhythm: Regular rhythm  Tachycardia present  Pulmonary:      Effort: Pulmonary effort is normal    Abdominal:      General: Abdomen is flat  Musculoskeletal:         General: No swelling  Cervical back: Normal range of motion  Left hip: Tenderness present  No crepitus  Decreased range of motion  Legs:    Skin:     General: Skin is warm        Capillary Refill: Capillary refill takes less than 2 seconds  Neurological:      General: No focal deficit present  Mental Status: He is alert and oriented to person, place, and time     Psychiatric:         Mood and Affect: Mood normal          Vital Signs  ED Triage Vitals   Temperature Pulse Respirations Blood Pressure SpO2   01/18/22 1615 01/18/22 1615 01/18/22 1615 01/18/22 1615 01/18/22 1615   98 °F (36 7 °C) (!) 125 18 135/80 98 %      Temp Source Heart Rate Source Patient Position - Orthostatic VS BP Location FiO2 (%)   01/18/22 1615 01/18/22 1615 01/18/22 1615 01/18/22 1615 --   Tympanic Monitor Lying Right arm       Pain Score       01/18/22 1648       8           Vitals:    01/18/22 1615 01/18/22 1724 01/18/22 1933   BP: 135/80 123/58 126/58   Pulse: (!) 125 96 87   Patient Position - Orthostatic VS: Lying Lying Lying         Visual Acuity      ED Medications  Medications   HYDROcodone-acetaminophen (NORCO) 5-325 mg per tablet 1 tablet (1 tablet Oral Given 1/18/22 1648)   ketorolac (TORADOL) injection 30 mg (30 mg Intramuscular Given 1/18/22 1648)       Diagnostic Studies  Results Reviewed     Procedure Component Value Units Date/Time    Basic metabolic panel [[id number]]  (Abnormal) Collected: 01/18/22 1724    Lab Status: Final result Specimen: Blood from Arm, Right Updated: 01/18/22 1805     Sodium 135 mmol/L      Potassium 4 3 mmol/L      Chloride 102 mmol/L      CO2 25 mmol/L      ANION GAP 8 mmol/L      BUN 17 mg/dL      Creatinine 1 03 mg/dL      Glucose 108 mg/dL      Calcium 9 6 mg/dL      eGFR 75 ml/min/1 73sq m     Narrative:      [facility] guidelines for Chronic Kidney Disease (CKD):     Stage 1 with normal or high GFR (GFR > 90 mL/min/1 73 square meters)    Stage 2 Mild CKD (GFR = 60-89 mL/min/1 73 square meters)    Stage 3A Moderate CKD (GFR = 45-59 mL/min/1 73 square meters)    Stage 3B Moderate CKD (GFR = 30-44 mL/min/1 73 square meters)    Stage 4 Severe CKD (GFR = 15-29 mL/min/1 73 square meters)    Stage 5 End Stage CKD (GFR <15 mL/min/1 73 square meters)  Note: GFR calculation is accurate only with a steady state creatinine    C-reactive protein [[id number]]  (Abnormal) Collected: 01/18/22 1724    Lab Status: Final result Specimen: Blood from Arm, Right Updated: 01/18/22 1805     CRP 6 1 mg/L     Sedimentation rate, automated [[id number]]  (Abnormal) Collected: 01/18/22 1724    Lab Status: Final result Specimen: Blood from Arm, Right Updated: 01/18/22 1804     Sed Rate 27 mm/hour     CBC and differential [[id number]]  (Abnormal) Collected: 01/18/22 1724    Lab Status: Final result Specimen: Blood from Arm, Right Updated: 01/18/22 1738     WBC 13 87 Thousand/uL      RBC 4 50 Million/uL      Hemoglobin 12 7 g/dL      Hematocrit 39 7 %      MCV 88 fL      MCH 28 2 pg      MCHC 32 0 g/dL      RDW 13 8 %      MPV 12 1 fL      Platelets 721 Thousands/uL      nRBC 0 /100 WBCs      Neutrophils Relative 70 %      Immat GRANS % 1 %      Lymphocytes Relative 17 %      Monocytes Relative 10 %      Eosinophils Relative 1 %      Basophils Relative 1 %      Neutrophils Absolute 9 89 Thousands/µL      Immature Grans Absolute 0 11 Thousand/uL      Lymphocytes Absolute 2 34 Thousands/µL      Monocytes Absolute 1 32 Thousand/µL      Eosinophils Absolute 0 13 Thousand/µL      Basophils Absolute 0 08 Thousands/µL                  XR hip/pelv 2-3 vws left   ED Interpretation by Michelle Lehman DO (01/18 1710)   No acute findings                 Procedures  Procedures         ED Course  ED Course as of 01/18/22 2043 Tue Jan 18, 2022   1727 Pain down to 4/10 from 7-8/10  Informed of xray results and reason for labs  Once cbc, sed and/or crp back, will d/w ortho   1739 WBC(!): 13 87   1810 Pain still about 4/10  Able to passively flex to between 45-60 degrees, but sig increase pain w/ internal > external rotation  Will text ortho for next steps  [address] w/ Dr Tamika Contreras    Given continued pain, recommends pt be admitted to [facility] for ortho eval tomorrow to determine if they think he needs an aspiration of the hip, etc    1831 D/w pt, agrees with plan  Will text SLIM   1834 Texted SLIM                                             MDM  Number of Diagnoses or Management Options  Left hip pain: new and requires workup  [facility] syndrome[facility] ): new and requires workup  Diagnosis management comments: L hip pain - THR 12/22 w/ dislocation 12/23  Pain now for 3 days - feels like something is "floating" and that the hip is swollen  Will get xray, +/- labs and d/w ortho       Amount and/or Complexity of Data Reviewed  Clinical lab tests: reviewed and ordered  Tests in the radiology section of CPT®: reviewed and ordered  Discuss the patient with other providers: yes  Independent visualization of images, tracings, or specimens: yes        Disposition  Final diagnoses:   Left hip pain   SIRS (systemic inflammatory response syndrome) ([email or web address] )     Time reflects when diagnosis was documented in both MDM as applicable and the Disposition within this note     Time User Action Codes Description Comment    1/18/2022  6:32 PM Davis Swan Add [M25 552] Left hip pain     1/18/2022  6:32 PM Jennifer Lion Add [R65 10] SIRS (systemic inflammatory response syndrome) [facility])       ED Disposition     ED Disposition Condition Date/Time Comment    Admit Stable Tue Jan 18, 2022  6:46 PM Case was discussed with Dr Omar Ramos and the patient's admission status was agreed to be Admission Status: inpatient status to the service of Dr Omar Ramos        Follow-up Information    None         Current Discharge Medication List      CONTINUE these medications which have NOT CHANGED    Details   alendronate (FOSAMAX) 70 mg tablet take 1 tablet by mouth every 7 days IN THE MORNING ON AN EMPTY ST   (REFER TO PRESCRIPTION NOTES)        allopurinol (ZYLOPRIM) 300 mg tablet Take 300 mg by mouth 2 (two) times a day        ascorbic acid (VITAMIN C) 500 MG tablet Take 1 tablet (500 mg total) by mouth daily Start 30 days prior to surgery  Qty: 30 tablet, Refills: 0    Associated Diagnoses: Aftercare following surgery; Primary osteoarthritis of one hip, left      cetirizine (ZyrTEC) 10 mg tablet Take 10 mg by mouth daily      DULoxetine (CYMBALTA) 30 mg delayed release capsule Take 60 mg by mouth daily at bedtime       enoxaparin (LOVENOX) 40 mg/0 4 mL Inject 0 4 mL (40 mg total) under the skin daily for 28 days Start injections after surgery  Qty: 11 2 mL, Refills: 0    Associated Diagnoses: Aftercare following surgery; Primary osteoarthritis of one hip, left      ergocalciferol (ERGOCALCIFEROL) 1 25 MG (75789 UT) capsule take 1 capsule by mouth every week      fenofibrate (TRICOR) 145 mg tablet Take 145 mg by mouth daily      ferrous sulfate 324 (65 Fe) mg Take 1 tablet (324 mg total) by mouth 2 (two) times a day before meals Start 30 days prior to surgery  Qty: 60 tablet, Refills: 0    Associated Diagnoses: Aftercare following surgery; Primary osteoarthritis of one hip, left      fluticasone-salmeterol (Wixela Inhub) 100-50 mcg/dose inhaler Inhale 1 puff 2 (two) times a day Rinse mouth after use  Qty: 60 blister, Refills: 0    Comments: Substitution to a formulary equivalent within the same pharmaceutical class is authorized  Associated Diagnoses: Chronic obstructive pulmonary disease, unspecified COPD type ([facility] )      folic acid (FOLVITE) 1 mg tablet Take 1 tablet (1 mg total) by mouth daily Start 30 days prior to surgery  Qty: 30 tablet, Refills: 0    Associated Diagnoses: Aftercare following surgery; Primary osteoarthritis of one hip, left      HYDROcodone-acetaminophen (Norco) 5-325 mg per tablet Take 1 tablet by mouth every 6 (six) hours as needed for pain Max Daily Amount: 4 tablets  Qty: 30 tablet, Refills: 0    Associated Diagnoses: S/P total left hip arthroplasty      !!  Icosapent Ethyl (Vascepa) 0 5 g CAPS Take 2 capsules by mouth 2 (two) times a day       !! Icosapent Ethyl 1 g CAPS take 2 capsules by mouth twice a day with food SWALLOW WHOLE DO NOT CHEW, OPEN,DISSOLVE OR CRUSH      ipratropium-albuterol (DUO-NEB) 0 5-2 5 mg/3 mL nebulizer solution inhale contents of 1 vial ( 3 milliliters ) in nebulizer by mouth four times a day if needed      metFORMIN (GLUCOPHAGE) 1000 MG tablet Take 1,000 mg by mouth 2 (two) times a day with meals        Multiple Vitamin (multivitamin) tablet TAKE 1 TABLET DAILY  naproxen (NAPROSYN) 125 mg/5 mL suspension take 5 milliliter by mouth every 8 hours with food if needed      omeprazole (PriLOSEC OTC) 20 MG tablet Take 20 mg by mouth daily      oxyCODONE (ROXICODONE) 5 mg immediate release tablet 1 pill po Q4 Hrs prn  Qty: 30 tablet, Refills: 0    Comments: Continuation of treatment  Associated Diagnoses: After care      pregabalin (Lyrica) 150 mg capsule Take 1 capsule by mouth every 12 (twelve) hours      !! rosuvastatin (CRESTOR) 10 MG tablet Take 5 mg by mouth daily at bedtime        !! rosuvastatin (CRESTOR) 5 mg tablet Take 5 mg by mouth daily      temazepam (RESTORIL) 7 5 mg capsule Take 7 5 mg by mouth daily at bedtime as needed      traMADol (ULTRAM) 50 mg tablet Take 1 tablet (50 mg total) by mouth every 6 (six) hours as needed for moderate pain for up to 60 doses  Qty: 60 tablet, Refills: 0    Comments: Continuation of treatment  Associated Diagnoses: Aftercare following surgery; Pain of left hip joint      Turmeric 500 MG CAPS Take by mouth daily       ! ! - Potential duplicate medications found  Please discuss with provider  No discharge procedures on file      PDMP Review       Value Time User    PDMP Reviewed  Yes 1/18/2022  4:22 PM Isaiah Colon DO          ED Provider  Electronically Signed by           Isaiah Colon DO  01/18/22 2043

## 2022-01-18 NOTE — Clinical Note
Case was discussed with CRISTINA and the patient's admission status was agreed to be Admission Status: observation status to the service of Dr Jennifer Lay

## 2022-01-19 PROBLEM — N18.30 TYPE 2 DIABETES MELLITUS WITH STAGE 3 CHRONIC KIDNEY DISEASE, WITHOUT LONG-TERM CURRENT USE OF INSULIN (HCC): Status: ACTIVE | Noted: 2022-01-18

## 2022-01-19 PROBLEM — E11.22 TYPE 2 DIABETES MELLITUS WITH STAGE 3 CHRONIC KIDNEY DISEASE, WITHOUT LONG-TERM CURRENT USE OF INSULIN (HCC): Status: ACTIVE | Noted: 2022-01-18

## 2022-01-19 LAB
ANION GAP SERPL CALCULATED.3IONS-SCNC: 9 MMOL/L (ref 4–13)
BASOPHILS # BLD AUTO: 0.08 THOUSANDS/ΜL (ref 0–0.1)
BASOPHILS NFR BLD AUTO: 1 % (ref 0–1)
BUN SERPL-MCNC: 20 MG/DL (ref 5–25)
CALCIUM SERPL-MCNC: 8.8 MG/DL (ref 8.3–10.1)
CHLORIDE SERPL-SCNC: 104 MMOL/L (ref 100–108)
CO2 SERPL-SCNC: 26 MMOL/L (ref 21–32)
CREAT SERPL-MCNC: 1.28 MG/DL (ref 0.6–1.3)
EOSINOPHIL # BLD AUTO: 0.13 THOUSAND/ΜL (ref 0–0.61)
EOSINOPHIL NFR BLD AUTO: 1 % (ref 0–6)
ERYTHROCYTE [DISTWIDTH] IN BLOOD BY AUTOMATED COUNT: 13.9 % (ref 11.6–15.1)
GFR SERPL CREATININE-BSD FRML MDRD: 58 ML/MIN/1.73SQ M
GLUCOSE SERPL-MCNC: 124 MG/DL (ref 65–140)
GLUCOSE SERPL-MCNC: 141 MG/DL (ref 65–140)
GLUCOSE SERPL-MCNC: 150 MG/DL (ref 65–140)
GLUCOSE SERPL-MCNC: 161 MG/DL (ref 65–140)
GLUCOSE SERPL-MCNC: 182 MG/DL (ref 65–140)
GLUCOSE SERPL-MCNC: 224 MG/DL (ref 65–140)
HCT VFR BLD AUTO: 33 % (ref 36.5–49.3)
HGB BLD-MCNC: 10.4 G/DL (ref 12–17)
IMM GRANULOCYTES # BLD AUTO: 0.05 THOUSAND/UL (ref 0–0.2)
IMM GRANULOCYTES NFR BLD AUTO: 1 % (ref 0–2)
LYMPHOCYTES # BLD AUTO: 2.79 THOUSANDS/ΜL (ref 0.6–4.47)
LYMPHOCYTES NFR BLD AUTO: 28 % (ref 14–44)
MCH RBC QN AUTO: 28.7 PG (ref 26.8–34.3)
MCHC RBC AUTO-ENTMCNC: 31.5 G/DL (ref 31.4–37.4)
MCV RBC AUTO: 91 FL (ref 82–98)
MONOCYTES # BLD AUTO: 1.46 THOUSAND/ΜL (ref 0.17–1.22)
MONOCYTES NFR BLD AUTO: 14 % (ref 4–12)
NEUTROPHILS # BLD AUTO: 5.63 THOUSANDS/ΜL (ref 1.85–7.62)
NEUTS SEG NFR BLD AUTO: 55 % (ref 43–75)
NRBC BLD AUTO-RTO: 0 /100 WBCS
PLATELET # BLD AUTO: 210 THOUSANDS/UL (ref 149–390)
PMV BLD AUTO: 13.2 FL (ref 8.9–12.7)
POTASSIUM SERPL-SCNC: 4.2 MMOL/L (ref 3.5–5.3)
RBC # BLD AUTO: 3.62 MILLION/UL (ref 3.88–5.62)
SODIUM SERPL-SCNC: 139 MMOL/L (ref 136–145)
WBC # BLD AUTO: 10.14 THOUSAND/UL (ref 4.31–10.16)

## 2022-01-19 PROCEDURE — 80048 BASIC METABOLIC PNL TOTAL CA: CPT | Performed by: PHYSICIAN ASSISTANT

## 2022-01-19 PROCEDURE — 99232 SBSQ HOSP IP/OBS MODERATE 35: CPT | Performed by: INTERNAL MEDICINE

## 2022-01-19 PROCEDURE — 85025 COMPLETE CBC W/AUTO DIFF WBC: CPT | Performed by: PHYSICIAN ASSISTANT

## 2022-01-19 PROCEDURE — 82948 REAGENT STRIP/BLOOD GLUCOSE: CPT

## 2022-01-19 PROCEDURE — 99024 POSTOP FOLLOW-UP VISIT: CPT | Performed by: ORTHOPAEDIC SURGERY

## 2022-01-19 PROCEDURE — 87040 BLOOD CULTURE FOR BACTERIA: CPT | Performed by: INTERNAL MEDICINE

## 2022-01-19 RX ORDER — HYDROMORPHONE HCL/PF 1 MG/ML
1 SYRINGE (ML) INJECTION EVERY 2 HOUR PRN
Status: DISCONTINUED | OUTPATIENT
Start: 2022-01-19 | End: 2022-01-20

## 2022-01-19 RX ORDER — HYDROCODONE BITARTRATE AND ACETAMINOPHEN 5; 325 MG/1; MG/1
2 TABLET ORAL EVERY 6 HOURS PRN
Status: DISCONTINUED | OUTPATIENT
Start: 2022-01-19 | End: 2022-01-20

## 2022-01-19 RX ADMIN — DULOXETINE HYDROCHLORIDE 60 MG: 30 CAPSULE, DELAYED RELEASE ORAL at 21:41

## 2022-01-19 RX ADMIN — ACETAMINOPHEN 975 MG: 325 TABLET, FILM COATED ORAL at 21:41

## 2022-01-19 RX ADMIN — PREGABALIN 150 MG: 75 CAPSULE ORAL at 09:38

## 2022-01-19 RX ADMIN — PRAVASTATIN SODIUM 80 MG: 40 TABLET ORAL at 16:29

## 2022-01-19 RX ADMIN — HYDROMORPHONE HYDROCHLORIDE 0.5 MG: 1 INJECTION, SOLUTION INTRAMUSCULAR; INTRAVENOUS; SUBCUTANEOUS at 14:24

## 2022-01-19 RX ADMIN — FLUTICASONE FUROATE AND VILANTEROL TRIFENATATE 1 PUFF: 100; 25 POWDER RESPIRATORY (INHALATION) at 07:30

## 2022-01-19 RX ADMIN — HYDROCODONE BITARTRATE AND ACETAMINOPHEN 2 TABLET: 5; 325 TABLET ORAL at 16:31

## 2022-01-19 RX ADMIN — FENOFIBRATE 145 MG: 145 TABLET, FILM COATED ORAL at 09:38

## 2022-01-19 RX ADMIN — ALLOPURINOL 300 MG: 300 TABLET ORAL at 17:20

## 2022-01-19 RX ADMIN — LORATADINE 10 MG: 10 TABLET ORAL at 09:38

## 2022-01-19 RX ADMIN — ALLOPURINOL 300 MG: 300 TABLET ORAL at 09:38

## 2022-01-19 RX ADMIN — PREGABALIN 150 MG: 75 CAPSULE ORAL at 21:42

## 2022-01-19 RX ADMIN — INSULIN LISPRO 1 UNITS: 100 INJECTION, SOLUTION INTRAVENOUS; SUBCUTANEOUS at 11:42

## 2022-01-19 RX ADMIN — INSULIN LISPRO 1 UNITS: 100 INJECTION, SOLUTION INTRAVENOUS; SUBCUTANEOUS at 17:20

## 2022-01-19 RX ADMIN — ACETAMINOPHEN 975 MG: 325 TABLET, FILM COATED ORAL at 14:23

## 2022-01-19 RX ADMIN — ACETAMINOPHEN 975 MG: 325 TABLET, FILM COATED ORAL at 05:04

## 2022-01-19 RX ADMIN — TEMAZEPAM 7.5 MG: 7.5 CAPSULE ORAL at 21:42

## 2022-01-19 RX ADMIN — HYDROCODONE BITARTRATE AND ACETAMINOPHEN 2 TABLET: 5; 325 TABLET ORAL at 22:35

## 2022-01-19 RX ADMIN — OXYCODONE HYDROCHLORIDE AND ACETAMINOPHEN 500 MG: 500 TABLET ORAL at 09:38

## 2022-01-19 RX ADMIN — INSULIN LISPRO 1 UNITS: 100 INJECTION, SOLUTION INTRAVENOUS; SUBCUTANEOUS at 00:05

## 2022-01-19 RX ADMIN — ENOXAPARIN SODIUM 40 MG: 40 INJECTION SUBCUTANEOUS at 09:39

## 2022-01-19 NOTE — ASSESSMENT & PLAN NOTE
· Patient underwent left hip arthroplasty 39/36/1989, complicated by left hip dislocation and reduction by orthopedics on 12/25/2021  · He presented with the acute onset of left hip pain over the past few days, that is new, and different than his postoperative pain  He also noted swelling around his incision  No erythema  No fever chills  · 1/18 x-ray left hip/pelvis:  Intact left total hip arthroplasty without acute osseous abnormality  · 1/19 CT scan left hip: "Collections surrounding the left hip as described  Postoperative seroma versus abscess"  · Patient evaluated the orthopedic surgery team:  Currently under investigation to determine if the fluid is physiologic, postoperative fluid, versus infection    · Patient is afebrile with normal white blood cell count however has significant, new pain in his left hip with any movement or palpation

## 2022-01-19 NOTE — OCCUPATIONAL THERAPY NOTE
Occupational Therapy         Patient Name: Flaco Dee  [id number] Date: 1/19/2022            OT consult received  Chart reviewed  Pt presented w/ L hip pain w/ recent h/o L THR on 12/22/21 & h/o L hip dislocation s/p reduction on 12/25/21  No official read on the CT scan  Ortho consult pending  Will defer OT eval at this time until cleared by Ortho       Philal Chris MS, OTR/L CPAM

## 2022-01-19 NOTE — CONSULTS
Orthopaedic Surgery - Consultation  Darlin Litten (65 y o  male)   : 1955   MRN: [id number]  Date: 2022   Encounter: [id number]   Unit/Bed#: E2 -01    Consulting Physician:  Lillard Landau, PA-C  Requesting Physician: Caity Bar MD  Reason for Consult / Principal Problem: L hip pain postoperatively    Assessment / Plan  Left hip pain status post left total hip arthroplasty on 2021 with Dr Vishnu Gama and subsequent dislocation with reduction in the ER on 2021  · Exam and labs not very concerning for infection at this time  CT scan did show fluid collections though this is not an abnormal finding postoperatively  Possible subsidence of stem on xray and continued instability though not dislocated at this time on xray without loosening of prothesis  · Continue with ice and analgesics as needed for pain  · Dr Hung Prakash will further evaluate patient later today, most likely will need outpatient follow-up with Dr Kristie Wells for re-evaluation  History of Present Illness  Darlin Litten is a 73 y o  male who presents to [facility] Emergency Room on 2020 to due to hip pain which she feels has increased over the last 3 days without specific trauma or injury  He is status post left total hip replacement on 2021 with Dr Kristie Wells and subsequent dislocation with reduction in the emergency room on 2021  Patient is complaining about his discomfort mostly being in the lateral part of the hip though he does get some discomfort into the thigh and groin  The patient states that he has been able to continue to ambulate with and without the walker  He does not feel he was having as much discomfort prior to 3 days ago but does admit he has had continuous soreness this just feels like it is more  He is denying any fevers or chills  He has been doing some physical therapy since his dislocation    He denies any distal numbness or tingling or any radiation of his pain down his leg     Review of Systems  Negative for chest pain and shortness of breath    Medical, Surgical, Family, and Social History    Past Medical History:   Diagnosis Date    Arthritis     osteoarthritis    Cervical radiculopathy     COPD (chronic obstructive pulmonary disease) (HCC)     CPAP (continuous positive airway pressure) dependence     Diabetes mellitus (HCC)     Fibromyalgia     Fibromyalgia, primary     GERD (gastroesophageal reflux disease)     History of transfusion     Hyperlipidemia     Osteopenia     Pneumonia     Sleep apnea     USES CPAP HS       Past Surgical History:   Procedure Laterality Date    CHOLECYSTECTOMY      COLONOSCOPY      CYST REMOVAL      FEMUR SURGERY Left     JOINT REPLACEMENT      T KR LEFT    KNEE SURGERY Left 1996    MANDIBLE SURGERY      MA CONV PREV HIP SURG TO TOT HIP ARTHROPLAS Left 12/22/2021    Procedure: ARTHROPLASTY HIP TOTAL- conversion;  Surgeon: Eunice Roe MD;  [facility]: [facility];  Service: Orthopedics    MA REMOVAL DEEP IMPLANT Left 9/8/2021    Procedure: REMOVAL HARDWARE HIP;  Surgeon: Yarelis Zavala MD;  Location[facility];  Service: Orthopedics    ROTATOR CUFF REPAIR Left     WRIST FRACTURE SURGERY Left     plate in place       Family History   Problem Relation Age of Onset    Cancer Mother     Arthritis Father     Diabetes Sister     Cancer Brother     Diabetes Daughter     Depression Son        Social History     Occupational History    Not on file   Tobacco Use    Smoking status: Current Every Day Smoker     Packs/day: 0 50     Types: Cigarettes    Smokeless tobacco: Never Used   Vaping Use    Vaping Use: Never used   Substance and Sexual Activity    Alcohol use: Not Currently     Comment: last drink 1 year ago     Drug use: No    Sexual activity: Not Currently       No Known Allergies    Current Facility-Administered Medications   Medication Dose Route Frequency    acetaminophen (TYLENOL) tablet 975 mg  975 mg Oral Q8H [address]    allopurinol (ZYLOPRIM) tablet 300 mg  300 mg Oral BID    aluminum-magnesium hydroxide-simethicone (MYLANTA) oral suspension 30 mL  30 mL Oral Q6H PRN    ascorbic acid (VITAMIN C) tablet 500 mg  500 mg Oral Daily    DULoxetine (CYMBALTA) delayed release capsule 60 mg  60 mg Oral HS    enoxaparin (LOVENOX) subcutaneous injection 40 mg  40 mg Subcutaneous Daily    fenofibrate (TRICOR) tablet 145 mg  145 mg Oral Daily    fluticasone-vilanterol (BREO ELLIPTA) 100-25 mcg/inh inhaler 1 puff  1 puff Inhalation Daily    HYDROmorphone (DILAUDID) injection 0 5 mg  0 5 mg Intravenous Q4H PRN    insulin lispro (HumaLOG) 100 units/mL subcutaneous injection 1-5 Units  1-5 Units Subcutaneous Q6H [address]    ipratropium-albuterol (DUO-NEB) 0 5-2 5 mg/3 mL inhalation solution 3 mL  3 mL Nebulization Q6H PRN    loratadine (CLARITIN) tablet 10 mg  10 mg Oral Daily    nicotine (NICODERM CQ) 14 mg/24hr TD 24 hr patch 1 patch  1 patch Transdermal Daily PRN    ondansetron (ZOFRAN) injection 4 mg  4 mg Intravenous Q6H PRN    oxyCODONE (ROXICODONE) IR tablet 5 mg  5 mg Oral Q6H PRN    Or    oxyCODONE (ROXICODONE) IR tablet 7 5 mg  7 5 mg Oral Q6H PRN    pravastatin (PRAVACHOL) tablet 80 mg  80 mg Oral Daily With Dinner    pregabalin (LYRICA) capsule 150 mg  150 mg Oral Q12H    temazepam (RESTORIL) capsule 7 5 mg  7 5 mg Oral HS PRN     Medications Prior to Admission   Medication    alendronate (FOSAMAX) 70 mg tablet    allopurinol (ZYLOPRIM) 300 mg tablet    ascorbic acid (VITAMIN C) 500 MG tablet    cetirizine (ZyrTEC) 10 mg tablet    DULoxetine (CYMBALTA) 30 mg delayed release capsule    enoxaparin (LOVENOX) 40 mg/0 4 mL    ergocalciferol (ERGOCALCIFEROL) 1 25 MG (11519 UT) capsule    fenofibrate (TRICOR) 145 mg tablet    ferrous sulfate 324 (65 Fe) mg    fluticasone-salmeterol (Wixela Inhub) 100-50 mcg/dose inhaler    folic acid (FOLVITE) 1 mg tablet    HYDROcodone-acetaminophen (Norco) 5-325 mg per tablet    Icosapent Ethyl (Vascepa) 0 5 g CAPS    Icosapent Ethyl 1 g CAPS    ipratropium-albuterol (DUO-NEB) 0 5-2 5 mg/3 mL nebulizer solution    metFORMIN (GLUCOPHAGE) 1000 MG tablet    Multiple Vitamin (multivitamin) tablet    naproxen (NAPROSYN) 125 mg/5 mL suspension    omeprazole (PriLOSEC OTC) 20 MG tablet    oxyCODONE (ROXICODONE) 5 mg immediate release tablet    pregabalin (Lyrica) 150 mg capsule    rosuvastatin (CRESTOR) 10 MG tablet    rosuvastatin (CRESTOR) 5 mg tablet    temazepam (RESTORIL) 7 5 mg capsule    traMADol (ULTRAM) 50 mg tablet    Turmeric 500 MG CAPS       Vitals  Temp:  [97 5 °F (36 4 °C)-98 1 °F (36 7 °C)] 97 5 °F (36 4 °C)  HR:  [] 72  Resp:  [16-20] 16  BP: (119-135)/(58-80) 119/59  Body mass index is 24 84 kg/m²  No intake/output data recorded  Physical Exam  General:  Alert & oriented x3, no distress, appears stated age  [de-identified]:  EOMI, eyes clear, hearing intact, mucous membranes moist  Neck:  Supple, non-tender, trachea midline, no lymphadenopathy  Cardiovascular:  Regular rate, no discernable arrhythmia  Pulmonary:  Regular rate, respirations non-labored   Abdominal:  Soft, non-tender    Left Hip Exam  Alignment / Posture:  Normal resting hip posture  Patient's left leg is approximately an inch shorter than the right which he states is normal as he started with leg length discrepancy prior to surgery  He does not have any abnormal rotation and leg at this time  Inspection:  Patient does have mild swelling in the lateral aspect of the left hip include area  No erythema noted  No notable ecchymosis at this time  Incision is well healed at this time with no signs of infection  Palpation:  Mild tenderness at Over the middle aspect of the incision only no pain with palpation over the glute area at this time     ROM:  Hip Flexion Patient tolerates hip flexion to 100 degrees without any issue at this time    He also tolerates logroll in both directions without pain     Strength:  Patient is able to actively flex and abduct the leg without much discomfort  Able to actively extend knee against gravity  Stability:  (-) Logroll  Tests:  No pertinent positive or negative tests  Neurovascular:  Sensation intact in DP/SP/Lyons/Sa/T nerve distributions  Sensation intact in all digital nerve distributions  Toes warm and perfused  Gait:  Steady with walker  Imaging  I have personally reviewed pertinent films in PACS  XR of left hip - From 01/18/2022 shows hardware in good position with possible slight subsidence of the femoral stem but no obvious signs of loosening  No dislocation or fracture seen  CT of left hip - From 01/18/2022 shows fluid collections around the left hip described by Radiology in the region of the piriformis measuring 4 8 x 1 9 centimeters  Another collection seen in the posterior aspect of the gluteus medius measuring 2 2 x 3 centimeters  And another collection within the adductor compartment musculature measuring 3 5 x 2 3 centimeters  Hardware is noted with no evidence of dislocation      Lab Results  (I have personally reviewed pertinent lab results )  Results from last 7 days   Lab Units 01/19/22  0439 01/18/22  1724   WBC Thousand/uL 10 14 13 87*   HEMOGLOBIN g/dL 10 4* 12 7   HEMATOCRIT % 33 0* 39 7   PLATELETS Thousands/uL 210 268   RBC Million/uL 3 62* 4 50   MCV fL 91 88   MCH pg 28 7 28 2   MCHC g/dL 31 5 32 0   RDW % 13 9 13 8   MPV fL 13 2* 12 1   NRBC AUTO /100 WBCs 0 0         Results from last 7 days   Lab Units 01/19/22  0439 01/18/22  1724   POTASSIUM mmol/L 4 2 4 3   CHLORIDE mmol/L 104 102   CO2 mmol/L 26 25   BUN mg/dL 20 17   CREATININE mg/dL 1 28 1 03   EGFR ml/min/1 73sq m 58 75   CALCIUM mg/dL 8 8 9 6     Results from last 7 days   Lab Units 01/18/22  1724   SED RATE mm/hour 27*   CRP mg/L 6 1*             Code Status  Level 1 - Full Code    Counseling / Coordination of Care  Total floor / unit time spent today 20 minutes  Greater than 50% of total time was spent with the patient and / or family counseling and / or coordination of care      Clarice Runner, PA-C

## 2022-01-19 NOTE — ASSESSMENT & PLAN NOTE
Lab Results   Component Value Date    EGFR 58 01/19/2022    EGFR 75 01/18/2022    EGFR 69 12/23/2021    CREATININE 1 28 01/19/2022    CREATININE 1 03 01/18/2022    CREATININE 1 11 12/23/2021     Patient has history of chronic kidney disease stage 3  Creatinine at baseline 1 0-1 2  Avoid hypotension/nephrotoxins

## 2022-01-19 NOTE — PLAN OF CARE
Problem: MOBILITY - ADULT  Goal: Maintain or return to baseline ADL function  Description: INTERVENTIONS:  -  Assess patient's ability to carry out ADLs; assess patient's baseline for ADL function and identify physical deficits which impact ability to perform ADLs (bathing, care of mouth/teeth, toileting, grooming, dressing, etc )  - Assess/evaluate cause of self-care deficits   - Assess range of motion  - Assess patient's mobility; develop plan if impaired  - Assess patient's need for assistive devices and provide as appropriate  - Encourage maximum independence but intervene and supervise when necessary  - Involve family in performance of ADLs  - Assess for home care needs following discharge   - Consider OT consult to assist with ADL evaluation and planning for discharge  - Provide patient education as appropriate  Outcome: Progressing     Problem: PAIN - ADULT  Goal: Verbalizes/displays adequate comfort level or baseline comfort level  Description: Interventions:  - Encourage patient to monitor pain and request assistance  - Assess pain using appropriate pain scale  - Administer analgesics based on type and severity of pain and evaluate response  - Implement non-pharmacological measures as appropriate and evaluate response  - Consider cultural and social influences on pain and pain management  - Notify physician/advanced practitioner if interventions unsuccessful or patient reports new pain  Outcome: Progressing     Problem: SAFETY ADULT  Goal: Maintain or return to baseline ADL function  Description: INTERVENTIONS:  -  Assess patient's ability to carry out ADLs; assess patient's baseline for ADL function and identify physical deficits which impact ability to perform ADLs (bathing, care of mouth/teeth, toileting, grooming, dressing, etc )  - Assess/evaluate cause of self-care deficits   - Assess range of motion  - Assess patient's mobility; develop plan if impaired  - Assess patient's need for assistive devices and provide as appropriate  - Encourage maximum independence but intervene and supervise when necessary  - Involve family in performance of ADLs  - Assess for home care needs following discharge   - Consider OT consult to assist with ADL evaluation and planning for discharge  - Provide patient education as appropriate  Outcome: Progressing

## 2022-01-19 NOTE — ASSESSMENT & PLAN NOTE
Lab Results   Component Value Date    HGBA1C 6 1 (H) 12/16/2021       Recent Labs     01/19/22  0001 01/19/22  0601 01/19/22  1113 01/19/22  1555   POCGLU 150* 124 224* 161*       Blood Sugar Average: Last 72 hrs:  (P) 164 75     Patient's diabetes type 2, without long-term use of insulin, with associated chronic kidney disease stage 3  home regimen:  Metformin 1000 mg BID  Hold oral anti hyperglycemics  Continue Accu-Cheks and sliding scale insulin

## 2022-01-19 NOTE — PLAN OF CARE
Problem: PAIN - ADULT  Goal: Verbalizes/displays adequate comfort level or baseline comfort level  Description: Interventions:  - Encourage patient to monitor pain and request assistance  - Assess pain using appropriate pain scale  - Administer analgesics based on type and severity of pain and evaluate response  - Implement non-pharmacological measures as appropriate and evaluate response  - Consider cultural and social influences on pain and pain management  - Notify physician/advanced practitioner if interventions unsuccessful or patient reports new pain  Outcome: Progressing     Problem: INFECTION - ADULT  Goal: Absence or prevention of progression during hospitalization  Description: INTERVENTIONS:  - Assess and monitor for signs and symptoms of infection  - Monitor lab/diagnostic results  - Monitor all insertion sites, i e  indwelling lines, tubes, and drains  - Monitor endotracheal if appropriate and nasal secretions for changes in amount and color  - [place] appropriate cooling/warming therapies per order  - Administer medications as ordered  - Instruct and encourage patient and family to use good hand hygiene technique  - Identify and instruct in appropriate isolation precautions for identified infection/condition  Outcome: Progressing     Problem: DISCHARGE PLANNING  Goal: Discharge to home or other facility with appropriate resources  Description: INTERVENTIONS:  - Identify barriers to discharge w/patient and caregiver  - Arrange for needed discharge resources and transportation as appropriate  - Identify discharge learning needs (meds, wound care, etc )  - Arrange for interpretive services to assist at discharge as needed  - Refer to Case Management Department for coordinating discharge planning if the patient needs post-hospital services based on physician/advanced practitioner order or complex needs related to functional status, cognitive ability, or social support system  Outcome: Progressing

## 2022-01-19 NOTE — ASSESSMENT & PLAN NOTE
Patient presents with left hip pain x3 days, states initially the pain was mild  Today, noted swelling and increased pain at the left hip, placed an ice pack for several hours however once he removed the ice packing regained sensation his pain became unbearable, he was not able to move, prompting him to call EMS  · S/p left hip arthroplasty 19/93/0724, complicated by left hip dislocation and reduction by orthopedics on 12/25/2021  · Afebrile, initially tachycardic likely secondary to pain  · On exam, has mild swelling around the left hip as well as severe tenderness to palpation  No erythema/skin discoloration  · WBC 14  · Left hip x-ray w/o dislocation or fracture on my read  Pending official read    Plan:  - Ordered CT L hip  - Pain control  - Orthopedic consult  Appreciate recs

## 2022-01-19 NOTE — PROGRESS NOTES
[facility]  Progress Note - Ilene Jonas 1955, 73 y o  male MRN: [id number]  Unit/Bed#: E2 -01 Encounter: [id number]  Primary Care Provider: Arleen Devi MD   Date and time admitted to hospital: 1/18/2022  4:08 PM    * Left hip pain  Assessment & Plan  · Patient underwent left hip arthroplasty 32/61/3069, complicated by left hip dislocation and reduction by orthopedics on 12/25/2021  · He presented with the acute onset of left hip pain over the past few days, that is new, and different than his postoperative pain  He also noted swelling around his incision  No erythema  No fever chills  · 1/18 x-ray left hip/pelvis:  Intact left total hip arthroplasty without acute osseous abnormality  · 1/19 CT scan left hip: "Collections surrounding the left hip as described  Postoperative seroma versus abscess"  · Patient evaluated the orthopedic surgery team:  Currently under investigation to determine if the fluid is physiologic, postoperative fluid, versus infection    · Patient is afebrile with normal white blood cell count however has significant, new pain in his left hip with any movement or palpation      Type 2 diabetes mellitus with stage 3 chronic kidney disease, without long-term current use of insulin [facility])  Assessment & Plan  Lab Results   Component Value Date    HGBA1C 6 1 (H) 12/16/2021       Recent Labs     01/19/22  0001 01/19/22  0601 01/19/22  1113 01/19/22  1555   POCGLU 150* 124 224* 161*       Blood Sugar Average: Last 72 hrs:  (P) 164 75     Patient's diabetes type 2, without long-term use of insulin, with associated chronic kidney disease stage 3  home regimen:  Metformin 1000 mg BID  Hold oral anti hyperglycemics  Continue Accu-Cheks and sliding scale insulin      Chronic pain syndrome  Assessment & Plan  PDMP reviewed:  · Hydrocodone-acetaminophen 5-325 mg  · Tramadol 50 mg  · Temazepam 7 5 mg  · Pregabalin 150 mg  While inpatient:  Scheduled Tylenol 975 q 8h, Norco PRN for moderate/severe pain, Dilaudid for breakthrough      CKD (chronic kidney disease) stage 3, GFR 30-59 ml/min [facility])  Assessment & Plan  Lab Results   Component Value Date    EGFR 58 01/19/2022    EGFR 75 01/18/2022    EGFR 69 12/23/2021    CREATININE 1 28 01/19/2022    CREATININE 1 03 01/18/2022    CREATININE 1 11 12/23/2021     Patient has history of chronic kidney disease stage 3  Creatinine at baseline 1 0-1 2  Avoid hypotension/nephrotoxins    COPD (chronic obstructive pulmonary disease) ([facility] [address] )  Assessment & Plan  Patient has a history of COPD  Not in acute exacerbation, no wheezing on auscultation  Continue inhalers    Hyperlipidemia  Assessment & Plan  Continue statin and fenofibrate          Family:  Called wife Mere aHirston and gave update    dw pts nurse   dw     VTE Pharmacologic Prophylaxis: Enoxaparin (Lovenox)  VTE Mechanical Prophylaxis: sequential compression device        Certification Statement: The patient will continue to require additional inpatient hospital stay due to need for further acute intervention for possible septic arthritis    Status: inpatient     ===================================================================    Subjective:  Patient relates since he had the hip surgery, and then 3 days later the hip dislocation that was then reduced, he had been recovering  He notes his pain had been stabilized  He notes however he had an acute worsening of the pain for the last few days  He also noted a bulge around his incision site  This area was quite tender when he pressed on it  He denies any pain radiating anywhere else  He denies any numbness, or decreased sensation  He denies any chest pain, shortness of breath, cough, nausea, vomiting, diarrhea  Physical Exam:   Temp:  [97 5 °F (36 4 °C)-98 1 °F (36 7 °C)] 97 7 °F (36 5 °C)  HR:  [72-94] 94  Resp:  [16-20] 16  BP: (119-150)/(58-85) 150/85    Gen:  Pleasant, non-tachypnic, non-dyspnic  Conversant  Heart: regular rate and rhythm, S1S2 present, no murmur, rub or gallop  Lungs: clear to ausculatation bilaterally  No wheezing, crackles, or rhonchi  No accessory muscle use or respiratory distress  Abd: soft, non-tender, non-distended  NABS, no guarding, rebound or peritoneal signs  Extremities: no clubbing, cyanosis or edema  2+pedal pulses bilaterally  Tenderness with palpation along left greater trochanter, left lateral thigh  Neuro: awake, alert  Fluent speech    Skin: warm and dry: no petechiae, purpura and rash  Left leg incision clean, dry, intact  No erythema  No discharge    LABS:   Results from last 7 days   Lab Units 01/19/22  0439 01/18/22  1724   WBC Thousand/uL 10 14 13 87*   HEMOGLOBIN g/dL 10 4* 12 7   HEMATOCRIT % 33 0* 39 7   PLATELETS Thousands/uL 210 268     Results from last 7 days   Lab Units 01/19/22  0439 01/18/22  1724   POTASSIUM mmol/L 4 2 4 3   CHLORIDE mmol/L 104 102   CO2 mmol/L 26 25   BUN mg/dL 20 17   CREATININE mg/dL 1 28 1 03   CALCIUM mg/dL 8 8 9 6       Hospital Data:    1/18:  CT left lower extremity  Collections surrounding the left hip as described  Postoperative seroma versus abscess    1/18 left hip x-ray  Intact total left hip arthroplasty  No acute osseous abnormality      ---------------------------------------------------------------------------------------------------------------  This note has been constructed using a voice recognition system

## 2022-01-19 NOTE — UTILIZATION REVIEW
Initial Clinical Review    Admission: Date/Time/Statement:   Admission Orders (From admission, onward)     Ordered        01/18/22 1847  Inpatient Admission  Once                      Orders Placed This Encounter   Procedures    Inpatient Admission     Standing Status:   Standing     Number of Occurrences:   1     Order Specific Question:   Level of Care     Answer:   Med Surg [16]     Order Specific Question:   Estimated length of stay     Answer:   More than 2 Midnights     Order Specific Question:   Certification     Answer:   I certify that inpatient services are medically necessary for this patient for a duration of greater than two midnights  See H&P and MD Progress Notes for additional information about the patient's course of treatment  ED Arrival Information     Expected Arrival Acuity    - 1/18/2022 16:07 Urgent         Means of arrival Escorted by Service Admission type    [facility] [place] ([address]) Hospitalist Urgent         Arrival complaint    hip pain        Chief Complaint   Patient presents with    Hip Pain     arrives via ems per report  left hip pain recent hip sx and dislocation  Initial Presentation: 73  Y O male presents to ED via  EMS  From home with left hip pain for past 3 days  Patient underwent left hip arthroplasty on 42/97/2253, which was complicated by left hip dislocation which was reduced by Orthopedics on 12/25/2021  Has  Had pain since surgery but increased past  3 days  Pain worsened the day of admission as well as  Increased swelling  Had  Ice on  Area for several hours, on removing  Ice pack, had excrutiating pain and inability to walk, called  EMS  Labs reveal WBC  14  Xray  Unremarkable  Additional PMH  Is  Chronic pain syndrome,  CKD, COPD, DM2 and HLD  Admit  Ip with  Left hip  Pain and plan is  Ortho consult, pain control, PT/OT, monitor labs and start  SSI        Date:     1/19       Day 2:   Ortho consult  · Exam and labs not concerning for infection  CT scan did show fluid collections though this is not an abnormal finding postoperatively   Continue ice and pain control as needed  Continue PT/OT  ED Triage Vitals   Temperature Pulse Respirations Blood Pressure SpO2   01/18/22 1615 01/18/22 1615 01/18/22 1615 01/18/22 1615 01/18/22 1615   98 °F (36 7 °C) (!) 125 18 135/80 98 %      Temp Source Heart Rate Source Patient Position - Orthostatic VS BP Location FiO2 (%)   01/18/22 1615 01/18/22 1615 01/18/22 1615 01/18/22 1615 --   Tympanic Monitor Lying Right arm       Pain Score       01/18/22 1648       8          Wt Readings from Last 1 Encounters:   01/18/22 74 1 kg (163 lb 5 8 oz)     Additional Vital Signs:   97 5 °F (36 4 °C) 72 16 119/59 82 95 % None (Room air) Lying    01/18/22 2243 98 1 °F (36 7 °C) 81 20 131/65 -- 96 % None (Room air) Lying   01/18/22 1933 97 8 °F (36 6 °C) 87 20 126/58 -- 98 % None (Room air) Lying   01/18/22 1724 98 °F (36 7 °C) 96 18 123/58 83 96 % None (Room air) Lying   01/18/22 1615 98 °F (36 7 °C) 125 Abnormal  18 135/80 101 98 % None (Room air) Lying       Pertinent Labs/Diagnostic Test Results:   CT  LLE  ( 1/19)    Collections surrounding the left hip as described   Postoperative seroma versus abscess  X ray  L hip ( 1/19)   Intact total left hip arthroplasty     No acute osseous abnormality         Results from last 7 days   Lab Units 01/19/22  0439 01/18/22  1724   WBC Thousand/uL 10 14 13 87*   HEMOGLOBIN g/dL 10 4* 12 7   HEMATOCRIT % 33 0* 39 7   PLATELETS Thousands/uL 210 268   NEUTROS ABS Thousands/µL 5 63 9 89*         Results from last 7 days   Lab Units 01/19/22  0439 01/18/22  1724   SODIUM mmol/L 139 135*   POTASSIUM mmol/L 4 2 4 3   CHLORIDE mmol/L 104 102   CO2 mmol/L 26 25   ANION GAP mmol/L 9 8   BUN mg/dL 20 17   CREATININE mg/dL 1 28 1 03   EGFR ml/min/1 73sq m 58 75   CALCIUM mg/dL 8 8 9 6         Results from last 7 days   Lab Units 01/19/22  0601 01/19/22  0001   POC GLUCOSE mg/dl 124 150* Results from last 7 days   Lab Units 01/19/22  0439 01/18/22  1724   GLUCOSE RANDOM mg/dL 141* 108               Results from last 7 days   Lab Units 01/18/22  1724   CRP mg/L 6 1*   SED RATE mm/hour 27*                 ED Treatment:   Medication Administration from 01/18/2022 1607 to 01/18/2022 1928       Date/Time Order Dose Route Action Comments     01/18/2022 1648 HYDROcodone-acetaminophen (NORCO) 5-325 mg per tablet 1 tablet 1 tablet Oral Given      01/18/2022 1648 ketorolac (TORADOL) injection 30 mg 30 mg Intramuscular Given           Present on Admission:   CKD (chronic kidney disease) stage 3, GFR 30-59 ml/min ([facility])   COPD (chronic obstructive pulmonary disease) ([facility])   Left hip pain   Chronic pain syndrome      Admitting Diagnosis: Hip pain [M25 559]  Left hip pain [M25 552]  SIRS (systemic inflammatory response syndrome) ([facility]) [R65 10]  Age/Sex: 73 y o  male  Admission Orders:  Scheduled Medications:  acetaminophen, 975 mg, Oral, Q8H [address]  allopurinol, 300 mg, Oral, BID  ascorbic acid, 500 mg, Oral, Daily  DULoxetine, 60 mg, Oral, HS  enoxaparin, 40 mg, Subcutaneous, Daily  fenofibrate, 145 mg, Oral, Daily  fluticasone-vilanterol, 1 puff, Inhalation, Daily  insulin lispro, 1-5 Units, Subcutaneous, Q6H DEVANTE  loratadine, 10 mg, Oral, Daily  pravastatin, 80 mg, Oral, Daily With Dinner  pregabalin, 150 mg, Oral, Q12H      Continuous IV Infusions:     PRN Meds:  aluminum-magnesium hydroxide-simethicone, 30 mL, Oral, Q6H PRN  HYDROmorphone, 0 5 mg, Intravenous, Q4H PRN  ipratropium-albuterol, 3 mL, Nebulization, Q6H PRN  nicotine, 1 patch, Transdermal, Daily PRN  ondansetron, 4 mg, Intravenous, Q6H PRN  oxyCODONE, 5 mg, Oral, Q6H PRN   Or  oxyCODONE, 7 5 mg, Oral, Q6H PRN  temazepam, 7 5 mg, Oral, HS PRN        IP CONSULT TO ORTHOPEDIC SURGERY  IP CONSULT TO ORTHOPEDIC SURGERY    Network Utilization Review Department  ATTENTION: Please call with any questions or concerns to [phone] and carefully listen to the prompts so that you are directed to the right person  All voicemails are confidential   Sofia Bee all requests for admission clinical reviews, approved or denied determinations and any other requests to dedicated fax number below belonging to the campus where the patient is receiving treatment   List of dedicated fax numbers for the Facilities:  [address] DENIALS (Administrative/Medical Necessity) [phone]   1000  [facility] (Maternity/NICU/Pediatrics) [phone]   [address]  [address] Medical [place] [address] [place] [address] [address] [place] [address] [address] [facility] [place] [address] [phone]

## 2022-01-19 NOTE — ASSESSMENT & PLAN NOTE
Patient has a history of COPD  Not in acute exacerbation, no wheezing on auscultation  Continue inhalers

## 2022-01-19 NOTE — ASSESSMENT & PLAN NOTE
Lab Results   Component Value Date    EGFR 75 01/18/2022    EGFR 69 12/23/2021    EGFR 53 11/04/2021    CREATININE 1 03 01/18/2022    CREATININE 1 11 12/23/2021    CREATININE 1 38 (H) 11/04/2021     Creatinine at baseline  Avoid hypotension/nephrotoxins

## 2022-01-19 NOTE — H&P
[facility]  H&P- Rakesh Etienne 1955, 73 y o  male MRN: [id number]  Unit/Bed#: E2 MS [place]-01 Encounter: [id number]  Primary Care Provider: Jerri Perkins MD   Date and time admitted to hospital: 1/18/2022  4:08 PM    * Left hip pain  Assessment & Plan  Patient presents with left hip pain x3 days, states initially the pain was mild  Today, noted swelling and increased pain at the left hip, placed an ice pack for several hours however once he removed the ice packing regained sensation his pain became unbearable, he was not able to move, prompting him to call EMS  · S/p left hip arthroplasty 03/90/3360, complicated by left hip dislocation and reduction by orthopedics on 12/25/2021  · Afebrile, initially tachycardic likely secondary to pain  · On exam, has mild swelling around the left hip as well as severe tenderness to palpation  No erythema/skin discoloration  · WBC 14  · Left hip x-ray w/o dislocation or fracture on my read  Pending official read    Plan:  - Ordered CT L hip  - Pain control  - Orthopedic consult  Appreciate recs  Type 2 diabetes mellitus ([facility] [address] )  Assessment & Plan  Lab Results   Component Value Date    HGBA1C 6 1 (H) 12/16/2021       No results for input(s): POCGLU in the last 72 hours      Blood Sugar Average: Last 72 hrs:   home regimen:  Metformin 1000 mg BID  Hold oral anti hyperglycemics  Start sliding scale insulin  NPO currently in case of orthopedic procedure    Chronic pain syndrome  Assessment & Plan  PDMP reviewed:  · Hydrocodone-acetaminophen 5-325 mg  · Tramadol 50 mg  · Temazepam 7 5 mg  · Pregabalin 150 mg  While inpatient:  Scheduled Tylenol 975 q 8h, oxycodone PRN for moderate/severe pain, Dilaudid for breakthrough      CKD (chronic kidney disease) stage 3, GFR 30-59 ml/min [facility])  Assessment & Plan  Lab Results   Component Value Date    EGFR 75 01/18/2022    EGFR 69 12/23/2021    EGFR 53 11/04/2021    CREATININE 1 03 01/18/2022    CREATININE 1 11 12/23/2021    CREATININE 1 38 (H) 11/04/2021     Creatinine at baseline  Avoid hypotension/nephrotoxins    COPD (chronic obstructive pulmonary disease) ([facility])  Assessment & Plan  Not in acute exacerbation, no wheezing on auscultation  Continue inhalers      VTE Prophylaxis: Enoxaparin (Lovenox)  / sequential compression device   Code Status:  Level 1 full code  POLST: There is no POLST form on file for this patient (pre-hospital)  Discussion with family:  Patient    Anticipated Length of Stay:  Patient will be admitted on an Inpatient basis with an anticipated length of stay of  greater than 2 midnights  Justification for Hospital Stay:  Left hip pain s/p arthroplasty 3 weeks ago    Total Time for Visit, including Counseling / Coordination of Care: 45 minutes  Greater than 50% of this total time spent on direct patient counseling and coordination of care  Chief Complaint:   Left hip pain    History of Present Illness:    Sonya Chang is a 73 y o  male with PMH HLD, T2DM, CKD, COPD who presents with left hip pain x3 days  Patient underwent left hip arthroplasty on 82/26/0842, which was complicated by left hip dislocation which was reduced by Orthopedics on 12/25/2021  He states that he has been having pain since the surgery, however pain is increased over the last 3 days  States he was still mild 3 days ago, however has worsened today as well as noting some swelling around the left hip  He placed an ice pack on his left hip for several hours to help with the swelling, however when he removed ice packing regained sensation is left hip he reported excruciating pain and inability to walk prompting him to call EMS  On arrival his vital signs are stable  His lab work is unremarkable except for mild leukocytosis of 14,000  He had a left hip x-ray in the ED which did not demonstrate any fractures or dislocations per my read    On exam, he does have mild swelling as well as significant tenderness to palpation of the left hip, there is no erythema or skin discoloration  He will be admitted for further workup alongside orthopedic consultation  Review of Systems:    Review of Systems   Constitutional: Negative for appetite change, chills, fatigue and fever  HENT: Negative for ear pain, sore throat and trouble swallowing  Eyes: Negative for visual disturbance  Respiratory: Negative for cough, chest tightness, shortness of breath and wheezing  Cardiovascular: Negative for chest pain, palpitations and leg swelling  Gastrointestinal: Negative for abdominal distention, abdominal pain, diarrhea, nausea and vomiting  Endocrine: Negative  Genitourinary: Negative for dysuria  Musculoskeletal: Positive for arthralgias (Left hip pain) and gait problem  Negative for myalgias  Skin: Negative for pallor  Allergic/Immunologic: Negative for immunocompromised state  Neurological: Negative for dizziness, syncope, light-headedness, numbness and headaches         Past Medical and Surgical History:     Past Medical History:   Diagnosis Date    Arthritis     osteoarthritis    Cervical radiculopathy     COPD (chronic obstructive pulmonary disease) (HCC)     CPAP (continuous positive airway pressure) dependence     Diabetes mellitus (HCC)     Fibromyalgia     Fibromyalgia, primary     GERD (gastroesophageal reflux disease)     History of transfusion     Hyperlipidemia     Osteopenia     Pneumonia     Sleep apnea     USES CPAP HS       Past Surgical History:   Procedure Laterality Date    CHOLECYSTECTOMY      COLONOSCOPY      CYST REMOVAL      FEMUR SURGERY Left     JOINT REPLACEMENT      T KR LEFT    KNEE SURGERY Left 1996    MANDIBLE SURGERY      KY CONV PREV HIP SURG TO TOT HIP ARTHROPLAS Left 12/22/2021    Procedure: ARTHROPLASTY HIP TOTAL- conversion;  Surgeon: Mindy Wells MD;  [facility];  Service: Orthopedics    [place] REMOVAL DEEP IMPLANT Left 9/8/2021    Procedure: REMOVAL HARDWARE HIP;  Surgeon: Troy Everett MD;  [facility];  Service: Orthopedics    ROTATOR CUFF REPAIR Left     WRIST FRACTURE SURGERY Left     plate in place       Meds/Allergies:    Prior to Admission medications    Medication Sig Start Date End Date Taking? Authorizing Provider   alendronate (FOSAMAX) 70 mg tablet take 1 tablet by mouth every 7 days IN THE MORNING ON AN EMPTY ST   (REFER TO PRESCRIPTION NOTES)  1/18/21   Historical Provider, MD   allopurinol (ZYLOPRIM) 300 mg tablet Take 300 mg by mouth 2 (two) times a day   9/7/20   Historical Provider, MD   ascorbic acid (VITAMIN C) 500 MG tablet Take 1 tablet (500 mg total) by mouth daily Start 30 days prior to surgery 11/3/21   Martin Lee MD   cetirizine (ZyrTEC) 10 mg tablet Take 10 mg by mouth daily    Historical Provider, MD   DULoxetine (CYMBALTA) 30 mg delayed release capsule Take 60 mg by mouth daily at bedtime  5/13/21   Historical Provider, MD   enoxaparin (LOVENOX) 40 mg/0 4 mL Inject 0 4 mL (40 mg total) under the skin daily for 28 days Start injections after surgery 11/3/21 12/1/21  Martin Lee MD   ergocalciferol (ERGOCALCIFEROL) 1 25 MG (00135 UT) capsule take 1 capsule by mouth every week 6/17/21   Historical Provider, MD   fenofibrate (TRICOR) 145 mg tablet Take 145 mg by mouth daily    Historical Provider, MD   ferrous sulfate 324 (65 Fe) mg Take 1 tablet (324 mg total) by mouth 2 (two) times a day before meals Start 30 days prior to surgery 11/3/21   Martin Lee MD   fluticasone-salmeterol (Wixela Inhub) 100-50 mcg/dose inhaler Inhale 1 puff 2 (two) times a day Rinse mouth after use    Patient taking differently: Inhale 1 puff as needed Rinse mouth after use   8/19/21 12/15/21  Iza Magallon PA-C   folic acid (FOLVITE) 1 mg tablet Take 1 tablet (1 mg total) by mouth daily Start 30 days prior to surgery 11/3/21   Martin Lee MD   HYDROcodone-acetaminophen (Norco) 5-325 mg per tablet Take 1 tablet by mouth every 6 (six) hours as needed for pain Max Daily Amount: 4 tablets 12/23/21   Ariel Jones PA-C   Icosapent Ethyl (Vascepa) 0 5 g CAPS Take 2 capsules by mouth 2 (two) times a day     Historical Provider, MD   Icosapent Ethyl 1 g CAPS take 2 capsules by mouth twice a day with food SWALLOW WHOLE DO NOT CHEW, OPEN,DISSOLVE OR CRUSH 12/16/21   Historical Provider, MD   ipratropium-albuterol (DUO-NEB) 0 5-2 5 mg/3 mL nebulizer solution inhale contents of 1 vial ( 3 milliliters ) in nebulizer by mouth four times a day if needed 3/18/21   Historical Provider, MD   metFORMIN (GLUCOPHAGE) 1000 MG tablet Take 1,000 mg by mouth 2 (two) times a day with meals   8/13/21   Historical Provider, MD   Multiple Vitamin (multivitamin) tablet TAKE 1 TABLET DAILY  Historical Provider, MD   naproxen (NAPROSYN) 125 mg/5 mL suspension take 5 milliliter by mouth every 8 hours with food if needed 12/17/21   Historical Provider, MD   omeprazole (PriLOSEC OTC) 20 MG tablet Take 20 mg by mouth daily 2/5/21 2/5/22  Historical Provider, MD   oxyCODONE (ROXICODONE) 5 mg immediate release tablet 1 pill po Q4 Hrs prn 9/8/21   Rossana Rivas PA-C   pregabalin (Lyrica) 150 mg capsule Take 1 capsule by mouth every 12 (twelve) hours 7/26/21   Historical Provider, MD   rosuvastatin (CRESTOR) 10 MG tablet Take 5 mg by mouth daily at bedtime      Historical Provider, MD   rosuvastatin (CRESTOR) 5 mg tablet Take 5 mg by mouth daily 12/18/21   Historical Provider, MD   temazepam (RESTORIL) 7 5 mg capsule Take 7 5 mg by mouth daily at bedtime as needed 12/17/21   Historical Provider, MD   traMADol (ULTRAM) 50 mg tablet Take 1 tablet (50 mg total) by mouth every 6 (six) hours as needed for moderate pain for up to 60 doses 12/27/21   Micah Brooks PA-C   Turmeric 500 MG CAPS Take by mouth daily    Historical Provider, MD     I have reviewed home medications with patient personally      Allergies: No Known Allergies    Social History:     Marital Status: /Civil Union   Occupation:  Noncontributory  Patient Pre-hospital Living Situation:  Home  Patient Pre-hospital Level of Mobility:  Previously independent before hip arthroplasty on 12/22/2021  Patient Pre-hospital Diet Restrictions:  Diabetic  Substance Use History:   Social History     Substance and Sexual Activity   Alcohol Use Not Currently    Comment: last drink 1 year ago      Social History     Tobacco Use   Smoking Status Current Every Day Smoker    Packs/day: 0 50    Types: Cigarettes   Smokeless Tobacco Never Used     Social History     Substance and Sexual Activity   Drug Use No       Family History:    non-contributory    Physical Exam:     Vitals:   Blood Pressure: 126/58 (01/18/22 1933)  Pulse: 87 (01/18/22 1933)  Temperature: 97 8 °F (36 6 °C) (01/18/22 1933)  Temp Source: Tympanic (01/18/22 1933)  Respirations: 20 (01/18/22 1933)  Height: 5' 8" (172 7 cm) (01/18/22 1933)  Weight - Scale: 74 1 kg (163 lb 5 8 oz) (01/18/22 1933)  SpO2: 98 % (01/18/22 1933)    Physical Exam  Vitals and nursing note reviewed  Constitutional:       Appearance: Normal appearance  HENT:      Head: Normocephalic and atraumatic  Mouth/Throat:      Mouth: Mucous membranes are moist       Pharynx: Oropharynx is clear  No oropharyngeal exudate  Eyes:      Extraocular Movements: Extraocular movements intact  Cardiovascular:      Rate and Rhythm: Normal rate and regular rhythm  Pulses: Normal pulses  Heart sounds: Normal heart sounds  No murmur heard  No friction rub  No gallop  Pulmonary:      Effort: Pulmonary effort is normal  No respiratory distress  Breath sounds: Normal breath sounds  No stridor  No wheezing or rales  Abdominal:      General: Abdomen is flat  Bowel sounds are normal  There is no distension  Palpations: Abdomen is soft  Tenderness: There is no abdominal tenderness     Musculoskeletal:         General: Swelling (Mild left hip) and tenderness ( left hip) present  Right lower leg: No edema  Left lower leg: No edema  Skin:     General: Skin is warm and dry  Findings: No bruising or erythema  Neurological:      General: No focal deficit present  Mental Status: He is alert and oriented to person, place, and time  Additional Data:     Lab Results: I have personally reviewed pertinent reports  Results from last 7 days   Lab Units 01/18/22  1724   WBC Thousand/uL 13 87*   HEMOGLOBIN g/dL 12 7   HEMATOCRIT % 39 7   PLATELETS Thousands/uL 268   NEUTROS PCT % 70   LYMPHS PCT % 17   MONOS PCT % 10   EOS PCT % 1     Results from last 7 days   Lab Units 01/18/22  1724   SODIUM mmol/L 135*   POTASSIUM mmol/L 4 3   CHLORIDE mmol/L 102   CO2 mmol/L 25   BUN mg/dL 17   CREATININE mg/dL 1 03   ANION GAP mmol/L 8   CALCIUM mg/dL 9 6   GLUCOSE RANDOM mg/dL 108                       Imaging: I have personally reviewed pertinent reports  XR hip/pelv 2-3 vws left   ED Interpretation by Bobby Uribe DO (01/18 1710)   No acute findings      CT lower extremity w contrast left    (Results Pending)       EKG, Pathology, and Other Studies Reviewed on Admission:     Allscripts / [facility] Records Reviewed: Yes     ** Please Note: This note has been constructed using a voice recognition system   **

## 2022-01-19 NOTE — ASSESSMENT & PLAN NOTE
PDMP reviewed:  · Hydrocodone-acetaminophen 5-325 mg  · Tramadol 50 mg  · Temazepam 7 5 mg  · Pregabalin 150 mg  While inpatient:  Scheduled Tylenol 975 q 8h, Norco PRN for moderate/severe pain, Dilaudid for breakthrough

## 2022-01-19 NOTE — PHYSICAL THERAPY NOTE
PHYSICAL THERAPY NOTE          Patient Name: Ortiz Argueta  [id number] Date: 1/19/2022     PT consult received  Chart reviewed  Pt presented w/ L hip pain w/ recent h/o L THR on 12/22/21 & h/o L hip dislocation s/p reduction on 12/25/21  No official read on the CT scan  Ortho consult pending  Will defer PT eval at this time until cleared by Ortho   Alberto Coleman, PT

## 2022-01-19 NOTE — ASSESSMENT & PLAN NOTE
PDMP reviewed:  · Hydrocodone-acetaminophen 5-325 mg  · Tramadol 50 mg  · Temazepam 7 5 mg  · Pregabalin 150 mg  While inpatient:  Scheduled Tylenol 975 q 8h, oxycodone PRN for moderate/severe pain, Dilaudid for breakthrough

## 2022-01-19 NOTE — ASSESSMENT & PLAN NOTE
Lab Results   Component Value Date    HGBA1C 6 1 (H) 12/16/2021       No results for input(s): POCGLU in the last 72 hours      Blood Sugar Average: Last 72 hrs:   home regimen:  Metformin 1000 mg BID  Hold oral anti hyperglycemics  Start sliding scale insulin  NPO currently in case of orthopedic procedure

## 2022-01-20 ENCOUNTER — APPOINTMENT (INPATIENT)
Dept: RADIOLOGY | Facility: HOSPITAL | Age: 67
DRG: 556 | End: 2022-01-20
Payer: COMMERCIAL

## 2022-01-20 ENCOUNTER — ANESTHESIA EVENT (INPATIENT)
Dept: PERIOP | Facility: HOSPITAL | Age: 67
DRG: 556 | End: 2022-01-20
Payer: COMMERCIAL

## 2022-01-20 ENCOUNTER — ANESTHESIA (INPATIENT)
Dept: PERIOP | Facility: HOSPITAL | Age: 67
DRG: 556 | End: 2022-01-20
Payer: COMMERCIAL

## 2022-01-20 LAB
BASOPHILS # BLD AUTO: 0.07 THOUSANDS/ΜL (ref 0–0.1)
BASOPHILS NFR BLD AUTO: 1 % (ref 0–1)
BILIRUB UR QL STRIP: NEGATIVE
CLARITY UR: CLEAR
COLOR UR: YELLOW
EOSINOPHIL # BLD AUTO: 0.25 THOUSAND/ΜL (ref 0–0.61)
EOSINOPHIL NFR BLD AUTO: 3 % (ref 0–6)
ERYTHROCYTE [DISTWIDTH] IN BLOOD BY AUTOMATED COUNT: 14.1 % (ref 11.6–15.1)
GLUCOSE SERPL-MCNC: 142 MG/DL (ref 65–140)
GLUCOSE SERPL-MCNC: 151 MG/DL (ref 65–140)
GLUCOSE SERPL-MCNC: 163 MG/DL (ref 65–140)
GLUCOSE SERPL-MCNC: 86 MG/DL (ref 65–140)
GLUCOSE UR STRIP-MCNC: NEGATIVE MG/DL
HCT VFR BLD AUTO: 32 % (ref 36.5–49.3)
HGB BLD-MCNC: 10.1 G/DL (ref 12–17)
HGB UR QL STRIP.AUTO: NEGATIVE
IMM GRANULOCYTES # BLD AUTO: 0.04 THOUSAND/UL (ref 0–0.2)
IMM GRANULOCYTES NFR BLD AUTO: 1 % (ref 0–2)
KETONES UR STRIP-MCNC: NEGATIVE MG/DL
LEUKOCYTE ESTERASE UR QL STRIP: NEGATIVE
LYMPHOCYTES # BLD AUTO: 3.16 THOUSANDS/ΜL (ref 0.6–4.47)
LYMPHOCYTES NFR BLD AUTO: 37 % (ref 14–44)
MCH RBC QN AUTO: 28.2 PG (ref 26.8–34.3)
MCHC RBC AUTO-ENTMCNC: 31.6 G/DL (ref 31.4–37.4)
MCV RBC AUTO: 89 FL (ref 82–98)
MONOCYTES # BLD AUTO: 0.99 THOUSAND/ΜL (ref 0.17–1.22)
MONOCYTES NFR BLD AUTO: 12 % (ref 4–12)
NEUTROPHILS # BLD AUTO: 3.95 THOUSANDS/ΜL (ref 1.85–7.62)
NEUTS SEG NFR BLD AUTO: 46 % (ref 43–75)
NITRITE UR QL STRIP: NEGATIVE
NRBC BLD AUTO-RTO: 0 /100 WBCS
PH UR STRIP.AUTO: 7.5 [PH]
PLATELET # BLD AUTO: 234 THOUSANDS/UL (ref 149–390)
PMV BLD AUTO: 13 FL (ref 8.9–12.7)
PROT UR STRIP-MCNC: NEGATIVE MG/DL
RBC # BLD AUTO: 3.58 MILLION/UL (ref 3.88–5.62)
SP GR UR STRIP.AUTO: 1.01 (ref 1–1.03)
UROBILINOGEN UR QL STRIP.AUTO: 0.2 E.U./DL
WBC # BLD AUTO: 8.46 THOUSAND/UL (ref 4.31–10.16)

## 2022-01-20 PROCEDURE — 82948 REAGENT STRIP/BLOOD GLUCOSE: CPT

## 2022-01-20 PROCEDURE — 99232 SBSQ HOSP IP/OBS MODERATE 35: CPT | Performed by: INTERNAL MEDICINE

## 2022-01-20 PROCEDURE — 0SWBXJZ REVISION OF SYNTHETIC SUBSTITUTE IN LEFT HIP JOINT, EXTERNAL APPROACH: ICD-10-PCS | Performed by: ORTHOPAEDIC SURGERY

## 2022-01-20 PROCEDURE — 73502 X-RAY EXAM HIP UNI 2-3 VIEWS: CPT

## 2022-01-20 PROCEDURE — 85025 COMPLETE CBC W/AUTO DIFF WBC: CPT | Performed by: INTERNAL MEDICINE

## 2022-01-20 PROCEDURE — 73501 X-RAY EXAM HIP UNI 1 VIEW: CPT

## 2022-01-20 PROCEDURE — 99024 POSTOP FOLLOW-UP VISIT: CPT | Performed by: ORTHOPAEDIC SURGERY

## 2022-01-20 PROCEDURE — 81003 URINALYSIS AUTO W/O SCOPE: CPT | Performed by: INTERNAL MEDICINE

## 2022-01-20 PROCEDURE — 27266 TREAT HIP DISLOCATION: CPT | Performed by: ORTHOPAEDIC SURGERY

## 2022-01-20 RX ORDER — OXYCODONE HYDROCHLORIDE 10 MG/1
10 TABLET ORAL EVERY 4 HOURS PRN
Status: DISCONTINUED | OUTPATIENT
Start: 2022-01-20 | End: 2022-01-21 | Stop reason: HOSPADM

## 2022-01-20 RX ORDER — OXYCODONE HYDROCHLORIDE 5 MG/1
5 TABLET ORAL EVERY 4 HOURS PRN
Status: DISCONTINUED | OUTPATIENT
Start: 2022-01-20 | End: 2022-01-21 | Stop reason: HOSPADM

## 2022-01-20 RX ORDER — ALPRAZOLAM 0.25 MG/1
0.25 TABLET ORAL 2 TIMES DAILY PRN
Status: DISCONTINUED | OUTPATIENT
Start: 2022-01-20 | End: 2022-01-21 | Stop reason: HOSPADM

## 2022-01-20 RX ORDER — HYDROMORPHONE HCL/PF 1 MG/ML
0.5 SYRINGE (ML) INJECTION EVERY 4 HOURS PRN
Status: DISCONTINUED | OUTPATIENT
Start: 2022-01-20 | End: 2022-01-21 | Stop reason: HOSPADM

## 2022-01-20 RX ORDER — MAGNESIUM HYDROXIDE/ALUMINUM HYDROXICE/SIMETHICONE 120; 1200; 1200 MG/30ML; MG/30ML; MG/30ML
30 SUSPENSION ORAL EVERY 6 HOURS PRN
Status: DISCONTINUED | OUTPATIENT
Start: 2022-01-20 | End: 2022-01-21 | Stop reason: HOSPADM

## 2022-01-20 RX ORDER — PROPOFOL 10 MG/ML
INJECTION, EMULSION INTRAVENOUS AS NEEDED
Status: DISCONTINUED | OUTPATIENT
Start: 2022-01-20 | End: 2022-01-20

## 2022-01-20 RX ORDER — SODIUM CHLORIDE 9 MG/ML
125 INJECTION, SOLUTION INTRAVENOUS CONTINUOUS
Status: DISCONTINUED | OUTPATIENT
Start: 2022-01-20 | End: 2022-01-21 | Stop reason: HOSPADM

## 2022-01-20 RX ORDER — FENTANYL CITRATE 50 UG/ML
INJECTION, SOLUTION INTRAMUSCULAR; INTRAVENOUS AS NEEDED
Status: DISCONTINUED | OUTPATIENT
Start: 2022-01-20 | End: 2022-01-20

## 2022-01-20 RX ORDER — PANTOPRAZOLE SODIUM 40 MG/1
40 TABLET, DELAYED RELEASE ORAL
Status: DISCONTINUED | OUTPATIENT
Start: 2022-01-21 | End: 2022-01-21 | Stop reason: HOSPADM

## 2022-01-20 RX ORDER — CALCIUM CARBONATE 200(500)MG
500 TABLET,CHEWABLE ORAL 3 TIMES DAILY PRN
Status: DISCONTINUED | OUTPATIENT
Start: 2022-01-20 | End: 2022-01-21 | Stop reason: HOSPADM

## 2022-01-20 RX ADMIN — PREGABALIN 150 MG: 75 CAPSULE ORAL at 08:21

## 2022-01-20 RX ADMIN — ACETAMINOPHEN 975 MG: 325 TABLET, FILM COATED ORAL at 05:32

## 2022-01-20 RX ADMIN — ENOXAPARIN SODIUM 40 MG: 40 INJECTION SUBCUTANEOUS at 08:21

## 2022-01-20 RX ADMIN — PRAVASTATIN SODIUM 80 MG: 40 TABLET ORAL at 16:59

## 2022-01-20 RX ADMIN — FENOFIBRATE 145 MG: 145 TABLET, FILM COATED ORAL at 08:36

## 2022-01-20 RX ADMIN — ACETAMINOPHEN 975 MG: 325 TABLET, FILM COATED ORAL at 21:44

## 2022-01-20 RX ADMIN — OXYCODONE HYDROCHLORIDE 10 MG: 10 TABLET ORAL at 12:03

## 2022-01-20 RX ADMIN — OXYCODONE HYDROCHLORIDE AND ACETAMINOPHEN 500 MG: 500 TABLET ORAL at 08:21

## 2022-01-20 RX ADMIN — LORATADINE 10 MG: 10 TABLET ORAL at 08:22

## 2022-01-20 RX ADMIN — FENTANYL CITRATE 100 MCG: 50 INJECTION INTRAMUSCULAR; INTRAVENOUS at 15:42

## 2022-01-20 RX ADMIN — FLUTICASONE FUROATE AND VILANTEROL TRIFENATATE 1 PUFF: 100; 25 POWDER RESPIRATORY (INHALATION) at 08:38

## 2022-01-20 RX ADMIN — ALLOPURINOL 300 MG: 300 TABLET ORAL at 08:22

## 2022-01-20 RX ADMIN — SODIUM CHLORIDE 125 ML/HR: 0.9 INJECTION, SOLUTION INTRAVENOUS at 15:18

## 2022-01-20 RX ADMIN — PROPOFOL 100 MG: 10 INJECTION, EMULSION INTRAVENOUS at 15:38

## 2022-01-20 RX ADMIN — HYDROMORPHONE HYDROCHLORIDE 0.5 MG: 1 INJECTION, SOLUTION INTRAMUSCULAR; INTRAVENOUS; SUBCUTANEOUS at 13:23

## 2022-01-20 RX ADMIN — CALCIUM CARBONATE (ANTACID) CHEW TAB 500 MG 500 MG: 500 CHEW TAB at 17:05

## 2022-01-20 RX ADMIN — HYDROMORPHONE HYDROCHLORIDE 0.5 MG: 1 INJECTION, SOLUTION INTRAMUSCULAR; INTRAVENOUS; SUBCUTANEOUS at 05:29

## 2022-01-20 RX ADMIN — ALLOPURINOL 300 MG: 300 TABLET ORAL at 17:00

## 2022-01-20 RX ADMIN — OXYCODONE HYDROCHLORIDE 10 MG: 10 TABLET ORAL at 08:35

## 2022-01-20 RX ADMIN — DULOXETINE HYDROCHLORIDE 60 MG: 30 CAPSULE, DELAYED RELEASE ORAL at 21:44

## 2022-01-20 RX ADMIN — ALPRAZOLAM 0.25 MG: 0.25 TABLET ORAL at 11:00

## 2022-01-20 RX ADMIN — PREGABALIN 150 MG: 75 CAPSULE ORAL at 21:44

## 2022-01-20 NOTE — ANESTHESIA PREPROCEDURE EVALUATION
Procedure:  CLOSED REDUCTION HIP (Left Hip)    Relevant Problems   CARDIO   (+) Chest pain   (+) Hyperlipidemia      ENDO   (+) Type 2 diabetes mellitus with stage 3 chronic kidney disease, without long-term current use of insulin ([facility])      GI/HEPATIC   (+) GERD (gastroesophageal reflux disease)      /RENAL   (+) CKD (chronic kidney disease) stage 3, GFR 30-59 ml/min ([facility])      MUSCULOSKELETAL   (+) Cervical spondylosis      NEURO/PSYCH   (+) Chronic pain syndrome      PULMONARY   (+) COPD (chronic obstructive pulmonary disease) ([facility])   (+) Community acquired bacterial pneumonia   (+) Sleep apnea        Physical Exam    Airway    Mallampati score: III  TM Distance: >3 FB  Neck ROM: full     Dental   Comment: Poor dentition  Multiple missing teeth and teeth are in poor condition  ,     Cardiovascular  Rhythm: regular, Rate: normal, Cardiovascular exam normal    Pulmonary  Breath sounds clear to auscultation,     Other Findings        Anesthesia Plan  ASA Score- 2     Anesthesia Type- general and IV sedation with anesthesia with ASA Monitors  Additional Monitors:   Airway Plan:     Comment: TIVA vs LMA  Plan Factors-    Chart reviewed  EKG reviewed  Existing labs reviewed  Patient summary reviewed  Induction- intravenous  Postoperative Plan- Plan for postoperative opioid use  Informed Consent- Anesthetic plan and risks discussed with patient

## 2022-01-20 NOTE — ASSESSMENT & PLAN NOTE
Patient has history of chronic pain  [facility] website was queried on discharge    There are no red flags  Patient with current acute pain, secondary now to left hip dislocation  Continue analgesics therapy

## 2022-01-20 NOTE — PROGRESS NOTES
Progress Note - Orthopedics   Davi Montenegro 73 y o  male MRN: [id number]  Unit/Bed#: E2 -01 Encounter: [id number]    Assessment:  72 yo male with left hip pain, recent left WOLFGANG on 12/22 with subsequent left hip dislocation and closed reduction on 12/25    Plan:  · Patient is requesting repeat x-ray as he feels his hip is dislocated again this morning after leaning over the bedside table last night  I am able to range the hip slightly but patient with pain limiting exam  Order placed for left hip x-ray  · Continue strict posterior hip precautions  Again reviewed these with the patient  · Low suspicion for infection at this time  · Pain control prn  · PT/ OT  · Apply ice to left hip  · Ortho will follow  Subjective: patient seen and examined  He is concerned this morning because he is sure he dislocated his hip again  He was leaning out from bed to reach his bedside table when he had sudden increase in left hip pain  He is requesting a repeat x-ray  He states he is not able to move his hip as much as yesterday  Denies distal numbness or tingling  Vitals: Blood pressure 127/63, pulse 80, temperature 97 9 °F (36 6 °C), temperature source Tympanic, resp  rate 16, height 5' 8" (1 727 m), weight 74 1 kg (163 lb 5 8 oz), SpO2 94 %  ,Body mass index is 24 84 kg/m²  No intake or output data in the 24 hours ending 01/20/22 0753    Invasive Devices  Report    Peripheral Intravenous Line            Peripheral IV 12/25/21 Right Antecubital 25 days    Peripheral IV 01/18/22 Right Antecubital 1 day                Ortho Exam:   Left lower extremity:  Inspection- patient's left leg is significantly shortened  Incision healing well without erythema or discoloration  Palpation- +swelling about the hip  +TTP over the left buttock  Thigh soft and compressible  ROM- pain with log roll  Able to plantar and dorsiflex ankle and toes  Neurovascular- Sensation intact L4- S1  Palpable pedal pulse   AT/ GS/ EHL intact       Lab, Imaging and other studies:   CBC:   Lab Results   Component Value Date    WBC 8 46 01/20/2022    HGB 10 1 (L) 01/20/2022    HCT 32 0 (L) 01/20/2022    MCV 89 01/20/2022     01/20/2022    MCH 28 2 01/20/2022    MCHC 31 6 01/20/2022    RDW 14 1 01/20/2022    MPV 13 0 (H) 01/20/2022    NRBC 0 01/20/2022     CMP: No results found for: SODIUM, CL, CO2, ANIONGAP, BUN, CREATININE, GLUCOSE, CALCIUM, AST, ALT, ALKPHOS, PROT, BILITOT, EGFR

## 2022-01-20 NOTE — ASSESSMENT & PLAN NOTE
· Patient underwent left hip arthroplasty 56/56/3043, complicated by left hip dislocation and reduction by orthopedics on 12/25/2021  · He presented with the acute onset of left hip pain over the past few days, that is new, and different than his postoperative pain  He also noted swelling around his incision  No erythema  No fever chills  · 1/18 x-ray left hip/pelvis:  Intact left total hip arthroplasty without acute osseous abnormality  · 1/19 CT scan left hip: "Collections surrounding the left hip as described  Postoperative seroma versus abscess"  · Patient was evaluated the orthopedic surgery team:  They note he is afebrile without a significant white blood cell count and has range of motion of his hip  They did not feel that the fluid was reflective of abscess or infected joint    · Patient unfortunately bent over to  an object on the evening 1/19 and dislocated his left hip prosthesis  · Scheduled to go to the operating room 1/20 for close reduction

## 2022-01-20 NOTE — PHYSICAL THERAPY NOTE
Physical Therapy Cancellation Note         01/20/22 0958   Note Type   Note type Screen   Cancel Reasons Medical status   Additional Comments Note XR, showing dislocation L hip prosthesis  Will follow up and resume services as medically appropriate and cleared by fatou Ordonez Page, PT

## 2022-01-20 NOTE — PLAN OF CARE
Problem: MOBILITY - ADULT  Goal: Maintain or return to baseline ADL function  Description: INTERVENTIONS:  -  Assess patient's ability to carry out ADLs; assess patient's baseline for ADL function and identify physical deficits which impact ability to perform ADLs (bathing, care of mouth/teeth, toileting, grooming, dressing, etc )  - Assess/evaluate cause of self-care deficits   - Assess range of motion  - Assess patient's mobility; develop plan if impaired  - Assess patient's need for assistive devices and provide as appropriate  - Encourage maximum independence but intervene and supervise when necessary  - Involve family in performance of ADLs  - Assess for home care needs following discharge   - Consider OT consult to assist with ADL evaluation and planning for discharge  - Provide patient education as appropriate  Outcome: Progressing  Goal: Maintains/Returns to pre admission functional level  Description: INTERVENTIONS:  - Perform BMAT or MOVE assessment daily    - Set and communicate daily mobility goal to care team and patient/family/caregiver     - Collaborate with rehabilitation services on mobility goals if consulted  - Out of bed for toileting  - Record patient progress and toleration of activity level   Outcome: Progressing     Problem: INFECTION - ADULT  Goal: Absence or prevention of progression during hospitalization  Description: INTERVENTIONS:  - Assess and monitor for signs and symptoms of infection  - Monitor lab/diagnostic results  - Monitor all insertion sites, i e  indwelling lines, tubes, and drains  - Monitor endotracheal if appropriate and nasal secretions for changes in amount and color  - [place] appropriate cooling/warming therapies per order  - Administer medications as ordered  - Instruct and encourage patient and family to use good hand hygiene technique  - Identify and instruct in appropriate isolation precautions for identified infection/condition  Outcome: Progressing  Goal: Absence of fever/infection during neutropenic period  Description: INTERVENTIONS:  - Monitor WBC    Outcome: Progressing     Problem: DISCHARGE PLANNING  Goal: Discharge to home or other facility with appropriate resources  Description: INTERVENTIONS:  - Identify barriers to discharge w/patient and caregiver  - Arrange for needed discharge resources and transportation as appropriate  - Identify discharge learning needs (meds, wound care, etc )  - Arrange for interpretive services to assist at discharge as needed  - Refer to Case Management Department for coordinating discharge planning if the patient needs post-hospital services based on physician/advanced practitioner order or complex needs related to functional status, cognitive ability, or social support system  Outcome: Progressing

## 2022-01-20 NOTE — OCCUPATIONAL THERAPY NOTE
OCCUPATIONAL THERAPY CANCELLATION     OT orders received and chart reviewed  Pt s/p L hip XR revealing dislocation of L hip prosthesis  Will f/u and see as able and appropriate s/p ortho follow up       Orlando Haile MS, OTR/L

## 2022-01-20 NOTE — OP NOTE
OPERATIVE REPORT    PATIENT NAME: Sonya Chang   :  1955  MRN: [id number]  Pt Location: [facility] OR ROOM 03    SURGERY DATE: 2022    SURGEON(S) and ROLE:  Primary: Mark Goodpasture, MD    NOTE:  I was present for the entire procedure and performed all essential portions of the surgery  PREOPERATIVE DIAGNOSES:  Left WOLFGANG Posterior Dislocation    POSTOPERATIVE DIAGNOSES:  Left WOLFGANG Posterior Dislocation    PROCEDURES:  Closed Reduction Under Anesthesia of Left WOLFGANG      ANESTHESIA TYPE:  Conscious sedation    ANESTHESIA STAFF:   Anesthesiologist: Rama Prescott DO    ESTIMATED BLOOD LOSS:  0 mL    TOURNIQUET TIME:  NA    PERIOPERATIVE ANTIBIOTICS:  Not indicated    IMPLANTS:  none    * No implants in log *    SPECIMENS:  * No specimens in log *    DRAINS:  None      OPERATIVE INDICATIONS:  The patient is a 73 y o  male with posterior dislocation of left WOLFGANG sustained yesterday in the hospital   Surgical treatment was indicated due to instability  After a thorough discussion of the potential risks, benefits, and alternative treatments, the patient agreed to proceed with surgery  The patient understands that the risks of surgery include, but are not limited to: infection, neurovascular injury, wound healing complications, venous thromboembolism, persistent pain, stiffness, instability, recurrence of symptoms, potential need for additional surgeries, and loss of limb or life  Oral and written consent for surgery was obtained from the patient preoperatively  PROCEDURE AND TECHNIQUE:  On the day of surgery, the patient was identified in the preoperative holding area  The operative site was marked by the surgeon  The patient was taken into the operating room  A time-out was conducted to confirm the patient's identity, the operative site, and the proposed procedure  The patient was anesthetized  The patient was positioned supine on the OR table  All bony prominences were padded    A tourniquet was not used  Using gentle traction, flexion, and internal rotation, the left hip was closed reduced  A palpable clunk was felt upon reduction  Fluoroscopy confirmed concentric reduction of the prosthesis  The hip was stable to 70 degrees of flexion in neutral rotation  A hip abduction pillow was placed  The patient was awakened        COMPLICATIONS:  None    PATIENT DISPOSITION:  PACU       SIGNATURE:  Humera Connolly MD  DATE:  January 20, 2022  TIME:  4:35 PM

## 2022-01-20 NOTE — ASSESSMENT & PLAN NOTE
Lab Results   Component Value Date    HGBA1C 6 1 (H) 12/16/2021       Recent Labs     01/19/22 2054 01/20/22  0558 01/20/22  1118 01/20/22  1611   POCGLU 182* 142* 163* 86       Blood Sugar Average: Last 72 hrs:  (P) 154     Patient's diabetes type 2, without long-term use of insulin, with associated chronic kidney disease stage 3  home regimen:  Metformin 1000 mg BID  Hold oral anti hyperglycemics  Continue Accu-Cheks and sliding scale insulin

## 2022-01-20 NOTE — ANESTHESIA POSTPROCEDURE EVALUATION
Post-Op Assessment Note    CV Status:  Stable  Pain Score: 2    Pain management: adequate     Mental Status:  Alert and awake   Hydration Status:  Euvolemic   PONV Controlled:  Controlled   Airway Patency:  Patent      Post Op Vitals Reviewed: Yes      Staff: Anesthesiologist         No complications documented      /57 (01/20/22 1600)    Temp 97 6 °F (36 4 °C) (01/20/22 1600)    Pulse 84 (01/20/22 1600)   Resp 12 (01/20/22 1600)    SpO2 95 % (01/20/22 1604)

## 2022-01-21 VITALS
WEIGHT: 163.36 LBS | SYSTOLIC BLOOD PRESSURE: 154 MMHG | OXYGEN SATURATION: 93 % | HEART RATE: 85 BPM | RESPIRATION RATE: 18 BRPM | BODY MASS INDEX: 24.76 KG/M2 | DIASTOLIC BLOOD PRESSURE: 75 MMHG | HEIGHT: 68 IN | TEMPERATURE: 97.1 F

## 2022-01-21 PROBLEM — S73.005A DISLOCATED HIP, LEFT, INITIAL ENCOUNTER (HCC): Status: RESOLVED | Noted: 2022-01-21 | Resolved: 2022-01-21

## 2022-01-21 PROBLEM — S73.005A DISLOCATED HIP, LEFT, INITIAL ENCOUNTER (HCC): Status: ACTIVE | Noted: 2022-01-21

## 2022-01-21 LAB
ANION GAP SERPL CALCULATED.3IONS-SCNC: 9 MMOL/L (ref 4–13)
BASOPHILS # BLD AUTO: 0.07 THOUSANDS/ΜL (ref 0–0.1)
BASOPHILS NFR BLD AUTO: 1 % (ref 0–1)
BUN SERPL-MCNC: 16 MG/DL (ref 5–25)
CALCIUM SERPL-MCNC: 8.8 MG/DL (ref 8.3–10.1)
CHLORIDE SERPL-SCNC: 108 MMOL/L (ref 100–108)
CO2 SERPL-SCNC: 24 MMOL/L (ref 21–32)
CREAT SERPL-MCNC: 1.12 MG/DL (ref 0.6–1.3)
EOSINOPHIL # BLD AUTO: 0.34 THOUSAND/ΜL (ref 0–0.61)
EOSINOPHIL NFR BLD AUTO: 4 % (ref 0–6)
ERYTHROCYTE [DISTWIDTH] IN BLOOD BY AUTOMATED COUNT: 14.2 % (ref 11.6–15.1)
GFR SERPL CREATININE-BSD FRML MDRD: 68 ML/MIN/1.73SQ M
GLUCOSE SERPL-MCNC: 125 MG/DL (ref 65–140)
GLUCOSE SERPL-MCNC: 137 MG/DL (ref 65–140)
HCT VFR BLD AUTO: 33.6 % (ref 36.5–49.3)
HGB BLD-MCNC: 10.7 G/DL (ref 12–17)
IMM GRANULOCYTES # BLD AUTO: 0.05 THOUSAND/UL (ref 0–0.2)
IMM GRANULOCYTES NFR BLD AUTO: 1 % (ref 0–2)
LYMPHOCYTES # BLD AUTO: 1.85 THOUSANDS/ΜL (ref 0.6–4.47)
LYMPHOCYTES NFR BLD AUTO: 23 % (ref 14–44)
MCH RBC QN AUTO: 28.5 PG (ref 26.8–34.3)
MCHC RBC AUTO-ENTMCNC: 31.8 G/DL (ref 31.4–37.4)
MCV RBC AUTO: 90 FL (ref 82–98)
MONOCYTES # BLD AUTO: 0.95 THOUSAND/ΜL (ref 0.17–1.22)
MONOCYTES NFR BLD AUTO: 12 % (ref 4–12)
NEUTROPHILS # BLD AUTO: 4.85 THOUSANDS/ΜL (ref 1.85–7.62)
NEUTS SEG NFR BLD AUTO: 59 % (ref 43–75)
NRBC BLD AUTO-RTO: 0 /100 WBCS
PLATELET # BLD AUTO: 230 THOUSANDS/UL (ref 149–390)
PMV BLD AUTO: 12.6 FL (ref 8.9–12.7)
POTASSIUM SERPL-SCNC: 4.7 MMOL/L (ref 3.5–5.3)
RBC # BLD AUTO: 3.75 MILLION/UL (ref 3.88–5.62)
SODIUM SERPL-SCNC: 141 MMOL/L (ref 136–145)
WBC # BLD AUTO: 8.11 THOUSAND/UL (ref 4.31–10.16)

## 2022-01-21 PROCEDURE — 85025 COMPLETE CBC W/AUTO DIFF WBC: CPT | Performed by: INTERNAL MEDICINE

## 2022-01-21 PROCEDURE — 82948 REAGENT STRIP/BLOOD GLUCOSE: CPT

## 2022-01-21 PROCEDURE — 97163 PT EVAL HIGH COMPLEX 45 MIN: CPT

## 2022-01-21 PROCEDURE — 80048 BASIC METABOLIC PNL TOTAL CA: CPT | Performed by: INTERNAL MEDICINE

## 2022-01-21 PROCEDURE — 99024 POSTOP FOLLOW-UP VISIT: CPT | Performed by: PHYSICIAN ASSISTANT

## 2022-01-21 PROCEDURE — 99239 HOSP IP/OBS DSCHRG MGMT >30: CPT | Performed by: INTERNAL MEDICINE

## 2022-01-21 PROCEDURE — 97167 OT EVAL HIGH COMPLEX 60 MIN: CPT

## 2022-01-21 RX ORDER — OXYCODONE HYDROCHLORIDE 5 MG/1
TABLET ORAL
Qty: 30 TABLET | Refills: 0 | Status: SHIPPED | OUTPATIENT
Start: 2022-01-21

## 2022-01-21 RX ADMIN — LORATADINE 10 MG: 10 TABLET ORAL at 08:19

## 2022-01-21 RX ADMIN — PANTOPRAZOLE SODIUM 40 MG: 40 TABLET, DELAYED RELEASE ORAL at 05:27

## 2022-01-21 RX ADMIN — ACETAMINOPHEN 975 MG: 325 TABLET, FILM COATED ORAL at 05:27

## 2022-01-21 RX ADMIN — OXYCODONE HYDROCHLORIDE AND ACETAMINOPHEN 500 MG: 500 TABLET ORAL at 08:20

## 2022-01-21 RX ADMIN — SODIUM CHLORIDE 125 ML/HR: 0.9 INJECTION, SOLUTION INTRAVENOUS at 03:55

## 2022-01-21 RX ADMIN — PREGABALIN 150 MG: 75 CAPSULE ORAL at 08:20

## 2022-01-21 RX ADMIN — ALLOPURINOL 300 MG: 300 TABLET ORAL at 08:20

## 2022-01-21 RX ADMIN — FLUTICASONE FUROATE AND VILANTEROL TRIFENATATE 1 PUFF: 100; 25 POWDER RESPIRATORY (INHALATION) at 08:30

## 2022-01-21 RX ADMIN — FENOFIBRATE 145 MG: 145 TABLET, FILM COATED ORAL at 08:19

## 2022-01-21 RX ADMIN — ENOXAPARIN SODIUM 40 MG: 40 INJECTION SUBCUTANEOUS at 08:20

## 2022-01-21 NOTE — ASSESSMENT & PLAN NOTE
Patient has history of chronic pain  [facility] website was queried on discharge  There are no red flags  Patient with current acute pain, secondary now to left hip dislocation  Continue analgesics therapy  Patient was prescribed oxycodone IR 5 mg tablet q 4 hours p r n  Mild pain and 10 mg q 4 hours p r n   Moderate or severe pain dispense #30 Refill 0  Follow up with his primary orthopedic surgery team as an outpatient

## 2022-01-21 NOTE — PROGRESS NOTES
Orthopaedic Surgery - Progress Note  Torrie Machado (74 y o  male)   : 1955   MRN: [id number]  Date: 2022   Encounter: [id number]   Unit/Bed#: E2 -01    Assessment / Plan  Postop day 1 status post closed reduction left total hip posterior dislocation    · Patient was fitted with an abduction brace last night  The brace is locked from 0-45 degrees  · Patient should wear the brace at all times including sleep  He can use the abduction pillow rather than the brace for sleep  · Ice and analgesics as needed for pain  · Weight-bearing as tolerated left leg with brace in place  · Patient can be discharged home with abduction brace at this time  Should be scheduled for outpatient follow-up with Dr Joseluis Hall all for further discussion on treatment going forward  Subjective  58-year-old male postop day 1 status post close reduction left total hip posterior dislocation  He was status post left total hip replacement on 2021 with Dr Joseluis Hall and subsequent dislocation with reduction in the emergency room on 2021  Patient is denying any pain at this time  He still has some soreness around the hip  He has been compliant with the abduction pillow and brace since yesterday  He feels he is tolerating the brace well at this time  He denies any distal numbness or tingling at this time  Vitals  Temp:  [97 1 °F (36 2 °C)-98 2 °F (36 8 °C)] 97 1 °F (36 2 °C)  HR:  [80-85] 85  Resp:  [12-19] 18  BP: (110-154)/(56-75) 154/75  Body mass index is 24 84 kg/m²  I/O last 24 hours: In: 400 [I V :400]  Out: 1300 [Urine:1300]    Left Hip Exam  Alignment / Posture:  Normal resting hip posture  Inspection:  Patient currently sitting comfortably in bed with the abduction brace in place  Palpation:  No tenderness  ROM:  Not tested  Strength:  5/5 AT, GSC, PT, and peroneals  Stability:  Not tested  Tests:  No pertinent positive or negative tests    Neurovascular:  Sensation intact in DP/SP/Lyons/Sa/T nerve distributions  Sensation intact in all digital nerve distributions  Toes warm and perfused  Gait:  Steady with walker  Lab Results  (I have personally reviewed pertinent lab results )  Results from last 7 days   Lab Units 01/21/22  0509 01/20/22  0431 01/19/22  0439 01/18/22  1724   WBC Thousand/uL 8 11 8 46 10 14 13 87*   HEMOGLOBIN g/dL 10 7* 10 1* 10 4* 12 7   HEMATOCRIT % 33 6* 32 0* 33 0* 39 7   PLATELETS Thousands/uL 230 234 210 268         Results from last 7 days   Lab Units 01/21/22  0509 01/19/22  0439 01/18/22  1724   POTASSIUM mmol/L 4 7 4 2 4 3   CHLORIDE mmol/L 108 104 102   CO2 mmol/L 24 26 25   BUN mg/dL 16 20 17   CREATININE mg/dL 1 12 1 28 1 03   EGFR ml/min/1 73sq m 68 58 75   CALCIUM mg/dL 8 8 8 8 9 6     Results from last 7 days   Lab Units 01/18/22  1724   SED RATE mm/hour 27*   CRP mg/L 6 1*     Results from last 7 days   Lab Units 01/19/22  2046   BLOOD CULTURE  No Growth at 24 hrs  No Growth at 24 hrs         Clarice Runner, PA-C

## 2022-01-21 NOTE — OCCUPATIONAL THERAPY NOTE
Occupational Therapy Evaluation     Patient Name: Robert Armstrong  Today's Date: 1/21/2022  Problem List  Principal Problem:    Left hip pain  Active Problems:    Hyperlipidemia    COPD (chronic obstructive pulmonary disease) (formerly [facility])    CKD (chronic kidney disease) stage 3, GFR 30-59 ml/min (formerly [facility])    Chronic pain syndrome    Type 2 diabetes mellitus with stage 3 chronic kidney disease, without long-term current use of insulin (formerly [facility])    Past Medical History  Past Medical History:   Diagnosis Date    Arthritis     osteoarthritis    Cervical radiculopathy     COPD (chronic obstructive pulmonary disease) (formerly [facility])     CPAP (continuous positive airway pressure) dependence     Diabetes mellitus ([facility])     Fibromyalgia     Fibromyalgia, primary     GERD (gastroesophageal reflux disease)     History of transfusion     Hyperlipidemia     Osteopenia     Pneumonia     Sleep apnea     USES CPAP HS     Past Surgical History  Past Surgical History:   Procedure Laterality Date    CHOLECYSTECTOMY      COLONOSCOPY      CYST REMOVAL      FEMUR SURGERY Left     HIP CLOSE REDUCTION Left 1/20/2022    Procedure: CLOSED REDUCTION HIP;  Surgeon: Fran Cortez MD;  Location: [facility];  Service: Orthopedics    JOINT REPLACEMENT      T KR LEFT    KNEE SURGERY Left 1996    MANDIBLE SURGERY      VA CONV PREV HIP SURG TO TOT HIP ARTHROPLAS Left 12/22/2021    Procedure: ARTHROPLASTY HIP TOTAL- conversion;  Surgeon: Charles Johnson MD;  [facility];  Service: Orthopedics    VA REMOVAL DEEP IMPLANT Left 9/8/2021    Procedure: REMOVAL HARDWARE HIP;  Surgeon: Alba Gonzalez MD;  [facility];  Service: Orthopedics    ROTATOR CUFF REPAIR Left     WRIST FRACTURE SURGERY Left     plate in place         01/21/22 0817   OT Last Visit   OT Visit Date 01/21/22   Note Type   Note type Evaluation   Restrictions/Precautions   Weight Bearing Precautions Per Order No   Braces or Orthoses Other (Comment)  (Abduction brace locked 0-45 degrees )   Other Precautions Pain;THR  (THP's)   Pain Assessment   Pain Assessment Tool 0-10   Pain Score 2   Pain Location/Orientation Orientation: Left; Location: Hip   Home Living   Type of Home Apartment  (0 [address]LE[address] through side )   Home Layout One level   Bathroom Shower/Tub Tub/shower unit   Bathroom Toilet Standard  (+ ETS)   [address] [place] chair; Toilet raiser;Grab bars in shower;Commode   [address] [facility]; Other (Comment); Cane  (Hip kit)   Additional Comments Pt was receiving home PT and would like to resume w/ same therapist   Prior Function   Lives With Spouse; Other (Comment)  (Dtr has been staying to assist )   [place]   IADLs Independent  ([place] w/ wife )   Falls in the last 6 months 0   Comments Sleeps on sofa that turns into recliner    Lifestyle   Autonomy Pt states he's (I) PLOF  Since sx has been (I) w/ ADLs using hip kit  Family has been performing IADLs  Ambulates using RW most times  Reciprocal Relationships Family    Psychosocial   Psychosocial (WDL) WDL   Subjective   Subjective " It's just a little sore"    ADL   Eating Assistance 7  Independent   Grooming Assistance 7  Independent   UB Bathing Assistance 7  Independent   LB Bathing Assistance 4  Minimal Assistance   LB Bathing Deficit Setup;Use of adaptive equipment   UB Dressing Assistance 7  Independent   LB Dressing Assistance 4  Minimal Assistance   LB Dressing Deficit Setup;Use of adaptive equipment   Toileting Assistance  4  Minimal Assistance   Additional Comments Cues needed not to twist body w/ feet planted  Reinforced need for LHAE due to hip precautions  Bed Mobility   Supine to Sit 4  Minimal assistance   Additional items HOB elevated; Bedrails; Increased time required;LE management   Additional Comments Remains OOB in recliner w/ all needs and pillow between legs   Transfers   Sit to Stand 5 Supervision   Additional items Impulsive   Stand to Sit 5  Supervision   Additional items Impulsive   Stand pivot 5  Supervision   Additional items Impulsive  (RW)   Additional Comments Cues needed to slow pace and not to twist body w/ feet planted  Pt only able to recall 2 THP's and states "I have them on a paper at home "    Functional Mobility   Functional Mobility 5  Supervision   Additional Comments (S) using RW; impulsive  Cues needed for safety w/ THP's  Balance   Static Sitting Fair +   Dynamic Sitting Fair  (Within limitations)   Static Standing Fair +   Dynamic Standing Fair   Ambulatory Fair   Activity Tolerance   Activity Tolerance Patient tolerated treatment well   Medical Staff Made Aware PTLouise    Nurse Made Aware Jyothi    RUE Assessment   RUE Assessment WFL  (4/5)   LUE Assessment   LUE Assessment   (4/5)   Hand Function   Gross Motor Coordination Functional   Fine Motor Coordination Functional   Vision-Basic Assessment   Current Vision Wears glasses all the time   Cognition   Arousal/Participation Cooperative   Attention Attends with cues to redirect   Orientation Level Oriented X4   Memory Decreased short term memory;Decreased recall of precautions   Following Commands Follows multistep commands without difficulty   Comments Pt impulsive at times and needs reminders not to twist body w/ feet planted  Could not recall all THP's but states has paper at home  Decreased safety noted and compliance  Assessment   Limitation Decreased ADL status; Decreased self-care trans;Decreased high-level ADLs   Prognosis Good   Assessment Pt admit 1/18/22 with L hip pain x 3 days  S/p L THR 12/22/21 than had dislocation requiring reduction in ED on 12/25/21  Initial XR's during this admit were negative for dislocation  However, pt reports reaching for bedside table and felt pain  Repeat XR's showed dislocation  S/p closed reduction 1/20/22  Currently WBAT w/ hip abduction brace intact from 0-45 degrees  OT completed extensive review of pt's medical and social history  Pt with h/o OA, cervical radiculopathy, DM, fibromyalgia, BETHANY, pneumonia, THR and dislocation x 2  Prior to admit was living w/ wife in apt w/ 0 MARIELENA  (I) PLOF  Daughter has been staying to assist if needed  - falls  Pt presents to OT below baseline due to the following performance deficits: pain; balance; stand tolerance; functional mobility; problem solving; attention; coping; community integration; self care; and IADLs  Therefore, pt would benefit from OT services to achieve optimal level of performance  Occupational performance areas to be addressed include: bathing, toileting, dressing, activity tolerance, functional mobility, and clothing management  Pt is Min for bed mobility and (S) for transfers and ambulation using RW  Requires frequent cues not to twist body w/ feet planted  Decreased compliance noted  Pt only able to recall 2 THP's but states he has paper at home with them on  Increased assist needed for LB ADLs and toileting w/ cues for safety and reinforcement of LHAE  Based on findings, pt is of high complexity  The patient's raw score on the AM-PAC Daily Activity inpatient short form is 20, standardized score is 42 03, greater than 39 4  Patients at this level are likely to benefit from discharge to home  Recommend pt return home w/ family support and continued home therapy  Goals   Patient Goals To go home today    Plan   Treatment Interventions ADL retraining; Endurance training;Patient/family training;Equipment evaluation/education; Activityengagement   Goal Expiration Date 01/28/22   OT Treatment Day 0   OT Frequency 2-3x/wk   Recommendation   OT Discharge Recommendation Home with home health rehabilitation   [facility] Daily Activity Inpatient   Lower Body Dressing 3   Bathing 3   Toileting 3   Upper Body Dressing 4   Grooming 3   Eating 4   Daily Activity Raw Score 20   Daily Activity Standardized Score (Calc for Raw Score >=11) 42 03   AM-PAC Applied Cognition Inpatient   Following a Speech/Presentation 4   Understanding Ordinary Conversation 4   Taking Medications 4   Remembering Where Things Are Placed or Put Away 4   Remembering List of 4-5 Errands 4   Taking Care of Complicated Tasks 3   Applied Cognition Raw Score 23   Applied Cognition Standardized Score 53 08       GOALS:      Pt will complete LB ADL's w/ MI using AE      Pt will complete toileting including hygiene and clothing management w/ MI      Pt will complete functional transfers w/ MI using RW     Pt will complete dynamic stand balance w/ G- for safe clothing management      Pt will improve stand tolerance to 5-10 mins for safety w/ ADL tasks      Pt will improve activity tolerance to G for 20- 30 min tx session for increased engagement in functional tasks      Pt will verbalize THP's (I)'ly and maintain during ADL tasks without cues      Yvette Bernardo MS, OTR/L

## 2022-01-21 NOTE — PLAN OF CARE
Problem: OCCUPATIONAL THERAPY ADULT  Goal: Performs self-care activities at highest level of function for planned discharge setting  See evaluation for individualized goals  Description: Treatment Interventions: ADL retraining,Endurance training,Patient/family training,Equipment evaluation/education,Activityengagement          See flowsheet documentation for full assessment, interventions and recommendations  1/21/2022 0918 by Orlando Haile OT  Note: Limitation: Decreased ADL status,Decreased self-care trans,Decreased high-level ADLs  Prognosis: Good  Assessment: Pt admit 1/18/22 with L hip pain x 3 days  S/p L THR 12/22/21 than had dislocation requiring reduction in ED on 12/25/21  Initial XR's during this admit were negative for dislocation  However, pt reports reaching for bedside table and felt pain  Repeat XR's showed dislocation  S/p closed reduction 1/20/22  Currently WBAT w/ hip abduction brace intact from 0-45 degrees  OT completed extensive review of pt's medical and social history  Pt with h/o OA, cervical radiculopathy, DM, fibromyalgia, BETHANY, pneumonia, THR and dislocation x 2  Prior to admit was living w/ wife in apt w/ 0 MARIELENA  (I) PLOF  Daughter has been staying to assist if needed  - falls  Pt presents to OT below baseline due to the following performance deficits: pain; balance; stand tolerance; functional mobility; problem solving; attention; coping; community integration; self care; and IADLs  Therefore, pt would benefit from OT services to achieve optimal level of performance  Occupational performance areas to be addressed include: bathing, toileting, dressing, activity tolerance, functional mobility, and clothing management  Pt is Min for bed mobility and (S) for transfers and ambulation using RW  Requires frequent cues not to twist body w/ feet planted  Decreased compliance noted  Pt only able to recall 2 THP's but states he has paper at home with them on   Increased assist needed for LB ADLs and toileting w/ cues for safety and reinforcement of LHAE  Based on findings, pt is of high complexity  The patient's raw score on the AM-PAC Daily Activity inpatient short form is 20, standardized score is 42 03, greater than 39 4  Patients at this level are likely to benefit from discharge to home  Recommend pt return home w/ family support and continued home therapy  OT Discharge Recommendation: Home with home health rehabilitation       1/21/2022 8047 by Adrianne Hussein OT  Note: Limitation: Decreased ADL status,Decreased self-care trans,Decreased high-level ADLs  Prognosis: Good  Assessment: Pt admit 1/18/22 with L hip pain x 3 days  S/p L THR 12/22/21 than had dislocation requiring reduction in ED on 12/25/21  Initial XR's during this admit were negative for dislocation  However, pt reports reaching for bedside table and felt pain  Repeat XR's showed dislocation  S/p closed reduction 1/20/22  Currently WBAT w/ hip abduction brace intact from 0-45 degrees  OT completed extensive review of pt's medical and social history  Pt with h/o OA, cervical radiculopathy, DM, fibromyalgia, BETHANY, pneumonia, THR and dislocation x 2  Prior to admit was living w/ wife in apt w/ 0 MARIELENA  (I) PLOF  Daughter has been staying to assist if needed  - falls  Pt presents to OT below baseline due to the following performance deficits: pain; balance; stand tolerance; functional mobility; problem solving; attention; coping; community integration; self care; and IADLs  Therefore, pt would benefit from OT services to achieve optimal level of performance  Occupational performance areas to be addressed include: bathing, toileting, dressing, activity tolerance, functional mobility, and clothing management  Pt is Min for bed mobility and (S) for transfers and ambulation using RW  Requires frequent cues not to twist body w/ feet planted  Decreased compliance noted   Pt only able to recall 2 THP's but states he has paper at home with them on  Increased assist needed for LB ADLs and toileting w/ cues for safety and reinforcement of LHAE  Based on findings, pt is of high complexity  The patient's raw score on the AM-PAC Daily Activity inpatient short form is 20, standardized score is 42 03, greater than 39 4  Patients at this level are likely to benefit from discharge to home  Recommend pt return home w/ family support and continued home therapy        OT Discharge Recommendation: Home with home health rehabilitation

## 2022-01-21 NOTE — PLAN OF CARE
Problem: MOBILITY - ADULT  Goal: Maintain or return to baseline ADL function  Description: INTERVENTIONS:  -  Assess patient's ability to carry out ADLs; assess patient's baseline for ADL function and identify physical deficits which impact ability to perform ADLs (bathing, care of mouth/teeth, toileting, grooming, dressing, etc )  - Assess/evaluate cause of self-care deficits   - Assess range of motion  - Assess patient's mobility; develop plan if impaired  - Assess patient's need for assistive devices and provide as appropriate  - Encourage maximum independence but intervene and supervise when necessary  - Involve family in performance of ADLs  - Assess for home care needs following discharge   - Consider OT consult to assist with ADL evaluation and planning for discharge  - Provide patient education as appropriate  Outcome: Progressing  Goal: Maintains/Returns to pre admission functional level  Description: INTERVENTIONS:  - Perform BMAT or MOVE assessment daily    - Set and communicate daily mobility goal to care team and patient/family/caregiver     - Collaborate with rehabilitation services on mobility goals if consulted  - Out of bed for toileting  - Record patient progress and toleration of activity level   Out  Problem: INFECTION - ADULT  Goal: Absence or prevention of progression during hospitalization  Description: INTERVENTIONS:  - Assess and monitor for signs and symptoms of infection  - Monitor lab/diagnostic results  - Monitor all insertion sites, i e  indwelling lines, tubes, and drains  - Monitor endotracheal if appropriate and nasal secretions for changes in amount and color  - [place] appropriate cooling/warming therapies per order  - Administer medications as ordered  - Instruct and encourage patient and family to use good hand hygiene technique  - Identify and instruct in appropriate isolation precautions for identified infection/condition  Outcome: Progressing  Goal: Absence of fever/infection during neutropenic period  Description: INTERVENTIONS:  - Monitor WBC    Outcome: Progressing     Problem: SAFETY ADULT  Goal: Patient will remain free of falls  Description: INTERVENTIONS:  - Educate patient/family on patient safety including physical limitations  - Instruct patient to call for assistance with activity   - Consult OT/PT to assist with strengthening/mobility   - Keep Call bell within reach  - Keep bed low and locked with side rails adjusted as appropriate  - Keep care items and personal belongings within reach  - Initiate and maintain comfort rounds  - Make Fall Risk Sign visible to staff  - Apply yellow socks and bracelet for high fall risk patients  - Consider moving patient to room near nurses station  Outcome: Progressing  Goal: Maintain or return to baseline ADL function  Description: INTERVENTIONS:  -  Assess patient's ability to carry out ADLs; assess patient's baseline for ADL function and identify physical deficits which impact ability to perform ADLs (bathing, care of mouth/teeth, toileting, grooming, dressing, etc )  - Assess/evaluate cause of self-care deficits   - Assess range of motion  - Assess patient's mobility; develop plan if impaired  - Assess patient's need for assistive devices and provide as appropriate  - Encourage maximum independence but intervene and supervise when necessary  - Involve family in performance of ADLs  - Assess for home care needs following discharge   - Consider OT consult to assist with ADL evaluation and planning for discharge  - Provide patient education as appropriate  Outcome: Progressing  Goal: Maintains/Returns to pre admission functional level  Description: INTERVENTIONS:  - Perform BMAT or MOVE assessment daily    - Set and communicate daily mobility goal to care team and patient/family/caregiver     - Collaborate with rehabilitation services on mobility goals if consulted  - Out of bed for toileting  - Record patient progress and toleration of activity level   Outcome: Progressing   come: Progressing

## 2022-01-21 NOTE — CASE MANAGEMENT
Case Management Progress Note    Patient name Meño [facility]  Location [address] [address] /E2 -* MRN [id number]  : 1955 Date 2022       LOS (days): 3  Geometric Mean LOS (GMLOS) (days):   Days to GMLOS:        OBJECTIVE:        Current admission status: Inpatient  Preferred Pharmacy:   [id number] [facility], [address] [facility] [address] [address]  Phone: [phone] Fax: Linda Krueger [place] [address], [place] - [address] [facility] [address] [place] [id number] [facility] [address] [address]  Phone: [phone] Fax: [phone]    Primary Care Provider: Akhil Hughes MD    Primary Insurance: [address] [place] [address]  Secondary Insurance: MEDICARE    PROGRESS NOTE:      Pt is actually open to [facility] for [facility] services  They were contacted and made aware pt will be discharging today  Pt now requesting a wcv home  CM placed a request to [facility]  Will advise of time once confirmed

## 2022-01-21 NOTE — CASE MANAGEMENT
Case Management Progress Note    Patient name Becky Late  Location [address] [facility] 2 /E2 -* MRN [id number]  : 1955 Date 2022       LOS (days): 3  Geometric Mean LOS (GMLOS) (days): 2 60  Days to GMLOS:-0 1        OBJECTIVE:        Current admission status: Inpatient  Preferred Pharmacy:   [id number]   And [facility] [address], [address]  [facility] [address] [address]  Phone: [phone] Fax: Linda Robledo [address], [facility] [place] [address] [facility] [address] [facility] [address] [address]  Phone: [phone] Fax: [phone]    Primary Care Provider: Priyanka Guzman MD    Primary Insurance: [address] [place] [address]  Secondary Insurance: MEDICARE    PROGRESS NOTE:      Pt will be transported via wcv at [phone]

## 2022-01-21 NOTE — ASSESSMENT & PLAN NOTE
-patient initially presented with left hip, however unfortunately suffered a left hip prosthesis dislocation during his hospitalization  -he was followed by the orthopedic surgery team who performed close reduction in the operating room 1/20  -patient will be discharged home with analgesics and follow-up with his primary orthopedic surgery team as an outpatient

## 2022-01-21 NOTE — ASSESSMENT & PLAN NOTE
Lab Results   Component Value Date    HGBA1C 6 1 (H) 12/16/2021       Recent Labs     01/20/22  1118 01/20/22  1611 01/20/22  2118 01/21/22  0711   POCGLU 163* 86 151* 137       Blood Sugar Average: Last 72 hrs:  (P) 152     Patient's diabetes type 2, without long-term use of insulin, with associated chronic kidney disease stage 3  home regimen:  Metformin 1000 mg BID  Patient was monitored with Accu-Cheks and sliding scale insulin during his hospitalization  Will be discharged home to resume his home regimen

## 2022-01-21 NOTE — CASE MANAGEMENT
Case Management Discharge Planning Note    Patient name Sergio Mittal  Location [place] 2 /E2 -* MRN [id number]  : 1955 Date 2022       Current Admission Date: 2022  Current Admission Diagnosis:Left hip pain   Patient Active Problem List    Diagnosis Date Noted    Sleep apnea     Type 2 diabetes mellitus with stage 3 chronic kidney disease, without long-term current use of insulin ([facility] [address] ) 2022    Orthopedic aftercare 2021    Status post left hip replacement 2021    Left hip pain 2021    Prediabetes 2021    Femoral neck fracture ([facility] [address] ) 2021    Chronic pain syndrome 03/10/2021    Cervical disc disorder with radiculopathy of mid-cervical region     Cervical spondylosis     Cervical facet joint syndrome     Nicotine dependence 2020    CKD (chronic kidney disease) stage 3, GFR 30-59 ml/min ([facility] [address] ) 2020    Community acquired bacterial pneumonia 2020    Closed fracture of one rib of left side 2019    Chest pain 2017    Hyperlipidemia     GERD (gastroesophageal reflux disease)     COPD (chronic obstructive pulmonary disease) ([facility])       LOS (days): 3  Geometric Mean LOS (GMLOS) (days):   Days to GMLOS:     OBJECTIVE:  Risk of Unplanned Readmission Score: 14      Current admission status: Inpatient   Preferred Pharmacy:   [id number], [place] - [address]  [address] [facility] [address]  Phone: [phone] Fax: Linda Robledo [address] [place], [place] [address] [address] [place] [address] [address]  Phone: [phone] Fax: [phone]    Primary Care Provider: Juanita Castillo MD    Primary Insurance: [address] [place] [address]  Secondary Insurance: MEDICARE    DISCHARGE DETAILS:    Discharge planning discussed with[de-identified] Patient  Freedom of Choice: Yes  Comments - Freedom of Choice: CM discussed HHC w/ pt and he is agreeable to HHPT/OT  Pt accepted by [facility]  CM discussed transport w/ pt  Advised him that a wcv is OOP  He is going to call his friend and see if he can take him home  CM will continue to follow       [address]         Is the patient interested in [facility] at discharge?: Yes  [address] requested[de-identified] Physical [address] Name[de-identified] Other ([address] [place] [address])  [address] [place] [address] Kenn Provider[de-identified] PCP  Home Health Services Needed[de-identified] Evaluate Functional Status and Safety,Gait/ADL Training,Post-Op Care and Assessment,Strengthening/Theraputic Exercises to Improve Function  Homebound Criteria Met[de-identified] Uses an Assist Device (i e  cane, walker, etc),Requires the Assistance of Another Person for Safe Ambulation or to Leave the Home  Supporting Clincal Findings[de-identified] Limited Endurance

## 2022-01-21 NOTE — PHYSICAL THERAPY NOTE
PHYSICAL THERAPY EVALUATION          Patient Name: Dwight ZARATE Date: 1/21/2022   PT EVALUATION    73 y o     [id number]    Hip pain [M25 559]  Left hip pain [M25 552]  SIRS (systemic inflammatory response syndrome) ([facility] [address] ) [R65 10]    Past Medical History:   Diagnosis Date    Arthritis     osteoarthritis    Cervical radiculopathy     COPD (chronic obstructive pulmonary disease) (HCC)     CPAP (continuous positive airway pressure) dependence     Diabetes mellitus (HCC)     Fibromyalgia     Fibromyalgia, primary     GERD (gastroesophageal reflux disease)     History of transfusion     Hyperlipidemia     Osteopenia     Pneumonia     Sleep apnea     USES CPAP HS     Past Surgical History:   Procedure Laterality Date    CHOLECYSTECTOMY      COLONOSCOPY      CYST REMOVAL      FEMUR SURGERY Left     HIP CLOSE REDUCTION Left 1/20/2022    Procedure: CLOSED REDUCTION HIP;  Surgeon: Romina Yadav MD;  Location: [facility];  Service: Orthopedics    JOINT REPLACEMENT      T KR LEFT    KNEE SURGERY Left 1996    MANDIBLE SURGERY      VA CONV PREV HIP SURG TO TOT HIP ARTHROPLAS Left 12/22/2021    Procedure: ARTHROPLASTY HIP TOTAL- conversion;  Surgeon: Salome Chowdary MD;  [facility]: [facility];  Service: Orthopedics    VA REMOVAL DEEP IMPLANT Left 9/8/2021    Procedure: REMOVAL HARDWARE HIP;  Surgeon: Patricia Naranjo MD;  [facility];  Service: Orthopedics    ROTATOR CUFF REPAIR Left     WRIST FRACTURE SURGERY Left     plate in place        01/21/22 0818   PT Last Visit   PT Visit Date 01/21/22   Note Type   Note type Evaluation   Pain Assessment   Pain Assessment Tool 0-10   Pain Score 2   Pain Location/Orientation Orientation: Left; Location: Hip   Hospital Pain Intervention(s) Repositioned; Ambulation/increased activity; Elevated; Emotional support; Rest   Restrictions/Precautions   Braces or Orthoses Other (Comment)  (hip abd brace locked o-45 degree flexion and 10 degree abd)   Other Precautions THR;Multiple lines;Pain   Home Living   Type of [address] One level;Stairs to enter without rails   Bathroom Shower/Tub Tub/shower unit   Bathroom Toilet Standard   Bathroom Equipment Grab bars in shower; Shower chair;Commode   Home Equipment Walker;Cane;Crutches; Other (Comment)  (hip kit)   Additional Comments 1 MARIELENA   Prior Function   Level of [place] Independent with ADLs and functional mobility   Lives With Spouse   Receives Help From Family;Home health  (dtr; HHPT)   ADL Assistance Independent   IADLs   (family assisting)   Falls in the last 6 months 0   Vocational Retired   Comments amb w RW pta, states occasionally without any AD   General   Additional Pertinent History pt admitted 1/18/22 for L hip pain s/p closed reduction 1/20  wbat LLE per ortho  abd brace to be worn OOB, currently locked in 0-45 flexion and 10 degree abd  appropriate to wear abd wedge when in bed  bedrest orders dc when brace fit  [facility] where he underwent L THR 12/22 w post hip precautions  had dislocation 12/25   Cognition   Overall Cognitive Status WFL   Arousal/Participation Cooperative   Memory Decreased recall of precautions   Following Commands Follows one step commands without difficulty   Comments able to recall 2/3 THP  impulsive   Subjective   Subjective agreeable to PT   RLE Assessment   RLE Assessment WFL  (4+)   LLE Assessment   LLE Assessment X  (defer further testing d/t brace)   Strength LLE   L Knee Extension 3/5   L Ankle Dorsiflexion 3/5   Coordination   Sensation WFL   Bed Mobility   Supine to Sit 4  Minimal assistance   Additional items Assist x 1;HOB elevated; Bedrails; Increased time required;LE management   Additional Comments A for LLE   Transfers   Sit to Stand 5  Supervision   Additional items Other  (RW)   Stand to Sit 5  Supervision   Additional items Armrests; Increased time required; Other;Verbal cues  (RW)   Additional Comments verbal cues for safety d/t impulsivity   Ambulation/Elevation   Gait pattern WNL  (impulsive)   Gait Assistance 5  Supervision   Additional items Assist x 1;Verbal cues  (for safety to maintain THP)   Assistive Device Rolling walker   Distance 150'   Stair Management Assistance Not tested   Balance   Static Standing Fair   Dynamic Standing [address],Floors 3,4, & 5 -   Endurance Deficit   Endurance Deficit No   Activity Tolerance   Activity Tolerance Patient tolerated treatment well   Medical Staff Made Aware Yvette OT   Nurse Made Aware Jyothi RN   Assessment   Prognosis Fair   Problem List Pain;Orthopedic restrictions; Impaired judgement;Decreased safety awareness;Decreased mobility   Assessment Angela Guadalupe is a 73 y o  male admitted to [facility] on 1/18/2022 for Left hip pain  S/p closed reduction 1/20/22  PT was consulted and pt was seen on 1/21/2022 for mobility assessment and d/c planning  Pt presents w high fall risk, hip precautions- now w hip abd brace, multiple lines, acute pain  Was indep for ADLs and ambulation w RW pta  Pt is currently functioning at a minimum assistance x1 level for bed mobility, supervision assistance x1 level for transfers and ambulation w RW  Pt demonstrated impulsivity throughout session, requiring frequent cuing to reinforce THP, attention to situation and safety awareness  However no gross LOB or difficulty w mobility during session  Denies concerns w dc home  Denies further education regarding hip abd brace  At this time PT recommendations for d/c are home w continued HHPT when medically stable  Barriers to Discharge None   Plan   PT Frequency   (d/c PT)   Recommendation   PT Discharge Recommendation Home with home health rehabilitation  (would like to continue w same company/ therapist)   Additional Comments The patient's AM-PAC Basic Mobility Inpatient Short Form Raw Score is 18  A Raw score of greater than 16 suggests the patient may benefit from discharge to home   Please also refer to the recommendation of the Physical Therapist for safe discharge planning  Current score reflects need for supervision d/t decreased recall of precautions and impulsivity  AM-PAC Basic Mobility Inpatient   Turning in Bed Without Bedrails 3   Lying on Back to Sitting on Edge of Flat Bed 3   Moving Bed to Chair 3   Standing Up From Chair 3   Walk in Room 3   Climb 3-5 Stairs 3   Basic Mobility Inpatient Raw Score 18   Basic Mobility Standardized Score 41 05   Highest Level Of Mobility   JH-HLM Goal 6: Walk 10 steps or more   JH-HLM Highest Level of Mobility 7: Walk 25 feet or more   JH-HLM Goal Achieved Yes   End of Consult   Patient Position at End of Consult Bedside chair; All needs within reach   History: past experience (prev admission), fall risk, use of assistive device, assist for iadl's, cognition (judgement, impulsive, safety awareness, recall THP), multiple lines, abd brace, wbat LLE  Exam: impairments in systems including musculoskeletal (strength), neuromuscular (balance, gait, transfers, motor function and learning), joint integrity (instability-multiple dislocations), am-pac, cognition  Clinical: stable/unpredictable  Complexity:high      Milton Burk, PT

## 2022-01-21 NOTE — DISCHARGE SUMMARY
[address] [facility]  Discharge- Arnel Ac 1955, 73 y o  male MRN: [id number]  Unit/Bed#: E2 -01 Encounter: [id number]  Primary Care Provider: Jamilah Arndt MD   Date and time admitted to hospital: 1/18/2022  4:08 PM          Admission Date: 1/18/2022       Discharge Date:  01/21/2022        Primary Diagnoses  Principal Problem:    Left hip pain  Active Problems:    Hyperlipidemia    COPD (chronic obstructive pulmonary disease) ([facility])    CKD (chronic kidney disease) stage 3, GFR 30-59 ml/min ([facility])    Chronic pain syndrome    Type 2 diabetes mellitus with stage 3 chronic kidney disease, without long-term current use of insulin ([facility] )  Resolved Problems:    Dislocated hip, left, initial encounter [facility])      Hospital Course by problem:  * Left hip pain  Assessment & Plan  · Patient underwent left hip arthroplasty 51/91/5740, complicated by left hip dislocation and reduction by orthopedics on 12/25/2021  · He presented with the acute onset of left hip pain over the past few days, that is new, and different than his postoperative pain  He also noted swelling around his incision  No erythema  No fever chills  · 1/18 x-ray left hip/pelvis:  Intact left total hip arthroplasty without acute osseous abnormality  · 1/19 CT scan left hip: "Collections surrounding the left hip as described  Postoperative seroma versus abscess"  · Patient was evaluated the orthopedic surgery team:  They note he is afebrile without a significant white blood cell count and has range of motion of his hip  They did not feel that the fluid was reflective of abscess or infected joint  · Patient unfortunately bent over to  an object on the evening 1/19 and dislocated his left hip prosthesis  · Patient was evaluated by the orthopedic surgery team and on 1/20 underwent closed reduction reduction of left hip under anesthesia  · Patient tolerated the procedure well    Postoperatively he ambulated with physical therapy  He will be discharged home with home physical therapy  He notes his pain is controlled  Dislocated hip, left, initial encounter (HCC)-resolved as of 1/21/2022  Assessment & Plan  -patient initially presented with left hip, however unfortunately suffered a left hip prosthesis dislocation during his hospitalization  -he was followed by the orthopedic surgery team who performed close reduction in the operating room 1/20  -patient will be discharged home with analgesics and follow-up with his primary orthopedic surgery team as an outpatient    Type 2 diabetes mellitus with stage 3 chronic kidney disease, without long-term current use of insulin [facility])  Assessment & Plan  Lab Results   Component Value Date    HGBA1C 6 1 (H) 12/16/2021       Recent Labs     01/20/22  1118 01/20/22  1611 01/20/22  2118 01/21/22  0711   POCGLU 163* 86 151* 137       Blood Sugar Average: Last 72 hrs:  (P) 152     Patient's diabetes type 2, without long-term use of insulin, with associated chronic kidney disease stage 3  home regimen:  Metformin 1000 mg BID  Patient was monitored with Accu-Cheks and sliding scale insulin during his hospitalization  Will be discharged home to resume his home regimen      Chronic pain syndrome  Assessment & Plan  Patient has history of chronic pain  PA- website was queried on discharge  There are no red flags  Patient with current acute pain, secondary now to left hip dislocation  Continue analgesics therapy  Patient was prescribed oxycodone IR 5 mg tablet q 4 hours p r n  Mild pain and 10 mg q 4 hours p r n   Moderate or severe pain dispense #30 Refill 0  Follow up with his primary orthopedic surgery team as an outpatient      CKD (chronic kidney disease) stage 3, GFR 30-59 ml/min [facility])  Assessment & Plan  Lab Results   Component Value Date    EGFR 68 01/21/2022    EGFR 58 01/19/2022    EGFR 75 01/18/2022    CREATININE 1 12 01/21/2022    CREATININE 1 28 01/19/2022    CREATININE 1 03 01/18/2022     Patient has history of chronic kidney disease stage 3  Creatinine at baseline 1 0-1 2  Avoid hypotension/nephrotoxins    COPD (chronic obstructive pulmonary disease) ([address] )  Assessment & Plan  Patient has a history of COPD  Not in acute exacerbation, no wheezing on auscultation  Continue inhalers    Hyperlipidemia  Assessment & Plan  Continue statin and fenofibrate    Service:  [facility], Dr [facility]  Consulting Providers   Orthopedic surgery:  Dr Donato Pineda    Procedures Performed   1/20 closed reduction of left total hip arthroplasty under anesthesia    Hospital Studies:  1/20 x-ray left hip/pelvis dislocated left total hip replacement     1/18:  CT left lower extremity  Collections surrounding the left hip as described   Postoperative seroma versus abscess     1/18 left hip x-ray  Intact total left hip arthroplasty  No acute osseous abnormality        Microbiology  1/19 blood culture:  Negative x2    Results from last 7 days   Lab Units 01/21/22  0509 01/20/22  0431 01/19/22  0439   WBC Thousand/uL 8 11 8 46 10 14   HEMOGLOBIN g/dL 10 7* 10 1* 10 4*   HEMATOCRIT % 33 6* 32 0* 33 0*   PLATELETS Thousands/uL 230 234 210     Results from last 7 days   Lab Units 01/21/22  0509 01/19/22  0439 01/18/22  1724   POTASSIUM mmol/L 4 7 4 2 4 3   CHLORIDE mmol/L 108 104 102   CO2 mmol/L 24 26 25   BUN mg/dL 16 20 17   CREATININE mg/dL 1 12 1 28 1 03   CALCIUM mg/dL 8 8 8 8 9 6       History and Physical Exam:  Please refer to the Admission H&P note    Discharge Condition: Improved  Discharge Disposition: Home/Self Care    Discharge Note and Physical Exam:   Patient relates he feels much better  He denies any left hip pain at all at this time  He denies any numbness or weakness  He notes he is ambulating without any difficulty  He denies any pain anywhere else  Denies any chest pain, back pain, abdominal pain  He denies any shortness of breath or cough    Denies any nausea, vomiting, diarrhea constipation  He notes he feels improved and is eager for discharge home    Vitals:    01/21/22 0829   BP: 154/75   Pulse: 85   Resp: 18   Temp: (!) 97 1 °F (36 2 °C)   SpO2: 93%     General:  Pleasant, non-tachypnic, non-dyspnic  Conversant  Heart: Regular rate and rhythm, S1S2 present  No murmur, rub or gallop  Lungs: Clear to auscultation bilaterally, no wheezing, rhonchi, or crackles  Good air movement  No accessory muscle use or respiratory distress  Abdomen: soft, non-tender, non-distended, NABS  No rebound or guarding  No mass or peritoneal signs  Extremities: no clubbing, cyanosis or edema  2+ pedal pulses bilaterally  Neurologic:  Awake alert  Fluent speech  Strength globally intact  Skin: warm and dry  No petechiae, purpura or rash  Discharge Medications   Please see Medical Reconciliation Discharge Form    Discharge Follow Up Appointments:   Shelby Siddiqi MD: 1 week  Dr Jamel Torres, ortho: 1 week  Home PT    Discharge  Statement   Total Time Spent today including physical exam, discussion with patient and discharge arrangements/care = 34 minutes  dw pts nurse  dw   Family:  Called wife and gave update      This note has been constructed using a voice recognition system

## 2022-01-21 NOTE — DISCHARGE INSTRUCTIONS
Hip Dislocation   WHAT YOU NEED TO KNOW:   A hip dislocation occurs when your thigh bone is forced out of your hip socket  DISCHARGE INSTRUCTIONS:   Return to the emergency department if:   · You have severe pain  · You dislocate your hip again  Call your doctor if:   · You have a fever  · You have pain that does not go away after you take pain medicine  · You cannot walk well with your cane or crutches  · You have questions or concerns about your condition or care  Medicines: You may need the following:  · Prescription pain medicine  may be given  Ask your healthcare provider how to take this medicine safely  Some prescription pain medicines contain acetaminophen  Do not take other medicines that contain acetaminophen without talking to your healthcare provider  Too much acetaminophen may cause liver damage  Prescription pain medicine may cause constipation  Ask your healthcare provider how to prevent or treat constipation  · Take your medicine as directed  Contact your healthcare provider if you think your medicine is not helping or if you have side effects  Tell him of her if you are allergic to any medicine  Keep a list of the medicines, vitamins, and herbs you take  Include the amounts, and when and why you take them  Bring the list or the pill bottles to follow-up visits  Carry your medicine list with you in case of an emergency  Manage a hip dislocation: It will take 2 to 3 months for your hip to heal   · Use a walker or crutches as directed  Ask your healthcare provider or orthopedist when you can put weight on your injured side  As your hip heals, use a cane to help you walk until your limp goes away  · Avoid high-impact activities and sports  Do this for 6 to 12 weeks or until your hip strength has returned  · Go to physical therapy, if directed  A physical therapist teaches you exercises to increase the range of motion in your hip   Exercises also make your hip stronger and decrease pain  Prevent another hip dislocation:  Follow these precautions for 6 weeks after your injury or as directed:  · Sit with your back straight and your feet flat on the floor  Do not cross your legs  Do not lean forward when you sit in a chair  · Keep your knees apart  Place a pillow or wedge between your knees when you sit or lie  Do not twist your knees  Do not lift your knees higher than your hips  · Do not sit in a low chair  Use armrests and your upper body strength to push yourself up from a sitting position  · Do not bend at the waist to  an object from the floor  Bend your knees to reach the object, or use a tool to pick it up  Follow up with your doctor or orthopedist as directed: You may need another x-ray or CT scan  Write down your questions so you remember to ask them during your visits  © [employer] 2021 Information is for End User's use only and may not be sold, redistributed or otherwise used for commercial purposes  All illustrations and images included in CareNotes® are the copyrighted property of A D A M , Inc  or [facility] [employer]   The above information is an  only  It is not intended as medical advice for individual conditions or treatments  Talk to your doctor, nurse or pharmacist before following any medical regimen to see if it is safe and effective for you     ======================================================================  · You were fitted with an abduction brace  The brace is locked from 0-45 degrees  · You should wear the brace at all times including sleep  You can use the abduction pillow rather than the brace for sleep  · Ice and analgesics as needed for pain  · Weight-bearing as tolerated left leg with brace in place  Please continue pain medications as needed    They will make you constipated so please continue with stool softeners and laxatives if needed    Please see your family doctor in 1 week for a checkup as well as orthopedic surgeon in 1 week for a checkup    Please return to the ER with any problems at all, especially any chest pain, difficulty breathing, dizziness, weakness, lightheadedness, worsening hip pain, numbness, or any other problems  ===========================================================

## 2022-01-21 NOTE — ASSESSMENT & PLAN NOTE
· Patient underwent left hip arthroplasty 47/10/2953, complicated by left hip dislocation and reduction by orthopedics on 12/25/2021  · He presented with the acute onset of left hip pain over the past few days, that is new, and different than his postoperative pain  He also noted swelling around his incision  No erythema  No fever chills  · 1/18 x-ray left hip/pelvis:  Intact left total hip arthroplasty without acute osseous abnormality  · 1/19 CT scan left hip: "Collections surrounding the left hip as described  Postoperative seroma versus abscess"  · Patient was evaluated the orthopedic surgery team:  They note he is afebrile without a significant white blood cell count and has range of motion of his hip  They did not feel that the fluid was reflective of abscess or infected joint  · Patient unfortunately bent over to  an object on the evening 1/19 and dislocated his left hip prosthesis  · Patient was evaluated by the orthopedic surgery team and on 1/20 underwent closed reduction reduction of left hip under anesthesia  · Patient tolerated the procedure well  Postoperatively he ambulated with physical therapy  He will be discharged home with home physical therapy  He notes his pain is controlled

## 2022-01-21 NOTE — ASSESSMENT & PLAN NOTE
Lab Results   Component Value Date    EGFR 68 01/21/2022    EGFR 58 01/19/2022    EGFR 75 01/18/2022    CREATININE 1 12 01/21/2022    CREATININE 1 28 01/19/2022    CREATININE 1 03 01/18/2022     Patient has history of chronic kidney disease stage 3  Creatinine at baseline 1 0-1 2  Avoid hypotension/nephrotoxins

## 2022-01-25 ENCOUNTER — DOCUMENTATION (OUTPATIENT)
Dept: SOCIAL WORK | Facility: HOSPITAL | Age: 67
End: 2022-01-25

## 2022-01-25 LAB
BACTERIA BLD CULT: NORMAL
BACTERIA BLD CULT: NORMAL

## 2022-01-25 NOTE — PROGRESS NOTES
Can the pt don and doff the LLE hip ABD brace in order to get dressed and change clothes? Can the pt take a shower without the brace? Pt is currently receiving home PT and home OT and need clarification concerning the wearing of the brace in order to perform functional activity and ADLs      Thank you,  Jina HERNANDEZT

## 2022-01-25 NOTE — PROGRESS NOTES
This patient will be following up with Dr Camargo [facility] for further discussion on his Hip Replacement and the Brace  Please direct any questions about progression out of the brace to him

## 2022-01-27 ENCOUNTER — TELEPHONE (OUTPATIENT)
Dept: OBGYN CLINIC | Facility: MEDICAL CENTER | Age: 67
End: 2022-01-27

## 2022-01-27 NOTE — TELEPHONE ENCOUNTER
Patient sees Dr Dalila Lorenzo at [address] [facility] calling to let the dr know he had dislocated his left hip, but he believes it popped back in place, pain of about a 4  If the doctors has any questions to please give a call back  Patient has appointment on 2/9/2022 with Dr Dalila Sims   # [phone]

## 2022-02-03 ENCOUNTER — TELEPHONE (OUTPATIENT)
Dept: OBGYN CLINIC | Facility: HOSPITAL | Age: 67
End: 2022-02-03

## 2022-02-03 NOTE — TELEPHONE ENCOUNTER
Patient sees Celeste Aguilar @ [facility] just wanted to let the Dr know that he was discharged today from [address] [place] [address] today      [facility] [phone]

## 2022-02-09 ENCOUNTER — OFFICE VISIT (OUTPATIENT)
Dept: OBGYN CLINIC | Facility: HOSPITAL | Age: 67
End: 2022-02-09

## 2022-02-09 VITALS
BODY MASS INDEX: 24.71 KG/M2 | WEIGHT: 163 LBS | HEIGHT: 68 IN | SYSTOLIC BLOOD PRESSURE: 132 MMHG | DIASTOLIC BLOOD PRESSURE: 84 MMHG | HEART RATE: 73 BPM

## 2022-02-09 DIAGNOSIS — Z47.89 ORTHOPEDIC AFTERCARE: ICD-10-CM

## 2022-02-09 DIAGNOSIS — Z96.642 STATUS POST LEFT HIP REPLACEMENT: Primary | ICD-10-CM

## 2022-02-09 PROCEDURE — 99024 POSTOP FOLLOW-UP VISIT: CPT | Performed by: PHYSICIAN ASSISTANT

## 2022-02-09 NOTE — PROGRESS NOTES
Assessment:    Left total hip arthroplasty- conversion done on 12/22/2021 with subsequent dislocation x 2 and closed reduction thereafter  Today, the patient is doing well with minimal pain in left hip, and feels like he is progressing with his home therapy  Plan:    Weight-bearing as tolerated left lower extremity  Assistance with walker or cane as needed for ambulation  Continue utilizing the hip brace  Continue home therapy with transition to outpatient PT for strengthening   Follow-up 6 weeks for re-evaluation with Dr Estella Vega          Subjective:     Patient ID:  Jaylnee  is a 73 y o  male    HPI    History of orthopedic events:  2  prior hip surgeries: 1 introduced the eamon 1 to take the eamon out  Following this, the patient underwent Left total hip arthroplasty- conversion (Left) done on 12/22/2021  On 12/25/2021 he dislocated, and was closed reduced in ER by orthopedics  He dislocated again and underwent closed reduction in the OR by Dr Olegario Gomez on 1/20/2022  He presents today stating overall he feels like he is doing well with minimal pain in the left hip  He is ambulating with the assistance of a walker and has had no issues  He does know after his second dislocation he was at home few days after and felt his left hip slightly pop out and pop back into place  He did notify the orthopedic team   He has had no issues since then  The following portions of the patient's history were reviewed and updated as appropriate: allergies, current medications, past family history, past medical history, past social history, past surgical history and problem list     Review of Systems     Objective:    Imaging:  None        Physical Exam     Orthopedic Examination:  Left hip     Inspection:  Incision is healed nicely  No signs of infection  Leg lengths are equal    Palpation:  No warmth of the nisha-incisional area    Special Tests:  No calf tenderness, no pitting edema

## 2022-02-11 ENCOUNTER — TELEPHONE (OUTPATIENT)
Dept: OBGYN CLINIC | Facility: HOSPITAL | Age: 67
End: 2022-02-11

## 2022-02-11 NOTE — TELEPHONE ENCOUNTER
Glenetta Fleischer is calling from [facility] in reference to the patient's referral to outpatient PT  Glenetta Fleischer  is sending orders for one final home visit early next week    Patient will then begin outpatient PT per the orders on 02-    Glenetta Fleischer [phone]

## 2022-02-18 ENCOUNTER — EVALUATION (OUTPATIENT)
Dept: PHYSICAL THERAPY | Facility: CLINIC | Age: 67
End: 2022-02-18
Payer: COMMERCIAL

## 2022-02-18 DIAGNOSIS — Z96.642 STATUS POST LEFT HIP REPLACEMENT: Primary | ICD-10-CM

## 2022-02-18 DIAGNOSIS — Z47.89 ORTHOPEDIC AFTERCARE: ICD-10-CM

## 2022-02-18 PROCEDURE — 97110 THERAPEUTIC EXERCISES: CPT

## 2022-02-18 PROCEDURE — 97162 PT EVAL MOD COMPLEX 30 MIN: CPT

## 2022-02-18 NOTE — PROGRESS NOTES
PT Evaluation     Today's date: 2022  Patient name: Roe Berry  : 1955  MRN: [id number]  Referring provider: Delores Wallis  Dx:   Encounter Diagnosis     ICD-10-CM    1  Status post left hip replacement  [id number] Ambulatory Referral to Physical Therapy   2  Orthopedic aftercare  [id number] Ambulatory Referral to Physical Therapy       Start Time:   Stop Time: 477  Total time in clinic (min): 36 minutes    Assessment  Assessment details: Pt is a 73 yom presenting to physical therapy s/p L hip replacement on 22  Since then, pt has had two dislocations requiring a closed reduction  He also has had three dislocations since the closed reduction that reduced on their own  Pt is currently wearing abduction brace to limit extreme hip ROM to avoid future dislocations  Pt displayed overall LLE weakness; ROM assessment was limited by the brace  Pt also displayed decreased gait speed and increased 5 time STS time, which shows pt is at increased risk for falls  Pt also exhibited difficulty maintaining SLS on the LLE due to lateral hip weakness, requiring UE support  Pt will benefit from continued physical therapy twice a week for 6-8 weeks to improve strength, improve function, and decrease risk of falls  Impairments: abnormal or restricted ROM, activity intolerance, impaired physical strength, lacks appropriate home exercise program and pain with function    Symptom irritability: moderateUnderstanding of Dx/Px/POC: good   Prognosis: good    Goals  Short term goals (3-4 weeks)  1  Pt will display independence with understanding and performance of HEP to allow for carryover of plan of care at home  2  Pt will improve FOTO score from initial evaluation to show improvement in pain and function  3  Pt will increase R knee extension strength to 4/5 to improve knee stability strength and function with stair navigation and walking for long periods of time    4  Pt will increase R knee flexion strength to 4/5 to improve knee stability strength and function with stair navigation and walking for long periods of time  5  Pt will improve TUG time by 5 seconds to reduce fall risk  Long term goals (6-8 weeks)  1  Pt will score equal or better than projected score on FOTO to show improvement in pain and function  2  Pt will increase R knee extension strength to 4+/5 to improve knee stability strength and function with stair navigation and walking for long periods of time  3  Pt will increase R knee flexion strength to 4+/5 to improve knee stability strength and function with stair navigation and walking for long periods of time  4  Pt will improve TUG time by 10 seconds to reduce fall risk  Plan  Patient would benefit from: skilled physical therapy  Planned modality interventions: TENS, thermotherapy: hydrocollator packs, traction, ultrasound, cryotherapy and low level laser therapy  Planned therapy interventions: body mechanics training, therapeutic training, therapeutic exercise, therapeutic activities, stretching, strengthening, neuromuscular re-education, patient education, home exercise program, functional ROM exercises, flexibility, manual therapy, Lopez taping, joint mobilization and balance  Frequency: 2x week  Duration in weeks: 8  Treatment plan discussed with: patient        Subjective Evaluation    History of Present Illness  Mechanism of injury: Pt presents to physical therapy s/p L hip replacement on   Pt then experienced two dislocations and closed reduction following surgery  Pt also noticed his hip dislocated three more times since the closed reduction but it popped itself back in each time  He reports the most recent was about three weeks ago  Pt is currently wearing an abduction hip brace to control hip movement and avoid future dislocations    Pain  Current pain ratin  At best pain ratin  At worst pain ratin (at night when lay down to sleep)  Quality: needle-like  Relieving factors: change in position  Aggravating factors: walking and standing (on his feet for too long)  Progression: improved    Social Support  Steps to enter house: yes (4-5 steps )    Employment status: not working  Treatments  Previous treatment: physical therapy  Patient Goals  Patient goals for therapy: decreased pain and increased strength (would like to be able to get up without thinking about it )          Objective     General Comments:      Hip Comments   L hip AROM  Flexion:90 degrees  Abduction: limited by brace      R hip AROM  Flexion: WNL  Abduction: WNL       LE Strength  L Hip flex: 3/5   R Hip flexion: 3+/5   L Knee extension: 3+/5  R Knee extension: 5/5  L Hip Abduction: 3/5  R Hip Abduction: 3/5  L Knee flexion: 3+/5  R Knee flexion:4/5    TU seconds  5 time STS: 19 seconds  30 second sit to stand: 8      Flowsheet Rows      Most Recent Value   PT/OT G-Codes    Current Score 55   Projected Score 72             Precautions: GERD, type 2 diabetes, CKD, sleep apnea, COPD, h/o L rib fx, femoral neck fx, hyperlipidemia, chronic pain syndrome, s/p L hip replacement      Manuals                                                                 Neuro Re-Ed             SLR 1x8; HEP            bridges nv            Feet together on foam balance nv            semitandem balance nv            Weight shifting to SLS (UE support prn)  nv                                                   Ther Ex             standing hip abduction 1x10 each; HEP            Standing marches 1x10; HEP            Lateral walks nv            Walking warmup/bike nv            Leg press nv            Hip extension nv                                      Ther Activity             Step ups nv            Mini squats nv            Gait Training                                       Modalities

## 2022-02-21 ENCOUNTER — OFFICE VISIT (OUTPATIENT)
Dept: PHYSICAL THERAPY | Facility: CLINIC | Age: 67
End: 2022-02-21
Payer: COMMERCIAL

## 2022-02-21 DIAGNOSIS — Z96.642 STATUS POST LEFT HIP REPLACEMENT: Primary | ICD-10-CM

## 2022-02-21 DIAGNOSIS — Z47.89 ORTHOPEDIC AFTERCARE: ICD-10-CM

## 2022-02-21 PROCEDURE — 97112 NEUROMUSCULAR REEDUCATION: CPT

## 2022-02-21 PROCEDURE — 97530 THERAPEUTIC ACTIVITIES: CPT

## 2022-02-21 PROCEDURE — 97110 THERAPEUTIC EXERCISES: CPT

## 2022-02-21 NOTE — PROGRESS NOTES
Daily Note     Today's date: 2022  Patient name: Juan Borjas  : 1955  MRN: [id number]  Referring provider: London Ramirez  Dx:   Encounter Diagnosis     ICD-10-CM    1  Status post left hip replacement  Z96 642    2  Orthopedic aftercare  Z47 89        Start Time: 1534  Stop Time: 1612  Total time in clinic (min): 38 minutes    Subjective: Pt reported he's a little fatigued today but he feels good  Objective: See treatment diary below      Assessment: Tolerated treatment well  Patient reported increased difficulty with weight added to SLR, though it was more difficult in the knee than the hip  Pt was challenged by semitandem balance on foam with minor balance checks and occasional UE support  Lateral walks were performed with cuing to control the eccentric portion of the motion  Pt reported L lateral hip "pain" during the eccentric portion of lateral walks; pt was educated that this is not the joint itself causing pain but more muscle fatigue and work  Leg press was trialed and set up for strengthening more end range knee extension due to inability to flex hip significantly  Step ups were trialed with LLE; pt tolerated exercise well with no increase in symptoms, though UE support was utilized  Pt trialed weight shifts onto SLS with the LLE; despite cuing, pt continued to compensate with increased L lateral lean due to lateral hip weakness  Continue to progress pt as tolerated next visit  Plan: Continue per plan of care        Precautions: GERD, type 2 diabetes, CKD, sleep apnea, COPD, h/o L rib fx, femoral neck fx, hyperlipidemia, chronic pain syndrome, s/p L hip replacement      Manuals                                                                Neuro Re-Ed            SLR 1x8; HEP 1x10; 2x10 2#           bridges nv 3x10           Feet together on foam balance nv 2x30"           semitandem balance nv 1x30" each           Weight shifting to SLS (UE support prn) nv 2x10                                                  Ther Ex 2/18 2/21           standing hip abduction 1x10 each; HEP            Standing marches 1x10; HEP            Lateral walks nv 1x5 ptb            Walking warmup/bike nv 5'           Leg press nv 1x10 35#, 1x10 45#           Hip extension nv 3x10 3#                                     Ther Activity 2/18 2/21           Step ups nv fwd 2x10 0R           Mini squats nv 2x15           Gait Training 2/18 2/21                                     Modalities 2/18 2/21

## 2022-02-23 ENCOUNTER — OFFICE VISIT (OUTPATIENT)
Dept: PHYSICAL THERAPY | Facility: CLINIC | Age: 67
End: 2022-02-23
Payer: COMMERCIAL

## 2022-02-23 DIAGNOSIS — Z96.642 STATUS POST LEFT HIP REPLACEMENT: Primary | ICD-10-CM

## 2022-02-23 DIAGNOSIS — Z47.89 ORTHOPEDIC AFTERCARE: ICD-10-CM

## 2022-02-23 PROCEDURE — 97110 THERAPEUTIC EXERCISES: CPT | Performed by: PHYSICAL THERAPIST

## 2022-02-23 PROCEDURE — 97112 NEUROMUSCULAR REEDUCATION: CPT | Performed by: PHYSICAL THERAPIST

## 2022-02-23 NOTE — PROGRESS NOTES
Daily Note     Today's date: 2022  Patient name: Meño Avila  : 1955  MRN: [id number]  Referring provider: Socorro Pacheco  Dx:   Encounter Diagnosis     ICD-10-CM    1  Status post left hip replacement  Z96 642    2  Orthopedic aftercare  Z47 89                   Subjective: Pt reports some hip soreness on the outside of his leg likely due to walking a lot yesterday  Objective: See treatment diary below      Assessment: Pt does well w continued progression of program and does well w functional activities including walking w/o AD and squatting mechanics  He is challenged w bridges and increased reps on leg press  Trialed hip flexor str w lots of tightness present, but has improved hip extension AROM following  Patient demonstrated fatigue post treatment and would benefit from continued PT  Plan: Continue per plan of care  Progress treatment as tolerated         Precautions: GERD, type 2 diabetes, CKD, sleep apnea, COPD, h/o L rib fx, femoral neck fx, hyperlipidemia, chronic pain syndrome, s/p L hip replacement      Manuals           HF str   SF                                                 Neuro Re-Ed           SLR 1x8; HEP 1x10; 2x10 2# 3x10           bridges nv 3x10 3x10          Feet together on foam balance nv 2x30" 2x30" EC          semitandem balance nv 1x30" each 2x30" EC ea          Weight shifting to SLS (UE support prn)  nv 2x10 2x10                                                 Ther Ex           standing hip abduction 1x10 each; HEP  2x10          Standing marches 1x10; HEP  15x          Lateral walks nv 1x5 ptb            Walking warmup/bike nv 5' 5' walking          Leg press nv 1x10 35#, 1x10 45# 3x10 45#          Hip extension nv 3x10 3# 3x10 3#                                    Ther Activity           Step ups nv fwd 2x10 0R           Mini squats nv 2x15 2x15                                    Gait Training 2/18 2/21                                     Modalities 2/18 2/21

## 2022-03-01 ENCOUNTER — OFFICE VISIT (OUTPATIENT)
Dept: PHYSICAL THERAPY | Facility: CLINIC | Age: 67
End: 2022-03-01
Payer: COMMERCIAL

## 2022-03-01 DIAGNOSIS — Z47.89 ORTHOPEDIC AFTERCARE: ICD-10-CM

## 2022-03-01 DIAGNOSIS — Z96.642 STATUS POST LEFT HIP REPLACEMENT: Primary | ICD-10-CM

## 2022-03-01 PROCEDURE — 97530 THERAPEUTIC ACTIVITIES: CPT

## 2022-03-01 PROCEDURE — 97112 NEUROMUSCULAR REEDUCATION: CPT

## 2022-03-01 NOTE — PROGRESS NOTES
Daily Note     Today's date: 3/1/2022  Patient name: Isaac Rivera  : 1955  MRN: [id number]  Referring provider: Velia Fragoso  Dx:   Encounter Diagnosis     ICD-10-CM    1  Status post left hip replacement  Z96 642    2  Orthopedic aftercare  Z47 89        Start Time: [id number]  Stop Time: 912  Total time in clinic (min): 38 minutes    Subjective: Pt reported he was a little sore after last session  Objective: See treatment diary below      Assessment: Tolerated treatment well  Patient progressed step ups this session with increased height  Verbal cuing was provided during mini squats to increase hip flexion and keep feet shoulder width apart  Bridges were progressed with the addition of the abduction resistance  Cuing was provided during lateral walks to control the eccentric portion of the motion  Continue to progress pt as tolerated next visit  Plan: Continue per plan of care        Precautions: GERD, type 2 diabetes, CKD, sleep apnea, COPD, h/o L rib fx, femoral neck fx, hyperlipidemia, chronic pain syndrome, s/p L hip replacement      Manuals 2/18 2/21 2/23 3/1         HF str   SF                                                 Neuro Re-Ed  3         SLR 1x8; HEP 1x10; 2x10 2# 3x10  2x10         bridges nv 3x10 3x10 2x10; 1x10 ptb         Feet together on foam balance nv 2x30" 2x30" EC          semitandem balance nv 1x30" each 2x30" EC ea          Weight shifting to SLS (UE support prn)  nv 2x10 2x10                                                 Ther Ex  3         standing hip abduction 1x10 each; HEP  2x10 2x12 3#         Standing marches 1x10; HEP  15x 2x56'         Lateral walks nv 1x5 ptb   1x5 ptb         Walking warmup/bike nv 5' 5' walking 5' walking         Leg press nv 1x10 35#, 1x10 45# 3x10 45# 1x10 45#, 1x10 55#         Hip extension nv 3x10 3# 3x10 3# 3# 2x12                                   Ther Activity  3/         Step ups nv fwd 2x10 0R  Fwd 2x10 1R         Mini squats nv 2x15 2x15 2x15                                   Gait Training 2/18 2/21  3/1                                   Modalities 2/18 2/21  3/1

## 2022-03-03 ENCOUNTER — OFFICE VISIT (OUTPATIENT)
Dept: PHYSICAL THERAPY | Facility: CLINIC | Age: 67
End: 2022-03-03
Payer: COMMERCIAL

## 2022-03-03 DIAGNOSIS — Z47.89 ORTHOPEDIC AFTERCARE: ICD-10-CM

## 2022-03-03 DIAGNOSIS — Z96.642 STATUS POST LEFT HIP REPLACEMENT: Primary | ICD-10-CM

## 2022-03-03 PROCEDURE — 97110 THERAPEUTIC EXERCISES: CPT

## 2022-03-03 PROCEDURE — 97112 NEUROMUSCULAR REEDUCATION: CPT

## 2022-03-03 NOTE — PROGRESS NOTES
Daily Note     Today's date: 3/3/2022  Patient name: Robert Armstrong  : 1955  MRN: [id number]  Referring provider: Mandi Robledo  Dx:   Encounter Diagnosis     ICD-10-CM    1  Status post left hip replacement  Z96 642    2  Orthopedic aftercare  Z47 [id number]        Start Time: 0800  Stop Time: 0845  Total time in clinic (min): 45 minutes    Subjective: Pt reports he's feeling good today  He feels confident with a lot of things he's doing at home but he's still unable to put on his socks at home  Objective: See treatment diary below      Assessment: Tolerated treatment well  Patient shows improvement with L lateral hip replacement  He reports it's been the longest he's gone since the surgery that he has not dislocated  Pt is challenged by walking marches with decreased balance and stability, requiring UE support  However, pt doesn't require UE support for stair navigation with 1R  Lateral step ups were added this session with pt requiring cuing to control the eccentric portion of the motion; UE support was required  Pt will continue to benefit from physical therapy to increase strength and function  Continue to progress pt as tolerated next visit  Plan: Continue per plan of care        Precautions: GERD, type 2 diabetes, CKD, sleep apnea, COPD, h/o L rib fx, femoral neck fx, hyperlipidemia, chronic pain syndrome, s/p L hip replacement      Manuals 2/18 2/21 2/23 3/1 3/3        HF str   SF  NS                                               Neuro Re-Ed 2/18 2/21 2/23 3/1 3/3        SLR 1x8; HEP 1x10; 2x10 2# 3x10  2x10 2x10 2 5#        bridges nv 3x10 3x10 2x10; 1x10 ptb 2x10 pball        Feet together on foam balance nv 2x30" 2x30" EC          semitandem balance nv 1x30" each 2x30" EC ea          Weight shifting to SLS (UE support prn)  nv 2x10 2x10  SLS no UE support 2x45"        sidelying abduction     3x10                                  Ther Ex 2/18 2/21 2/23 3/1 3/3        standing hip abduction 1x10 each; HEP  2x10 2x12 3#         Standing marches 1x10; HEP  15x 2x56' 4xback and forth at table        Lateral walks nv 1x5 ptb   1x5 ptb         Walking warmup/bike nv 5' 5' walking 5' walking 5' walking        Leg press nv 1x10 35#, 1x10 45# 3x10 45# 1x10 45#, 1x10 55# 1x10 55#, 1x10 65#, 35# 2x10        Hip extension nv 3x10 3# 3x10 3# 3# 2x12 2x15 2 5#                                  Ther Activity 2/18 2/21 2/23 3/1 3/3        Step ups nv fwd 2x10 0R  Fwd 2x10 1R Fwd 2x10 1R; lateral 2x15 UE support        Mini squats nv 2x15 2x15 2x15 1x15                                  Gait Training 2/18 2/21  3/1 3/3                                  Modalities 2/18 2/21  3/1 3/3

## 2022-03-08 ENCOUNTER — OFFICE VISIT (OUTPATIENT)
Dept: PHYSICAL THERAPY | Facility: CLINIC | Age: 67
End: 2022-03-08
Payer: COMMERCIAL

## 2022-03-08 DIAGNOSIS — Z47.89 ORTHOPEDIC AFTERCARE: ICD-10-CM

## 2022-03-08 DIAGNOSIS — Z96.642 STATUS POST LEFT HIP REPLACEMENT: Primary | ICD-10-CM

## 2022-03-08 PROCEDURE — 97110 THERAPEUTIC EXERCISES: CPT

## 2022-03-08 PROCEDURE — 97112 NEUROMUSCULAR REEDUCATION: CPT

## 2022-03-08 NOTE — PROGRESS NOTES
Daily Note     Today's date: 3/8/2022  Patient name: Regina Myrick  : 1955  MRN: [id number]  Referring provider: Cecile Sykes  Dx:   Encounter Diagnosis     ICD-10-CM    1  Status post left hip replacement  Z96 642    2  Orthopedic aftercare  Z47 89        Start Time: 08  Stop Time: 0910  Total time in clinic (min): 40 minutes    Subjective: Pt reports he's been working really hard at home with exercises  Objective: See treatment diary below      Assessment: Tolerated treatment well  Patient progressed SLR and pball bridges with increased repetitions this session  Tactile cuing was required for form during sidelying abduction  Pt shows improvement with ability to warmup without AD and no noticeable pelvic drop  He also shows improvement with increased weight with heg press this session and ability to perform travelling marches without UE support  Forward and lateral step ups were progressed with increased height; pt reported some L knee pain with lateral step ups at increased height  Pt required either the [facility] or [facility] UE support during forward step ups at increased height due to quad and lateral hip weakness  Continue to progress pt as tolerated next visit  Plan: Continue per plan of care        Precautions: GERD, type 2 diabetes, CKD, sleep apnea, COPD, h/o L rib fx, femoral neck fx, hyperlipidemia, chronic pain syndrome, s/p L hip replacement      Manuals 2/18 2/21 2/23 3/1 3/3 3       HF str   SF  NS                                               Neuro Re-Ed 2/18 2/21 2/23 3/1 3/3 38       SLR 1x8; HEP 1x10; 2x10 2# 3x10  2x10 2x10 2 5# 2x12 2 5#       bridges nv 3x10 3x10 2x10; 1x10 ptb 2x10 pball 2x12 pball       Feet together on foam balance nv 2x30" 2x30" EC          semitandem balance nv 1x30" each 2x30" EC ea          Weight shifting to SLS (UE support prn)  nv 2x10 2x10  SLS no UE support 2x45"        sidelying abduction     3x10 2x12 Ther Ex 2/18 2/21 2/23 3/1 3/3 3/8       standing hip abduction 1x10 each; HEP  2x10 2x12 3#         Standing marches 1x10; HEP  15x 2x56' 4xback and forth at table 5x back and forth at the table, no UE support       Lateral walks nv 1x5 ptb   1x5 ptb         Walking warmup/bike nv 5' 5' walking 5' walking 5' walking 4' walking no AD       Leg press nv 1x10 35#, 1x10 45# 3x10 45# 1x10 45#, 1x10 55# 1x10 55#, 1x10 65#, 35# 2x10 1x10 65#, 1x10 75#, 45# 1x10 LLE 55# 1x10       Hip extension nv 3x10 3# 3x10 3# 3# 2x12 2x15 2 5#                                  Ther Activity 2/18 2/21 2/23 3/1 3/3 3/8       Step ups nv fwd 2x10 0R  Fwd 2x10 1R Fwd 2x10 1R; lateral 2x15 UE support 2R fwd 2x10, lateral 1x15 UE support       Mini squats nv 2x15 2x15 2x15 1x15        Pt edu      NS                    Gait Training 2/18 2/21  3/1 3/3 3/8                                 Modalities 2/18 2/21  3/1 3/3 3/8

## 2022-03-10 ENCOUNTER — OFFICE VISIT (OUTPATIENT)
Dept: PHYSICAL THERAPY | Facility: CLINIC | Age: 67
End: 2022-03-10
Payer: COMMERCIAL

## 2022-03-10 DIAGNOSIS — Z96.642 STATUS POST LEFT HIP REPLACEMENT: Primary | ICD-10-CM

## 2022-03-10 DIAGNOSIS — Z47.89 ORTHOPEDIC AFTERCARE: ICD-10-CM

## 2022-03-10 PROCEDURE — 97112 NEUROMUSCULAR REEDUCATION: CPT

## 2022-03-10 PROCEDURE — 97110 THERAPEUTIC EXERCISES: CPT

## 2022-03-10 NOTE — PROGRESS NOTES
Daily Note     Today's date: 3/10/2022  Patient name: Davi Montenegro  : 1955  MRN: [id number]  Referring provider: Shirley Contreras  Dx:   Encounter Diagnosis     ICD-10-CM    1  Status post left hip replacement  Z96 642    2  Orthopedic aftercare  Z47 89        Start Time: 0804  Stop Time: 187  Total time in clinic (min): 39 minutes    Subjective: Pt reported he was able to put on his L sock at home without his sock aid  Objective: See treatment diary below      Assessment: Tolerated treatment well  Patient progressed sidelying abduction with increased repetitions this session  Leg press was progressed with increased weight  Standing marches were performed without UE support; pt performed the exercise with ease, so 8# suitcase carry was added  Step ups were progressed this session with addition of suitcase pickup and carry  Pt performed exercise well with one noted balance check due to L knee fatigue on the last repetition  No UE support was needed, but pt stepped up with the contralateral foot in between descent  Pt is progressing well  Next visit, pt will progress warmup to treadmill  Continue to progress pt as tolerated next visit  Plan: Continue per plan of care        Precautions: GERD, type 2 diabetes, CKD, sleep apnea, COPD, h/o L rib fx, femoral neck fx, hyperlipidemia, chronic pain syndrome, s/p L hip replacement      Manuals 2/18 2/21 2/23 3/1 3/3 3/8 3/10      HF str   SF  NS                                               Neuro Re-Ed 2/18 2/21 2/23 3/1 3/3 3/8 3/10      SLR 1x8; HEP 1x10; 2x10 2# 3x10  2x10 2x10 2 5# 2x12 2 5#       bridges nv 3x10 3x10 2x10; 1x10 ptb 2x10 pball 2x12 pball       Feet together on foam balance nv 2x30" 2x30" EC          semitandem balance nv 1x30" each 2x30" EC ea          Weight shifting to SLS (UE support prn)  nv 2x10 2x10  SLS no UE support 2x45"        sidelying abduction     3x10 2x12 2x15      SLS       2x45" each                   Ther Ex 2/18 2/21 2/23 3/1 3/3 3/8 3/10      standing hip abduction 1x10 each; HEP  2x10 2x12 3#         Standing marches 1x10; HEP  15x 2x56' 4xback and forth at table 5x back and forth at the table, no UE support 5x back and forth at table; 2 laps with 8# suitcase carry      Lateral walks nv 1x5 ptb   1x5 ptb         Walking warmup/bike nv 5' 5' walking 5' walking 5' walking 4' walking no AD 4' walking no AD; TM nv      Leg press nv 1x10 35#, 1x10 45# 3x10 45# 1x10 45#, 1x10 55# 1x10 55#, 1x10 65#, 35# 2x10 1x10 65#, 1x10 75#, 45# 1x10 LLE 55# 1x10 1x10 85#, 1x10 105#, 1x10 115#, SL 1x10 65#      Hip extension nv 3x10 3# 3x10 3# 3# 2x12 2x15 2 5#        Seated HS curls       gtb 2x12 each                   Ther Activity 2/18 2/21 2/23 3/1 3/3 3/8 3/10      Step ups nv fwd 2x10 0R  Fwd 2x10 1R Fwd 2x10 1R; lateral 2x15 UE support 2R fwd 2x10, lateral 1x15 UE support 2R fwd 1x10 no UE support, 1x10 with suitcase pickup and carry, 1x15 lateral 1R      Mini squats nv 2x15 2x15 2x15 1x15        Pt edu      NS NS                   Gait Training 2/18 2/21  3/1 3/3 3/8 3/10                                Modalities 2/18 2/21  3/1 3/3 3/8 3/10

## 2022-03-15 ENCOUNTER — OFFICE VISIT (OUTPATIENT)
Dept: PHYSICAL THERAPY | Facility: CLINIC | Age: 67
End: 2022-03-15
Payer: COMMERCIAL

## 2022-03-15 DIAGNOSIS — Z96.642 STATUS POST LEFT HIP REPLACEMENT: Primary | ICD-10-CM

## 2022-03-15 DIAGNOSIS — Z47.89 ORTHOPEDIC AFTERCARE: ICD-10-CM

## 2022-03-15 PROCEDURE — 97110 THERAPEUTIC EXERCISES: CPT

## 2022-03-15 PROCEDURE — 97112 NEUROMUSCULAR REEDUCATION: CPT

## 2022-03-15 NOTE — PROGRESS NOTES
Daily Note     Today's date: 3/15/2022  Patient name: Isaac Rivera  : 1955  MRN: [id number]  Referring provider: Velia Fragoso  Dx:   Encounter Diagnosis     ICD-10-CM    1  Status post left hip replacement  Z96 642    2  Orthopedic aftercare  Z47 [id number]        Start Time: 0803  Stop Time: 0845  Total time in clinic (min): 42 minutes    Subjective: Pt reports he's doing well this morning and he's confident transitioning his warmup to the treadmill  Objective: See treatment diary below      Assessment: Tolerated treatment well  Patient progressed pball bridges with increased repetitions, requiring increased rest breaks in between sets  Sidelying abduction and leg press were also progressed with increased weight  Pt showed improvement in lateral hip stability with transition of suitcase marches to slow and controlled; pt displayed ability to control SLS on the LLE well with one to two LOBs  Pt continues to show ease with navigating steps with 2 riser height and no UE support; progress pt to 3 risers next time  Pt took FOTO outcome score with decrease in progress, though pt reports he does not agree  He reports he has made a lot of functional progress at home since starting therapy, including being able to dress himself, put on his shoes and socks, and improvement navigating stairs  He has also been able to get in and out of the shower independently since starting therapy  Continue to progress pt as tolerated next visit  Plan: Continue per plan of care        Precautions: GERD, type 2 diabetes, CKD, sleep apnea, COPD, h/o L rib fx, femoral neck fx, hyperlipidemia, chronic pain syndrome, s/p L hip replacement      Manuals 2/18 2/21 2/23 3/1 3/3 3/8 3/10 3/14     HF str   SF  NS                                               Neuro Re-Ed 2/18 2/21 2/23 3/1 3/3 3/8 3/10 3/14     SLR 1x8; HEP 1x10; 2x10 2# 3x10  2x10 2x10 2 5# 2x12 2 5#       bridges nv 3x10 3x10 2x10; 1x10 ptb 2x10 pball 2x12 pball  2x15 pball     Feet together on foam balance nv 2x30" 2x30" EC          semitandem balance nv 1x30" each 2x30" EC ea          Weight shifting to SLS (UE support prn)  nv 2x10 2x10  SLS no UE support 2x45"        sidelying abduction     3x10 2x12 2x15 1x15, 1x8 2#     SLS       2x45" each                   Ther Ex 2/18 2/21 2/23 3/1 3/3 3/8 3/10 3/14     standing hip abduction 1x10 each; HEP  2x10 2x12 3#         Standing marches 1x10; HEP  15x 2x56' 4xback and forth at table 5x back and forth at the table, no UE support 5x back and forth at table; 2 laps with 8# suitcase carry 8# suitcase slow 3x at table     Lateral walks nv 1x5 ptb   1x5 ptb         Walking warmup/bike nv 5' 5' walking 5' walking 5' walking 4' walking no AD 4' walking no AD; TM nv 5' TM     Leg press nv 1x10 35#, 1x10 45# 3x10 45# 1x10 45#, 1x10 55# 1x10 55#, 1x10 65#, 35# 2x10 1x10 65#, 1x10 75#, 45# 1x10 LLE 55# 1x10 1x10 85#, 1x10 105#, 1x10 115#, SL 1x10 65# 1x15 115#, 1x10 125#, SL 1x10 75#      Hip extension nv 3x10 3# 3x10 3# 3# 2x12 2x15 2 5#        Seated HS curls       gtb 2x12 each                   Ther Activity 2/18 2/21 2/23 3/1 3/3 3/8 3/10 3/14     Step ups nv fwd 2x10 0R  Fwd 2x10 1R Fwd 2x10 1R; lateral 2x15 UE support 2R fwd 2x10, lateral 1x15 UE support 2R fwd 1x10 no UE support, 1x10 with suitcase pickup and carry, 1x15 lateral 1R 2R fwd 1x10 no UE support     Mini squats nv 2x15 2x15 2x15 1x15        Pt edu      NS NS NS                  Gait Training 2/18 2/21  3/1 3/3 3/8 3/10 3/14                               Modalities 2/18 2/21  3/1 3/3 3/8 3/10 3/14

## 2022-03-16 ENCOUNTER — APPOINTMENT (OUTPATIENT)
Dept: PHYSICAL THERAPY | Facility: CLINIC | Age: 67
End: 2022-03-16
Payer: COMMERCIAL

## 2022-03-17 ENCOUNTER — APPOINTMENT (OUTPATIENT)
Dept: PHYSICAL THERAPY | Facility: CLINIC | Age: 67
End: 2022-03-17
Payer: COMMERCIAL

## 2022-03-18 ENCOUNTER — OFFICE VISIT (OUTPATIENT)
Dept: PHYSICAL THERAPY | Facility: CLINIC | Age: 67
End: 2022-03-18
Payer: COMMERCIAL

## 2022-03-18 DIAGNOSIS — Z47.89 ORTHOPEDIC AFTERCARE: ICD-10-CM

## 2022-03-18 DIAGNOSIS — Z96.642 STATUS POST LEFT HIP REPLACEMENT: Primary | ICD-10-CM

## 2022-03-18 PROCEDURE — 97110 THERAPEUTIC EXERCISES: CPT

## 2022-03-18 PROCEDURE — 97112 NEUROMUSCULAR REEDUCATION: CPT

## 2022-03-18 NOTE — PROGRESS NOTES
Daily Note     Today's date: 3/18/2022  Patient name: Roe Berry  : 1955  MRN: [id number]  Referring provider: Delores Wallis  Dx:   Encounter Diagnosis     ICD-10-CM    1  Status post left hip replacement  Z96 642    2  Orthopedic aftercare  Z47 [id number]        Start Time: 6562  Stop Time: 09  Total time in clinic (min): 39 minutes    Subjective: Pt reported he walked a mile without his cane without any trouble  Objective: See treatment diary below      Assessment: Tolerated treatment well  Patient shows progress with navigation of stairs with increased height; pt had difficulty with descending the steps due to ROM limitations from brace  Leg press and lateral walks were progressed with increased resistance; pt had difficulty controlling the eccentric motion of lateral walks but was able to complete the exercise without UE support  Pt also showed improvement with no UE support during suitcase marches and increased time during each SLS  Continue to progress pt as tolerated next visit  Plan: Continue per plan of care        Precautions: GERD, type 2 diabetes, CKD, sleep apnea, COPD, h/o L rib fx, femoral neck fx, hyperlipidemia, chronic pain syndrome, s/p L hip replacement      Manuals 2/18 2/21 2/23 3/1 3/3 3/8 3/10 3/14 3/18    HF str   SF  NS                                               Neuro Re-Ed 2/18 2/21 2/23 3/1 3/3 3/8 3/10 3/14 3/18    SLR 1x8; HEP 1x10; 2x10 2# 3x10  2x10 2x10 2 5# 2x12 2 5#       bridges nv 3x10 3x10 2x10; 1x10 ptb 2x10 pball 2x12 pball  2x15 pball 2x12 pball    Feet together on foam balance nv 2x30" 2x30" EC          semitandem balance nv 1x30" each 2x30" EC ea          Weight shifting to SLS (UE support prn)  nv 2x10 2x10  SLS no UE support 2x45"        sidelying abduction     3x10 2x12 2x15 1x15, 1x8 2# 2x10 8#    SLS       2x45" each                   Ther Ex  3/ 3/3 3/8 3/10 3/14 3/18    standing hip abduction 1x10 each; HEP  2x10 2x12 3# Standing marches 1x10; HEP  15x 2x56' 4xback and forth at table 5x back and forth at the table, no UE support 5x back and forth at table; 2 laps with 8# suitcase carry 8# suitcase slow 3x at table 8# suitcase 1x28'    Lateral walks nv 1x5 ptb   1x5 ptb     1x3 btb    Walking warmup/bike nv 5' 5' walking 5' walking 5' walking 4' walking no AD 4' walking no AD; TM nv 5' TM 5' TM    Leg press nv 1x10 35#, 1x10 45# 3x10 45# 1x10 45#, 1x10 55# 1x10 55#, 1x10 65#, 35# 2x10 1x10 65#, 1x10 75#, 45# 1x10 LLE 55# 1x10 1x10 85#, 1x10 105#, 1x10 115#, SL 1x10 65# 1x15 115#, 1x10 125#, SL 1x10 75#  2x10 135#, 1x10 75# SL    Hip extension nv 3x10 3# 3x10 3# 3# 2x12 2x15 2 5#        Seated HS curls       gtb 2x12 each  btb 2x10 each                 Ther Activity 2/18 2/21 2/23 3/1 3/3 3/8 3/10 3/14 3/18    Step ups nv fwd 2x10 0R  Fwd 2x10 1R Fwd 2x10 1R; lateral 2x15 UE support 2R fwd 2x10, lateral 1x15 UE support 2R fwd 1x10 no UE support, 1x10 with suitcase pickup and carry, 1x15 lateral 1R 2R fwd 1x10 no UE support 3R fwd 1x10 no UE support, 2R 1x10 no UE support       Mini squats nv 2x15 2x15 2x15 1x15        Pt edu      NS NS NS                  Gait Training 2/18 2/21  3/1 3/3 3/8 3/10 3/14 3/18                              Modalities 2/18 2/21  3/1 3/3 3/8 3/10 3/14 3/18

## 2022-03-22 ENCOUNTER — EVALUATION (OUTPATIENT)
Dept: PHYSICAL THERAPY | Facility: CLINIC | Age: 67
End: 2022-03-22
Payer: COMMERCIAL

## 2022-03-22 DIAGNOSIS — Z96.642 STATUS POST LEFT HIP REPLACEMENT: Primary | ICD-10-CM

## 2022-03-22 DIAGNOSIS — Z47.89 ORTHOPEDIC AFTERCARE: ICD-10-CM

## 2022-03-22 PROCEDURE — 97164 PT RE-EVAL EST PLAN CARE: CPT

## 2022-03-22 PROCEDURE — 97110 THERAPEUTIC EXERCISES: CPT

## 2022-03-22 PROCEDURE — 97112 NEUROMUSCULAR REEDUCATION: CPT

## 2022-03-22 NOTE — PROGRESS NOTES
PT Re-evaluation     Today's date: 3/22/2022  Patient name: Jaylene Juarez  : 1955  MRN: [id number]  Referring provider: Carola Meadows  Dx:   Encounter Diagnosis     ICD-10-CM    1  Status post left hip replacement  Z96 642    2  Orthopedic aftercare  Z47 89        Start Time: 0804  Stop Time: 0845  Total time in clinic (min): 41 minutes    Subjective: Pt reports he sees improvement since coming to therapy  He has been able to walk a mile without difficulties while using the brace  He has been able to put his socks on with no issues since starting therapy  He has not dislocated his hip since starting therapy  Pt also presented to therapy today reporting a sudden increase in R ankle pain upon waking this morning  Pain  Current pain ratin  At best pain ratin  At worst pain ratin   Quality: needle-like  Relieving factors: change in position  Aggravating factors: walking and standing (on his feet for too long)  Progression: improved      Objective: See treatment diary below    Hip Comments   L hip AROM  Flexion:100 degrees  Abduction: limited by brace        R hip AROM  Flexion: WNL  Abduction: WNL         LE Strength  L Hip flex: 4/5 with pain  R Hip flexion: 4+/5   L Knee extension: 4/5 with pain  R Knee extension: 5/5  L Hip Abduction: 4-/5  R Hip Abduction: *not tested due to brace  L Knee flexion: 5/5  R Knee flexion: 5/5     TU seconds  5 time STS: 12 seconds  30 second sit to stand: 12      Assessment: Tolerated treatment well  Patient shows improvement with meeting of all measured short term goals  Pt also met two of four long term goals  Pt shows improvement in hip flexion ROM, overall strength, and gait speed/functional testing compared to the initial evaluation  Pt performed sidelying abduction with increased weight this session  He also performed hamstring curls with increased repetitions   Suitcase marches were trialed this session but pt reported R ankle pain, so the exercise was terminated  Pt will continue to benefit from physical therapy to improve ROM, strength, and function  Continue to progress pt as tolerated next visit  Pt to see physician for f/u tomorrow, 3/23  Goals  Short term goals (3-4 weeks)  1  Pt will display independence with understanding and performance of HEP to allow for carryover of plan of care at home  (met)  2  Pt will improve FOTO score from initial evaluation to show improvement in pain and function  (unmeasured)  3  Pt will increase R knee extension strength to 4/5 to improve knee stability strength and function with stair navigation and walking for long periods of time  (met)  4  Pt will increase R knee flexion strength to 4/5 to improve knee stability strength and function with stair navigation and walking for long periods of time  (met)  5  Pt will improve TUG time by 5 seconds to reduce fall risk  (met)    Long term goals (6-8 weeks)  1  Pt will score equal or better than projected score on FOTO to show improvement in pain and function  (unmeasured)  2  Pt will increase R knee extension strength to 4+/5 to improve knee stability strength and function with stair navigation and walking for long periods of time  (progressing)  3  Pt will increase R knee flexion strength to 4+/5 to improve knee stability strength and function with stair navigation and walking for long periods of time  (met)  4  Pt will improve TUG time by 10 seconds to reduce fall risk  (met)      Plan: Continue per plan of care        Precautions: GERD, type 2 diabetes, CKD, sleep apnea, COPD, h/o L rib fx, femoral neck fx, hyperlipidemia, chronic pain syndrome, s/p L hip replacement      Manuals 2/18 2/21 2/23 3/1 3/3 3/8 3/10 3/14 3/18 3/22   HF str   SF  NS                                               Neuro Re-Ed 2/18 2/21 2/23 3/1 3/3 3/8 3/10 3/14 3/18 3/22   SLR 1x8; HEP 1x10; 2x10 2# 3x10  2x10 2x10 2 5# 2x12 2 5#       bridges nv 3x10 3x10 2x10; 1x10 ptb 2x10 pball 2x12 pball  2x15 pball 2x12 pball 2x15 pball   Feet together on foam balance nv 2x30" 2x30" EC          semitandem balance nv 1x30" each 2x30" EC ea          Weight shifting to SLS (UE support prn)  nv 2x10 2x10  SLS no UE support 2x45"        sidelying abduction     3x10 2x12 2x15 1x15, 1x8 2# 2x10 2# 2x12 2 5#   SLS       2x45" each                   Ther Ex 2/18 2/21 2/23 3/1 3/3 3/8 3/10 3/14 3/18 3/22   standing hip abduction 1x10 each; HEP  2x10 2x12 3#         Standing marches 1x10; HEP  15x 2x56' 4xback and forth at table 5x back and forth at the table, no UE support 5x back and forth at table; 2 laps with 8# suitcase carry 8# suitcase slow 3x at table 8# suitcase 1x28' 8# 8' slow; pn! In R ankle, terminated   Lateral walks nv 1x5 ptb   1x5 ptb     1x3 btb 1x4 btb   Walking warmup/bike nv 5' 5' walking 5' walking 5' walking 4' walking no AD 4' walking no AD; TM nv 5' TM 5' TM TM 5'   Leg press nv 1x10 35#, 1x10 45# 3x10 45# 1x10 45#, 1x10 55# 1x10 55#, 1x10 65#, 35# 2x10 1x10 65#, 1x10 75#, 45# 1x10 LLE 55# 1x10 1x10 85#, 1x10 105#, 1x10 115#, SL 1x10 65# 1x15 115#, 1x10 125#, SL 1x10 75#  2x10 135#, 1x10 75# SL    Hip extension nv 3x10 3# 3x10 3# 3# 2x12 2x15 2 5#        Seated HS curls       gtb 2x12 each  btb 2x10 each btb 1x12                Ther Activity 2/18 2/21 2/23 3/1 3/3 3/8 3/10 3/14 3/18 3/22   Step ups nv fwd 2x10 0R  Fwd 2x10 1R Fwd 2x10 1R; lateral 2x15 UE support 2R fwd 2x10, lateral 1x15 UE support 2R fwd 1x10 no UE support, 1x10 with suitcase pickup and carry, 1x15 lateral 1R 2R fwd 1x10 no UE support 3R fwd 1x10 no UE support, 2R 1x10 no UE support       Mini squats nv 2x15 2x15 2x15 1x15        Pt edu      NS NS NS                  Gait Training 2/18 2/21  3/1 3/3 3/8 3/10 3/14 3/18 3/22                             Modalities 2/18 2/21  3/1 3/3 3/8 3/10 3/14 3/18 3/22

## 2022-03-23 ENCOUNTER — HOSPITAL ENCOUNTER (OUTPATIENT)
Dept: RADIOLOGY | Facility: HOSPITAL | Age: 67
Discharge: HOME | End: 2022-03-23
Attending: ORTHOPAEDIC SURGERY
Payer: COMMERCIAL

## 2022-03-23 ENCOUNTER — OFFICE VISIT (OUTPATIENT)
Dept: OBGYN CLINIC | Facility: HOSPITAL | Age: 67
End: 2022-03-23

## 2022-03-23 VITALS
BODY MASS INDEX: 24.78 KG/M2 | DIASTOLIC BLOOD PRESSURE: 79 MMHG | HEART RATE: 80 BPM | HEIGHT: 68 IN | SYSTOLIC BLOOD PRESSURE: 117 MMHG

## 2022-03-23 DIAGNOSIS — Z47.1 AFTERCARE FOLLOWING LEFT HIP JOINT REPLACEMENT SURGERY: ICD-10-CM

## 2022-03-23 DIAGNOSIS — Z96.642 AFTERCARE FOLLOWING LEFT HIP JOINT REPLACEMENT SURGERY: ICD-10-CM

## 2022-03-23 DIAGNOSIS — Z47.1 AFTERCARE FOLLOWING LEFT HIP JOINT REPLACEMENT SURGERY: Primary | ICD-10-CM

## 2022-03-23 DIAGNOSIS — Z96.642 AFTERCARE FOLLOWING LEFT HIP JOINT REPLACEMENT SURGERY: Primary | ICD-10-CM

## 2022-03-23 PROCEDURE — 73502 X-RAY EXAM HIP UNI 2-3 VIEWS: CPT

## 2022-03-23 PROCEDURE — 99024 POSTOP FOLLOW-UP VISIT: CPT | Performed by: ORTHOPAEDIC SURGERY

## 2022-03-23 RX ORDER — OMEPRAZOLE 20 MG/1
CAPSULE, DELAYED RELEASE ORAL
COMMUNITY
Start: 2022-03-07

## 2022-03-23 RX ORDER — TEMAZEPAM 15 MG/1
15 CAPSULE ORAL
COMMUNITY
Start: 2022-03-04

## 2022-03-23 NOTE — PROGRESS NOTES
Assessment:   Diagnosis ICD-10-CM Associated Orders   1  Aftercare following left hip joint replacement surgery  Z47 1     R15 195        Plan:  X-rays taken and reviewed, physical exam performed  Three months status post left total hip arthroplasty and 2 months status post his 2nd left hip dislocation that was reduced in the ER  At this point he will continue physical therapy to maximize his recovery and ongoing strengthening  Weight-bearing activities as tolerated  He may discontinue his brace  Follow-up in 6 weeks for repeat evaluation    To do next visit:  Return in about 6 weeks (around 5/4/2022) for re-check  The above stated was discussed in layman's terms and the patient expressed understanding  All questions were answered to the patient's satisfaction  Scribe Attestation    I,:  Tanner Aguilar am acting as a scribe while in the presence of the attending physician :       I,:  Klever Gutierrez MD personally performed the services described in this documentation    as scribed in my presence :             Subjective:   Jaylene  is a 78 y o  male who presents repeat evaluation 3 months status post left total hip arthroplasty  He sustained 2 dislocations of his hip, 12/25/2021 as well as 1/19/2022  Both were reduced in the ER by our orthopedic department  He returns today doing well  He discontinued his hip abduction brace 1 week ago  He denies any calf or thigh pain  He is attending physical therapy and progressing well  He has therapy scheduled through April        Review of systems negative unless otherwise specified in HPI  Review of Systems    Past Medical History:   Diagnosis Date    Arthritis     osteoarthritis    Cervical radiculopathy     COPD (chronic obstructive pulmonary disease) (HCC)     CPAP (continuous positive airway pressure) dependence     Diabetes mellitus (HCC)     Fibromyalgia     Fibromyalgia, primary     GERD (gastroesophageal reflux disease)     History of transfusion     Hyperlipidemia     Osteopenia     Pneumonia     Sleep apnea     USES CPAP HS       Past Surgical History:   Procedure Laterality Date    CHOLECYSTECTOMY      COLONOSCOPY      CYST REMOVAL      FEMUR SURGERY Left     HIP CLOSE REDUCTION Left 1/20/2022    Procedure: CLOSED REDUCTION HIP;  Surgeon: Bigg Roberts MD;  Location: [facility];  Service: Orthopedics    JOINT REPLACEMENT      T KR LEFT    KNEE SURGERY Left 1996    MANDIBLE SURGERY      OK CONV PREV HIP SURG TO TOT HIP ARTHROPLAS Left 12/22/2021    Procedure: ARTHROPLASTY HIP TOTAL- conversion;  Surgeon: Eleno Fraga MD;  Location: [facility];  Service: Orthopedics    OK REMOVAL DEEP IMPLANT Left 9/8/2021    Procedure: REMOVAL HARDWARE HIP;  Surgeon: Xu Lux MD;  Location: [facility];  Service: Orthopedics    ROTATOR CUFF REPAIR Left     WRIST FRACTURE SURGERY Left     plate in place       Family History   Problem Relation Age of Onset    Cancer Mother     Arthritis Father     Diabetes Sister     Cancer Brother     Diabetes Daughter     Depression Son        Social History     Occupational History    Not on file   Tobacco Use    Smoking status: Current Every Day Smoker     Packs/day: 0 50     Types: Cigarettes    Smokeless tobacco: Never Used   Vaping Use    Vaping Use: Never used   Substance and Sexual Activity    Alcohol use: Not Currently     Comment: last drink 1 year ago     Drug use: No    Sexual activity: Not Currently         Current Outpatient Medications:     alendronate (FOSAMAX) 70 mg tablet, take 1 tablet by mouth every 7 days IN THE MORNING ON AN EMPTY ST   (REFER TO PRESCRIPTION NOTES)  , Disp: , Rfl:     allopurinol (ZYLOPRIM) 300 mg tablet, Take 300 mg by mouth 2 (two) times a day  , Disp: , Rfl:     ascorbic acid (VITAMIN C) 500 MG tablet, Take 1 tablet (500 mg total) by mouth daily Start 30 days prior to surgery, Disp: 30 tablet, Rfl: 0    cetirizine (ZyrTEC) 10 mg tablet, Take 10 mg by mouth daily, Disp: , Rfl:     DULoxetine (CYMBALTA) 30 mg delayed release capsule, Take 60 mg by mouth daily at bedtime , Disp: , Rfl:     ergocalciferol (ERGOCALCIFEROL) 1 25 MG (60480 UT) capsule, take 1 capsule by mouth every week, Disp: , Rfl:     fenofibrate (TRICOR) 145 mg tablet, Take 145 mg by mouth daily, Disp: , Rfl:     ferrous sulfate 324 (65 Fe) mg, Take 1 tablet (324 mg total) by mouth 2 (two) times a day before meals Start 30 days prior to surgery, Disp: 60 tablet, Rfl: 0    fluticasone-salmeterol (Advair) 250-50 mcg/dose inhaler, inhale 1 puff by mouth and INTO THE LUNGS every 12 hours SAME TIMES EACH DAY, Disp: , Rfl:     fluticasone-salmeterol (Wixela Inhub) 100-50 mcg/dose inhaler, Inhale 1 puff 2 (two) times a day Rinse mouth after use  (Patient taking differently: Inhale 1 puff as needed Rinse mouth after use  ), Disp: 60 blister, Rfl: 0    folic acid (FOLVITE) 1 mg tablet, Take 1 tablet (1 mg total) by mouth daily Start 30 days prior to surgery, Disp: 30 tablet, Rfl: 0    Icosapent Ethyl (Vascepa) 0 5 g CAPS, Take 2 capsules by mouth 2 (two) times a day , Disp: , Rfl:     Icosapent Ethyl 1 g CAPS, take 2 capsules by mouth twice a day with food SWALLOW WHOLE DO NOT CHEW, OPEN,DISSOLVE OR CRUSH, Disp: , Rfl:     ipratropium-albuterol (DUO-NEB) 0 5-2 5 mg/3 mL nebulizer solution, inhale contents of 1 vial ( 3 milliliters ) in nebulizer by mouth four times a day if needed, Disp: , Rfl:     metFORMIN (GLUCOPHAGE) 1000 MG tablet, Take 1,000 mg by mouth 2 (two) times a day with meals  , Disp: , Rfl:     Multiple Vitamin (multivitamin) tablet, TAKE 1 TABLET DAILY  , Disp: , Rfl:     naproxen (NAPROSYN) 125 mg/5 mL suspension, take 5 milliliter by mouth every 8 hours with food if needed, Disp: , Rfl:     omeprazole (PriLOSEC OTC) 20 MG tablet, Take 20 mg by mouth daily, Disp: , Rfl:     omeprazole (PriLOSEC) 20 mg delayed release capsule, take 1 capsule by mouth twice a day 30 TO 60 MINUTES PRIOR TO EATING, Disp: , Rfl:     oxyCODONE (ROXICODONE) 5 immediate release tablet, 1 tab po q4h prn mild pain  2 tabs po q4h prn moderate or severe pain, Disp: 30 tablet, Rfl: 0    pregabalin (Lyrica) 150 mg capsule, Take 1 capsule by mouth every 12 (twelve) hours, Disp: , Rfl:     rosuvastatin (CRESTOR) 10 MG tablet, Take 5 mg by mouth daily at bedtime  , Disp: , Rfl:     temazepam (RESTORIL) 15 mg capsule, Take 15 mg by mouth daily at bedtime, Disp: , Rfl:     temazepam (RESTORIL) 7 5 mg capsule, Take 7 5 mg by mouth daily at bedtime as needed, Disp: , Rfl:     Turmeric 500 MG CAPS, Take by mouth daily, Disp: , Rfl:     No Known Allergies         Vitals:    03/23/22 1520   BP: 117/79   Pulse: 80       Objective:                    Left Hip Exam     Tenderness   The patient is experiencing no tenderness  Other   Erythema: absent  Sensation: normal    Comments:    Healed posterior lateral incision  No signs of infection  No warmth  No erythema  No ecchymosis  Calf thigh are soft nontender no signs DVT  Painless arc of passive gentle range of motion all planes  Clinically well located left total hip            Diagnostics, reviewed and taken today if performed as documented: The attending physician has personally reviewed the pertinent films in PACS and interpretation is as follows:    Left hip and pelvis x-rays taken and reviewed in the office today show:  Left total hip prosthesis in excellent position alignment, well bonded, no signs of loosening or fracture  Radiographically located left total hip      Procedures, if performed today:    Procedures    None performed      Portions of the record may have been created with voice recognition software  Occasional wrong word or "sound a like" substitutions may have occurred due to the inherent limitations of voice recognition software    Read the chart carefully and recognize, using context, where substitutions have occurred

## 2022-03-24 ENCOUNTER — OFFICE VISIT (OUTPATIENT)
Dept: PHYSICAL THERAPY | Facility: CLINIC | Age: 67
End: 2022-03-24
Payer: COMMERCIAL

## 2022-03-24 DIAGNOSIS — Z47.89 ORTHOPEDIC AFTERCARE: ICD-10-CM

## 2022-03-24 DIAGNOSIS — Z96.642 STATUS POST LEFT HIP REPLACEMENT: Primary | ICD-10-CM

## 2022-03-24 PROCEDURE — 97112 NEUROMUSCULAR REEDUCATION: CPT

## 2022-03-24 PROCEDURE — 97110 THERAPEUTIC EXERCISES: CPT

## 2022-03-24 PROCEDURE — 97530 THERAPEUTIC ACTIVITIES: CPT

## 2022-03-24 NOTE — PROGRESS NOTES
Daily Note     Today's date: 3/24/2022  Patient name: Evaristo Austin  : 1955  MRN: [id number]  Referring provider: Jesse Manuel  Dx:   Encounter Diagnosis     ICD-10-CM    1  Status post left hip replacement  Z96 642    2  Orthopedic aftercare  Z47 89        Start Time: 08  Stop Time: 517  Total time in clinic (min): 43 minutes    Subjective: Pt went to the doctor today  They were pleased with his progress and discharged his brace, though advised him to continued to be careful as the healing continues  Objective: See treatment diary below      Assessment: Tolerated treatment well  Patient shows fair hip flexion ROM without brace  Sidelying hip abduction was progressed with increased repetitions  Sit to stands were also introduced with pt able to use LLE without offsetting it without brace  Pt had no issues with performing exercise at chair height with no elevation  Leg press was also progressed with increased weight and pt tolerated increased ROM without brace  Lunges were introduced this session with pt displaying muscle fasciculations due to LE fatigue  Continue to progress pt as tolerated next visit  Plan: Continue per plan of care        Precautions: GERD, type 2 diabetes, CKD, sleep apnea, COPD, h/o L rib fx, femoral neck fx, hyperlipidemia, chronic pain syndrome, s/p L hip replacement      Manuals 3/24   3/1 3/3 3/8 3/10 3/14 3/18 3/22   HF str     NS                                               Neuro Re-Ed 3/24   3/1 3/3 3/8 3/10 3/14 3/18 3/22   SLR    2x10 2x10 2 5# 2x12 2 5#       bridges 1x15 pball   2x10; 1x10 ptb 2x10 pball 2x12 pball  2x15 pball 2x12 pball 2x15 pball   Feet together on foam balance             semitandem balance             Weight shifting to SLS (UE support prn)      SLS no UE support 2x45"        sidelying abduction 2x15 2 5#    3x10 2x12 2x15 1x15, 1x8 2# 2x10 2# 2x12 2 5#   SLS       2x45" each                   Ther Ex 3/24   3/1 3/3 3/8 3/10 3/14 3/18 3/22   standing hip abduction    2x12 3#         Standing marches 8# 1x lap at mirror   2x56' 4xback and forth at table 5x back and forth at the table, no UE support 5x back and forth at table; 2 laps with 8# suitcase carry 8# suitcase slow 3x at table 8# suitcase 1x28' 8# 8' slow; pn! In R ankle, terminated   Lateral walks 1x5 btb   1x5 ptb     1x3 btb 1x4 btb   Walking warmup/bike 5' TM; bike nv   5' walking 5' walking 4' walking no AD 4' walking no AD; TM nv 5' TM 5' TM TM 5'   Leg press 1x15 145#, 1x10 75# SL, 1x10 85#   1x10 45#, 1x10 55# 1x10 55#, 1x10 65#, 35# 2x10 1x10 65#, 1x10 75#, 45# 1x10 LLE 55# 1x10 1x10 85#, 1x10 105#, 1x10 115#, SL 1x10 65# 1x15 115#, 1x10 125#, SL 1x10 75#  2x10 135#, 1x10 75# SL    Hip extension    3# 2x12 2x15 2 5#        Seated HS curls       gtb 2x12 each  btb 2x10 each btb 1x12                Ther Activity 3/24   3/1 3/3 3/8 3/10 3/14 3/18 3/22                Step ups    Fwd 2x10 1R Fwd 2x10 1R; lateral 2x15 UE support 2R fwd 2x10, lateral 1x15 UE support 2R fwd 1x10 no UE support, 1x10 with suitcase pickup and carry, 1x15 lateral 1R 2R fwd 1x10 no UE support 3R fwd 1x10 no UE support, 2R 1x10 no UE support       Walking lunges  2x laps at table             Mini squats    2x15 1x15        Pt edu      NS NS NS     STS 2x15, 1st set blue foam            Gait Training 3/24   3/1 3/3 3/8 3/10 3/14 3/18 3/22                             Modalities 3/24   3/1 3/3 3/8 3/10 3/14 3/18 3/22

## 2022-03-29 ENCOUNTER — OFFICE VISIT (OUTPATIENT)
Dept: PHYSICAL THERAPY | Facility: CLINIC | Age: 67
End: 2022-03-29
Payer: COMMERCIAL

## 2022-03-29 DIAGNOSIS — Z47.89 ORTHOPEDIC AFTERCARE: ICD-10-CM

## 2022-03-29 DIAGNOSIS — Z96.642 STATUS POST LEFT HIP REPLACEMENT: Primary | ICD-10-CM

## 2022-03-29 PROCEDURE — 97110 THERAPEUTIC EXERCISES: CPT

## 2022-03-29 PROCEDURE — 97112 NEUROMUSCULAR REEDUCATION: CPT

## 2022-03-29 NOTE — PROGRESS NOTES
Daily Note     Today's date: 3/29/2022  Patient name: Linda Reardon  : 1955  MRN: [id number]  Referring provider: Cheri Engel  Dx:   Encounter Diagnosis     ICD-10-CM    1  Status post left hip replacement  Z96 642    2  Orthopedic aftercare  Z47 89        Start Time: 0804  Stop Time: 0845  Total time in clinic (min): 41 minutes    Subjective: Pt reports ever since last session he has noticed some back pain with certain movements  However, he does notice improvements in L hip ROM to the point where he can put his pants on without help  Objective: See treatment diary below      Assessment: Tolerated treatment well  Patient performing STS, requiring cuing for form, with progressive increase in difficulty with changes in height and weight  SLS cone taps were also trialed this session with pt requiring UE support but reporting no increase in back/hip discomfort  Following pball bridges, pt reported his back felt better than it did following the treadmill walking  Lunges were also performed this session with pt displaying good depth but requiring UE support  Continue to progress pt as tolerated next visit  Plan: Continue per plan of care        Precautions: GERD, type 2 diabetes, CKD, sleep apnea, COPD, h/o L rib fx, femoral neck fx, hyperlipidemia, chronic pain syndrome, s/p L hip replacement      Manuals 3/24 3/29  3/1 3/3 3/8 3/10 3/14 3/18 3/22   HF str     NS                                               Neuro Re-Ed 3/24 3/29  3/1 3/3 3/8 3/10 3/14 3/18 3/22   SLR    2x10 2x10 2 5# 2x12 2 5#       bridges 1x15 pball 1x15 pball  2x10; 1x10 ptb 2x10 pball 2x12 pball  2x15 pball 2x12 pball 2x15 pball   Feet together on foam balance             semitandem balance             Weight shifting to SLS (UE support prn)      SLS no UE support 2x45"        sidelying abduction 2x15 2 5# 2x12 2 5#   3x10 2x12 2x15 1x15, 1x8 2# 2x10 2# 2x12 2 5#   SLS       2x45" each      SLS cone taps  2x5 laps 4 cones           Ther Ex 3/24 3/29  3/1 3/3 3/8 3/10 3/14 3/18 3/22   standing hip abduction    2x12 3#         Standing marches 8# 1x lap at mirror   2x56' 4xback and forth at table 5x back and forth at the table, no UE support 5x back and forth at table; 2 laps with 8# suitcase carry 8# suitcase slow 3x at table 8# suitcase 1x28' 8# 8' slow; pn! In R ankle, terminated   Lateral walks 1x5 btb 1x5 btb  1x5 ptb     1x3 btb 1x4 btb   Walking warmup/bike 5' TM; bike nv 5' TM  5' walking 5' walking 4' walking no AD 4' walking no AD; TM nv 5' TM 5' TM TM 5'   Leg press 1x15 145#, 1x10 75# SL, 1x10 85# 1x15 155#, 1x10 75# SL, 1x10 85#  1x10 45#, 1x10 55# 1x10 55#, 1x10 65#, 35# 2x10 1x10 65#, 1x10 75#, 45# 1x10 LLE 55# 1x10 1x10 85#, 1x10 105#, 1x10 115#, SL 1x10 65# 1x15 115#, 1x10 125#, SL 1x10 75#  2x10 135#, 1x10 75# SL    Hip extension    3# 2x12 2x15 2 5#        Seated HS curls       gtb 2x12 each  btb 2x10 each btb 1x12                Ther Activity 3/24 3/29  3/1 3/3 3/8 3/10 3/14 3/18 3/22                Step ups    Fwd 2x10 1R Fwd 2x10 1R; lateral 2x15 UE support 2R fwd 2x10, lateral 1x15 UE support 2R fwd 1x10 no UE support, 1x10 with suitcase pickup and carry, 1x15 lateral 1R 2R fwd 1x10 no UE support 3R fwd 1x10 no UE support, 2R 1x10 no UE support       Walking lunges  2x laps at table  1xF           Mini squats    2x15 1x15        Pt edu      NS NS NS     STS 2x15, 1st set blue foam 1x10 no foam, 1x10 foam with 8# goblet, 1x10 no foam 8# goblet           Gait Training 3/24 3/29  3/1 3/3 3/8 3/10 3/14 3/18 3/22                             Modalities 3/24 3/29  3/1 3/3 3/8 3/10 3/14 3/18 3/22

## 2022-03-31 ENCOUNTER — OFFICE VISIT (OUTPATIENT)
Dept: PHYSICAL THERAPY | Facility: CLINIC | Age: 67
End: 2022-03-31
Payer: COMMERCIAL

## 2022-03-31 DIAGNOSIS — Z47.89 ORTHOPEDIC AFTERCARE: ICD-10-CM

## 2022-03-31 DIAGNOSIS — Z96.642 STATUS POST LEFT HIP REPLACEMENT: Primary | ICD-10-CM

## 2022-03-31 PROCEDURE — 97112 NEUROMUSCULAR REEDUCATION: CPT

## 2022-03-31 PROCEDURE — 97110 THERAPEUTIC EXERCISES: CPT

## 2022-03-31 PROCEDURE — 97530 THERAPEUTIC ACTIVITIES: CPT

## 2022-03-31 NOTE — PROGRESS NOTES
Daily Note     Today's date: 3/31/2022  Patient name: Rakesh Etienne  : 1955  MRN: [id number]  Referring provider: Brayan Lundberg  Dx:   Encounter Diagnosis     ICD-10-CM    1  Status post left hip replacement  Z96 642    2  Orthopedic aftercare  Z47 89        Start Time: 08  Stop Time: 0845  Total time in clinic (min): 43 minutes    Subjective: Pt reports he woke up this morning without back pain  He notices it has decreased recently and he is working hard to break habits of favoring the L hip  Objective: See treatment diary below      Assessment: Tolerated treatment well  Patient required cuing during sidelying hip abduction to avoid external rotation compensation; following cuing, pt displayed increased difficulty with exercise but improved form  Pt continues to progress treadmill speed as tolerated  Leg press weight was progressed significantly this session, especially for SL; pt reported fatigue following  Improved symmetry was noted with sit to stands  Increased fatigue and difficulty on the LLE compared to the RLE was noted with hamstring curls with increased resistance  Deadlifts were introduced with pt requiring cuing for form  Decreased L knee bend and increased thoracic rotation were noted and pt was cued to adjust  Continue to progress pt as tolerated next visit  Plan: Continue per plan of care        Precautions: GERD, type 2 diabetes, CKD, sleep apnea, COPD, h/o L rib fx, femoral neck fx, hyperlipidemia, chronic pain syndrome, s/p L hip replacement      Manuals 3/24 3/29 3/31    3/10 3/14 3/18 3/22   HF str                                                    Neuro Re-Ed 3/24 3/29 3/31    3/10 3/14 3/18 3/22   SLR             bridges 1x15 pball 1x15 pball 1x15 pball     2x15 pball 2x12 pball 2x15 pball   Feet together on foam balance             semitandem balance             Weight shifting to SLS (UE support prn)              sidelying abduction 2x15 2 5# 2x12 2 5# 2x15 2 5#    2x15 1x15, 1x8 2# 2x10 2# 2x12 2 5#   SLS       2x45" each      SLS cone taps  2x5 laps 4 cones           Ther Ex 3/24 3/29 3/31    3/10 3/14 3/18 3/22   standing hip abduction             Standing marches 8# 1x lap at mirror      5x back and forth at table; 2 laps with 8# suitcase carry 8# suitcase slow 3x at table 8# suitcase 1x28' 8# 8' slow; pn! In R ankle, terminated   Lateral walks 1x5 btb 1x5 btb       1x3 btb 1x4 btb   Walking warmup/bike 5' TM; bike nv 5' TM 5' TM    4' walking no AD; TM nv 5' TM 5' TM TM 5'   Leg press 1x15 145#, 1x10 75# SL, 1x10 85# 1x15 155#, 1x10 75# SL, 1x10 85# 1x10 165#, 175# 1x10, 85# 1x10 SL, 95# 1x10 SL, 105# SL 1x10    1x10 85#, 1x10 105#, 1x10 115#, SL 1x10 65# 1x15 115#, 1x10 125#, SL 1x10 75#  2x10 135#, 1x10 75# SL    Hip extension             Seated HS curls   2x10 each purple tb    gtb 2x12 each  btb 2x10 each btb 1x12                Ther Activity 3/24 3/29 3/31    3/10 3/14 3/18 3/22                Step ups       2R fwd 1x10 no UE support, 1x10 with suitcase pickup and carry, 1x15 lateral 1R 2R fwd 1x10 no UE support 3R fwd 1x10 no UE support, 2R 1x10 no UE support       deadlifts   2x10 8#          Walking lunges  2x laps at table  1xF 1x10          Mini squats             Pt edu       NS NS     STS 2x15, 1st set blue foam 1x10 no foam, 1x10 foam with 8# goblet, 1x10 no foam 8# goblet 2x10 8# goblet no foam          Gait Training 3/24 3/29 3/31    3/10 3/14 3/18 3/22                             Modalities 3/24 3/29 3/31    3/10 3/14 3/18 3/22

## 2022-04-05 ENCOUNTER — OFFICE VISIT (OUTPATIENT)
Dept: PHYSICAL THERAPY | Facility: CLINIC | Age: 67
End: 2022-04-05
Payer: COMMERCIAL

## 2022-04-05 DIAGNOSIS — Z47.89 ORTHOPEDIC AFTERCARE: ICD-10-CM

## 2022-04-05 DIAGNOSIS — Z96.642 STATUS POST LEFT HIP REPLACEMENT: Primary | ICD-10-CM

## 2022-04-05 PROCEDURE — 97110 THERAPEUTIC EXERCISES: CPT

## 2022-04-05 PROCEDURE — 97530 THERAPEUTIC ACTIVITIES: CPT

## 2022-04-05 PROCEDURE — 97112 NEUROMUSCULAR REEDUCATION: CPT

## 2022-04-05 NOTE — PROGRESS NOTES
Daily Note     Today's date: 2022  Patient name: Evita Mittal  : 1955  MRN: [id number]  Referring provider: Cherylene Arena, Meribeth Mallet  Dx:   Encounter Diagnosis     ICD-10-CM    1  Status post left hip replacement  Z96 642    2  Orthopedic aftercare  Z47 [id number]        Start Time: 08  Stop Time: 9365  Total time in clinic (min): 45 minutes    Subjective: Pt reports he feels good today but is having difficulty getting up from the toilet at home  Objective: See treatment diary below      Assessment: Tolerated treatment well  The bike was trialed with warmup today with pt unable to make a full revolution due to ROM deficits; instead, he modified the warmup with rocking  To practice rising from low surfaces, pt performed sit to stands from stool with foam pad elevation  Exercise was performed with min A occassionally and with support nearby  Pt progressed bridges with hamstring curl at top, requiring initial cuing for form  Pt was verbally cued to control the eccentric motion during lateral walks  Deadlifts were performed again this session with pt cued to avoid upper thoracic rounding; despite cuing, pt still displayed upper thoracic rounding throughout the second set, though it was less compared to the first set  Continue to progress pt as tolerated next visit  Plan: Continue per plan of care        Precautions: GERD, type 2 diabetes, CKD, sleep apnea, COPD, h/o L rib fx, femoral neck fx, hyperlipidemia, chronic pain syndrome, s/p L hip replacement      Manuals 3/24 3/29 3/31 4/5   3/10 3/14 3/18 3/22   HF str                                                    Neuro Re-Ed 3/24 3/29 3/31 4/5   3/10 3/14 3/18 3/22   SLR             bridges 1x15 pball 1x15 pball 1x15 pball 2x8 with hamstring curl pball    2x15 pball 2x12 pball 2x15 pball   Feet together on foam balance             semitandem balance             Weight shifting to SLS (UE support prn)              sidelying abduction 2x15 2 5# 2x12 2 5# 2x15 2 5# 2x10 3#   2x15 1x15, 1x8 2# 2x10 2# 2x12 2 5#   SLS       2x45" each      SLS cone taps  2x5 laps 4 cones           Ther Ex 3/24 3/29 3/31 4/5   3/10 3/14 3/18 3/22   standing hip abduction             Standing marches 8# 1x lap at mirror      5x back and forth at table; 2 laps with 8# suitcase carry 8# suitcase slow 3x at table 8# suitcase 1x28' 8# 8' slow; pn! In R ankle, terminated   Lateral walks 1x5 btb 1x5 btb  1x4 btb eccentric emphasis     1x3 btb 1x4 btb   Walking warmup/bike 5' TM; bike nv 5' TM 5' TM 5' bike rocking   4' walking no AD; TM nv 5' TM 5' TM TM 5'   Leg press 1x15 145#, 1x10 75# SL, 1x10 85# 1x15 155#, 1x10 75# SL, 1x10 85# 1x10 165#, 175# 1x10, 85# 1x10 SL, 95# 1x10 SL, 105# SL 1x10 1x10 165#, 1x10 185#, 105# SL 1x10, 115# 1x5 SL   1x10 85#, 1x10 105#, 1x10 115#, SL 1x10 65# 1x15 115#, 1x10 125#, SL 1x10 75#  2x10 135#, 1x10 75# SL    Hip extension             Seated HS curls   2x10 each purple tb    gtb 2x12 each  btb 2x10 each btb 1x12   HF with strap    1x10 each         Ther Activity 3/24 3/29 3/31 4/5   3/10 3/14 3/18 3/22                Step ups       2R fwd 1x10 no UE support, 1x10 with suitcase pickup and carry, 1x15 lateral 1R 2R fwd 1x10 no UE support 3R fwd 1x10 no UE support, 2R 1x10 no UE support       deadlifts   2x10 8# 2x10 8#         Walking lunges  2x laps at table  1xF 1x10          Mini squats             Pt edu       NS NS     STS 2x15, 1st set blue foam 1x10 no foam, 1x10 foam with 8# goblet, 1x10 no foam 8# goblet 2x10 8# goblet no foam 1x5 decreased elevation stool+blue foam+black foam         Gait Training 3/24 3/29 3/31 4/5   3/10 3/14 3/18 3/22                             Modalities 3/24 3/29 3/31 4/5   3/10 3/14 3/18 3/22

## 2022-04-07 ENCOUNTER — OFFICE VISIT (OUTPATIENT)
Dept: PHYSICAL THERAPY | Facility: CLINIC | Age: 67
End: 2022-04-07
Payer: COMMERCIAL

## 2022-04-07 DIAGNOSIS — Z47.89 ORTHOPEDIC AFTERCARE: ICD-10-CM

## 2022-04-07 DIAGNOSIS — Z96.642 STATUS POST LEFT HIP REPLACEMENT: Primary | ICD-10-CM

## 2022-04-07 PROCEDURE — 97112 NEUROMUSCULAR REEDUCATION: CPT

## 2022-04-07 PROCEDURE — 97110 THERAPEUTIC EXERCISES: CPT

## 2022-04-07 NOTE — PROGRESS NOTES
Daily Note     Today's date: 2022  Patient name: Cesia Ha  : 1955  MRN: [id number]  Referring provider: Carol Doty  Dx:   Encounter Diagnosis     ICD-10-CM    1  Status post left hip replacement  Z96 642    2  Orthopedic aftercare  Z47 [id number]        Start Time: 0804          Subjective: Pt reports he's been doing a lot of work at home trying to sit deeper on his toilet that's lower to the ground and get up without difficulty  Objective: See treatment diary below      Assessment: Tolerated treatment well  Patient showed hip ROM improvement with ability to complete full revolutions backwards and forwards on the bike this session  Sidelying abduction was performed with verbal and tactile cuing provided for form; with improved form, pt reported lateral hip fatigue  Pt improved form with deadlifts this session following cuing  Continue to progress pt as tolerated next visit  Plan: Continue per plan of care        Precautions: GERD, type 2 diabetes, CKD, sleep apnea, COPD, h/o L rib fx, femoral neck fx, hyperlipidemia, chronic pain syndrome, s/p L hip replacement      Manuals 3/24 3/29 3/31 4/5 4/7  3/10 3/14 3/18 3/22   HF str                                                    Neuro Re-Ed 3/24 3/29 3/31 4/5 4/7  3/10 3/14 3/18 3/22   SLR             bridges 1x15 pball 1x15 pball 1x15 pball 2x8 with hamstring curl pball 1x10, 1x5 with hamstring curls pball   2x15 pball 2x12 pball 2x15 pball   Feet together on foam balance             semitandem balance             Weight shifting to SLS (UE support prn)              sidelying abduction 2x15 2 5# 2x12 2 5# 2x15 2 5# 2x10 3#   2x15 1x15, 1x8 2# 2x10 2# 2x12 2 5#   SLS       2x45" each      SLS cone taps  2x5 laps 4 cones   2x5 laps 5 cones        Ther Ex 3/24 3/29 3/31 4/5 4/7  3/10 3/14 3/18 3/22   standing hip abduction             Standing marches 8# 1x lap at mirror      5x back and forth at table; 2 laps with 8# suitcase carry 8# suitcase slow 3x at table 8# suitcase 1x28' 8# 8' slow; pn! In R ankle, terminated   Lateral walks 1x5 btb 1x5 btb  1x4 btb eccentric emphasis     1x3 btb 1x4 btb   Walking warmup/bike 5' TM; bike nv 5' TM 5' TM 5' bike rocking 6' bike rocking  4' walking no AD; TM nv 5' TM 5' TM TM 5'   Leg press 1x15 145#, 1x10 75# SL, 1x10 85# 1x15 155#, 1x10 75# SL, 1x10 85# 1x10 165#, 175# 1x10, 85# 1x10 SL, 95# 1x10 SL, 105# SL 1x10 1x10 165#, 1x10 185#, 105# SL 1x10, 115# 1x5 SL   1x10 85#, 1x10 105#, 1x10 115#, SL 1x10 65# 1x15 115#, 1x10 125#, SL 1x10 75#  2x10 135#, 1x10 75# SL    Hip extension     1x15 each 3#        Seated HS curls   2x10 each purple tb    gtb 2x12 each  btb 2x10 each btb 1x12   HF with strap    1x10 each 2x10 each        Ther Activity 3/24 3/29 3/31 4/5 4/7  3/10 3/14 3/18 3/22                Step ups       2R fwd 1x10 no UE support, 1x10 with suitcase pickup and carry, 1x15 lateral 1R 2R fwd 1x10 no UE support 3R fwd 1x10 no UE support, 2R 1x10 no UE support       deadlifts   2x10 8# 2x10 8# 2x10 8#        Walking lunges  2x laps at table  1xF 1x10          Mini squats             Pt edu       NS NS     STS 2x15, 1st set blue foam 1x10 no foam, 1x10 foam with 8# goblet, 1x10 no foam 8# goblet 2x10 8# goblet no foam 1x5 decreased elevation stool+blue foam+black foam 1x8 18# goblet, 1x5 stool with elevation        Gait Training 3/24 3/29 3/31 4/5 4/7  3/10 3/14 3/18 3/22                             Modalities 3/24 3/29 3/31 4/5 4/7  3/10 3/14 3/18 3/22

## 2022-04-12 ENCOUNTER — OFFICE VISIT (OUTPATIENT)
Dept: PHYSICAL THERAPY | Facility: CLINIC | Age: 67
End: 2022-04-12
Payer: COMMERCIAL

## 2022-04-12 DIAGNOSIS — Z47.89 ORTHOPEDIC AFTERCARE: ICD-10-CM

## 2022-04-12 DIAGNOSIS — Z96.642 STATUS POST LEFT HIP REPLACEMENT: Primary | ICD-10-CM

## 2022-04-12 PROCEDURE — 97110 THERAPEUTIC EXERCISES: CPT

## 2022-04-12 PROCEDURE — 97112 NEUROMUSCULAR REEDUCATION: CPT

## 2022-04-12 PROCEDURE — 97530 THERAPEUTIC ACTIVITIES: CPT

## 2022-04-12 NOTE — PROGRESS NOTES
Daily Note     Today's date: 2022  Patient name: Sahil Fisher  : 1955  MRN: [id number]  Referring provider: Barb Omer  Dx:   Encounter Diagnosis     ICD-10-CM    1  Status post left hip replacement  Z96 642    2  Orthopedic aftercare  Z47 89        Start Time:           Subjective: Pt reports he has been on medication since Friday for an upper respiratory infection  Objective: See treatment diary below      Assessment: Tolerated treatment well  Patient trialed SLS on foam today; pt had initial difficulty bilaterally but showed continued difficulty maintaining SLS on the LLE compared to the RLE, requiring slightly more UE support throughout  Pt reported a "pull" and some pain in the R hip at the end of the minute  Stool scooter HS curls were added to exercises; pt displayed some increased difficulty on the LLE compared to the RLE  Pt was adequately challenged this session with increased sets of leg press with platform closer to improve strength and hip ROM  Continue to progress pt as tolerated next visit  Plan: Continue per plan of care        Precautions: GERD, type 2 diabetes, CKD, sleep apnea, COPD, h/o L rib fx, femoral neck fx, hyperlipidemia, chronic pain syndrome, s/p L hip replacement      Manuals 3/24 3/29 3/31 4/5 4/7 4/12    3/22   HF str                                                    Neuro Re-Ed 3/24 3/29 3/31 4/5 4/7 4/12    3/22   SLR             bridges 1x15 pball 1x15 pball 1x15 pball 2x8 with hamstring curl pball 1x10, 1x5 with hamstring curls pball     2x15 pball   Feet together on foam balance             semitandem balance             Weight shifting to SLS (UE support prn)              sidelying abduction 2x15 2 5# 2x12 2 5# 2x15 2 5# 2x10 3#  3# 2x12    2x12 2 5#   SLS      2x1' each on foam       SLS cone taps  2x5 laps 4 cones   2x5 laps 5 cones        Ther Ex 3/24 3/29 3/31 4/ 4/7 4/12    3/22   standing hip abduction             Standing marches 8# 1x lap at mirror         8# 8' slow; pn!  In R ankle, terminated   Lateral walks 1x5 btb 1x5 btb  1x4 btb eccentric emphasis  1x4 btb    1x4 btb   Walking warmup/bike 5' TM; bike nv 5' TM 5' TM 5' bike rocking 6' bike rocking 5' bike rocking    TM 5'   Leg press 1x15 145#, 1x10 75# SL, 1x10 85# 1x15 155#, 1x10 75# SL, 1x10 85# 1x10 165#, 175# 1x10, 85# 1x10 SL, 95# 1x10 SL, 105# SL 1x10 1x10 165#, 1x10 185#, 105# SL 1x10, 115# 1x5 SL  185# 3x10, 95# 1x10 SL       Hip extension     1x15 each 3#        Seated HS curls   2x10 each purple tb       btb 1x12   Stool HS curls      28'x4       HF with strap    1x10 each 2x10 each        Ther Activity 3/24 3/29 3/31 4/5 4/7 4/12    3/22                Step ups      **lateral nv       deadlifts   2x10 8# 2x10 8# 2x10 8# 2x12 8#       Walking lunges  2x laps at table  1xF 1x10          Mini squats             Pt edu      NS       STS 2x15, 1st set blue foam 1x10 no foam, 1x10 foam with 8# goblet, 1x10 no foam 8# goblet 2x10 8# goblet no foam 1x5 decreased elevation stool+blue foam+black foam 1x8 18# goblet, 1x5 stool with elevation        Gait Training 3/24 3/29 3/31 4/5 4/7 4/12    3/22                             Modalities 3/24 3/29 3/31 4/5 4/7 4/12    3/22

## 2022-04-14 ENCOUNTER — OFFICE VISIT (OUTPATIENT)
Dept: PHYSICAL THERAPY | Facility: CLINIC | Age: 67
End: 2022-04-14
Payer: COMMERCIAL

## 2022-04-14 DIAGNOSIS — Z47.89 ORTHOPEDIC AFTERCARE: ICD-10-CM

## 2022-04-14 DIAGNOSIS — Z96.642 STATUS POST LEFT HIP REPLACEMENT: Primary | ICD-10-CM

## 2022-04-14 PROCEDURE — 97110 THERAPEUTIC EXERCISES: CPT

## 2022-04-14 PROCEDURE — 97530 THERAPEUTIC ACTIVITIES: CPT

## 2022-04-14 PROCEDURE — 97112 NEUROMUSCULAR REEDUCATION: CPT

## 2022-04-14 NOTE — PROGRESS NOTES
Daily Note     Today's date: 2022  Patient name: Indu Jose  : 1955  MRN: [id number]  Referring provider: Silverio Vickers  Dx:   Encounter Diagnosis     ICD-10-CM    1  Status post left hip replacement  Z96 642    2  Orthopedic aftercare  Z47 89        Start Time: 69 430 23 60  Stop Time: 467 [id number]  Total time in clinic (min): 42 minutes    Subjective: Pt reported he tried to go on a long walk on Tuesday  He had a lot of soreness in his knees, hips, and back  Yesterday he didn't walk at all yesterday because he needed a rest day  Start billing 03      Objective: See treatment diary below      Assessment: Tolerated treatment well  Patient progressed hamstring curl bridges with increased repetitions; pt was adequately fatigued following exercise  Sidelying abduction with weight was also progressed with increased weight with pt reporting lateral hip fatigue following exercise  SLS on foam was difficulty for pt to perform without UE support  Lateral step ups were trialed with 2R; however, pt had extreme L knee pain, so the height was decreased  Deadlifts were also progressed this with increased weight with no adverse symptoms  Continue to progress pt as tolerated next visit  Plan: Continue per plan of care        Precautions: GERD, type 2 diabetes, CKD, sleep apnea, COPD, h/o L rib fx, femoral neck fx, hyperlipidemia, chronic pain syndrome, s/p L hip replacement      Manuals 3/24 3/29 3/31 4/5 4/7 4/12 4/14   3/22   HF str                                                    Neuro Re-Ed 3/24 3/29 3/31 4/5 4/7 4/12 4/14   3/22   SLR             bridges 1x15 pball 1x15 pball 1x15 pball 2x8 with hamstring curl pball 1x10, 1x5 with hamstring curls pball  2x12 hamstring curls   2x15 pball   Feet together on foam balance             semitandem balance             Weight shifting to SLS (UE support prn)              sidelying abduction 2x15 2 5# 2x12 2 5# 2x15 2 5# 2x10 3#  3# 2x12 3# 1x15   2x12 2 5#   SLS 2x1' each on foam 2x1' each on foam      SLS cone taps  2x5 laps 4 cones   2x5 laps 5 cones        Ther Ex 3/24 3/29 3/31 4/5 4/7 4/12 4/14   3/22   standing hip abduction             Standing marches 8# 1x lap at mirror         8# 8' slow; pn!  In R ankle, terminated   Lateral walks 1x5 btb 1x5 btb  1x4 btb eccentric emphasis  1x4 btb    1x4 btb   Walking warmup/bike 5' TM; bike nv 5' TM 5' TM 5' bike rocking 6' bike rocking 5' bike rocking 7' bike rocking   TM 5'   Leg press 1x15 145#, 1x10 75# SL, 1x10 85# 1x15 155#, 1x10 75# SL, 1x10 85# 1x10 165#, 175# 1x10, 85# 1x10 SL, 95# 1x10 SL, 105# SL 1x10 1x10 165#, 1x10 185#, 105# SL 1x10, 115# 1x5 SL  185# 3x10, 95# 1x10 SL       Hip extension     1x15 each 3#  1x15 3#      Seated HS curls   2x10 each purple tb       btb 1x12   Stool HS curls      28'x4       HF with strap    1x10 each 2x10 each        Ther Activity 3/24 3/29 3/31 4/5 4/7 4/12 4/14   3/22                Step ups      **lateral nv 2x10 1R      deadlifts   2x10 8# 2x10 8# 2x10 8# 2x12 8# 1x10 8#, 1x10 18#      Walking lunges  2x laps at table  1xF 1x10          Mini squats             Pt edu      NS       STS 2x15, 1st set blue foam 1x10 no foam, 1x10 foam with 8# goblet, 1x10 no foam 8# goblet 2x10 8# goblet no foam 1x5 decreased elevation stool+blue foam+black foam 1x8 18# goblet, 1x5 stool with elevation        Gait Training 3/24 3/29 3/31 4/5 4/7 4/12 4/14   3/22                             Modalities 3/24 3/29 3/31 4/5 4/7 4/12 4/14   3/22

## 2022-04-15 ENCOUNTER — HOSPITAL ENCOUNTER (OUTPATIENT)
Dept: RADIOLOGY | Facility: HOSPITAL | Age: 67
Discharge: HOME | End: 2022-04-15
Payer: COMMERCIAL

## 2022-04-15 DIAGNOSIS — J44.1 CHRONIC OBSTRUCTIVE PULMONARY DISEASE WITH ACUTE EXACERBATION (HCC): ICD-10-CM

## 2022-04-15 PROCEDURE — 71046 X-RAY EXAM CHEST 2 VIEWS: CPT

## 2022-04-19 ENCOUNTER — OFFICE VISIT (OUTPATIENT)
Dept: PHYSICAL THERAPY | Facility: CLINIC | Age: 67
End: 2022-04-19
Payer: COMMERCIAL

## 2022-04-19 DIAGNOSIS — Z96.642 STATUS POST LEFT HIP REPLACEMENT: Primary | ICD-10-CM

## 2022-04-19 DIAGNOSIS — Z47.89 ORTHOPEDIC AFTERCARE: ICD-10-CM

## 2022-04-19 PROCEDURE — 97530 THERAPEUTIC ACTIVITIES: CPT

## 2022-04-19 PROCEDURE — 97112 NEUROMUSCULAR REEDUCATION: CPT

## 2022-04-19 PROCEDURE — 97110 THERAPEUTIC EXERCISES: CPT

## 2022-04-19 NOTE — PROGRESS NOTES
Daily Note     Today's date: 2022  Patient name: Bob Ziegler  : 1955  MRN: [id number]  Referring provider: Renee Pierce  Dx:   Encounter Diagnosis     ICD-10-CM    1  Status post left hip replacement  Z96 642    2  Orthopedic aftercare  Z47 89        Start Time: 714  Stop Time: 828  Total time in clinic (min): 53 minutes    Subjective: Pt reports his knees are bothering him this morning, but he feels better once he finishes on the bike  Objective: See treatment diary below      Assessment: Tolerated treatment well  Patient reported feeling more confident walking on the treadmill following warmup on the bike  Pt was unable to make a full revolution on the bike at the beginning of the time, but was riding with full revolutions by the end of the time  Pt progressed bridges with eccentric slide outs, requiring initial cuing  Pt displayed difficulty with LLE step downs laterally due to quad weakness at increasing depths, specifically eccentric strength  Suitcase marches were performed with 8#; heavier weight was trialed but pt was unable to maintain stability, so the weight was decreased back to 8#  Verbal cuing was provided to avoid external rotation compensation during lateral walks  Continue to progress pt as tolerated next visit  Plan: Continue per plan of care        Precautions: GERD, type 2 diabetes, CKD, sleep apnea, COPD, h/o L rib fx, femoral neck fx, hyperlipidemia, chronic pain syndrome, s/p L hip replacement      Manuals 3/24 3/29 3/31 4/5 4/7 4/12 4/14 4/19  3/22   HF str                                                    Neuro Re-Ed 3/24 3/29 3/31 4/5 4/7 4/12 4/14 4/19  3/22   SLR             bridges 1x15 pball 1x15 pball 1x15 pball 2x8 with hamstring curl pball 1x10, 1x5 with hamstring curls pball  2x12 hamstring curls 2x10 eccentric slide outs  2x15 pball   Feet together on foam balance             semitandem balance             Weight shifting to SLS (UE support prn)              sidelying abduction 2x15 2 5# 2x12 2 5# 2x15 2 5# 2x10 3#  3# 2x12 3# 1x15 3# 1x15  2x12 2 5#   SLS      2x1' each on foam 2x1' each on foam      Adduction squeeze with hip flexion        1x10      SLS cone taps  2x5 laps 4 cones   2x5 laps 5 cones        Ther Ex 3/24 3/29 3/31 4/5 4/7 4/12 4/14 4/19  3/22   standing hip abduction             Standing marches 8# 1x lap at mirror         8# 8' slow; pn!  In R ankle, terminated   Lateral walks 1x5 btb 1x5 btb  1x4 btb eccentric emphasis  1x4 btb  1x5 btb at mirror  1x4 btb   Walking warmup/bike 5' TM; bike nv 5' TM 5' TM 5' bike rocking 6' bike rocking 5' bike rocking 7' bike rocking 6' bike, 5' TM  TM 5'   Leg press 1x15 145#, 1x10 75# SL, 1x10 85# 1x15 155#, 1x10 75# SL, 1x10 85# 1x10 165#, 175# 1x10, 85# 1x10 SL, 95# 1x10 SL, 105# SL 1x10 1x10 165#, 1x10 185#, 105# SL 1x10, 115# 1x5 SL  185# 3x10, 95# 1x10 SL       Hip extension     1x15 each 3#  1x15 3#      Seated HS curls   2x10 each purple tb       btb 1x12   Stool HS curls      28'x4       HF with strap    1x10 each 2x10 each   1x10 each     Ther Activity 3/24 3/29 3/31 4/5 4/7 4/12 4/14 4/19  3/22   Suitcase marches        2x mirror 8#     Step ups      **lateral nv 2x10 1R 1x15 1R     deadlifts   2x10 8# 2x10 8# 2x10 8# 2x12 8# 1x10 8#, 1x10 18#      Walking lunges  2x laps at table  1xF 1x10          Mini squats             Pt edu      NS       STS 2x15, 1st set blue foam 1x10 no foam, 1x10 foam with 8# goblet, 1x10 no foam 8# goblet 2x10 8# goblet no foam 1x5 decreased elevation stool+blue foam+black foam 1x8 18# goblet, 1x5 stool with elevation        Gait Training 3/24 3/29 3/31 4/5 4/7 4/12 4/14 4/19  3/22                             Modalities 3/24 3/29 3/31 4/5 4/7 4/12 4/14 4/19  3/22

## 2022-04-21 ENCOUNTER — OFFICE VISIT (OUTPATIENT)
Dept: PHYSICAL THERAPY | Facility: CLINIC | Age: 67
End: 2022-04-21
Payer: COMMERCIAL

## 2022-04-21 DIAGNOSIS — Z96.642 STATUS POST LEFT HIP REPLACEMENT: Primary | ICD-10-CM

## 2022-04-21 DIAGNOSIS — Z47.89 ORTHOPEDIC AFTERCARE: ICD-10-CM

## 2022-04-21 PROCEDURE — 97112 NEUROMUSCULAR REEDUCATION: CPT

## 2022-04-21 PROCEDURE — 97110 THERAPEUTIC EXERCISES: CPT

## 2022-04-21 PROCEDURE — 97530 THERAPEUTIC ACTIVITIES: CPT

## 2022-04-21 NOTE — PROGRESS NOTES
Daily Note     Today's date: 2022  Patient name: Sahil Fisher  : 1955  MRN: [id number]  Referring provider: Barb Omer  Dx:   Encounter Diagnosis     ICD-10-CM    1  Status post left hip replacement  Z96 642    2  Orthopedic aftercare  Z47 89        Start Time: 0805          Subjective: Pt reports he has some R hip pain following the bike this session  Objective: See treatment diary below      Assessment: Tolerated treatment well  Patient reports R sided hip fatigue more so than L sided hip fatigue with sidelying abduction  Pt displayed increased difficulty with LLE hamstring curls compared to the RLE  Due to pt reports of dragging feet during walks, walking marches with ankle weights were added to exercises  Pt required frequent UE support  Pt also reported difficulty with advancing limb during ambulating, so hip flexion strengthening was emphasized with SLR with weight this session  Plan: Continue per plan of care        Precautions: GERD, type 2 diabetes, CKD, sleep apnea, COPD, h/o L rib fx, femoral neck fx, hyperlipidemia, chronic pain syndrome, s/p L hip replacement      Manuals 3/24 3/29 3/31 4/ 4    HF str                                                    Neuro Re-Ed 3/24 3/29 3/31 4/ 4/    SLR         2 5# 1x15 each    bridges 1x15 pball 1x15 pball 1x15 pball 2x8 with hamstring curl pball 1x10, 1x5 with hamstring curls pball  2x12 hamstring curls 2x10 eccentric slide outs 2x12 eccentric slide outs    Feet together on foam balance             semitandem balance             Weight shifting to SLS (UE support prn)              sidelying abduction 2x15 2 5# 2x12 2 5# 2x15 2 5# 2x10 3#  3# 2x12 3# 1x15 3# 1x15 3# 1x15    SLS      2x1' each on foam 2x1' each on foam      Adduction squeeze with hip flexion        1x10      SLS cone taps  2x5 laps 4 cones   2x5 laps 5 cones        Ther Ex 3/24 3/29 3/31 4/5 4/7  standing hip abduction             Standing marches 8# 1x lap at mirror            Lateral walks 1x5 btb 1x5 btb  1x4 btb eccentric emphasis  1x4 btb  1x5 btb at mirror     Walking warmup/bike 5' TM; bike nv 5' TM 5' TM 5' bike rocking 6' bike rocking 5' bike rocking 7' bike rocking 6' bike, 5' TM 6' bike, 5' TM    Leg press 1x15 145#, 1x10 75# SL, 1x10 85# 1x15 155#, 1x10 75# SL, 1x10 85# 1x10 165#, 175# 1x10, 85# 1x10 SL, 95# 1x10 SL, 105# SL 1x10 1x10 165#, 1x10 185#, 105# SL 1x10, 115# 1x5 SL  185# 3x10, 95# 1x10 SL       Hip extension     1x15 each 3#  1x15 3#      Seated HS curls   2x10 each purple tb          Stool HS curls      28'x4       HF with strap    1x10 each 2x10 each   1x10 each 1x10 each    Ther Activity 3/24 3/29 3/31 4/5 4/7 4/12 4/14 4/19 4/21    Suitcase marches        2x mirror 8#     Step ups      **lateral nv 2x10 1R lateral 1x15 1R lateral 1x15 1R lateral    deadlifts   2x10 8# 2x10 8# 2x10 8# 2x12 8# 1x10 8#, 1x10 18#      Walking marches with ankle weights         4# 5x at [facility]  2x laps at table  1xF 1x10      alternating from floor 1x10 each    Mini squats             Pt edu      NS       STS 2x15, 1st set blue foam 1x10 no foam, 1x10 foam with 8# goblet, 1x10 no foam 8# goblet 2x10 8# goblet no foam 1x5 decreased elevation stool+blue foam+black foam 1x8 18# goblet, 1x5 stool with elevation        Gait Training 3/24 3/29 3/31 4/5 4/7 4/12 4/14 4/19 4/21                              Modalities 3/24 3/29 3/31 4/5 4/7 4/12 4/14 4/19 4/21

## 2022-04-26 ENCOUNTER — OFFICE VISIT (OUTPATIENT)
Dept: PHYSICAL THERAPY | Facility: CLINIC | Age: 67
End: 2022-04-26
Payer: COMMERCIAL

## 2022-04-26 DIAGNOSIS — Z96.642 STATUS POST LEFT HIP REPLACEMENT: Primary | ICD-10-CM

## 2022-04-26 DIAGNOSIS — Z47.89 ORTHOPEDIC AFTERCARE: ICD-10-CM

## 2022-04-26 PROCEDURE — 97110 THERAPEUTIC EXERCISES: CPT

## 2022-04-26 PROCEDURE — 97112 NEUROMUSCULAR REEDUCATION: CPT

## 2022-04-26 NOTE — PROGRESS NOTES
Daily Note     Today's date: 2022  Patient name: Sigrid Kang  : 1955  MRN: [id number]  Referring provider: Víctor Conway  Dx:   Encounter Diagnosis     ICD-10-CM    1  Status post left hip replacement  Z96 642    2  Orthopedic aftercare  Z47 89        Start Time: 0  Stop Time: 0845  Total time in clinic (min): 46 minutes    Subjective: Pt trialed walking for long periods of time this weekend and reported quite a bit of pain "from hip to hip" in his back  Objective: See treatment diary below    Palpation: pain with PA mobilization to L1-L5, more pain with descending palpation; pain and hypertonicity with palpation to bilateral erector spinae of the lumbar spine, increased lower R lumbar spine; pain with palpation to L SIJ with UPA mobilization      Assessment: Tolerated treatment well  Due to pt complaint of "hip to hip" pain in the back with walking for long periods of time, pt was assessed with palpation as above  Paloff press was introduced this session with pt requiring initial cuing  Prone birddogs were also trialed this session with pt requiring initial cuing; pt reported feeling fatigue in his back post-exercise  Moist heat was also trialed post-treatment  Continue to progress pt as tolerated next visit with emphasis on low back strengthening and stability  Plan: Continue per plan of care        Precautions: GERD, type 2 diabetes, CKD, sleep apnea, COPD, h/o L rib fx, femoral neck fx, hyperlipidemia, chronic pain syndrome, s/p L hip replacement      Manuals 3/24 3/29 3/31 4/ 4   HF str             RE          nv                             Neuro Re-Ed 3/24 3/29 3/31 4/5 4/7    Prone birddogs          2x10   SLR         2 5# 1x15 each    bridges 1x15 pball 1x15 pball 1x15 pball 2x8 with hamstring curl pball 1x10, 1x5 with hamstring curls pball  2x12 hamstring curls 2x10 eccentric slide outs 2x12 eccentric slide outs 2x15 pball    Feet together on foam balance             semitandem balance             Weight shifting to SLS (UE support prn)              sidelying abduction 2x15 2 5# 2x12 2 5# 2x15 2 5# 2x10 3#  3# 2x12 3# 1x15 3# 1x15 3# 1x15 3# 1x15 each   Paloff press          10# 2x15 each side   SLS      2x1' each on foam 2x1' each on foam      Adduction squeeze with hip flexion        1x10   1x15 hooklying, 1x15 long lever   SLS cone taps  2x5 laps 4 cones   2x5 laps 5 cones        Ther Ex 3/24 3/29 3/31 4/5 4/7 4/12 4/14 4/19 4/21 4/26   standing hip abduction             Standing marches 8# 1x lap at mirror            Lateral walks 1x5 btb 1x5 btb  1x4 btb eccentric emphasis  1x4 btb  1x5 btb at mirror     Walking warmup/bike 5' TM; bike nv 5' TM 5' TM 5' bike rocking 6' bike rocking 5' bike rocking 7' bike rocking 6' bike, 5' TM 6' bike, 5' TM 6' bike   Leg press 1x15 145#, 1x10 75# SL, 1x10 85# 1x15 155#, 1x10 75# SL, 1x10 85# 1x10 165#, 175# 1x10, 85# 1x10 SL, 95# 1x10 SL, 105# SL 1x10 1x10 165#, 1x10 185#, 105# SL 1x10, 115# 1x5 SL  185# 3x10, 95# 1x10 SL       Hip extension     1x15 each 3#  1x15 3#      Seated HS curls   2x10 each purple tb          Stool HS curls      28'x4       HF with strap    1x10 each 2x10 each   1x10 each 1x10 each 1x10 each side   Ther Activity 3/24 3/29 3/31 4/5 4/7 4/12 4/14 4/19 4/21 4/26   Suitcase marches        2x mirror 8#  10# 2x at table   Step ups      **lateral nv 2x10 1R lateral 1x15 1R lateral 1x15 1R lateral    deadlifts   2x10 8# 2x10 8# 2x10 8# 2x12 8# 1x10 8#, 1x10 18#      Walking marches with ankle weights         4# 5x at [facility] lung  2x laps at table  1xF 1x10      alternating from floor 1x10 each    Mini squats             Pt edu      NS    NS   STS 2x15, 1st set blue foam 1x10 no foam, 1x10 foam with 8# goblet, 1x10 no foam 8# goblet 2x10 8# goblet no foam 1x5 decreased elevation stool+blue foam+black foam 1x8 18# goblet, 1x5 stool with elevation        Gait Training 3/24 3/29 3/31 4/5 4/7 4/12 4/14 4/19 4/21 4/26                             Modalities 3/24 3/29 3/31 4/5 4/7 4/12 4/14 4/19 4/21 4/26   MHP post-tx

## 2022-04-28 ENCOUNTER — EVALUATION (OUTPATIENT)
Dept: PHYSICAL THERAPY | Facility: CLINIC | Age: 67
End: 2022-04-28
Payer: COMMERCIAL

## 2022-04-28 DIAGNOSIS — Z96.642 STATUS POST LEFT HIP REPLACEMENT: Primary | ICD-10-CM

## 2022-04-28 DIAGNOSIS — Z47.89 ORTHOPEDIC AFTERCARE: ICD-10-CM

## 2022-04-28 PROCEDURE — 97110 THERAPEUTIC EXERCISES: CPT

## 2022-04-28 PROCEDURE — 97164 PT RE-EVAL EST PLAN CARE: CPT

## 2022-04-28 PROCEDURE — 97112 NEUROMUSCULAR REEDUCATION: CPT

## 2022-04-28 NOTE — PROGRESS NOTES
PT Re-evaluation     Today's date: 2022  Patient name: Darleen Perez  : 1955  MRN: [id number]  Referring provider: Mendoza Guerra  Dx:   Encounter Diagnosis     ICD-10-CM    1  Status post left hip replacement  Z96 642    2  Orthopedic aftercare  Z47 89        Start Time: 0802  Stop Time: 3847  Total time in clinic (min): 45 minutes    Subjective: Pt reports he's discouraged because as he's tried to return to walking for longer periods of time, he's had ankle, knee, hip, and back   Start billing 05    Pain  Current pain ratin  At best pain ratin  At worst pain ratin   Shon Kimi in position  Aggravating factors: walking and standing (on his feet for too long)  Progression: improved    Objective: See treatment diary below    Hip Comments   L hip AROM  Flexion:110 degrees  Abduction: WNL        R hip AROM  Flexion: WNL  Abduction: WNL         LE Strength  L Hip flex: 4/5 with pain  R Hip flexion: 4+/5   L Knee extension: 5/5 with pain  R Knee extension: 5/5  L Hip Abduction: 4/5  R Hip Abduction: 4+/5  L Knee flexion: 5/5  R Knee flexion: 5/5     TU seconds  5 time STS: 12 seconds  30 second sit to stand: 12      Assessment: Tolerated treatment well  Patient shows improvement with increase in R hip ROM and overall strength, especially lateral hip strength  Pt displays tight hip flexors especially on the L side; due to this, prone knee bend stretch was added to HEP  Pt's progress is not seen with FOTO outcome measure; due to this, outcome measure items will be discussed with pt next visit  All goals besides FOTO outcome measure have been met  Pt will continue to benefit from physical therapy to improve mobility and function  Continue to progress pt as tolerated with walking and mobility tasks as able  Goals  Short term goals (3-4 weeks)  1   Pt will display independence with understanding and performance of HEP to allow for carryover of plan of care at home  (met)  2  Pt will improve FOTO score from initial evaluation to show improvement in pain and function  (not met)  3  Pt will increase R knee extension strength to 4/5 to improve knee stability strength and function with stair navigation and walking for long periods of time  (met)  4  Pt will increase R knee flexion strength to 4/5 to improve knee stability strength and function with stair navigation and walking for long periods of time  (met)  5  Pt will improve TUG time by 5 seconds to reduce fall risk  (met)    Long term goals (6-8 weeks)  1  Pt will score equal or better than projected score on FOTO to show improvement in pain and function  (unmet)  2  Pt will increase R knee extension strength to 4+/5 to improve knee stability strength and function with stair navigation and walking for long periods of time  (met)  3  Pt will increase R knee flexion strength to 4+/5 to improve knee stability strength and function with stair navigation and walking for long periods of time  (met)  4  Pt will improve TUG time by 10 seconds to reduce fall risk  (met)      Plan: Continue per plan of care        Precautions: GERD, type 2 diabetes, CKD, sleep apnea, COPD, h/o L rib fx, femoral neck fx, hyperlipidemia, chronic pain syndrome, s/p L hip replacement      Manuals 4/28 4/7 4/12 4/14 4/19 4/21 4/26   HF str             RE          nv                             Neuro Re-Ed 4/28 4/7 4/12 4/14 4/19 4/21 4/26   Prone birddogs          2x10   SLR         2 5# 1x15 each    bridges 2x10 pball with curl    1x10, 1x5 with hamstring curls pball  2x12 hamstring curls 2x10 eccentric slide outs 2x12 eccentric slide outs 2x15 pball    Feet together on foam balance             semitandem balance             Weight shifting to SLS (UE support prn)              sidelying abduction 3# 1x15 each side     3# 2x12 3# 1x15 3# 1x15 3# 1x15 3# 1x15 each   Paloff press          10# 2x15 each side   SLS      2x1' each on foam 2x1' each on foam      Adduction squeeze with hip flexion        1x10   1x15 hooklying, 1x15 long lever   SLS cone taps     2x5 laps 5 cones        Ther Ex 4/28 4/7 4/12 4/14 4/19 4/21 4/26   standing hip abduction             Standing marches             Lateral walks      1x4 btb  1x5 btb at mirror     Walking warmup/bike 5' bike    6' bike rocking 5' bike rocking 7' bike rocking 6' bike, 5' TM 6' bike, 5' TM 6' bike   Leg press      185# 3x10, 95# 1x10 SL       Hip extension     1x15 each 3#  1x15 3#      Seated HS curls             Stool HS curls      28'x4       Prone press ups 2x10            Prone knee bend  3x45"            HF with strap 1x10 each     2x10 each   1x10 each 1x10 each 1x10 each side   Ther Activity 4/28 4/7 4/12 4/14 4/19 4/21 4/26   Suitcase marches        2x mirror 8#  10# 2x at table   Step ups      **lateral nv 2x10 1R lateral 1x15 1R lateral 1x15 1R lateral    deadlifts     2x10 8# 2x12 8# 1x10 8#, 1x10 18#      Walking marches with ankle weights         4# 5x at [facility]          alternating from floor 1x10 each    Mini squats             Pt edu      NS    NS   STS     1x8 18# goblet, 1x5 stool with elevation        Gait Training 4/28 4/7 4/12 4/14 4/19 4/21 4/26                             Modalities 4/28 4/7 4/12 4/14 4/19 4/21 4/26   MHP post-tx

## 2022-05-03 ENCOUNTER — OFFICE VISIT (OUTPATIENT)
Dept: PHYSICAL THERAPY | Facility: CLINIC | Age: 67
End: 2022-05-03
Payer: COMMERCIAL

## 2022-05-03 DIAGNOSIS — Z96.642 STATUS POST LEFT HIP REPLACEMENT: Primary | ICD-10-CM

## 2022-05-03 DIAGNOSIS — Z47.89 ORTHOPEDIC AFTERCARE: ICD-10-CM

## 2022-05-03 PROCEDURE — 97112 NEUROMUSCULAR REEDUCATION: CPT

## 2022-05-03 PROCEDURE — 97110 THERAPEUTIC EXERCISES: CPT

## 2022-05-03 PROCEDURE — 97530 THERAPEUTIC ACTIVITIES: CPT

## 2022-05-03 NOTE — PROGRESS NOTES
Daily Note     Today's date: 5/3/2022  Patient name: Jeanie Stearns  : 1955  MRN: [id number]  Referring provider: Germaine Alvarado  Dx:   Encounter Diagnosis     ICD-10-CM    1  Status post left hip replacement  Z96 642    2  Orthopedic aftercare  Z47 89        Start Time: 08  Stop Time: 90  Total time in clinic (min): 38 minutes    Subjective: Pt reported he elbowed himself in the rib after falling at home when he had a little too much to drink  Pt denies any injuries besides some R rib soreness  Objective: See treatment diary below      Assessment: Tolerated treatment well  Patient required modification of hip flexion stretch to half kneel due to rib pain with prone lying  Pt reported greater stretch in half kneel following  Frequent UE support was required for walking marches with ankle weight, though pt was able to remove UE support inconsistently, especially on the RLE  Pt was adequately challenged with deadlifts with 18#, requiring cuing for form  Continue to progress pt as tolerated next visit  Plan: Continue per plan of care        Precautions: GERD, type 2 diabetes, CKD, sleep apnea, COPD, h/o L rib fx, femoral neck fx, hyperlipidemia, chronic pain syndrome, s/p L hip replacement      Manuals 4/28 5/3   4/7 4/12 4/14 4/19 4/21 4/26   HF str             RE          nv                             Neuro Re-Ed 4/28 5/3   4/7 4/12 4/14 4/19 4/21 4/26   Prone birddogs          2x10   SLR         2 5# 1x15 each    bridges 2x10 pball with curl 2x10 with curl    1x10, 1x5 with hamstring curls pball  2x12 hamstring curls 2x10 eccentric slide outs 2x12 eccentric slide outs 2x15 pball    Feet together on foam balance             semitandem balance             Weight shifting to SLS (UE support prn)              sidelying abduction 3# 1x15 each side `    3# 2x12 3# 1x15 3# 1x15 3# 1x15 3# 1x15 each   Paloff press          10# 2x15 each side   SLS  2x1' foam    2x1' each on foam 2x1' each on foam      Adduction squeeze with hip flexion        1x10   1x15 hooklying, 1x15 long lever   SLS cone taps     2x5 laps 5 cones        Ther Ex 4/28 5/3   4/7 4/12 4/14 4/19 4/21 4/26   standing hip abduction             Standing marches             Lateral walks      1x4 btb  1x5 btb at mirror     Walking warmup/bike 5' bike 5' bike, 5' TM incline    6' bike rocking 5' bike rocking 7' bike rocking 6' bike, 5' TM 6' bike, 5' TM 6' bike   Leg press      185# 3x10, 95# 1x10 SL       Hip extension     1x15 each 3#  1x15 3#      Seated HS curls             Stool HS curls      28'x4       Prone press ups 2x10            Prone knee bend  3x45"            Half kneel HF stretch  1x1' each           HF with strap 1x10 each     2x10 each   1x10 each 1x10 each 1x10 each side   Ther Activity 4/28 5/3   4/7 4/12 4/14 4/19 4/21 4/26   Suitcase marches        2x mirror 8#  10# 2x at table   Step ups      **lateral nv 2x10 1R lateral 1x15 1R lateral 1x15 1R lateral    deadlifts  18# 2x10   2x10 8# 2x12 8# 1x10 8#, 1x10 18#      Walking marches with ankle weights  4# 5x at table       4# 5x at [facility]          alternating from floor 1x10 each    Mini squats             Pt edu      NS    NS   Lateral walks  btb 5x at table           STS     1x8 18# goblet, 1x5 stool with elevation        Gait Training 4/28 5/3   4/7 4/12 4/14 4/19 4/21 4/26                             Modalities 4/28 5/3   4/7 4/12 4/14 4/19 4/21 4/26   MHP post-tx

## 2022-05-05 ENCOUNTER — OFFICE VISIT (OUTPATIENT)
Dept: PHYSICAL THERAPY | Facility: CLINIC | Age: 67
End: 2022-05-05
Payer: COMMERCIAL

## 2022-05-05 DIAGNOSIS — Z47.89 ORTHOPEDIC AFTERCARE: ICD-10-CM

## 2022-05-05 DIAGNOSIS — Z96.642 STATUS POST LEFT HIP REPLACEMENT: Primary | ICD-10-CM

## 2022-05-05 PROCEDURE — 97110 THERAPEUTIC EXERCISES: CPT

## 2022-05-05 PROCEDURE — 97530 THERAPEUTIC ACTIVITIES: CPT

## 2022-05-05 PROCEDURE — 97112 NEUROMUSCULAR REEDUCATION: CPT

## 2022-05-05 NOTE — PROGRESS NOTES
Daily Note     Today's date: 2022  Patient name: Indu Jose  : 1955  MRN: [id number]  Referring provider: Silverio Vickers  Dx:   Encounter Diagnosis     ICD-10-CM    1  Status post left hip replacement  Z96 642    2  Orthopedic aftercare  Z47 89        Start Time: 1091  Stop Time: 727  Total time in clinic (min): 56 minutes    Subjective: Patient has follow up with physician for next Wednesday  He has started walking on the TM on an incline and at home with some soreness following  Objective: See treatment diary below      Assessment: Tolerated treatment well  Patient required initial cuing for eccentric slide out bridges  Good form was noted with sidelying hip abduction  Significant difference in prone knee bend was noted on the L compared to the R, with limitation at 90 degrees knee bend on the L and reports of quad tightness  Due to this, supine resisted marching was added to exercises with pt displaying significant weakness on the L side compared to the R  Deadlifts were performed with increased repetitions and cuing required to avoid upper back rounding  Pt also reported feeling slightly off balance during the exercise  Pt cooled down with incline TM walking as tolerated  Continue to progress pt as tolerated next session  Pt to see physician for follow up before next visit  Plan: Continue per plan of care        Precautions: GERD, type 2 diabetes, CKD, sleep apnea, COPD, h/o L rib fx, femoral neck fx, hyperlipidemia, chronic pain syndrome, s/p L hip replacement      Manuals 4/28 5/3 5/5  4/7 4/12 4/14 4/19 4/21 4/26   HF str             RE          nv                             Neuro Re-Ed 4/28 5/3 5/5  4/7 4/12 4/14 4/19 4/21 4/26   Prone birddogs          2x10   SLR         2 5# 1x15 each    bridges 2x10 pball with curl 2x10 with curl  2x12 eccentric slide outs  1x10, 1x5 with hamstring curls pball  2x12 hamstring curls 2x10 eccentric slide outs 2x12 eccentric slide outs 2x15 pball    Feet together on foam balance             semitandem balance             Weight shifting to SLS (UE support prn)              sidelying abduction 3# 1x15 each side ` 3# 1x15 each side   3# 2x12 3# 1x15 3# 1x15 3# 1x15 3# 1x15 each   Paloff press          10# 2x15 each side   SLS  2x1' foam    2x1' each on foam 2x1' each on foam      Adduction squeeze with hip flexion        1x10   1x15 hooklying, 1x15 long lever   SLS cone taps     2x5 laps 5 cones        Ther Ex 4/28 5/3 5/5  4/7 4/12 4/14 4/19 4/21 4/26   standing hip abduction             Standing marches             Lateral walks      1x4 btb  1x5 btb at mirror     Walking warmup/bike 5' bike 5' bike, 5' TM incline  6' bike, 5' TM incline  6' bike rocking 5' bike rocking 7' bike rocking 6' bike, 5' TM 6' bike, 5' TM 6' bike   Leg press   185# 2x10, 95# 1x10 SL   185# 3x10, 95# 1x10 SL       Hip extension     1x15 each 3#  1x15 3#      Seated HS curls             Stool HS curls      28'x4       Prone press ups 2x10            Prone knee bend  3x45"  2x1'          Half kneel HF stretch  1x1' each           Supine marches   gtb 2x10 alternating          HF with strap 1x10 each     2x10 each   1x10 each 1x10 each 1x10 each side   Ther Activity 4/28 5/3 5/5  4/7 4/12 4/14 4/19 4/21 4/26   Suitcase marches        2x mirror 8#  10# 2x at table   Step ups      **lateral nv 2x10 1R lateral 1x15 1R lateral 1x15 1R lateral    deadlifts  18# 2x10 18# 2x12  2x10 8# 2x12 8# 1x10 8#, 1x10 18#      Walking marches with ankle weights  4# 5x at table 4# 3x at table       4# 5x at mirror    Walking lunges          alternating from floor 1x10 each    Mini squats             Pt edu      NS    NS   Lateral walks  btb 5x at table           STS     1x8 18# goblet, 1x5 stool with elevation        Gait Training 4/28 5/3 5/5 4/7 4/12 4/14 4/19 4/21 4/26                             Modalities 4/28 5/3 5/5  4/7 4/12 4/14 4/19 4/21 4/26   MHP post-tx

## 2022-05-11 ENCOUNTER — OFFICE VISIT (OUTPATIENT)
Dept: OBGYN CLINIC | Facility: HOSPITAL | Age: 67
End: 2022-05-11
Payer: COMMERCIAL

## 2022-05-11 ENCOUNTER — HOSPITAL ENCOUNTER (OUTPATIENT)
Dept: RADIOLOGY | Facility: HOSPITAL | Age: 67
Discharge: HOME | End: 2022-05-11
Attending: ORTHOPAEDIC SURGERY
Payer: COMMERCIAL

## 2022-05-11 VITALS
SYSTOLIC BLOOD PRESSURE: 108 MMHG | WEIGHT: 170 LBS | HEIGHT: 68 IN | BODY MASS INDEX: 25.76 KG/M2 | HEART RATE: 98 BPM | DIASTOLIC BLOOD PRESSURE: 65 MMHG

## 2022-05-11 DIAGNOSIS — M53.3 SACRODYNIA: Primary | ICD-10-CM

## 2022-05-11 DIAGNOSIS — G95.19 NEUROGENIC CLAUDICATION (HCC): ICD-10-CM

## 2022-05-11 DIAGNOSIS — Z47.1 AFTERCARE FOLLOWING LEFT HIP JOINT REPLACEMENT SURGERY: ICD-10-CM

## 2022-05-11 DIAGNOSIS — M54.50 SPINE PAIN, LUMBAR: ICD-10-CM

## 2022-05-11 DIAGNOSIS — Z96.642 AFTERCARE FOLLOWING LEFT HIP JOINT REPLACEMENT SURGERY: ICD-10-CM

## 2022-05-11 DIAGNOSIS — M41.86 SCOLIOSIS OF LUMBAR REGION DUE TO DEGENERATIVE DISEASE OF SPINE IN ADULT: ICD-10-CM

## 2022-05-11 DIAGNOSIS — M51.36 DEGENERATIVE DISC DISEASE, LUMBAR: ICD-10-CM

## 2022-05-11 PROCEDURE — 4004F PT TOBACCO SCREEN RCVD TLK: CPT | Performed by: ORTHOPAEDIC SURGERY

## 2022-05-11 PROCEDURE — 3008F BODY MASS INDEX DOCD: CPT | Performed by: ORTHOPAEDIC SURGERY

## 2022-05-11 PROCEDURE — 1160F RVW MEDS BY RX/DR IN RCRD: CPT | Performed by: ORTHOPAEDIC SURGERY

## 2022-05-11 PROCEDURE — 73502 X-RAY EXAM HIP UNI 2-3 VIEWS: CPT

## 2022-05-11 PROCEDURE — 72100 X-RAY EXAM L-S SPINE 2/3 VWS: CPT

## 2022-05-11 PROCEDURE — 99213 OFFICE O/P EST LOW 20 MIN: CPT | Performed by: ORTHOPAEDIC SURGERY

## 2022-05-11 RX ORDER — METHYLPREDNISOLONE 4 MG/1
TABLET ORAL
COMMUNITY
Start: 2022-04-08

## 2022-05-11 RX ORDER — AZITHROMYCIN 250 MG/1
TABLET, FILM COATED ORAL
COMMUNITY
Start: 2022-04-08

## 2022-05-11 RX ORDER — BUPROPION HYDROCHLORIDE 150 MG/1
150 TABLET ORAL DAILY
COMMUNITY
Start: 2022-04-18

## 2022-05-11 RX ORDER — ROSUVASTATIN CALCIUM 5 MG/1
5 TABLET, COATED ORAL DAILY
COMMUNITY
Start: 2022-03-15

## 2022-05-11 RX ORDER — NABUMETONE 750 MG/1
750 TABLET, FILM COATED ORAL 2 TIMES DAILY
Qty: 60 TABLET | Refills: 0 | Status: SHIPPED | OUTPATIENT
Start: 2022-05-11 | End: 2022-06-06

## 2022-05-11 NOTE — PROGRESS NOTES
Subjective;    52-year-old male patient, with history of left total hip replacement  It is noted that he has had 2 dislocations since Christmas, due to and extraordinary maneuver or flexion at the waist   It is been over 3 months since his last dislocation and need for closed reduction under sedation or anesthesia  He comes the office today without an assistant device  Exhibits asymmetrical posturing  He reports bilateral leg pain after standing for 5 minute  He describes difficulty with propulsion from the supine or the seated position  He describes lower extremity leg strength is weakening with prolonged standing from the distal thigh through the calf to the foot and ankle  He has a history on [facility] of requiring spine and pain treatment and medication  He openly voices that over-the-counter medication do not help with his pain which she points to the flank the midline of the lumbar spine the superior aspect of his pelvis and sacroiliac region      X-rays were ordered left hip and LS spine two views    Past Medical History:   Diagnosis Date    Arthritis     osteoarthritis    Cervical radiculopathy     [facility] obstructive pulmonary disease) [facility])     CPAP (continuous positive airway pressure) dependence     Diabetes mellitus ([facility])     Fibromyalgia     Fibromyalgia, primary     GERD (gastroesophageal reflux disease)     History of transfusion     Hyperlipidemia     Osteopenia     Pneumonia     Sleep apnea     USES CPAP HS       Past Surgical History:   Procedure Laterality Date    CHOLECYSTECTOMY      COLONOSCOPY      CYST REMOVAL      FEMUR SURGERY Left     HIP CLOSE REDUCTION Left 1/20/2022    Procedure: CLOSED REDUCTION HIP;  Surgeon: Char Hollingsworth MD;  Location: [facility];  Service: Orthopedics    JOINT REPLACEMENT      T KR LEFT    KNEE SURGERY Left 1996    MANDIBLE SURGERY      OK CONV PREV HIP SURG TO TOT HIP ARTHROPLAS Left 12/22/2021    Procedure: ARTHROPLASTY HIP TOTAL- conversion;  Surgeon: Mayelin Mendez MD;  [facility];  Service: Orthopedics    DC REMOVAL DEEP IMPLANT Left 9/8/2021    Procedure: REMOVAL HARDWARE HIP;  Surgeon: Kaushal Moraes MD;  [facility];  Service: Orthopedics    ROTATOR CUFF REPAIR Left     WRIST FRACTURE SURGERY Left     plate in place       Family History   Problem Relation Age of Onset    Cancer Mother     Arthritis Father     Diabetes Sister     Cancer Brother     Diabetes Daughter     Depression Son        Social History     Tobacco Use    Smoking status: Current Every Day Smoker     Packs/day: 0 50     Types: Cigarettes    Smokeless tobacco: Never Used   Vaping Use    Vaping Use: Never used   Substance Use Topics    Alcohol use: Not Currently     Comment: last drink 1 year ago     Drug use: No     Exam;    Adult male in no acute distress  Limb length inequality in the standing position the left being shorter than the right  He has asymmetry of his flank crease is posteriorly he has a put should by the lumbar spine to the left he has symmetrical shoulder height than scapular height,  The waist band of his jeans travels downward from right to left  He is able to maintain his balance but has weakness in [address] [place] from the level of the knees distally    In the seated position he is able to perform hip flexion of both hips with equal strength he is tolerant of hip internal and external rotation left hip as well as abduction without pain and without apprehension  He has decreased Motorr strength, with knee extension and dorsiflexion of the foot at the ankles  Point focal palpation and percussion of the midline of the lumbar spine elicits significant discomfort over the left and right sacroiliac joint  He has a positive Gaenslen test     X-rays; LS spine, the AP projection shows a scoliosis in the lumbar region as well as 2 level degenerative disc disease and loss of intervertebral height L4-5 and L5-S1  Incidentally seen are parts total hip construct    Left hip series shows total hip components in excellent position  Impression;  history of left total hip replacement  History of dislocations of his total hip x2  Lumbago  Sacrodynia bilateral  Neurogenic claudication  Lumbar degenerative disc disease 2 levels L4-5 L5-S1    Plan;    His hip was examined and radiographs reviewed today  He was provided a neuro exam involving his axial spine and lower extremities  He had radiographs performed of the lumbar spine      He was afforded a muscle relaxant sent to his pharmacy  He had SI joint injections ordered on his behalf  The attending surgeon recommended follow-up in 3 months  His entire experience was supervised by and plan formulated by the attending surgeon it was my privilege to assist him in the delivery this man's care

## 2022-05-13 ENCOUNTER — APPOINTMENT (OUTPATIENT)
Dept: PHYSICAL THERAPY | Facility: CLINIC | Age: 67
End: 2022-05-13
Payer: COMMERCIAL

## 2022-05-16 ENCOUNTER — TELEPHONE (OUTPATIENT)
Dept: OBGYN CLINIC | Facility: MEDICAL CENTER | Age: 67
End: 2022-05-16

## 2022-05-16 NOTE — TELEPHONE ENCOUNTER
Patient sees Dr Annie Dhillon  Patient is calling for the left / right SI joint injections  He is calling to have these scheduled in the [facility] office  Please call back to schedule

## 2022-05-16 NOTE — TELEPHONE ENCOUNTER
Message reviewed    These were ordered the day of his appointment May 11,     Please refer to epic    For Their status,  You may reach out to Radiology,  Or the [place] surgery scheduling team,    Where these orders originated on the 11th of May    I would imagine the patient has not heard from Radiology as yet    LIDYA

## 2022-05-31 DIAGNOSIS — G89.4 CHRONIC PAIN SYNDROME: Primary | ICD-10-CM

## 2022-05-31 RX ORDER — SODIUM CHLORIDE 9 MG/ML
75 INJECTION, SOLUTION INTRAVENOUS CONTINUOUS
OUTPATIENT
Start: 2022-05-31

## 2022-06-05 DIAGNOSIS — M54.50 SPINE PAIN, LUMBAR: ICD-10-CM

## 2022-06-05 DIAGNOSIS — M53.3 SACRODYNIA: ICD-10-CM

## 2022-06-06 RX ORDER — NABUMETONE 750 MG/1
TABLET, FILM COATED ORAL
Qty: 60 TABLET | Refills: 0 | Status: SHIPPED | OUTPATIENT
Start: 2022-06-06

## 2022-08-03 ENCOUNTER — OFFICE VISIT (OUTPATIENT)
Dept: OBGYN CLINIC | Facility: HOSPITAL | Age: 67
End: 2022-08-03
Payer: COMMERCIAL

## 2022-08-03 VITALS
SYSTOLIC BLOOD PRESSURE: 102 MMHG | HEART RATE: 88 BPM | HEIGHT: 68 IN | WEIGHT: 171 LBS | DIASTOLIC BLOOD PRESSURE: 64 MMHG | BODY MASS INDEX: 25.91 KG/M2

## 2022-08-03 DIAGNOSIS — Z96.642 AFTERCARE FOLLOWING LEFT HIP JOINT REPLACEMENT SURGERY: Primary | ICD-10-CM

## 2022-08-03 DIAGNOSIS — M53.3 SACRODYNIA: ICD-10-CM

## 2022-08-03 DIAGNOSIS — Z47.1 AFTERCARE FOLLOWING LEFT HIP JOINT REPLACEMENT SURGERY: Primary | ICD-10-CM

## 2022-08-03 PROCEDURE — 1160F RVW MEDS BY RX/DR IN RCRD: CPT | Performed by: ORTHOPAEDIC SURGERY

## 2022-08-03 PROCEDURE — 99213 OFFICE O/P EST LOW 20 MIN: CPT | Performed by: ORTHOPAEDIC SURGERY

## 2022-08-03 RX ORDER — BUDESONIDE, GLYCOPYRROLATE, AND FORMOTEROL FUMARATE 160; 9; 4.8 UG/1; UG/1; UG/1
2 AEROSOL, METERED RESPIRATORY (INHALATION) 2 TIMES DAILY
COMMUNITY
Start: 2022-05-17

## 2022-08-03 RX ORDER — SENNOSIDES 8.6 MG
650 CAPSULE ORAL
COMMUNITY

## 2022-08-03 RX ORDER — CELECOXIB 200 MG/1
200 CAPSULE ORAL DAILY
COMMUNITY
Start: 2022-07-05 | End: 2023-07-05

## 2022-08-03 NOTE — PROGRESS NOTES
Assessment:  1  Aftercare following left hip joint replacement surgery     2  [place]         Plan:  wbat LLE  F/u pain management  SIJ as scheduled  Assisted devices as needed  F/u December for one year check up    To do next visit:  No follow-ups on file  xrays left hip    The above stated was discussed in layman's terms and the patient expressed understanding  All questions were answered to the patient's satisfaction  Scribe Attestation    I,:   am acting as a scribe while in the presence of the attending physician :       I,:   personally performed the services described in this documentation    as scribed in my presence :             Subjective:   Yu Stevenson is a 78 y o  male who presents For follow-up of left total hip arthroplasty  Postoperative course complicated by recurrent dislocations and lumbar stenosis as well as sacroiliitis  He has not dislocated recently  Most his complaints today are related to his back  He is scheduled for SI joint injection in September and is seen pain management which is prescribing him Celebrex  He is requesting something stronger at this time        Review of systems negative unless otherwise specified in HPI    Past Medical History:   Diagnosis Date    Arthritis     osteoarthritis    Cervical radiculopathy     COPD (chronic obstructive pulmonary disease) (HCC)     CPAP (continuous positive airway pressure) dependence     Diabetes mellitus (HCC)     Fibromyalgia     Fibromyalgia, primary     GERD (gastroesophageal reflux disease)     History of transfusion     Hyperlipidemia     Osteopenia     Pneumonia     Sleep apnea     USES CPAP HS       Past Surgical History:   Procedure Laterality Date    CHOLECYSTECTOMY      COLONOSCOPY      CYST REMOVAL      FEMUR SURGERY Left     HIP CLOSE REDUCTION Left 1/20/2022    Procedure: CLOSED REDUCTION HIP;  Surgeon: Brennen Corona MD;  Location: [facility] [place];  Service: Orthopedics    JOINT REPLACEMENT      T KR LEFT    KNEE SURGERY Left 1996    MANDIBLE SURGERY      NH CONV PREV HIP SURG TO TOT HIP ARTHROPLAS Left 12/22/2021    Procedure: ARTHROPLASTY HIP TOTAL- conversion;  Surgeon: Kecia Martinez MD;  Location: [facility];  Service: Orthopedics    NH REMOVAL DEEP IMPLANT Left 9/8/2021    Procedure: REMOVAL HARDWARE HIP;  Surgeon: Tyshawn Crowder MD;  Location: [facility];  Service: Orthopedics    ROTATOR CUFF REPAIR Left     WRIST FRACTURE SURGERY Left     plate in place       Family History   Problem Relation Age of Onset    Cancer Mother     Arthritis Father     Diabetes Sister     Cancer Brother     Diabetes Daughter     Depression Son        Social History     Occupational History    Not on file   Tobacco Use    Smoking status: Current Every Day Smoker     Packs/day: 0 50     Types: Cigarettes    Smokeless tobacco: Never Used   Vaping Use    Vaping Use: Never used   Substance and Sexual Activity    Alcohol use: Not Currently     Comment: last drink 1 year ago     Drug use: No    Sexual activity: Not Currently         Current Outpatient Medications:     celecoxib (CeleBREX) 200 mg capsule, Take 200 mg by mouth daily, Disp: , Rfl:     acetaminophen (TYLENOL) 650 mg CR tablet, Take 650 mg by mouth, Disp: , Rfl:     alendronate (FOSAMAX) 70 mg tablet, take 1 tablet by mouth every 7 days IN THE MORNING ON AN EMPTY ST   (REFER TO PRESCRIPTION NOTES)  , Disp: , Rfl:     allopurinol (ZYLOPRIM) 300 mg tablet, Take 300 mg by mouth 2 (two) times a day  , Disp: , Rfl:     ascorbic acid (VITAMIN C) 500 MG tablet, Take 1 tablet (500 mg total) by mouth daily Start 30 days prior to surgery, Disp: 30 tablet, Rfl: 0    azithromycin (ZITHROMAX) 250 mg tablet, take 2 tablets by mouth on day 1 then take 1 tablet on days 2 through 5, Disp: , Rfl:     Breztri Aerosphere 160-9-4 8 MCG/ACT AERO, Inhale 2 puffs 2 (two) times a day, Disp: , Rfl:     buPROPion (WELLBUTRIN XL) 150 mg 24 hr tablet, Take 150 mg by mouth in the morning , Disp: , Rfl:     cetirizine (ZyrTEC) 10 mg tablet, Take 10 mg by mouth daily, Disp: , Rfl:     DULoxetine (CYMBALTA) 30 mg delayed release capsule, Take 60 mg by mouth daily at bedtime , Disp: , Rfl:     ergocalciferol (ERGOCALCIFEROL) 1 25 MG (16559 UT) capsule, take 1 capsule by mouth every week, Disp: , Rfl:     fenofibrate (TRICOR) 145 mg tablet, Take 145 mg by mouth daily, Disp: , Rfl:     ferrous sulfate 324 (65 Fe) mg, Take 1 tablet (324 mg total) by mouth 2 (two) times a day before meals Start 30 days prior to surgery, Disp: 60 tablet, Rfl: 0    fluticasone-salmeterol (Advair) 250-50 mcg/dose inhaler, inhale 1 puff by mouth and INTO THE LUNGS every 12 hours SAME TIMES EACH DAY, Disp: , Rfl:     fluticasone-salmeterol (Wixela Inhub) 100-50 mcg/dose inhaler, Inhale 1 puff 2 (two) times a day Rinse mouth after use  (Patient taking differently: Inhale 1 puff as needed Rinse mouth after use  ), Disp: 60 blister, Rfl: 0    folic acid (FOLVITE) 1 mg tablet, Take 1 tablet (1 mg total) by mouth daily Start 30 days prior to surgery, Disp: 30 tablet, Rfl: 0    Icosapent Ethyl (Vascepa) 0 5 g CAPS, Take 2 capsules by mouth 2 (two) times a day , Disp: , Rfl:     Icosapent Ethyl 1 g CAPS, take 2 capsules by mouth twice a day with food SWALLOW WHOLE DO NOT CHEW, OPEN,DISSOLVE OR CRUSH, Disp: , Rfl:     ipratropium-albuterol (DUO-NEB) 0 5-2 5 mg/3 mL nebulizer solution, inhale contents of 1 vial ( 3 milliliters ) in nebulizer by mouth four times a day if needed, Disp: , Rfl:     metFORMIN (GLUCOPHAGE) 1000 MG tablet, Take 1,000 mg by mouth 2 (two) times a day with meals  , Disp: , Rfl:     methylPREDNISolone 4 MG tablet therapy pack, use as directed FOLLOW DIRECTIONS ON BACK OF FOIL PACK, Disp: , Rfl:     Multiple Vitamin (multivitamin) tablet, TAKE 1 TABLET DAILY  , Disp: , Rfl:     nabumetone (RELAFEN) 750 mg tablet, TAKE 1 TABLET BY MOUTH IN THE MORNING AND 1 TABLET IN THE EVENING  DO THIS FOR 60 DOSES, Disp: 60 tablet, Rfl: 0    omeprazole (PriLOSEC OTC) 20 MG tablet, Take 20 mg by mouth daily, Disp: , Rfl:     omeprazole (PriLOSEC) 20 mg delayed release capsule, take 1 capsule by mouth twice a day 30 TO 60 MINUTES PRIOR TO EATING, Disp: , Rfl:     oxyCODONE (ROXICODONE) 5 immediate release tablet, 1 tab po q4h prn mild pain  2 tabs po q4h prn moderate or severe pain, Disp: 30 tablet, Rfl: 0    pregabalin (Lyrica) 150 mg capsule, Take 1 capsule by mouth every 12 (twelve) hours, Disp: , Rfl:     rosuvastatin (CRESTOR) 5 mg tablet, Take 5 mg by mouth in the morning , Disp: , Rfl:     temazepam (RESTORIL) 15 mg capsule, Take 15 mg by mouth daily at bedtime, Disp: , Rfl:     temazepam (RESTORIL) 7 5 mg capsule, Take 7 5 mg by mouth daily at bedtime as needed, Disp: , Rfl:     traMADol-acetaminophen (ULTRACET) 37 5-325 mg per tablet, Take 1 tablet by mouth 2 (two) times a day as needed, Disp: , Rfl:     Turmeric 500 MG CAPS, Take by mouth daily, Disp: , Rfl:     No Known Allergies         Vitals:    08/03/22 1417   BP: 102/64   Pulse: 88       Objective:  Physical exam  · General: Awake, Alert, Oriented  · Eyes: Pupils equal, round and reactive to light  · Heart: regular rate and rhythm  · Lungs: No audible wheezing  · Abdomen: soft                    Left Hip Exam     Tenderness   The patient is experiencing no tenderness  Range of Motion   The patient has normal left hip ROM  Muscle Strength   The patient has normal left hip strength  Other   Erythema: absent  Scars: present  Sensation: normal  Pulse: present            Diagnostics, reviewed and taken today if performed as documented:    None performed       Procedures, if performed today:    Procedures    None performed      Portions of the record may have been created with voice recognition software    Occasional wrong word or "sound a like" substitutions may have occurred due to the inherent limitations of voice recognition software  Read the chart carefully and recognize, using context, where substitutions have occurred

## 2022-08-17 ENCOUNTER — HOSPITAL ENCOUNTER (OUTPATIENT)
Dept: RADIOLOGY | Facility: HOSPITAL | Age: 67
Discharge: HOME | End: 2022-08-17
Payer: COMMERCIAL

## 2022-08-17 ENCOUNTER — APPOINTMENT (OUTPATIENT)
Dept: LAB | Facility: HOSPITAL | Age: 67
End: 2022-08-17
Payer: COMMERCIAL

## 2022-08-17 DIAGNOSIS — R07.9 CHEST PAIN, UNSPECIFIED TYPE: ICD-10-CM

## 2022-08-17 DIAGNOSIS — M77.8 RIGHT SHOULDER TENDINITIS: ICD-10-CM

## 2022-08-17 LAB
ATRIAL RATE: 87 BPM
P AXIS: 31 DEGREES
PR INTERVAL: 160 MS
QRS AXIS: 73 DEGREES
QRSD INTERVAL: 88 MS
QT INTERVAL: 332 MS
QTC INTERVAL: 399 MS
T WAVE AXIS: 59 DEGREES
VENTRICULAR RATE: 87 BPM

## 2022-08-17 PROCEDURE — 93005 ELECTROCARDIOGRAM TRACING: CPT

## 2022-08-17 PROCEDURE — 71046 X-RAY EXAM CHEST 2 VIEWS: CPT

## 2022-08-17 PROCEDURE — 73020 X-RAY EXAM OF SHOULDER: CPT

## 2022-11-29 ENCOUNTER — EVALUATION (OUTPATIENT)
Dept: PHYSICAL THERAPY | Facility: CLINIC | Age: 67
End: 2022-11-29

## 2022-11-29 DIAGNOSIS — M54.2 NECK PAIN: ICD-10-CM

## 2022-11-29 DIAGNOSIS — G89.29 CHRONIC MIDLINE LOW BACK PAIN WITHOUT SCIATICA: Primary | ICD-10-CM

## 2022-11-29 DIAGNOSIS — M54.50 CHRONIC MIDLINE LOW BACK PAIN WITHOUT SCIATICA: Primary | ICD-10-CM

## 2022-11-29 NOTE — LETTER
2022    Stacia Salter, [address] [id number] [address]  [address]    Patient: Melchor Bishop   YOB: 1955   Date of Visit: 2022     Encounter Diagnosis     ICD-10-CM    1  Chronic midline low back pain without sciatica  M54 50     G89 29       2  Neck pain  M54 2           Dear Dr Schwab Kos: Thank you for your recent referral of Melchor Bishop  Please review the attached evaluation summary from Gregory's recent visit  Please verify that you agree with the plan of care by signing the attached order  If you have any questions or concerns, please do not hesitate to call  I sincerely appreciate the opportunity to share in the care of one of your patients and hope to have another opportunity to work with you in the near future  Sincerely,    Kuldip Maguire, PT      Referring Provider:      I certify that I have read the below Plan of Care and certify the need for these services furnished under this plan of treatment while under my care  Stacia Salter, [address] [address]  Via Fax: [phone]          PT Evaluation     Today's date: 2022  Patient name: Melchor Bishop  : 1955  MRN: [id number]  Referring provider: Yohan Leonard DO  Dx:   Encounter Diagnosis     ICD-10-CM    1  Chronic midline low back pain without sciatica  M54 50     G89 29       2  Neck pain  M54 2                      Assessment  Assessment details: Pt is a 78y o  year old male presenting to physical therapy for Chronic midline low back pain without sciatica, and Neck pain    He presents with the following impairments: limited cervical AROM, UE weakness b/l, hypomobility, as well as lumbar ROM deficits, LE weakness, + pSLR on the R LE, diminished sensation in L LE/foot, and hypomobility in lumbar spine affecting his function with walking, standing long periods, transferring sit-stand, lifting, bending, twisting, turning neck, looking up, and completing chores at home  Pt will benefit from skilled physical therapy to address functional limitations noted in evaluation and meet patient goals  Impairments: abnormal gait, abnormal or restricted ROM, abnormal movement, activity intolerance, impaired balance, impaired physical strength, lacks appropriate home exercise program, pain with function, safety issue, poor posture  and poor body mechanics    Symptom irritability: high  Goals  ST  Pt will reduce pain to 5/10 at rest   2  Pt will demonstrate good TA activation  LT  Pt will improve lumbar flexion to min deficits for improved ability to forward bend  2  Pt will improve b/l shoulder ABD to 4+/5 for improved ability to lift overhead  3  Pt will improve b/l hip flexion to 4/5 for improved ability to stand and walk long periods  4  Pt will be I w HEP  Plan  Patient would benefit from: skilled physical therapy and PT eval  Planned modality interventions: biofeedback, cryotherapy, electrical stimulation/Russian stimulation, TENS, thermotherapy: hydrocollator packs and unattended electrical stimulation  Planned therapy interventions: abdominal trunk stabilization, joint mobilization, manual therapy, neuromuscular re-education, patient education, strengthening, stretching, therapeutic activities, therapeutic exercise, flexibility, functional ROM exercises, gait training, home exercise program, coordination, body mechanics training and balance  Frequency: 2x week  Duration in weeks: 6  Treatment plan discussed with: patient        Subjective Evaluation    History of Present Illness  Mechanism of injury: Pt reports lots of neck pain and arm pain/weakness affecting him for the past several months and has been getting worse  He also reports low back pain from stenosis that constantly bothers him  He has pain reaching behind his back and tries to avoid lifting or carrying with his R hand    Denies any trauma or injury to his back or neck  He also avoids bending forward for his low back  He reports numbness and tingling in both of his hands and feet that has been present for years, but he also reports more difficulty walking and feels like his leg will give out if walking for long periods  He has complex medical Hx including but no limited to: OA, osteopenia, COPD, fibromyalgia, multiple surgeries for various conditions and is prediabetic  He is scheduled for an MRI later today  Recurrent probem    Pain  Current pain ratin  At worst pain ratin          Objective     Palpation   Left   Tenderness of the levator scapulae and upper trapezius  Right   Hypertonic in the levator scapulae and upper trapezius  Tenderness of the levator scapulae and upper trapezius       Neurological Testing     Sensation     Lumbar   Left   Diminished: light touch    Right   Intact: light touch    Reflexes   Left   Patellar (L4): absent (0)  Achilles (S1): absent (0)  Guzman's reflex: negative  Babinski sign: negative    Right   Patellar (L4): normal (2+)  Achilles (S1): trace (1+)  Guzman's reflex: negative  Babinski sign: negative    Additional Neurological Details  (-) inverted supinator b/l    Active Range of Motion   Cervical/Thoracic Spine       Cervical    Flexion:  WFL  Extension:  Restriction level: minimal  Left lateral flexion:  WFL  Right lateral flexion:  WFL  Left rotation:  Restriction level: minimal  Right rotation:  Restriction level: minimal    Thoracic    Flexion:  Restriction level: moderate  Extension:  Restriction level: maximal    Lumbar   Flexion:  Restriction level: moderate  Extension:  Restriction level: moderate  Left lateral flexion:  Restriction level: moderate  Right lateral flexion:  Restriction level: moderate    Joint Play   Joints within functional limits: C1, C2, C3, C4, C5 and C6     Hypomobile: C7, T1, T2, T3, T4, T5, T6, T7, T8, T9, T10, T11, T12, L1, L2, L3, L4, L5 and S1     Pain: C7, T1, T2, T3, T4, L1, L2, L3, L4, L5 and S1     Strength/Myotome Testing   Cervical Spine     Left   Interossei strength (t1): 4    Right   Interossei strength (t1): 4    Left Shoulder     Planes of Motion   Adduction: 4-     Right Shoulder     Planes of Motion   Adduction: 4-     Left Elbow   Flexion: 4+  Extension: 4-    Right Elbow   Flexion: 4+  Extension: 4-    Left Wrist/Hand   Wrist extension: 4+  Wrist flexion: 4-  Thumb extension: 4    Right Wrist/Hand   Wrist extension: 4+  Wrist flexion: 4-  Thumb extension: 4    Lumbar   Left   Heel walk: normal  Toe walk: normal    Right   Heel walk: normal  Toe walk: normal    Left Hip   Planes of Motion   Flexion: 4-  Extension: WFL  Abduction: 3+    Right Hip   Planes of Motion   Flexion: 4-  Abduction: 4-    Left Knee   Flexion: 4  Extension: 4-    Right Knee   Flexion: 4+  Extension: 4+    Muscle Activation   Patient able to activate left transverse abdominals and right transverse abdominals  Tests   Cervical   Negative cervical distraction  Left   Negative Spurling's Test A  Right   Negative Spurling's Test A  Left Shoulder   Negative ULTT1  Right Shoulder   Negative ULTT1  Lumbar     Left   Negative passive SLR and slump test      Right   Positive passive SLR     Negative slump test        Flowsheet Rows    Flowsheet Row Most Recent Value   PT/OT G-Codes    Current Score 47   Projected Score 58            Precautions:     Date 11/29            Visit # 1            FOTO 47/58             Re-eval IE              Manuals 11/29            Cervical PROM SF            UT/LS str                                       Neuro Re-Ed 11/29            Scap retraction 5x10"            No Money 10x OTB                         TA bracing             Supine andrew vuong             Airex             Tandem walk                          Ther Ex 11/29            Bike/TM             T/s ext             Rows/ext 10x BTB Pec str 2x30" doorway                         Leg Press             SLR             S/l hip ABD             Ther Activity             Squat             UTR                                                    Gait Training                                       Modalities

## 2022-11-29 NOTE — PROGRESS NOTES
PT Evaluation     Today's date: 2022  Patient name: Liv Pinto  : 1955  MRN: [id number]  Referring provider: Chiara Briones DO  Dx:   Encounter Diagnosis     ICD-10-CM    1  Chronic midline low back pain without sciatica  M54 50     G89 29       2  Neck pain  M54 2                      Assessment  Assessment details: Pt is a 78y o  year old male presenting to physical therapy for Chronic midline low back pain without sciatica, and Neck pain  He presents with the following impairments: limited cervical AROM, UE weakness b/l, hypomobility, as well as lumbar ROM deficits, LE weakness, + pSLR on the R LE, diminished sensation in L LE/foot, and hypomobility in lumbar spine affecting his function with walking, standing long periods, transferring sit-stand, lifting, bending, twisting, turning neck, looking up, and completing chores at home  Pt will benefit from skilled physical therapy to address functional limitations noted in evaluation and meet patient goals  Impairments: abnormal gait, abnormal or restricted ROM, abnormal movement, activity intolerance, impaired balance, impaired physical strength, lacks appropriate home exercise program, pain with function, safety issue, poor posture  and poor body mechanics    Symptom irritability: high  Goals  ST  Pt will reduce pain to 5/10 at rest   2  Pt will demonstrate good TA activation  LT  Pt will improve lumbar flexion to min deficits for improved ability to forward bend  2  Pt will improve b/l shoulder ABD to 4+/5 for improved ability to lift overhead  3  Pt will improve b/l hip flexion to 4/5 for improved ability to stand and walk long periods  4  Pt will be I w HEP      Plan  Patient would benefit from: skilled physical therapy and PT eval  Planned modality interventions: biofeedback, cryotherapy, electrical stimulation/Russian stimulation, TENS, thermotherapy: hydrocollator packs and unattended electrical stimulation  Planned therapy interventions: abdominal trunk stabilization, joint mobilization, manual therapy, neuromuscular re-education, patient education, strengthening, stretching, therapeutic activities, therapeutic exercise, flexibility, functional ROM exercises, gait training, home exercise program, coordination, body mechanics training and balance  Frequency: 2x week  Duration in weeks: 6  Treatment plan discussed with: patient        Subjective Evaluation    History of Present Illness  Mechanism of injury: Pt reports lots of neck pain and arm pain/weakness affecting him for the past several months and has been getting worse  He also reports low back pain from stenosis that constantly bothers him  He has pain reaching behind his back and tries to avoid lifting or carrying with his R hand  Denies any trauma or injury to his back or neck  He also avoids bending forward for his low back  He reports numbness and tingling in both of his hands and feet that has been present for years, but he also reports more difficulty walking and feels like his leg will give out if walking for long periods  He has complex medical Hx including but no limited to: OA, osteopenia, COPD, fibromyalgia, multiple surgeries for various conditions and is prediabetic  He is scheduled for an MRI later today  Recurrent probem    Pain  Current pain ratin  At worst pain ratin          Objective     Palpation   Left   Tenderness of the levator scapulae and upper trapezius  Right   Hypertonic in the levator scapulae and upper trapezius  Tenderness of the levator scapulae and upper trapezius       Neurological Testing     Sensation     Lumbar   Left   Diminished: light touch    Right   Intact: light touch    Reflexes   Left   Patellar (L4): absent (0)  Achilles (S1): absent (0)  Guzman's reflex: negative  Babinski sign: negative    Right   Patellar (L4): normal (2+)  Achilles (S1): trace (1+)  Gumzan's reflex: negative  Babinski sign: negative    Additional Neurological Details  (-) inverted supinator b/l    Active Range of Motion   Cervical/Thoracic Spine       Cervical    Flexion:  WFL  Extension:  Restriction level: minimal  Left lateral flexion:  WFL  Right lateral flexion:  WFL  Left rotation:  Restriction level: minimal  Right rotation:  Restriction level: minimal    Thoracic    Flexion:  Restriction level: moderate  Extension:  Restriction level: maximal    Lumbar   Flexion:  Restriction level: moderate  Extension:  Restriction level: moderate  Left lateral flexion:  Restriction level: moderate  Right lateral flexion:  Restriction level: moderate    Joint Play   Joints within functional limits: C1, C2, C3, C4, C5 and C6     Hypomobile: C7, T1, T2, T3, T4, T5, T6, T7, T8, T9, T10, T11, T12, L1, L2, L3, L4, L5 and S1     Pain: C7, T1, T2, T3, T4, L1, L2, L3, L4, L5 and S1     Strength/Myotome Testing   Cervical Spine     Left   Interossei strength (t1): 4    Right   Interossei strength (t1): 4    Left Shoulder     Planes of Motion   Adduction: 4-     Right Shoulder     Planes of Motion   Adduction: 4-     Left Elbow   Flexion: 4+  Extension: 4-    Right Elbow   Flexion: 4+  Extension: 4-    Left Wrist/Hand   Wrist extension: 4+  Wrist flexion: 4-  Thumb extension: 4    Right Wrist/Hand   Wrist extension: 4+  Wrist flexion: 4-  Thumb extension: 4    Lumbar   Left   Heel walk: normal  Toe walk: normal    Right   Heel walk: normal  Toe walk: normal    Left Hip   Planes of Motion   Flexion: 4-  Extension: WFL  Abduction: 3+    Right Hip   Planes of Motion   Flexion: 4-  Abduction: 4-    Left Knee   Flexion: 4  Extension: 4-    Right Knee   Flexion: 4+  Extension: 4+    Muscle Activation   Patient able to activate left transverse abdominals and right transverse abdominals  Tests   Cervical   Negative cervical distraction  Left   Negative Spurling's Test A  Right   Negative Spurling's Test A  Left Shoulder   Negative ULTT1  Right Shoulder   Negative ULTT1  Lumbar     Left   Negative passive SLR and slump test      Right   Positive passive SLR     Negative slump test        Flowsheet Rows    Flowsheet Row Most Recent Value   PT/OT G-Codes    Current Score 47   Projected Score 58             Precautions:     Date 11/29            Visit # 1            FOTO 47/58             Re-eval IE              Manuals 11/29            Cervical PROM SF            UT/LS str                                       Neuro Re-Ed 11/29            Scap retraction 5x10"            No Money 10x OTB                         TA bracing             Supine andrew vuong             Airex             Tandem walk                          Ther Ex 11/29            Bike/TM             T/s ext             Rows/ext 10x BTB            Pec str 2x30" doorway                         Leg Press             SLR             S/l hip ABD             Ther Activity             Squat             UTR                                                    Gait Training                                       Modalities

## 2022-12-01 ENCOUNTER — OFFICE VISIT (OUTPATIENT)
Dept: PHYSICAL THERAPY | Facility: CLINIC | Age: 67
End: 2022-12-01

## 2022-12-01 DIAGNOSIS — M54.50 CHRONIC MIDLINE LOW BACK PAIN WITHOUT SCIATICA: Primary | ICD-10-CM

## 2022-12-01 DIAGNOSIS — G89.29 CHRONIC MIDLINE LOW BACK PAIN WITHOUT SCIATICA: Primary | ICD-10-CM

## 2022-12-01 DIAGNOSIS — M54.2 NECK PAIN: ICD-10-CM

## 2022-12-01 NOTE — PROGRESS NOTES
Daily Note     Today's date: 2022  Patient name: Carmen Sutton  : 1955  MRN: [id number]  Referring provider: Darya Roblero  Dx:   Encounter Diagnosis     ICD-10-CM    1  Chronic midline low back pain without sciatica  M54 50     G89 29       2  Neck pain  M54 2                      Subjective: Pt reports some soreness after completing his exercises this morning  Objective: See treatment diary below      Assessment:  Pt does well w progression of today's treatment session  He is SOB with recumbent bike but will benefit from progression for improved endurance and aerobic capacity to reduce fatigue and risk of falls when walking long periods  He is challenged w squatting and has some L knee present and requires seated rest post activity  Patient demonstrated fatigue post treatment and would benefit from continued PT  Plan: Continue per plan of care  Progress treatment as tolerated         Precautions:     Date            Visit # 1 2           FOTO 47/58             Re-eval IE              Manuals            Cervical PROM SF            UT/LS str                                       Neuro Re-Ed            Scap retraction 5x10" 10x10"           No Money 10x OTB 2x10 OTB                        TA bracing  10x10"           Supine marches  20x ea           clams  20x s/l           Airex             Tandem walk                          Ther Ex            Bike/TM  5' bike           T/s ext             Rows/ext 10x BTB 3x10 10#/6#           Pec str 2x30" doorway 3x30" doorway           UT str  3x15"           Leg Press  3x10 125#, 145#, 165#           SLR             S/l hip ABD                          Ther Activity             Squat  2x10           UTR                                                    Gait Training                                       Modalities

## 2022-12-07 ENCOUNTER — OFFICE VISIT (OUTPATIENT)
Dept: PHYSICAL THERAPY | Facility: CLINIC | Age: 67
End: 2022-12-07

## 2022-12-07 DIAGNOSIS — M54.2 NECK PAIN: ICD-10-CM

## 2022-12-07 DIAGNOSIS — G89.29 CHRONIC MIDLINE LOW BACK PAIN WITHOUT SCIATICA: Primary | ICD-10-CM

## 2022-12-07 DIAGNOSIS — M54.50 CHRONIC MIDLINE LOW BACK PAIN WITHOUT SCIATICA: Primary | ICD-10-CM

## 2022-12-07 NOTE — PROGRESS NOTES
Daily Note     Today's date: 2022  Patient name: Angela Guadalupe  : 1955  MRN: [id number]  Referring provider: Ronna Fort, Charmayne Gaudy  Dx:   Encounter Diagnosis     ICD-10-CM    1  Chronic midline low back pain without sciatica  M54 50     G89 29       2  Neck pain  M54 2                      Subjective: Pt reports increased soreness in his arms and legs after last session  Objective: See treatment diary below      Assessment:  Pt completes reduced workload with emphasis on LE stretching due to increased R shoulder pain today  He does well w MHP and trial of self-snags w less shoulder present afterwards  He has stiffness w hamstring and HF stretching  Patient demonstrated fatigue post treatment and would benefit from continued PT  Plan: Continue per plan of care  Progress treament per protocol        Precautions:     Date           Visit # 1 2 3          FOTO 47/58             Re-eval IE              Manuals           Cervical PROM SF            UT/LS str                                       Neuro Re-Ed           Scap retraction 5x10" 10x10"           No Money 10x OTB 2x10 OTB                        TA bracing  10x10" 10x10"          Supine marches  20x ea 2x20          clams  20x s/l 2x15 BTB HL          [employer]irex             Tandem walk                          Ther Ex           Bike/TM  5' bike 5' bike          T/s ext             Rows/ext 10x BTB 3x10 10#/6#           Pec str 2x30" doorway 3x30" doorway           UT str  3x15"           Leg Press  3x10 125#, 145#, 165#           SLR             S/l hip ABD             SB Rolling   5x10" seated          Hamstring str   3x20"          Hip flexor str   3x20"          Self Snags   10x ea          Ther Activity             Squat  2x10           UTR                                                    Gait Training                                       Modalities             MHP   10' neck

## 2022-12-09 ENCOUNTER — OFFICE VISIT (OUTPATIENT)
Dept: PHYSICAL THERAPY | Facility: CLINIC | Age: 67
End: 2022-12-09

## 2022-12-09 DIAGNOSIS — G89.29 CHRONIC MIDLINE LOW BACK PAIN WITHOUT SCIATICA: Primary | ICD-10-CM

## 2022-12-09 DIAGNOSIS — M54.2 NECK PAIN: ICD-10-CM

## 2022-12-09 DIAGNOSIS — M54.50 CHRONIC MIDLINE LOW BACK PAIN WITHOUT SCIATICA: Primary | ICD-10-CM

## 2022-12-09 NOTE — PROGRESS NOTES
Daily Note     Today's date: 2022  Patient name: Arnel Ac  : 1955  MRN: [id number]  Referring provider: Conrado Martini  Dx:   Encounter Diagnosis     ICD-10-CM    1  Chronic midline low back pain without sciatica  M54 50     G89 29       2  Neck pain  M54 2                      Subjective: Pt reports increased difficulty lately with interventions and moving his legs  His toes do not want to "cooperate"  He reports too much pain in during interventions and inability to tolerate interventions  He is hyper-focused on his pain and what he can't do  Objective: See treatment diary below      Assessment: Pt was motivated to complete all interventions today due to reluctance and pain  He believes the less he does the better he will feel  However, when he completes interventions in the clinic it increases pain/ soreness due to not moving for an extended period of time  Interventions were tried but were limited on the extent that could be completed  He requires constant motivation and education on continuing PT and understanding his conditions  Plan: Continue per plan of care        Precautions:     Date          Visit # 1 2 3 4         FOTO 47/58             Re-eval IE              Manuals          Cervical PROM SF            UT/LS str                                       Neuro Re-Ed          Scap retraction 5x10" 10x10"  5"x5 pain         No Money 10x OTB 2x10 OTB                        TA bracing  10x10" 10x10"          Supine marches  20x ea 2x20 2x20         clams  20x s/l 2x15 BTB HL          Airex             Tandem walk                          Ther Ex           Bike/TM  5' bike 5' bike 6' bike         T/s ext    10x w/ half roll         Rows/ext 10x BTB 3x10 10#/6#           Pec str 2x30" doorway 3x30" doorway           UT str  3x15"  3x15"         Leg Press  3x10 125#, 145#, 165#           SLR S/l hip ABD             SB Rolling   5x10" seated 5x10" seated         Hamstring str   3x20" 3x20"         Hip flexor str   3x20" 3x20"         Self Snags   10x ea 10x ea         Ther Activity             Squat  2x10           UTR                                                    Gait Training                                       Modalities             P   10' neck

## 2022-12-13 ENCOUNTER — OFFICE VISIT (OUTPATIENT)
Dept: PHYSICAL THERAPY | Facility: CLINIC | Age: 67
End: 2022-12-13

## 2022-12-13 DIAGNOSIS — M54.2 NECK PAIN: ICD-10-CM

## 2022-12-13 DIAGNOSIS — G89.29 CHRONIC MIDLINE LOW BACK PAIN WITHOUT SCIATICA: Primary | ICD-10-CM

## 2022-12-13 DIAGNOSIS — M54.50 CHRONIC MIDLINE LOW BACK PAIN WITHOUT SCIATICA: Primary | ICD-10-CM

## 2022-12-13 NOTE — PROGRESS NOTES
Daily Note     Today's date: 2022  Patient name: Eddy Rodriguez  : 1955  MRN: [id number]  Referring provider: Gabino Delgado  Dx:   Encounter Diagnosis     ICD-10-CM    1  Chronic midline low back pain without sciatica  M54 50     G89 29       2  Neck pain  M54 2                      Subjective: Pt reports less neck and shoulder pain today, but is not completing his HEP  He is set for an interview for a new job as he was denied disability  Objective: See treatment diary below      Assessment:  Pt does well w resuming leg press and rowing activity  He has some neck pain and R UE pain w SB rolling  He has slight improvement in R UE pain and improved cervical AROM following manuals  Patient demonstrated fatigue post treatment and would benefit from continued PT  Plan: Continue per plan of care  Progress treatment as tolerated         Precautions:     Date         Visit # 1 2 3 4 5        FOTO 47/58     47/58        Re-eval IE              Manuals         Cervical PROM SF    SF        UT/LS str     SF                                  Neuro Re-Ed         Scap retraction 5x10" 10x10"  5"x5 pain         No Money 10x OTB 2x10 OTB                        TA bracing  10x10" 10x10"          Supine marches  20x ea 2x20 2x20 2x20**        clams  20x s/l 2x15 BTB HL          Airex             Tandem walk                          Ther Ex         Bike/TM  5' bike 5' bike 6' bike 6' bike        T/s ext    10x w/ half roll 10x5" 1/2 roll        Rows/ext 10x BTB 3x10 10#/6#   3x10 7 5#        Pec str 2x30" doorway 3x30" doorway           UT str  3x15"  3x15"         Leg Press  3x10 125#, 145#, 165#   3x10 155#, 165#, 185#        SLR             S/l hip ABD             SB Rolling   5x10" seated 5x10" seated 5x10" seated        Hamstring str   3x20" 3x20" 3x20"        Hip flexor str   3x20" 3x20" Self Snags   10x ea 10x ea         Ther Activity             Squat  2x10           UTR                                                    Gait Training                                       Modalities             MHP   10' neck

## 2022-12-15 ENCOUNTER — HOSPITAL ENCOUNTER (OUTPATIENT)
Dept: MRI IMAGING | Facility: HOSPITAL | Age: 67
Discharge: HOME | End: 2022-12-15

## 2022-12-15 DIAGNOSIS — M48.02 SPINAL STENOSIS, CERVICAL REGION: ICD-10-CM

## 2022-12-16 ENCOUNTER — OFFICE VISIT (OUTPATIENT)
Dept: PHYSICAL THERAPY | Facility: CLINIC | Age: 67
End: 2022-12-16

## 2022-12-16 DIAGNOSIS — M54.2 NECK PAIN: ICD-10-CM

## 2022-12-16 DIAGNOSIS — M54.50 CHRONIC MIDLINE LOW BACK PAIN WITHOUT SCIATICA: Primary | ICD-10-CM

## 2022-12-16 DIAGNOSIS — G89.29 CHRONIC MIDLINE LOW BACK PAIN WITHOUT SCIATICA: Primary | ICD-10-CM

## 2022-12-16 NOTE — PROGRESS NOTES
Daily Note     Today's date: 2022  Patient name: Meño Avila  : 1955  MRN: [id number]  Referring provider: Socorro Pacheco  Dx:   Encounter Diagnosis     ICD-10-CM    1  Chronic midline low back pain without sciatica  M54 50     G89 29       2  Neck pain  M54 2                      Subjective: Pt presents to PT reporting increased pain in low back, right hip and shoulder and neck  He states increased pain began while having MRI for his neck secondary to laying down on hard surface  Pt reports a mild decrease in pain in low back and R hip but continues to complain of pain in R shoulder  Objective: See treatment diary below      Assessment: Pt demonstrates fair tolerance to TE and stretches today secondary to pain  Pt demonstrates good tolerance to ROM TE and advised pt to perform ROM to tolerance while at home  Patient demonstrated fatigue post treatment, exhibited good technique with therapeutic exercises and would benefit from continued PT to increase flexibility, strength and function  Plan: Pt DC'd to HEP secondary to returning to work next week       Precautions:     Date        Visit # 1 2 3 4 5 6       FOTO 47/58     47/58        Re-eval IE              Manuals        Cervical PROM SF    SF        UT/LS str     SF                                  Neuro Re-Ed        Scap retraction 5x10" 10x10"  5"x5 pain         No Money 10x OTB 2x10 OTB                        TA bracing  10x10" 10x10"          Supine marches  20x ea 2x20 2x20 2x20**        clams  20x s/l 2x15 BTB HL          [employer]             Tandem walk                          Ther Ex        Bike/TM  5' bike 5' bike 6' bike 6' bike np       T/s ext    10x w/ half roll 10x5" 1/2 roll 10x5" 1/2 roll       Rows/ext 10x BTB 3x10 10#/6#   3x10 7 5#        Pec str 2x30" doorway 3x30" doorway UT str  3x15"  3x15"  20" x3 ea       Leg Press  3x10 125#, 145#, 165#   3x10 155#, 165#, 185# x10 145#, x10 125#       SLR             S/l hip ABD             SB Rolling   5x10" seated 5x10" seated 5x10" seated 5x10" seated       Hamstring str   3x20" 3x20" 3x20" 3x20"       Hip flexor str   3x20" 3x20"         Self Snags   10x ea 10x ea         Ther Activity      12/16       Squat  2x10           UTR                                                    Gait Training      12/16                                 Modalities             MHP   10' neck   MHP low back & R shoulder

## 2022-12-19 ENCOUNTER — APPOINTMENT (OUTPATIENT)
Dept: PHYSICAL THERAPY | Facility: CLINIC | Age: 67
End: 2022-12-19

## 2022-12-22 ENCOUNTER — APPOINTMENT (OUTPATIENT)
Dept: PHYSICAL THERAPY | Facility: CLINIC | Age: 67
End: 2022-12-22

## 2022-12-27 ENCOUNTER — APPOINTMENT (OUTPATIENT)
Dept: PHYSICAL THERAPY | Facility: CLINIC | Age: 67
End: 2022-12-27

## 2022-12-29 ENCOUNTER — APPOINTMENT (OUTPATIENT)
Dept: PHYSICAL THERAPY | Facility: CLINIC | Age: 67
End: 2022-12-29

## 2023-01-13 ENCOUNTER — HOSPITAL ENCOUNTER (EMERGENCY)
Facility: HOSPITAL | Age: 68
Discharge: HOME | End: 2023-01-13
Attending: EMERGENCY MEDICINE

## 2023-01-13 VITALS
DIASTOLIC BLOOD PRESSURE: 72 MMHG | OXYGEN SATURATION: 95 % | SYSTOLIC BLOOD PRESSURE: 131 MMHG | RESPIRATION RATE: 16 BRPM | WEIGHT: 169.97 LBS | BODY MASS INDEX: 25.84 KG/M2 | HEART RATE: 85 BPM | TEMPERATURE: 98 F

## 2023-01-13 DIAGNOSIS — R53.1 GENERALIZED WEAKNESS: Primary | ICD-10-CM

## 2023-01-13 LAB
ALBUMIN SERPL BCP-MCNC: 4 G/DL (ref 3.5–5)
ALP SERPL-CCNC: 68 U/L (ref 46–116)
ALT SERPL W P-5'-P-CCNC: 47 U/L (ref 12–78)
ANION GAP SERPL CALCULATED.3IONS-SCNC: 10 MMOL/L (ref 4–13)
AST SERPL W P-5'-P-CCNC: 29 U/L (ref 5–45)
BASOPHILS # BLD AUTO: 0.08 THOUSANDS/ÂΜL (ref 0–0.1)
BASOPHILS NFR BLD AUTO: 1 % (ref 0–1)
BILIRUB SERPL-MCNC: 0.18 MG/DL (ref 0.2–1)
BILIRUB UR QL STRIP: NEGATIVE
BUN SERPL-MCNC: 23 MG/DL (ref 5–25)
CALCIUM SERPL-MCNC: 9.9 MG/DL (ref 8.3–10.1)
CHLORIDE SERPL-SCNC: 102 MMOL/L (ref 96–108)
CK SERPL-CCNC: 47 U/L (ref 39–308)
CLARITY UR: CLEAR
CO2 SERPL-SCNC: 25 MMOL/L (ref 21–32)
COLOR UR: YELLOW
CREAT SERPL-MCNC: 1.27 MG/DL (ref 0.6–1.3)
EOSINOPHIL # BLD AUTO: 0.2 THOUSAND/ÂΜL (ref 0–0.61)
EOSINOPHIL NFR BLD AUTO: 3 % (ref 0–6)
ERYTHROCYTE [DISTWIDTH] IN BLOOD BY AUTOMATED COUNT: 13.3 % (ref 11.6–15.1)
GFR SERPL CREATININE-BSD FRML MDRD: 58 ML/MIN/1.73SQ M
GLUCOSE SERPL-MCNC: 157 MG/DL (ref 65–140)
GLUCOSE UR STRIP-MCNC: NEGATIVE MG/DL
HCT VFR BLD AUTO: 40.2 % (ref 36.5–49.3)
HGB BLD-MCNC: 13.2 G/DL (ref 12–17)
HGB UR QL STRIP.AUTO: NEGATIVE
IMM GRANULOCYTES # BLD AUTO: 0.03 THOUSAND/UL (ref 0–0.2)
IMM GRANULOCYTES NFR BLD AUTO: 1 % (ref 0–2)
KETONES UR STRIP-MCNC: NEGATIVE MG/DL
LEUKOCYTE ESTERASE UR QL STRIP: NEGATIVE
LYMPHOCYTES # BLD AUTO: 2.03 THOUSANDS/ÂΜL (ref 0.6–4.47)
LYMPHOCYTES NFR BLD AUTO: 35 % (ref 14–44)
MCH RBC QN AUTO: 28.8 PG (ref 26.8–34.3)
MCHC RBC AUTO-ENTMCNC: 32.8 G/DL (ref 31.4–37.4)
MCV RBC AUTO: 88 FL (ref 82–98)
MONOCYTES # BLD AUTO: 0.78 THOUSAND/ÂΜL (ref 0.17–1.22)
MONOCYTES NFR BLD AUTO: 13 % (ref 4–12)
NEUTROPHILS # BLD AUTO: 2.71 THOUSANDS/ÂΜL (ref 1.85–7.62)
NEUTS SEG NFR BLD AUTO: 47 % (ref 43–75)
NITRITE UR QL STRIP: NEGATIVE
NRBC BLD AUTO-RTO: 0 /100 WBCS
PH UR STRIP.AUTO: 5.5 [PH]
PLATELET # BLD AUTO: 207 THOUSANDS/UL (ref 149–390)
PMV BLD AUTO: 13 FL (ref 8.9–12.7)
POTASSIUM SERPL-SCNC: 4.8 MMOL/L (ref 3.5–5.3)
PROT SERPL-MCNC: 8 G/DL (ref 6.4–8.4)
PROT UR STRIP-MCNC: NEGATIVE MG/DL
RBC # BLD AUTO: 4.58 MILLION/UL (ref 3.88–5.62)
SODIUM SERPL-SCNC: 137 MMOL/L (ref 135–147)
SP GR UR STRIP.AUTO: 1.01 (ref 1–1.03)
UROBILINOGEN UR QL STRIP.AUTO: 0.2 E.U./DL
WBC # BLD AUTO: 5.83 THOUSAND/UL (ref 4.31–10.16)

## 2023-02-18 ENCOUNTER — HOSPITAL ENCOUNTER (INPATIENT)
Facility: HOSPITAL | Age: 68
LOS: 2 days | Discharge: HOME | End: 2023-02-20
Attending: EMERGENCY MEDICINE | Admitting: INTERNAL MEDICINE

## 2023-02-18 ENCOUNTER — APPOINTMENT (EMERGENCY)
Dept: RADIOLOGY | Facility: HOSPITAL | Age: 68
End: 2023-02-18

## 2023-02-18 DIAGNOSIS — G47.00 INSOMNIA: ICD-10-CM

## 2023-02-18 DIAGNOSIS — J44.1 COPD EXACERBATION (HCC): Primary | ICD-10-CM

## 2023-02-18 DIAGNOSIS — N17.9 ACUTE KIDNEY INJURY (HCC): ICD-10-CM

## 2023-02-18 PROBLEM — J96.01 ACUTE RESPIRATORY FAILURE WITH HYPOXIA (HCC): Status: ACTIVE | Noted: 2023-02-18

## 2023-02-18 PROBLEM — R65.10 SIRS (SYSTEMIC INFLAMMATORY RESPONSE SYNDROME) (HCC): Status: ACTIVE | Noted: 2023-02-18

## 2023-02-18 LAB
2HR DELTA HS TROPONIN: 1 NG/L
ANION GAP SERPL CALCULATED.3IONS-SCNC: 11 MMOL/L (ref 4–13)
BASOPHILS # BLD AUTO: 0.06 THOUSANDS/ÂΜL (ref 0–0.1)
BASOPHILS NFR BLD AUTO: 1 % (ref 0–1)
BUN SERPL-MCNC: 42 MG/DL (ref 5–25)
CALCIUM SERPL-MCNC: 10.2 MG/DL (ref 8.4–10.2)
CARDIAC TROPONIN I PNL SERPL HS: 5 NG/L
CARDIAC TROPONIN I PNL SERPL HS: 6 NG/L
CHLORIDE SERPL-SCNC: 103 MMOL/L (ref 96–108)
CO2 SERPL-SCNC: 18 MMOL/L (ref 21–32)
CREAT SERPL-MCNC: 1.64 MG/DL (ref 0.6–1.3)
EOSINOPHIL # BLD AUTO: 0.02 THOUSAND/ÂΜL (ref 0–0.61)
EOSINOPHIL NFR BLD AUTO: 0 % (ref 0–6)
ERYTHROCYTE [DISTWIDTH] IN BLOOD BY AUTOMATED COUNT: 13.2 % (ref 11.6–15.1)
FLUAV RNA RESP QL NAA+PROBE: NEGATIVE
FLUBV RNA RESP QL NAA+PROBE: NEGATIVE
GFR SERPL CREATININE-BSD FRML MDRD: 42 ML/MIN/1.73SQ M
GLUCOSE SERPL-MCNC: 174 MG/DL (ref 65–140)
HCT VFR BLD AUTO: 41.4 % (ref 36.5–49.3)
HGB BLD-MCNC: 13.7 G/DL (ref 12–17)
IMM GRANULOCYTES # BLD AUTO: 0.07 THOUSAND/UL (ref 0–0.2)
IMM GRANULOCYTES NFR BLD AUTO: 1 % (ref 0–2)
LYMPHOCYTES # BLD AUTO: 1.41 THOUSANDS/ÂΜL (ref 0.6–4.47)
LYMPHOCYTES NFR BLD AUTO: 11 % (ref 14–44)
MCH RBC QN AUTO: 29 PG (ref 26.8–34.3)
MCHC RBC AUTO-ENTMCNC: 33.1 G/DL (ref 31.4–37.4)
MCV RBC AUTO: 88 FL (ref 82–98)
MONOCYTES # BLD AUTO: 1.24 THOUSAND/ÂΜL (ref 0.17–1.22)
MONOCYTES NFR BLD AUTO: 10 % (ref 4–12)
NEUTROPHILS # BLD AUTO: 10.02 THOUSANDS/ÂΜL (ref 1.85–7.62)
NEUTS SEG NFR BLD AUTO: 77 % (ref 43–75)
NRBC BLD AUTO-RTO: 0 /100 WBCS
PLATELET # BLD AUTO: 257 THOUSANDS/UL (ref 149–390)
PMV BLD AUTO: 12.4 FL (ref 8.9–12.7)
POTASSIUM SERPL-SCNC: 5.3 MMOL/L (ref 3.5–5.3)
RBC # BLD AUTO: 4.72 MILLION/UL (ref 3.88–5.62)
RSV RNA RESP QL NAA+PROBE: NEGATIVE
SARS-COV-2 RNA RESP QL NAA+PROBE: NEGATIVE
SODIUM SERPL-SCNC: 132 MMOL/L (ref 135–147)
WBC # BLD AUTO: 12.82 THOUSAND/UL (ref 4.31–10.16)

## 2023-02-18 RX ORDER — MAGNESIUM HYDROXIDE/ALUMINUM HYDROXICE/SIMETHICONE 120; 1200; 1200 MG/30ML; MG/30ML; MG/30ML
30 SUSPENSION ORAL EVERY 6 HOURS PRN
Status: DISCONTINUED | OUTPATIENT
Start: 2023-02-18 | End: 2023-02-20 | Stop reason: HOSPADM

## 2023-02-18 RX ORDER — ACETAMINOPHEN 325 MG/1
975 TABLET ORAL EVERY 6 HOURS PRN
Status: DISCONTINUED | OUTPATIENT
Start: 2023-02-18 | End: 2023-02-20 | Stop reason: HOSPADM

## 2023-02-18 RX ORDER — PREGABALIN 100 MG/1
100 CAPSULE ORAL 2 TIMES DAILY
COMMUNITY

## 2023-02-18 RX ORDER — ALBUTEROL SULFATE 90 UG/1
2 AEROSOL, METERED RESPIRATORY (INHALATION) EVERY 4 HOURS PRN
COMMUNITY

## 2023-02-18 RX ORDER — CELECOXIB 200 MG/1
200 CAPSULE ORAL DAILY
Status: DISCONTINUED | OUTPATIENT
Start: 2023-02-19 | End: 2023-02-20 | Stop reason: HOSPADM

## 2023-02-18 RX ORDER — CHLORAL HYDRATE 500 MG
2000 CAPSULE ORAL 2 TIMES DAILY
Status: DISCONTINUED | OUTPATIENT
Start: 2023-02-19 | End: 2023-02-20 | Stop reason: HOSPADM

## 2023-02-18 RX ORDER — ENOXAPARIN SODIUM 100 MG/ML
40 INJECTION SUBCUTANEOUS DAILY
Status: DISCONTINUED | OUTPATIENT
Start: 2023-02-19 | End: 2023-02-20 | Stop reason: HOSPADM

## 2023-02-18 RX ORDER — AZITHROMYCIN 250 MG/1
500 TABLET, FILM COATED ORAL EVERY 24 HOURS
Status: DISCONTINUED | OUTPATIENT
Start: 2023-02-18 | End: 2023-02-20 | Stop reason: HOSPADM

## 2023-02-18 RX ORDER — METHYLPREDNISOLONE SODIUM SUCCINATE 40 MG/ML
40 INJECTION, POWDER, LYOPHILIZED, FOR SOLUTION INTRAMUSCULAR; INTRAVENOUS EVERY 8 HOURS
Status: DISCONTINUED | OUTPATIENT
Start: 2023-02-19 | End: 2023-02-19

## 2023-02-18 RX ORDER — SIMETHICONE 80 MG
80 TABLET,CHEWABLE ORAL 4 TIMES DAILY PRN
Status: DISCONTINUED | OUTPATIENT
Start: 2023-02-18 | End: 2023-02-20 | Stop reason: HOSPADM

## 2023-02-18 RX ORDER — FENOFIBRATE 145 MG/1
145 TABLET, COATED ORAL DAILY
Status: DISCONTINUED | OUTPATIENT
Start: 2023-02-19 | End: 2023-02-20 | Stop reason: HOSPADM

## 2023-02-18 RX ORDER — GUAIFENESIN 600 MG/1
600 TABLET, EXTENDED RELEASE ORAL 2 TIMES DAILY
Status: DISCONTINUED | OUTPATIENT
Start: 2023-02-18 | End: 2023-02-20 | Stop reason: HOSPADM

## 2023-02-18 RX ORDER — SODIUM CHLORIDE 9 MG/ML
75 INJECTION, SOLUTION INTRAVENOUS CONTINUOUS
Status: DISCONTINUED | OUTPATIENT
Start: 2023-02-18 | End: 2023-02-18

## 2023-02-18 RX ORDER — SODIUM CHLORIDE 9 MG/ML
75 INJECTION, SOLUTION INTRAVENOUS CONTINUOUS
Status: DISPENSED | OUTPATIENT
Start: 2023-02-18 | End: 2023-02-19

## 2023-02-18 RX ORDER — SODIUM CHLORIDE FOR INHALATION 0.9 %
3 VIAL, NEBULIZER (ML) INHALATION
Status: DISCONTINUED | OUTPATIENT
Start: 2023-02-19 | End: 2023-02-19

## 2023-02-18 RX ORDER — LEVALBUTEROL 1.25 MG/.5ML
1.25 SOLUTION, CONCENTRATE RESPIRATORY (INHALATION)
Status: DISCONTINUED | OUTPATIENT
Start: 2023-02-19 | End: 2023-02-20 | Stop reason: HOSPADM

## 2023-02-18 RX ORDER — GUAIFENESIN/DEXTROMETHORPHAN 100-10MG/5
10 SYRUP ORAL EVERY 4 HOURS PRN
Status: DISCONTINUED | OUTPATIENT
Start: 2023-02-18 | End: 2023-02-20 | Stop reason: HOSPADM

## 2023-02-18 RX ORDER — GUAIFENESIN 600 MG/1
600 TABLET, EXTENDED RELEASE ORAL 2 TIMES DAILY
Status: DISCONTINUED | OUTPATIENT
Start: 2023-02-19 | End: 2023-02-18

## 2023-02-18 RX ORDER — METHYLPREDNISOLONE SODIUM SUCCINATE 125 MG/2ML
80 INJECTION, POWDER, LYOPHILIZED, FOR SOLUTION INTRAMUSCULAR; INTRAVENOUS ONCE
Status: COMPLETED | OUTPATIENT
Start: 2023-02-18 | End: 2023-02-18

## 2023-02-18 RX ORDER — LANOLIN ALCOHOL/MO/W.PET/CERES
6 CREAM (GRAM) TOPICAL
Status: DISCONTINUED | OUTPATIENT
Start: 2023-02-18 | End: 2023-02-20 | Stop reason: HOSPADM

## 2023-02-18 RX ORDER — PREGABALIN 50 MG/1
100 CAPSULE ORAL 2 TIMES DAILY
Status: DISCONTINUED | OUTPATIENT
Start: 2023-02-19 | End: 2023-02-20 | Stop reason: HOSPADM

## 2023-02-18 RX ORDER — TRAMADOL HYDROCHLORIDE 50 MG/1
50 TABLET ORAL EVERY 6 HOURS PRN
Status: DISCONTINUED | OUTPATIENT
Start: 2023-02-18 | End: 2023-02-18

## 2023-02-18 RX ORDER — TRAMADOL HYDROCHLORIDE 50 MG/1
50 TABLET ORAL EVERY 6 HOURS PRN
Status: DISCONTINUED | OUTPATIENT
Start: 2023-02-18 | End: 2023-02-20 | Stop reason: HOSPADM

## 2023-02-18 RX ORDER — BUDESONIDE 0.5 MG/2ML
0.5 INHALANT ORAL
Status: DISCONTINUED | OUTPATIENT
Start: 2023-02-18 | End: 2023-02-20 | Stop reason: HOSPADM

## 2023-02-18 RX ORDER — PANTOPRAZOLE SODIUM 20 MG/1
20 TABLET, DELAYED RELEASE ORAL
Status: DISCONTINUED | OUTPATIENT
Start: 2023-02-19 | End: 2023-02-20 | Stop reason: HOSPADM

## 2023-02-18 RX ORDER — IPRATROPIUM BROMIDE AND ALBUTEROL SULFATE 2.5; .5 MG/3ML; MG/3ML
3 SOLUTION RESPIRATORY (INHALATION) ONCE
Status: COMPLETED | OUTPATIENT
Start: 2023-02-18 | End: 2023-02-18

## 2023-02-18 RX ORDER — PRAVASTATIN SODIUM 40 MG
40 TABLET ORAL
Status: DISCONTINUED | OUTPATIENT
Start: 2023-02-19 | End: 2023-02-20 | Stop reason: HOSPADM

## 2023-02-18 RX ORDER — ALBUTEROL SULFATE 2.5 MG/3ML
2.5 SOLUTION RESPIRATORY (INHALATION) EVERY 4 HOURS PRN
Status: DISCONTINUED | OUTPATIENT
Start: 2023-02-18 | End: 2023-02-20 | Stop reason: HOSPADM

## 2023-02-18 RX ORDER — SODIUM CHLORIDE FOR INHALATION 0.9 %
3 VIAL, NEBULIZER (ML) INHALATION ONCE
Status: COMPLETED | OUTPATIENT
Start: 2023-02-18 | End: 2023-02-18

## 2023-02-18 RX ORDER — ICOSAPENT ETHYL 1000 MG/1
2 CAPSULE ORAL 2 TIMES DAILY
COMMUNITY

## 2023-02-18 RX ORDER — INSULIN LISPRO 100 [IU]/ML
1-6 INJECTION, SOLUTION INTRAVENOUS; SUBCUTANEOUS
Status: DISCONTINUED | OUTPATIENT
Start: 2023-02-18 | End: 2023-02-20 | Stop reason: HOSPADM

## 2023-02-18 RX ADMIN — METHYLPREDNISOLONE SODIUM SUCCINATE 40 MG: 40 INJECTION, POWDER, FOR SOLUTION INTRAMUSCULAR; INTRAVENOUS at 23:55

## 2023-02-18 RX ADMIN — GUAIFENESIN 600 MG: 600 TABLET, EXTENDED RELEASE ORAL at 23:55

## 2023-02-18 RX ADMIN — AZITHROMYCIN MONOHYDRATE 500 MG: 250 TABLET ORAL at 23:55

## 2023-02-18 RX ADMIN — IPRATROPIUM BROMIDE 1 MG: 0.5 SOLUTION RESPIRATORY (INHALATION) at 18:15

## 2023-02-18 RX ADMIN — SODIUM CHLORIDE 1000 ML: 0.9 INJECTION, SOLUTION INTRAVENOUS at 18:18

## 2023-02-18 RX ADMIN — METHYLPREDNISOLONE SODIUM SUCCINATE 80 MG: 125 INJECTION, POWDER, FOR SOLUTION INTRAMUSCULAR; INTRAVENOUS at 17:23

## 2023-02-18 RX ADMIN — ALBUTEROL SULFATE 10 MG: 2.5 SOLUTION RESPIRATORY (INHALATION) at 18:15

## 2023-02-18 RX ADMIN — ISODIUM CHLORIDE 3 ML: 0.03 SOLUTION RESPIRATORY (INHALATION) at 18:16

## 2023-02-18 RX ADMIN — IPRATROPIUM BROMIDE AND ALBUTEROL SULFATE 3 ML: 2.5; .5 SOLUTION RESPIRATORY (INHALATION) at 17:21

## 2023-02-18 NOTE — ED PROVIDER NOTES
History  Chief Complaint   Patient presents with   • Shortness of Breath     Pt reports increased SOB and cough for the past 2 hours  Pt reports coughing so hard back hurts  Pt also reports headache  Caroline Cottrell is a 79 yo M presenting with cough, congestion, sore throat, and shortness of breath over the past day  Reports cough has been largely nonproductive  Reports body aches but no known fevers  No known sick contacts or recent travel  He notes tightness in chest with breathing  He did not use his rescue inhaler today but feels as though he needs to  History provided by:  Patient   used: No        Prior to Admission Medications   Prescriptions Last Dose Informant Patient Reported? Taking? [facility] [employer] [id number] [phone] MCG/ACT AERO Not Taking  Yes No   Sig: Inhale 2 puffs 2 (two) times a day   Patient not taking: Reported on 2/18/2023   DULoxetine (CYMBALTA) 30 mg delayed release capsule Not Taking  Yes No   Sig: Take 60 mg by mouth daily at bedtime    Patient not taking: Reported on 2/18/2023   Icosapent Ethyl (Vascepa) 0 5 g CAPS Not Taking  Yes No   Sig: Take 2 capsules by mouth 2 (two) times a day    Patient not taking: Reported on 2/18/2023   Icosapent Ethyl 1 g CAPS Not Taking  Yes No   Sig: take 2 capsules by mouth twice a day with food SWALLOW WHOLE DO NOT CHEW, [address]   Patient not taking: Reported on 2/18/2023   MELATONIN PO   Yes Yes   Sig: Take 2 mg by mouth   Multiple Vitamin (multivitamin) tablet   Yes Yes   Sig: TAKE 1 TABLET DAILY     Omega-3 Fatty Acids (FISH OIL PO)   Yes Yes   Sig: Take by mouth 2 in the morning 2 at night   Turmeric 500 MG CAPS Not Taking  Yes No   Sig: Take by mouth daily   Patient not taking: Reported on 2/18/2023   acetaminophen (TYLENOL) 650 mg CR tablet   Yes Yes   Sig: Take 650 mg by mouth   alendronate (FOSAMAX) 70 mg tablet Not Taking  Yes No   Sig: take 1 tablet by mouth every 7 days IN THE MORNING ON AN EMPTY ST   (REFER TO PRESCRIPTION NOTES)  Patient not taking: Reported on 2/18/2023   allopurinol (ZYLOPRIM) 300 mg tablet Not Taking  Yes No   Sig: Take 300 mg by mouth 2 (two) times a day     Patient not taking: Reported on 2/18/2023   ascorbic acid (VITAMIN C) 500 MG tablet Not Taking  No No   Sig: Take 1 tablet (500 mg total) by mouth daily Start 30 days prior to surgery   Patient not taking: Reported on 2/18/2023   azithromycin (ZITHROMAX) 250 mg tablet Not Taking  Yes No   Sig: take 2 tablets by mouth on day 1 then take 1 tablet on days 2 through 5   Patient not taking: Reported on 2/18/2023   buPROPion (WELLBUTRIN XL) 150 mg 24 hr tablet Not Taking  Yes No   Sig: Take 150 mg by mouth in the morning     Patient not taking: Reported on 2/18/2023   celecoxib (CeleBREX) 200 mg capsule   Yes Yes   Sig: Take 200 mg by mouth daily   cetirizine (ZyrTEC) 10 mg tablet   Yes Yes   Sig: Take 10 mg by mouth daily   ergocalciferol (ERGOCALCIFEROL) 1 25 MG (19692 UT) capsule   Yes Yes   Sig: take 1 capsule by mouth every week   fenofibrate (TRICOR) 145 mg tablet   Yes Yes   Sig: Take 145 mg by mouth daily   ferrous sulfate 324 (65 Fe) mg Unknown  No No   Sig: Take 1 tablet (324 mg total) by mouth 2 (two) times a day before meals Start 30 days prior to surgery   fluticasone-salmeterol (Advair) 250-50 mcg/dose inhaler Not Taking  Yes No   Sig: inhale 1 puff by mouth and INTO THE LUNGS every 12 hours [facility]   Patient not taking: Reported on 5/96/3438   folic acid (FOLVITE) 1 mg tablet Not Taking  No No   Sig: Take 1 tablet (1 mg total) by mouth daily Start 30 days prior to surgery   Patient not taking: Reported on 2/18/2023   ipratropium-albuterol (DUO-NEB) 0 5-2 5 mg/3 mL nebulizer solution   Yes Yes   Sig: inhale contents of 1 vial ( 3 milliliters ) in nebulizer by mouth four times a day if needed   metFORMIN (GLUCOPHAGE) 1000 MG tablet   Yes Yes   Sig: Take 1,000 mg by mouth 2 (two) times a day with meals     methylPREDNISolone 4 MG tablet therapy pack   Yes Yes   Sig: use as directed FOLLOW DIRECTIONS ON BACK OF FOIL PACK   nabumetone (RELAFEN) 750 mg tablet Not Taking  No No   Sig: TAKE 1 TABLET BY MOUTH IN THE MORNING AND 1 TABLET IN THE EVENING  DO THIS FOR 60 DOSES   Patient not taking: Reported on 2023   omeprazole (PriLOSEC OTC) 20 MG tablet   Yes No   Sig: Take 20 mg by mouth daily   omeprazole (PriLOSEC) 20 mg delayed release capsule   Yes Yes   Sig: take 1 capsule by mouth twice a day 30 TO 60 MINUTES PRIOR TO EATING   oxyCODONE (ROXICODONE) 5 immediate release tablet Not Taking  No No   Si tab po q4h prn mild pain  2 tabs po q4h prn moderate or severe pain   Patient not taking: Reported on 2023   pregabalin (LYRICA) 150 mg capsule   Yes Yes   Sig: Take 1 capsule by mouth every 12 (twelve) hours 100 mg   rosuvastatin (CRESTOR) 5 mg tablet   Yes Yes   Sig: Take 5 mg by mouth in the morning     temazepam (RESTORIL) 15 mg capsule Not Taking  Yes No   Sig: Take 15 mg by mouth daily at bedtime   Patient not taking: Reported on 2023   temazepam (RESTORIL) 7 5 mg capsule Not Taking  Yes No   Sig: Take 7 5 mg by mouth daily at bedtime as needed   Patient not taking: Reported on 2023   traMADol-acetaminophen (ULTRACET) 37 5-325 mg per tablet Not Taking  Yes No   Sig: Take 1 tablet by mouth 2 (two) times a day as needed   Patient not taking: Reported on 2023      Facility-Administered Medications: None       Past Medical History:   Diagnosis Date   • Arthritis     osteoarthritis   • Cervical radiculopathy    • COPD (chronic obstructive pulmonary disease) ([facility])    • CPAP (continuous positive airway pressure) dependence    • Diabetes mellitus (HCC)    • Fibromyalgia    • Fibromyalgia, primary    • GERD (gastroesophageal reflux disease)    • History of transfusion    • Hyperlipidemia    • Osteopenia    • Pneumonia    • Sleep apnea     USES CPAP HS       Past Surgical History:   Procedure Laterality Date   • CHOLECYSTECTOMY     • COLONOSCOPY     • CYST REMOVAL     • FEMUR SURGERY Left    • HIP CLOSE REDUCTION Left 1/20/2022    Procedure: CLOSED REDUCTION HIP;  Surgeon: Ben Mccarty MD;  Location: [facility];  Service: Orthopedics   • JOINT REPLACEMENT      T KR LEFT   • KNEE SURGERY Left 1996   • MANDIBLE SURGERY     • VT CONV PREV HIP TOT HIP ARTHRP W/WO AGRFT/ALGRFT Left 12/22/2021    Procedure: ARTHROPLASTY HIP TOTAL- conversion;  Surgeon: Junior Orosco MD;  Location: [facility];  Service: Orthopedics   • VT REMOVAL IMPLANT DEEP Left 9/8/2021    Procedure: REMOVAL HARDWARE HIP;  Surgeon: Elvin Rivera MD;  Location: [facility];  Service: Orthopedics   • ROTATOR CUFF REPAIR Left    • WRIST FRACTURE SURGERY Left     plate in place       Family History   Problem Relation Age of Onset   • Cancer Mother    • Arthritis Father    • Diabetes Sister    • Cancer Brother    • Diabetes Daughter    • Depression Son      I have reviewed and agree with the history as documented  E-Cigarette/Vaping   • E-Cigarette Use Never User      E-Cigarette/Vaping Substances   • Nicotine No    • THC No    • CBD No    • Flavoring No    • Other No    • Unknown No      Social History     Tobacco Use   • Smoking status: Every Day     Packs/day: 0 50     Types: Cigarettes   • Smokeless tobacco: Never   Vaping Use   • Vaping Use: Never used   Substance Use Topics   • Alcohol use: Not Currently     Comment: "at holidays"   • Drug use: No       Review of Systems   Constitutional: Negative for chills and fever  HENT: Positive for congestion and sore throat  Negative for rhinorrhea  Eyes: Negative for pain and visual disturbance  Respiratory: Positive for chest tightness and shortness of breath  Negative for cough and wheezing  Cardiovascular: Negative for chest pain and palpitations  Gastrointestinal: Negative for abdominal pain, nausea and vomiting  Genitourinary: Negative for dysuria, frequency and urgency  Musculoskeletal: Positive for myalgias  Negative for back pain, neck pain and neck stiffness  Skin: Negative for rash and wound  Neurological: Negative for dizziness, weakness, light-headedness and numbness  Physical Exam  Physical Exam  Constitutional:       General: He is not in acute distress  Appearance: He is well-developed  He is not toxic-appearing or diaphoretic  HENT:      Head: Normocephalic and atraumatic  Right Ear: External ear normal       Left Ear: External ear normal       Nose: Nose normal       Mouth/Throat:      Mouth: Mucous membranes are moist       Pharynx: Oropharynx is clear  Eyes:      Conjunctiva/sclera: Conjunctivae normal       Pupils: Pupils are equal, round, and reactive to light  Cardiovascular:      Rate and Rhythm: Normal rate and regular rhythm  Heart sounds: Normal heart sounds  No murmur heard  No friction rub  No gallop  Pulmonary:      Effort: Pulmonary effort is normal  No respiratory distress  Breath sounds: Wheezing present  No rhonchi or rales  Comments: Expiratory wheezing  No acute respiratory distress or increased work of breathing  Chest:      Chest wall: No tenderness  Abdominal:      General: There is no distension  Palpations: Abdomen is soft  Tenderness: There is no abdominal tenderness  Musculoskeletal:      Cervical back: Normal range of motion and neck supple  Lymphadenopathy:      Cervical: No cervical adenopathy  Skin:     General: Skin is warm and dry  Capillary Refill: Capillary refill takes less than 2 seconds  Findings: No erythema or rash  Neurological:      Mental Status: He is alert and oriented to person, place, and time  Motor: No abnormal muscle tone  Coordination: Coordination normal    Psychiatric:         Behavior: Behavior normal          Thought Content:  Thought content normal          Judgment: Judgment normal          Vital Signs  ED Triage Vitals Temperature Pulse Respirations Blood Pressure SpO2   02/18/23 1640 02/18/23 1640 02/18/23 1640 02/18/23 1640 02/18/23 1640   (!) 97 4 °F (36 3 °C) (!) 122 22 157/95 95 %      Temp Source Heart Rate Source Patient Position - Orthostatic VS BP Location FiO2 (%)   02/18/23 1640 02/18/23 1640 02/18/23 1640 02/18/23 1640 --   Oral Monitor Sitting Right arm       Pain Score       02/18/23 2007       No Pain           Vitals:    02/18/23 1746 02/18/23 2007 02/18/23 2015 02/18/23 2019   BP: 151/70 127/65     Pulse: (!) 116 (!) 107 (!) 126 (!) 107   Patient Position - Orthostatic VS: Lying Sitting           Visual Acuity      ED Medications  Medications   ipratropium-albuterol (DUO-NEB) 0 5-2 5 mg/3 mL inhalation solution 3 mL (3 mL Nebulization Given 2/18/23 1721)   methylPREDNISolone sodium succinate (Solu-MEDROL) injection 80 mg (80 mg Intravenous Given 2/18/23 1723)   sodium chloride 0 9 % bolus 1,000 mL (0 mL Intravenous Stopped 2/18/23 2101)   albuterol inhalation solution 10 mg (10 mg Nebulization Given 2/18/23 1815)     And   ipratropium (ATROVENT) 0 02 % inhalation solution 1 mg (1 mg Nebulization Given 2/18/23 1815)     And   sodium chloride 0 9 % inhalation solution 3 mL (3 mL Nebulization Given 2/18/23 1816)       Diagnostic Studies  Results Reviewed     Procedure Component Value Units Date/Time    HS Troponin I 2hr [[id number]]  (Normal) Collected: 02/18/23 1926    Lab Status: Final result Specimen: Blood from Hand, Right Updated: 02/18/23 2001     hs TnI 2hr 6 ng/L      Delta 2hr hsTnI 1 ng/L     Basic metabolic panel [[id number]]  (Abnormal) Collected: 02/18/23 1705    Lab Status: Final result Specimen: Blood from Hand, Right Updated: 02/18/23 1748     Sodium 132 mmol/L      Potassium 5 3 mmol/L      Chloride 103 mmol/L      CO2 18 mmol/L      ANION GAP 11 mmol/L      BUN 42 mg/dL      Creatinine 1 64 mg/dL      Glucose 174 mg/dL      Calcium 10 2 mg/dL      eGFR 42 ml/min/1 73sq m     Narrative:      Consolidated [employer] guidelines for Chronic Kidney Disease (CKD):   •  Stage 1 with normal or high GFR (GFR > 90 mL/min/1 73 square meters)  •  Stage 2 Mild CKD (GFR = 60-89 mL/min/1 73 square meters)  •  Stage 3A Moderate CKD (GFR = 45-59 mL/min/1 73 square meters)  •  Stage 3B Moderate CKD (GFR = 30-44 mL/min/1 73 square meters)  •  Stage 4 Severe CKD (GFR = 15-29 mL/min/1 73 square meters)  •  Stage 5 End Stage CKD (GFR <15 mL/min/1 73 square meters)  Note: GFR calculation is accurate only with a steady state creatinine    FLU/RSV/COVID - if FLU/RSV clinically relevant [[id number]]  (Normal) Collected: 02/18/23 1705    Lab Status: Final result Specimen: Nares from Nose Updated: 02/18/23 1747     SARS-CoV-2 Negative     INFLUENZA A PCR Negative     INFLUENZA B PCR Negative     RSV PCR Negative    Narrative:      FOR PEDIATRIC PATIENTS - copy/paste COVID Guidelines URL to browser: [email or web address]  [employer]    SARS-CoV-2 assay is a Nucleic Acid Amplification assay intended for the  qualitative detection of nucleic acid from SARS-CoV-2 in nasopharyngeal  swabs  Results are for the presumptive identification of SARS-CoV-2 RNA  Positive results are indicative of infection with SARS-CoV-2, the virus  causing COVID-19, but do not rule out bacterial infection or co-infection  with other viruses  Laboratories within the [place] and its  territories are required to report all positive results to the appropriate  public health authorities  Negative results do not preclude SARS-CoV-2  infection and should not be used as the sole basis for treatment or other  patient management decisions  Negative results must be combined with  clinical observations, patient history, and epidemiological information  This test has not been FDA cleared or approved  This test has been authorized by FDA under an Emergency Use Authorization  (EUA)   This test is only authorized for the duration of time the  declaration that circumstances exist justifying the authorization of the  emergency use of an in vitro diagnostic tests for detection of SARS-CoV-2  virus and/or diagnosis of COVID-19 infection under section 564(b)(1) of  the Act, 21 U  S C  493ZCA-1(K)(1), unless the authorization is terminated  or revoked sooner  The test has been validated but independent review by FDA  and CLIA is pending  Test performed using [employer] GeneXpert: This RT-PCR assay targets N2,  a region unique to SARS-CoV-2  A conserved region in the E-gene was chosen  for pan-Sarbecovirus detection which includes SARS-CoV-2  According to CMS-2020-01-R, this platform meets the definition of high-throughput technology      HS Troponin 0hr (reflex protocol) [[id number]]  (Normal) Collected: 02/18/23 1705    Lab Status: Final result Specimen: Blood from Hand, Right Updated: 02/18/23 1740     hs TnI 0hr 5 ng/L     CBC and differential [[id number]]  (Abnormal) Collected: 02/18/23 1705    Lab Status: Final result Specimen: Blood from Hand, Right Updated: 02/18/23 1711     WBC 12 82 Thousand/uL      RBC 4 72 Million/uL      Hemoglobin 13 7 g/dL      Hematocrit 41 4 %      MCV 88 fL      MCH 29 0 pg      MCHC 33 1 g/dL      RDW 13 2 %      MPV 12 4 fL      Platelets 731 Thousands/uL      nRBC 0 /100 WBCs      Neutrophils Relative 77 %      Immat GRANS % 1 %      Lymphocytes Relative 11 %      Monocytes Relative 10 %      Eosinophils Relative 0 %      Basophils Relative 1 %      Neutrophils Absolute 10 02 Thousands/µL      Immature Grans Absolute 0 07 Thousand/uL      Lymphocytes Absolute 1 41 Thousands/µL      Monocytes Absolute 1 24 Thousand/µL      Eosinophils Absolute 0 02 Thousand/µL      Basophils Absolute 0 06 Thousands/µL                  XR chest 2 views    (Results Pending)              Procedures  ECG 12 Lead Documentation Only    Date/Time: 2/18/2023 5:00 PM  Performed by: Salima Juarez PA-C  Authorized by: Salima Juarez PA-C     Indications / Diagnosis:  Chest tightness  ECG reviewed by me, the ED Provider: yes    Patient location:  ED  Previous ECG:     Previous ECG:  Compared to current    Comparison ECG info:  Rate increased to 116 from 87    Similarity:  Changes noted    Comparison to cardiac monitor: Yes    Interpretation:     Interpretation: non-specific    Rate:     ECG rate:  116    ECG rate assessment: tachycardic    Rhythm:     Rhythm: sinus tachycardia    Ectopy:     Ectopy: none    QRS:     QRS axis:  Normal    QRS intervals:  Normal  Conduction:     Conduction normal: RSR' V1     ST segments:     ST segments:  Normal  T waves:     T waves: normal               ED Course                               SBIRT 22yo+    Flowsheet Row Most Recent Value   SBIRT (25 yo +)    In order to provide better care to our patients, we are screening all of our patients for alcohol and drug use  Would it be okay to ask you these screening questions? Unable to answer at this time Filed at: 02/18/2023 4019                    Medical Decision Making  Cough, congestion, wheezing/chest tightness over the past day  Wheezing noted on exam with O2 around 90% on room air  Prolonged expiration but no acute respiratory distress on initial exam  HR in 120's on initial exam  Afebrile  Will check labs including CBC for WBC count, BMP for electrolytes, EKG/troponin for cardiac ischemia given chest tightness, XR chest for infiltrate  Check COVID19/flu/RSV  Will provide duoneb, solumedrol, reassess  -----------------------  Minimal improvement noted by patient with duoneb/solumedrol, subsequently given pimentel neb  He again notes only slight relief  O2 noted to desaturate to mid 80's with ambulation  XR chest clear on my initial xray read  Pt also noted to have LORETTA from labs last month - he reports has been drinking fluids normally  Given IV fluids for same   Given minimal improvement with ED therapy will admit to [facility] for further treatment of COPD and correction of LORETTA  COPD exacerbation ([facility] ): acute illness or injury  Amount and/or Complexity of Data Reviewed  Labs: ordered  Decision-making details documented in ED Course  Radiology: ordered  Decision-making details documented in ED Course  Risk  Prescription drug management  Decision regarding hospitalization  Disposition  Final diagnoses:   COPD exacerbation ([facility] )   Acute kidney injury ([facility] )     Time reflects when diagnosis was documented in both MDM as applicable and the Disposition within this note     Time User Action Codes Description Comment    2/18/2023  8:53 PM Elena Landers Add [J44 1] COPD exacerbation ([facility] )     2/18/2023  9:33 PM Elena Landers Add [N17 9] Acute kidney injury [facility])       ED Disposition     ED Disposition   Admit    Condition   Stable    Date/Time   Sat Feb 18, 2023  8:53 PM    Comment   Case was discussed with Angela Comer and the patient's admission status was agreed to be Admission Status: inpatient status to the service of Dr Allegra Mojica  Follow-up Information    None         Patient's Medications   Discharge Prescriptions    No medications on file       No discharge procedures on file      PDMP Review       Value Time User    PDMP Reviewed  Yes 2/18/2023  9:32 PM Rula Graham          ED Provider  Electronically Signed by           Dave Hargrove PA-C  02/18/23 2741

## 2023-02-18 NOTE — LETTER
[id number]  [address]  Dept: [phone]    February 20, 2023     Patient: Alejo Shaver   YOB: 1955   Date of Visit: 2/18/2023       To Whom it May Concern:    Alejo Shaver is under my professional care  He was seen in the hospital from 2/18/2023 to 02/20/23  He may return to work on Friday, February 24, 2023 without limitations  If you have any questions or concerns, please don't hesitate to call           Sincerely,          Monty Simon MD

## 2023-02-18 NOTE — ED NOTES
Pt reporting no improvement after respiratory treatment, medications   Provider notified     Laura Ramirez RN  02/18/23 1800

## 2023-02-19 LAB
ANION GAP SERPL CALCULATED.3IONS-SCNC: 5 MMOL/L (ref 4–13)
ATRIAL RATE: 116 BPM
ATRIAL RATE: 117 BPM
BASOPHILS # BLD AUTO: 0.02 THOUSANDS/ÂΜL (ref 0–0.1)
BASOPHILS NFR BLD AUTO: 0 % (ref 0–1)
BUN SERPL-MCNC: 36 MG/DL (ref 5–25)
CALCIUM SERPL-MCNC: 9.3 MG/DL (ref 8.4–10.2)
CHLORIDE SERPL-SCNC: 107 MMOL/L (ref 96–108)
CO2 SERPL-SCNC: 23 MMOL/L (ref 21–32)
CREAT SERPL-MCNC: 1.38 MG/DL (ref 0.6–1.3)
EOSINOPHIL # BLD AUTO: 0 THOUSAND/ÂΜL (ref 0–0.61)
EOSINOPHIL NFR BLD AUTO: 0 % (ref 0–6)
ERYTHROCYTE [DISTWIDTH] IN BLOOD BY AUTOMATED COUNT: 13.6 % (ref 11.6–15.1)
GFR SERPL CREATININE-BSD FRML MDRD: 52 ML/MIN/1.73SQ M
GLUCOSE SERPL-MCNC: 142 MG/DL (ref 65–140)
GLUCOSE SERPL-MCNC: 155 MG/DL (ref 65–140)
GLUCOSE SERPL-MCNC: 185 MG/DL (ref 65–140)
GLUCOSE SERPL-MCNC: 228 MG/DL (ref 65–140)
GLUCOSE SERPL-MCNC: 269 MG/DL (ref 65–140)
GLUCOSE SERPL-MCNC: 288 MG/DL (ref 65–140)
HCT VFR BLD AUTO: 36.4 % (ref 36.5–49.3)
HGB BLD-MCNC: 11.9 G/DL (ref 12–17)
IMM GRANULOCYTES # BLD AUTO: 0.13 THOUSAND/UL (ref 0–0.2)
IMM GRANULOCYTES NFR BLD AUTO: 1 % (ref 0–2)
LYMPHOCYTES # BLD AUTO: 0.65 THOUSANDS/ÂΜL (ref 0.6–4.47)
LYMPHOCYTES NFR BLD AUTO: 4 % (ref 14–44)
MCH RBC QN AUTO: 29 PG (ref 26.8–34.3)
MCHC RBC AUTO-ENTMCNC: 32.7 G/DL (ref 31.4–37.4)
MCV RBC AUTO: 89 FL (ref 82–98)
MONOCYTES # BLD AUTO: 0.77 THOUSAND/ÂΜL (ref 0.17–1.22)
MONOCYTES NFR BLD AUTO: 4 % (ref 4–12)
NEUTROPHILS # BLD AUTO: 16.31 THOUSANDS/ÂΜL (ref 1.85–7.62)
NEUTS SEG NFR BLD AUTO: 91 % (ref 43–75)
NRBC BLD AUTO-RTO: 0 /100 WBCS
P AXIS: 76 DEGREES
P AXIS: 77 DEGREES
PLATELET # BLD AUTO: 188 THOUSANDS/UL (ref 149–390)
PMV BLD AUTO: 12.9 FL (ref 8.9–12.7)
POTASSIUM SERPL-SCNC: 4.9 MMOL/L (ref 3.5–5.3)
PR INTERVAL: 138 MS
PR INTERVAL: 142 MS
PROCALCITONIN SERPL-MCNC: 0.11 NG/ML
QRS AXIS: 87 DEGREES
QRS AXIS: 88 DEGREES
QRSD INTERVAL: 88 MS
QRSD INTERVAL: 92 MS
QT INTERVAL: 294 MS
QT INTERVAL: 298 MS
QTC INTERVAL: 408 MS
QTC INTERVAL: 415 MS
RBC # BLD AUTO: 4.11 MILLION/UL (ref 3.88–5.62)
SODIUM SERPL-SCNC: 135 MMOL/L (ref 135–147)
T WAVE AXIS: 67 DEGREES
T WAVE AXIS: 74 DEGREES
VENTRICULAR RATE: 116 BPM
VENTRICULAR RATE: 117 BPM
WBC # BLD AUTO: 17.88 THOUSAND/UL (ref 4.31–10.16)

## 2023-02-19 RX ORDER — METHYLPREDNISOLONE SODIUM SUCCINATE 40 MG/ML
40 INJECTION, POWDER, LYOPHILIZED, FOR SOLUTION INTRAMUSCULAR; INTRAVENOUS EVERY 12 HOURS SCHEDULED
Status: DISCONTINUED | OUTPATIENT
Start: 2023-02-20 | End: 2023-02-20 | Stop reason: HOSPADM

## 2023-02-19 RX ORDER — TRAZODONE HYDROCHLORIDE 50 MG/1
50 TABLET ORAL
Status: DISCONTINUED | OUTPATIENT
Start: 2023-02-19 | End: 2023-02-20 | Stop reason: HOSPADM

## 2023-02-19 RX ADMIN — INSULIN LISPRO 2 UNITS: 100 INJECTION, SOLUTION INTRAVENOUS; SUBCUTANEOUS at 22:10

## 2023-02-19 RX ADMIN — ACETAMINOPHEN 325MG 975 MG: 325 TABLET ORAL at 16:43

## 2023-02-19 RX ADMIN — GUAIFENESIN 600 MG: 600 TABLET, EXTENDED RELEASE ORAL at 08:58

## 2023-02-19 RX ADMIN — TRAZODONE HYDROCHLORIDE 50 MG: 50 TABLET ORAL at 22:10

## 2023-02-19 RX ADMIN — ACETAMINOPHEN 325MG 975 MG: 325 TABLET ORAL at 02:15

## 2023-02-19 RX ADMIN — INSULIN LISPRO 1 UNITS: 100 INJECTION, SOLUTION INTRAVENOUS; SUBCUTANEOUS at 08:59

## 2023-02-19 RX ADMIN — IPRATROPIUM BROMIDE 0.5 MG: 0.5 SOLUTION RESPIRATORY (INHALATION) at 08:38

## 2023-02-19 RX ADMIN — OMEGA-3 FATTY ACIDS CAP 1000 MG 2000 MG: 1000 CAP at 16:43

## 2023-02-19 RX ADMIN — GUAIFENESIN 600 MG: 600 TABLET, EXTENDED RELEASE ORAL at 16:43

## 2023-02-19 RX ADMIN — GUAIFENESIN AND DEXTROMETHORPHAN 10 ML: 100; 10 SYRUP ORAL at 16:49

## 2023-02-19 RX ADMIN — LEVALBUTEROL 1.25 MG: 1.25 SOLUTION, CONCENTRATE RESPIRATORY (INHALATION) at 14:10

## 2023-02-19 RX ADMIN — METHYLPREDNISOLONE SODIUM SUCCINATE 40 MG: 40 INJECTION, POWDER, FOR SOLUTION INTRAMUSCULAR; INTRAVENOUS at 08:58

## 2023-02-19 RX ADMIN — SODIUM CHLORIDE 75 ML/HR: 0.9 INJECTION, SOLUTION INTRAVENOUS at 00:08

## 2023-02-19 RX ADMIN — PANTOPRAZOLE SODIUM 20 MG: 20 TABLET, DELAYED RELEASE ORAL at 06:30

## 2023-02-19 RX ADMIN — PREGABALIN 100 MG: 50 CAPSULE ORAL at 16:45

## 2023-02-19 RX ADMIN — BUDESONIDE 0.5 MG: 0.5 INHALANT ORAL at 21:08

## 2023-02-19 RX ADMIN — IPRATROPIUM BROMIDE 0.5 MG: 0.5 SOLUTION RESPIRATORY (INHALATION) at 14:10

## 2023-02-19 RX ADMIN — LEVALBUTEROL 1.25 MG: 1.25 SOLUTION, CONCENTRATE RESPIRATORY (INHALATION) at 21:08

## 2023-02-19 RX ADMIN — B-COMPLEX W/ C & FOLIC ACID TAB 1 TABLET: TAB at 08:58

## 2023-02-19 RX ADMIN — INSULIN LISPRO 4 UNITS: 100 INJECTION, SOLUTION INTRAVENOUS; SUBCUTANEOUS at 00:39

## 2023-02-19 RX ADMIN — LEVALBUTEROL 1.25 MG: 1.25 SOLUTION, CONCENTRATE RESPIRATORY (INHALATION) at 08:38

## 2023-02-19 RX ADMIN — BUDESONIDE 0.5 MG: 0.5 INHALANT ORAL at 08:38

## 2023-02-19 RX ADMIN — IPRATROPIUM BROMIDE 0.5 MG: 0.5 SOLUTION RESPIRATORY (INHALATION) at 21:08

## 2023-02-19 RX ADMIN — OMEGA-3 FATTY ACIDS CAP 1000 MG 2000 MG: 1000 CAP at 08:58

## 2023-02-19 RX ADMIN — METHYLPREDNISOLONE SODIUM SUCCINATE 40 MG: 40 INJECTION, POWDER, FOR SOLUTION INTRAMUSCULAR; INTRAVENOUS at 16:44

## 2023-02-19 RX ADMIN — ENOXAPARIN SODIUM 40 MG: 40 INJECTION SUBCUTANEOUS at 08:58

## 2023-02-19 RX ADMIN — INSULIN LISPRO 3 UNITS: 100 INJECTION, SOLUTION INTRAVENOUS; SUBCUTANEOUS at 16:44

## 2023-02-19 RX ADMIN — FENOFIBRATE 145 MG: 145 TABLET, FILM COATED ORAL at 08:58

## 2023-02-19 RX ADMIN — AZITHROMYCIN MONOHYDRATE 500 MG: 250 TABLET ORAL at 22:10

## 2023-02-19 RX ADMIN — PRAVASTATIN SODIUM 40 MG: 40 TABLET ORAL at 16:43

## 2023-02-19 RX ADMIN — PREGABALIN 100 MG: 50 CAPSULE ORAL at 08:58

## 2023-02-19 RX ADMIN — Medication 6 MG: at 02:16

## 2023-02-19 NOTE — ASSESSMENT & PLAN NOTE
· Creatinine 1 6, baseline 1 0-1 3  · Provide gentle IV hydration  · Avoid nephrotoxins  · Repeat a m   BMP
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
## 2023-02-19 NOTE — ASSESSMENT & PLAN NOTE
Lab Results   Component Value Date    HGBA1C 7 0 (H) 06/13/2022     · Outpatient regimen metformin  Hold oral hypoglycemics while inpatient  · Monitor blood glucose closely while on IV steroids  · Add sliding scale insulin and ADA diet    No results for input(s): POCGLU in the last 72 hours      Blood Sugar Average: Last 72 hrs:

## 2023-02-19 NOTE — ASSESSMENT & PLAN NOTE
Lab Results   Component Value Date    HGBA1C 7 0 (H) 06/13/2022     · Outpatient regimen metformin    Hold oral hypoglycemics while inpatient  · Monitor blood glucose closely while on IV steroids - taper down  · Add sliding scale insulin and ADA diet    Recent Labs     02/18/23  2358 02/19/23  0726 02/19/23  1114 02/19/23  1608   POCGLU 288* 155* 142* 269*       Blood Sugar Average: Last 72 hrs:  (P) 213 5

## 2023-02-19 NOTE — ASSESSMENT & PLAN NOTE
· COPD exacerbation with increased dyspnea and cough, unable to expectorate sputum causing further respiratory distress  · CXR: unremarkable  · Hold home inhalers   Add Xopenex and Atrovent TID, Pulmicort nebulized b i d   · IV Solu-Medrol 40 mg q8h  · Moderate COPD exacerbation, will start prophylactic Azithromycin 500mg qd x3days  · Continue supplemental O2 to maintain O2 sat at or above 92%  · Will obtain PCT  · Smoking cessation  · Respiratory protocol  · Needs Rx for nebulizer treatment (has machine at home)

## 2023-02-19 NOTE — PLAN OF CARE
Problem: Potential for Falls  Goal: Patient will remain free of falls  Description: INTERVENTIONS:  - Educate patient/family on patient safety including physical limitations  - Instruct patient to call for assistance with activity   - Consult OT/PT to assist with strengthening/mobility   - Keep Call bell within reach  - Keep bed low and locked with side rails adjusted as appropriate  - Keep care items and personal belongings within reach  - Initiate and maintain comfort rounds  - Apply yellow socks and bracelet for high fall risk patients  - Consider moving patient to room near nurses station  Outcome: Progressing     Problem: PAIN - ADULT  Goal: Verbalizes/displays adequate comfort level or baseline comfort level  Description: Interventions:  - Encourage patient to monitor pain and request assistance  - Assess pain using appropriate pain scale  - Administer analgesics based on type and severity of pain and evaluate response  - Implement non-pharmacological measures as appropriate and evaluate response  - Consider cultural and social influences on pain and pain management  - Notify physician/advanced practitioner if interventions unsuccessful or patient reports new pain  Outcome: Progressing     Problem: INFECTION - ADULT  Goal: Absence or prevention of progression during hospitalization  Description: INTERVENTIONS:  - Assess and monitor for signs and symptoms of infection  - Monitor lab/diagnostic results  - Monitor all insertion sites, i e  indwelling lines, tubes, and drains  - Monitor endotracheal if appropriate and nasal secretions for changes in amount and color  - Castile appropriate cooling/warming therapies per order  - Administer medications as ordered  - Instruct and encourage patient and family to use good hand hygiene technique  - Identify and instruct in appropriate isolation precautions for identified infection/condition  Outcome: Progressing     Problem: DISCHARGE PLANNING  Goal: Discharge to home or other facility with appropriate resources  Description: INTERVENTIONS:  - Identify barriers to discharge w/patient and caregiver  - Arrange for needed discharge resources and transportation as appropriate  - Identify discharge learning needs (meds, wound care, etc )  - Arrange for interpretive services to assist at discharge as needed  - Refer to Case Management Department for coordinating discharge planning if the patient needs post-hospital services based on physician/advanced practitioner order or complex needs related to functional status, cognitive ability, or social support system  Outcome: Progressing     Problem: RESPIRATORY - ADULT  Goal: Achieves optimal ventilation and oxygenation  Description: INTERVENTIONS:  - Assess for changes in respiratory status  - Assess for changes in mentation and behavior  - Position to facilitate oxygenation and minimize respiratory effort  - Oxygen administered by appropriate delivery if ordered  - Initiate smoking cessation education as indicated  - Encourage broncho-pulmonary hygiene including cough, deep breathe, Incentive Spirometry  - Assess the need for suctioning and aspirate as needed  - Assess and instruct to report SOB or any respiratory difficulty  - Respiratory Therapy support as indicated  Outcome: Progressing     Problem: METABOLIC, FLUID AND ELECTROLYTES - ADULT  Goal: Electrolytes maintained within normal limits  Description: INTERVENTIONS:  - Monitor labs and assess patient for signs and symptoms of electrolyte imbalances  - Administer electrolyte replacement as ordered  - Monitor response to electrolyte replacements, including repeat lab results as appropriate  - Instruct patient on fluid and nutrition as appropriate  Outcome: Progressing  Goal: Glucose maintained within target range  Description: INTERVENTIONS:  - Monitor Blood Glucose as ordered  - Assess for signs and symptoms of hyperglycemia and hypoglycemia  - Administer ordered medications to maintain glucose within target range  - Assess nutritional intake and initiate nutrition service referral as needed  Outcome: Progressing
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
## 2023-02-19 NOTE — ASSESSMENT & PLAN NOTE
· SIRS POA with tachycardia, tachypnea and leukocytosis; subjective fevers  · No clear focal s/s infection, likely secondary to COPD exacerbation  · CXR neg for infiltrates  · PCT normal

## 2023-02-19 NOTE — ASSESSMENT & PLAN NOTE
· COPD exacerbation with increased dyspnea and cough, unable to expectorate sputum causing further respiratory distress  · CXR: unremarkable  · Hold home inhalers   Add Xopenex and Atrovent TID, Pulmicort nebulized b i d   · IV Solu-Medrol 40 mg - taper to q12h  · Moderate COPD exacerbation,continue prophylactic Azithromycin 500mg qd x3days  · Continue supplemental O2 to maintain O2 sat at or above 92%  · Smoking cessation  · Respiratory protocol  · Needs Rx for nebulizer treatment (has machine at home)

## 2023-02-19 NOTE — ASSESSMENT & PLAN NOTE
· SIRS POA with tachycardia, tachypnea and leukocytosis; subjective fevers  · No clear focal s/s infection, likely secondary to COPD exacerbation  · CXR neg for infiltrates  · Check PCT
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
## 2023-02-19 NOTE — ASSESSMENT & PLAN NOTE
· Creatinine 1 6, baseline 1 0-1 3  · Improved to near baseline with IV fluids, continue for now  · BMP in a m

## 2023-02-19 NOTE — ASSESSMENT & PLAN NOTE
· Tachycardia, tachypnea and hypoxia with activity, exertional O2 sat 87%  · Respiratory viral panel negative  · Admitted for COPD exacerbation    See above plan  · Evaluate for home O2 prior to discharge

## 2023-02-20 VITALS
DIASTOLIC BLOOD PRESSURE: 70 MMHG | HEART RATE: 74 BPM | BODY MASS INDEX: 25.16 KG/M2 | HEIGHT: 68 IN | TEMPERATURE: 97.8 F | WEIGHT: 166.01 LBS | SYSTOLIC BLOOD PRESSURE: 152 MMHG | OXYGEN SATURATION: 98 % | RESPIRATION RATE: 17 BRPM

## 2023-02-20 PROBLEM — G47.00 INSOMNIA: Status: ACTIVE | Noted: 2023-02-20

## 2023-02-20 LAB
ANION GAP SERPL CALCULATED.3IONS-SCNC: 5 MMOL/L (ref 4–13)
BASOPHILS # BLD AUTO: 0.03 THOUSANDS/ÂΜL (ref 0–0.1)
BASOPHILS NFR BLD AUTO: 0 % (ref 0–1)
BUN SERPL-MCNC: 31 MG/DL (ref 5–25)
CALCIUM SERPL-MCNC: 9.4 MG/DL (ref 8.4–10.2)
CHLORIDE SERPL-SCNC: 110 MMOL/L (ref 96–108)
CO2 SERPL-SCNC: 23 MMOL/L (ref 21–32)
CREAT SERPL-MCNC: 1.07 MG/DL (ref 0.6–1.3)
EOSINOPHIL # BLD AUTO: 0 THOUSAND/ÂΜL (ref 0–0.61)
EOSINOPHIL NFR BLD AUTO: 0 % (ref 0–6)
ERYTHROCYTE [DISTWIDTH] IN BLOOD BY AUTOMATED COUNT: 13.9 % (ref 11.6–15.1)
GFR SERPL CREATININE-BSD FRML MDRD: 71 ML/MIN/1.73SQ M
GLUCOSE SERPL-MCNC: 106 MG/DL (ref 65–140)
GLUCOSE SERPL-MCNC: 134 MG/DL (ref 65–140)
GLUCOSE SERPL-MCNC: 203 MG/DL (ref 65–140)
HCT VFR BLD AUTO: 35.4 % (ref 36.5–49.3)
HGB BLD-MCNC: 11.4 G/DL (ref 12–17)
IMM GRANULOCYTES # BLD AUTO: 0.14 THOUSAND/UL (ref 0–0.2)
IMM GRANULOCYTES NFR BLD AUTO: 1 % (ref 0–2)
LYMPHOCYTES # BLD AUTO: 2.08 THOUSANDS/ÂΜL (ref 0.6–4.47)
LYMPHOCYTES NFR BLD AUTO: 15 % (ref 14–44)
MCH RBC QN AUTO: 28.6 PG (ref 26.8–34.3)
MCHC RBC AUTO-ENTMCNC: 32.2 G/DL (ref 31.4–37.4)
MCV RBC AUTO: 89 FL (ref 82–98)
MONOCYTES # BLD AUTO: 1.31 THOUSAND/ÂΜL (ref 0.17–1.22)
MONOCYTES NFR BLD AUTO: 9 % (ref 4–12)
NEUTROPHILS # BLD AUTO: 10.59 THOUSANDS/ÂΜL (ref 1.85–7.62)
NEUTS SEG NFR BLD AUTO: 75 % (ref 43–75)
NRBC BLD AUTO-RTO: 0 /100 WBCS
PLATELET # BLD AUTO: 224 THOUSANDS/UL (ref 149–390)
PMV BLD AUTO: 12.6 FL (ref 8.9–12.7)
POTASSIUM SERPL-SCNC: 4.3 MMOL/L (ref 3.5–5.3)
RBC # BLD AUTO: 3.98 MILLION/UL (ref 3.88–5.62)
SODIUM SERPL-SCNC: 138 MMOL/L (ref 135–147)
WBC # BLD AUTO: 14.15 THOUSAND/UL (ref 4.31–10.16)

## 2023-02-20 RX ORDER — IPRATROPIUM BROMIDE AND ALBUTEROL SULFATE 2.5; .5 MG/3ML; MG/3ML
3 SOLUTION RESPIRATORY (INHALATION) 4 TIMES DAILY
Qty: 20 ML | Refills: 2 | Status: SHIPPED | OUTPATIENT
Start: 2023-02-20

## 2023-02-20 RX ORDER — IPRATROPIUM BROMIDE AND ALBUTEROL SULFATE 2.5; .5 MG/3ML; MG/3ML
3 SOLUTION RESPIRATORY (INHALATION) 4 TIMES DAILY
Qty: 10 ML | Refills: 0 | Status: SHIPPED | OUTPATIENT
Start: 2023-02-20 | End: 2023-02-20 | Stop reason: SDUPTHER

## 2023-02-20 RX ORDER — GUAIFENESIN 600 MG/1
600 TABLET, EXTENDED RELEASE ORAL 2 TIMES DAILY
Qty: 14 TABLET | Refills: 0 | Status: SHIPPED | OUTPATIENT
Start: 2023-02-20

## 2023-02-20 RX ORDER — TRAZODONE HYDROCHLORIDE 50 MG/1
50 TABLET ORAL
Qty: 30 TABLET | Refills: 0 | Status: SHIPPED | OUTPATIENT
Start: 2023-02-20

## 2023-02-20 RX ADMIN — B-COMPLEX W/ C & FOLIC ACID TAB 1 TABLET: TAB at 08:24

## 2023-02-20 RX ADMIN — INSULIN LISPRO 2 UNITS: 100 INJECTION, SOLUTION INTRAVENOUS; SUBCUTANEOUS at 12:48

## 2023-02-20 RX ADMIN — METHYLPREDNISOLONE SODIUM SUCCINATE 40 MG: 40 INJECTION, POWDER, FOR SOLUTION INTRAMUSCULAR; INTRAVENOUS at 08:24

## 2023-02-20 RX ADMIN — PREGABALIN 100 MG: 50 CAPSULE ORAL at 08:24

## 2023-02-20 RX ADMIN — BUDESONIDE 0.5 MG: 0.5 INHALANT ORAL at 07:10

## 2023-02-20 RX ADMIN — IPRATROPIUM BROMIDE 0.5 MG: 0.5 SOLUTION RESPIRATORY (INHALATION) at 07:10

## 2023-02-20 RX ADMIN — CELECOXIB 200 MG: 200 CAPSULE ORAL at 08:25

## 2023-02-20 RX ADMIN — ENOXAPARIN SODIUM 40 MG: 40 INJECTION SUBCUTANEOUS at 08:24

## 2023-02-20 RX ADMIN — OMEGA-3 FATTY ACIDS CAP 1000 MG 2000 MG: 1000 CAP at 08:24

## 2023-02-20 RX ADMIN — FENOFIBRATE 145 MG: 145 TABLET, FILM COATED ORAL at 08:24

## 2023-02-20 RX ADMIN — PANTOPRAZOLE SODIUM 20 MG: 20 TABLET, DELAYED RELEASE ORAL at 06:25

## 2023-02-20 RX ADMIN — LEVALBUTEROL 1.25 MG: 1.25 SOLUTION, CONCENTRATE RESPIRATORY (INHALATION) at 07:10

## 2023-02-20 RX ADMIN — GUAIFENESIN 600 MG: 600 TABLET, EXTENDED RELEASE ORAL at 08:24

## 2023-02-20 NOTE — ASSESSMENT & PLAN NOTE
· COPD exacerbation with increased dyspnea and cough, unable to expectorate sputum causing further respiratory distress  · CXR: unremarkable  · Hold home inhalers   Add Xopenex and Atrovent TID, Pulmicort nebulized b i d   · Received IV Solu-Medrol -discharged on prednisone taper, already has at home  · Moderate COPD exacerbation,received prophylactic Azithromycin 500mg qd while here   · Continue supplemental O2 to maintain O2 sat at or above 92%  · Smoking cessation  · Respiratory protocol  · Rx for nebulizer treatment to patient's preferred pharmacy (has machine at home)

## 2023-02-20 NOTE — PLAN OF CARE
Problem: Potential for Falls  Goal: Patient will remain free of falls  Description: INTERVENTIONS:  - Educate patient/family on patient safety including physical limitations  - Instruct patient to call for assistance with activity   - Consult OT/PT to assist with strengthening/mobility   - Keep Call bell within reach  - Keep bed low and locked with side rails adjusted as appropriate  - Keep care items and personal belongings within reach  - Initiate and maintain comfort rounds  - Apply yellow socks and bracelet for high fall risk patients  - Consider moving patient to room near nurses station  Outcome: Progressing     Problem: PAIN - ADULT  Goal: Verbalizes/displays adequate comfort level or baseline comfort level  Description: Interventions:  - Encourage patient to monitor pain and request assistance  - Assess pain using appropriate pain scale  - Administer analgesics based on type and severity of pain and evaluate response  - Implement non-pharmacological measures as appropriate and evaluate response  - Consider cultural and social influences on pain and pain management  - Notify physician/advanced practitioner if interventions unsuccessful or patient reports new pain  Outcome: Progressing     Problem: INFECTION - ADULT  Goal: Absence or prevention of progression during hospitalization  Description: INTERVENTIONS:  - Assess and monitor for signs and symptoms of infection  - Monitor lab/diagnostic results  - Monitor all insertion sites, i e  indwelling lines, tubes, and drains  - Monitor endotracheal if appropriate and nasal secretions for changes in amount and color  - [place] appropriate cooling/warming therapies per order  - Administer medications as ordered  - Instruct and encourage patient and family to use good hand hygiene technique  - Identify and instruct in appropriate isolation precautions for identified infection/condition  Outcome: Progressing     Problem: DISCHARGE PLANNING  Goal: Discharge to home or other facility with appropriate resources  Description: INTERVENTIONS:  - Identify barriers to discharge w/patient and caregiver  - Arrange for needed discharge resources and transportation as appropriate  - Identify discharge learning needs (meds, wound care, etc )  - Arrange for interpretive services to assist at discharge as needed  - Refer to Case Management Department for coordinating discharge planning if the patient needs post-hospital services based on physician/advanced practitioner order or complex needs related to functional status, cognitive ability, or social support system  Outcome: Progressing     Problem: RESPIRATORY - ADULT  Goal: Achieves optimal ventilation and oxygenation  Description: INTERVENTIONS:  - Assess for changes in respiratory status  - Assess for changes in mentation and behavior  - Position to facilitate oxygenation and minimize respiratory effort  - Oxygen administered by appropriate delivery if ordered  - Initiate smoking cessation education as indicated  - Encourage broncho-pulmonary hygiene including cough, deep breathe, Incentive Spirometry  - Assess the need for suctioning and aspirate as needed  - Assess and instruct to report SOB or any respiratory difficulty  - Respiratory Therapy support as indicated  Outcome: Progressing     Problem: METABOLIC, FLUID AND ELECTROLYTES - ADULT  Goal: Electrolytes maintained within normal limits  Description: INTERVENTIONS:  - Monitor labs and assess patient for signs and symptoms of electrolyte imbalances  - Administer electrolyte replacement as ordered  - Monitor response to electrolyte replacements, including repeat lab results as appropriate  - Instruct patient on fluid and nutrition as appropriate  Outcome: Progressing  Goal: Glucose maintained within target range  Description: INTERVENTIONS:  - Monitor Blood Glucose as ordered  - Assess for signs and symptoms of hyperglycemia and hypoglycemia  - Administer ordered medications to maintain glucose within target range  - Assess nutritional intake and initiate nutrition service referral as needed  Outcome: Progressing

## 2023-02-20 NOTE — ASSESSMENT & PLAN NOTE
Lab Results   Component Value Date    HGBA1C 7 0 (H) 06/13/2022     · Resume home regimen    Recent Labs     02/19/23  1608 02/19/23  2111 02/20/23  0724 02/20/23  1109   POCGLU 269* 228* 106 203*       Blood Sugar Average: Last 72 hrs:  (P) 578 [id number]

## 2023-02-20 NOTE — DISCHARGE SUMMARY
[address] [facility]  Discharge- Major Mannheim 1955, 80 y o  male MRN: [id number]  Unit/Bed#: [place] 68 2 [address] [place] 87 224-02 Encounter: [id number]  Primary Care Provider: Meyer Fothergill, MD   Date and time admitted to hospital: 2/18/2023  4:43 PM    * Chronic obstructive pulmonary disease with acute exacerbation ([place] [address] )  Assessment & Plan  · COPD exacerbation with increased dyspnea and cough, unable to expectorate sputum causing further respiratory distress  · CXR: unremarkable  · Hold home inhalers  Add Xopenex and Atrovent TID, Pulmicort nebulized b i d   · Received IV Solu-Medrol -discharged on prednisone taper, already has at home  · Moderate COPD exacerbation,received prophylactic Azithromycin 500mg qd while here   · Continue supplemental O2 to maintain O2 sat at or above 92%  · Smoking cessation  · Respiratory protocol  · Rx for nebulizer treatment to patient's preferred pharmacy (has machine at home)    Insomnia  Assessment & Plan  Patient reported improvement in sleep with trazodone  Prescription sent to patient's pharmacy    SIRS (systemic inflammatory response syndrome) ([place] [address] )  Assessment & Plan  · SIRS POA with tachycardia, tachypnea and leukocytosis; subjective fevers  · No clear focal s/s infection, likely secondary to COPD exacerbation  · CXR neg for infiltrates  · PCT normal   · No additional work-up or treatment indicated    Acute kidney injury ([place] [address] )  Assessment & Plan  · Creatinine 1 6, baseline 1 0-1 3  · Improved to near baseline prior to discharge     Acute respiratory failure with hypoxia ([facility])  Assessment & Plan  · Tachycardia, tachypnea and hypoxia with activity, exertional O2 sat 87%  · Respiratory viral panel negative  · Admitted for COPD exacerbation    See above plan  · Resolved, no hypoxia with ambulation in room      Sleep apnea  Assessment & Plan  · Nocturnal CPAP    Type 2 diabetes mellitus with stage 3 chronic kidney disease, without long-term current use of insulin [facility])  Assessment & Plan  Lab Results   Component Value Date    HGBA1C 7 0 (H) 06/13/2022     · Resume home regimen    Recent Labs     02/19/23  1608 02/19/23  2111 02/20/23  0724 02/20/23  1109   POCGLU 269* 228* 106 203*       Blood Sugar Average: Last 72 hrs:  (P) 512 [id number]    Nicotine dependence  Assessment & Plan  · Smokes 1/2 to 1PPD; declined nicotine TD patch due to side effects  · Tobacco cessation education    Hyperlipidemia  Assessment & Plan  · Continue statin and fenofibrate    Medical Problems     Resolved Problems  Date Reviewed: 2/20/2023   None       Discharging Physician / Practitioner: Eusebio Foley MD  PCP: Glen House MD  Admission Date:   Admission Orders (From admission, onward)     Ordered        02/18/23 2102  INPATIENT ADMISSION  Once                      Discharge Date: 02/20/23    Consultations During Hospital Stay:  · Case management      Procedures Performed:   XR chest 2 views   Final Result by Nurys Kirby MD (02/19 1004)      No acute cardiopulmonary disease  Workstation performed: [id number]             Significant Findings / Test Results:   Results from last 7 days   Lab Units 02/20/23  0632   WBC Thousand/uL 14 15*   HEMOGLOBIN g/dL 11 4*   HEMATOCRIT % 35 4*   PLATELETS Thousands/uL 224     Results from last 7 days   Lab Units 02/20/23  0632   SODIUM mmol/L 138   CHLORIDE mmol/L 110*   CO2 mmol/L 23   BUN mg/dL 31*   CREATININE mg/dL 1 07   CALCIUM mg/dL 9 4       Test Results Pending at Discharge (will require follow up): · None     Outpatient Tests Requested:  · None     Complications:  None    Reason for Admission: SOB     Hospital Course:   Alice Cobb is a 80 y o  male patient who originally presented to the hospital on 2/18/2023 due to shortness of breath  Diagnosed with mild hypoxia due to COPD exacerbation  Improved with systemic steroids, weaned off supplemental oxygen prior to discharge    Already has prednisone at home and will continue with the taper upon discharge  A prescription for refill for DuoNebs sent to patient's preferred pharmacy  Return precautions discussed  The patient is strongly encouraged to stop smoking  Please see above list of diagnoses and related plan for additional information  Condition at Discharge: stable    Discharge Day Visit / Exam:     Subjective: Patient seen and examined  He reports feeling well and would like to be discharged home  He understands return precautions and plan of care  Vitals: Blood Pressure: 152/70 (02/20/23 0725)  Pulse: 74 (02/20/23 0725)  Temperature: 97 8 °F (36 6 °C) (02/20/23 0725)  Temp Source: Oral (02/18/23 2210)  Respirations: 17 (02/20/23 0725)  Height: 5' 8" (172 7 cm) (02/18/23 1640)  Weight - Scale: 75 3 kg (166 lb 0 1 oz) (02/18/23 1640)  SpO2: 98 % (02/20/23 0725)    Exam:     Physical Exam  Constitutional:       General: He is not in acute distress  HENT:      Head: Normocephalic and atraumatic  Nose: No congestion  Eyes:      Conjunctiva/sclera: Conjunctivae normal    Cardiovascular:      Rate and Rhythm: Normal rate and regular rhythm  Heart sounds: No murmur heard  Pulmonary:      Effort: No respiratory distress  Breath sounds: Wheezing (improved ) present  Abdominal:      General: There is no distension  Tenderness: There is no abdominal tenderness  There is no guarding  Musculoskeletal:      Right lower leg: No edema  Left lower leg: No edema  Skin:     General: Skin is warm and dry  Neurological:      Mental Status: He is oriented to person, place, and time  Psychiatric:         Mood and Affect: Mood normal             Discharge instructions/Information to patient and family:   See after visit summary for information provided to patient and family  Provisions for Follow-Up Care:  See after visit summary for information related to follow-up care and any pertinent home health orders  Disposition:   Home    Planned Readmission: No     Discharge Statement:  I spent 45 minutes discharging the patient  This time was spent on the day of discharge  I had direct contact with the patient on the day of discharge  Greater than 50% of the total time was spent examining patient, answering all patient questions, arranging and discussing plan of care with patient as well as directly providing post-discharge instructions  Additional time then spent on discharge activities  Discharge Medications:  See after visit summary for reconciled discharge medications provided to patient and/or family        **Please Note: This note may have been constructed using a voice recognition system**

## 2023-02-20 NOTE — PLAN OF CARE
Problem: Potential for Falls  Goal: Patient will remain free of falls  Description: INTERVENTIONS:  - Educate patient/family on patient safety including physical limitations  - Instruct patient to call for assistance with activity   - Consult OT/PT to assist with strengthening/mobility   - Keep Call bell within reach  - Keep bed low and locked with side rails adjusted as appropriate  - Keep care items and personal belongings within reach  - Initiate and maintain comfort rounds  - Apply yellow socks and bracelet for high fall risk patients  - Consider moving patient to room near nurses station  2/20/2023 1134 by Marilee Sheriff RN  Outcome: Progressing  2/20/2023 1133 by Marilee Sheriff, RN  Outcome: Progressing

## 2023-02-20 NOTE — NURSING NOTE
Discharge paperwork and meds reviewed with patient  Answered all questions and concerns  IV and masimo removed  Patient to transport self home

## 2023-02-20 NOTE — ASSESSMENT & PLAN NOTE
· SIRS POA with tachycardia, tachypnea and leukocytosis; subjective fevers  · No clear focal s/s infection, likely secondary to COPD exacerbation  · CXR neg for infiltrates  · PCT normal   · No additional work-up or treatment indicated

## 2023-02-20 NOTE — ASSESSMENT & PLAN NOTE
· Tachycardia, tachypnea and hypoxia with activity, exertional O2 sat 87%  · Respiratory viral panel negative  · Admitted for COPD exacerbation    See above plan  · Resolved, no hypoxia with ambulation in room

## 2023-02-21 ENCOUNTER — HOSPITAL ENCOUNTER (OUTPATIENT)
Dept: PULMONOLOGY | Facility: HOSPITAL | Age: 68
Discharge: HOME | End: 2023-02-21

## 2023-02-21 DIAGNOSIS — J44.9 COPD (CHRONIC OBSTRUCTIVE PULMONARY DISEASE) (HCC): ICD-10-CM

## 2023-02-21 LAB
ARTERIAL PATENCY WRIST A: YES
BASE EXCESS BLDA CALC-SCNC: -2.8 MMOL/L
HCO3 BLDA-SCNC: 19.8 MMOL/L (ref 22–28)
NON VENT ROOM AIR: ABNORMAL %
O2 CT BLDA-SCNC: 18 ML/DL (ref 16–23)
OXYHGB MFR BLDA: 90.7 % (ref 94–97)
PCO2 BLDA: 28.8 MM HG (ref 36–44)
PH BLDA: 7.46 [PH] (ref 7.35–7.45)
PO2 BLDA: 69 MM HG (ref 75–129)
SPECIMEN SOURCE: ABNORMAL

## 2023-04-26 ENCOUNTER — HOSPITAL ENCOUNTER (OUTPATIENT)
Dept: NON INVASIVE DIAGNOSTICS | Facility: CLINIC | Age: 68
Discharge: HOME | End: 2023-04-26

## 2023-04-26 DIAGNOSIS — I73.9 PERIPHERAL VASCULAR DISEASE, UNSPECIFIED (HCC): ICD-10-CM

## 2023-06-20 LAB — HBA1C MFR BLD HPLC: 6.4 %

## 2023-06-30 ENCOUNTER — HOSPITAL ENCOUNTER (OUTPATIENT)
Dept: RADIOLOGY | Facility: HOSPITAL | Age: 68
Discharge: HOME | End: 2023-06-30
Payer: MEDICARE

## 2023-06-30 ENCOUNTER — HOSPITAL ENCOUNTER (OUTPATIENT)
Dept: ULTRASOUND IMAGING | Facility: HOSPITAL | Age: 68
Discharge: HOME | End: 2023-06-30
Payer: MEDICARE

## 2023-06-30 DIAGNOSIS — M16.11 PRIMARY OSTEOARTHRITIS OF RIGHT HIP: ICD-10-CM

## 2023-06-30 DIAGNOSIS — E87.5 HYPERKALEMIA: ICD-10-CM

## 2023-06-30 PROCEDURE — 76775 US EXAM ABDO BACK WALL LIM: CPT

## 2023-06-30 PROCEDURE — 73502 X-RAY EXAM HIP UNI 2-3 VIEWS: CPT

## 2023-08-06 ENCOUNTER — HOSPITAL ENCOUNTER (OUTPATIENT)
Dept: CT IMAGING | Facility: HOSPITAL | Age: 68
Discharge: HOME | End: 2023-08-06
Payer: MEDICARE

## 2023-08-06 DIAGNOSIS — E83.52 HYPERCALCEMIA: ICD-10-CM

## 2023-08-06 PROCEDURE — G1004 CDSM NDSC: HCPCS

## 2023-08-06 PROCEDURE — 71250 CT THORAX DX C-: CPT

## 2023-08-08 ENCOUNTER — HOSPITAL ENCOUNTER (OUTPATIENT)
Dept: NUCLEAR MEDICINE | Facility: HOSPITAL | Age: 68
Discharge: HOME | End: 2023-08-08
Payer: MEDICARE

## 2023-08-08 DIAGNOSIS — E83.52 HYPERCALCEMIA: ICD-10-CM

## 2023-08-08 PROCEDURE — 78071 PARATHYRD PLANAR W/WO SUBTRJ: CPT

## 2023-08-08 PROCEDURE — A9500 TC99M SESTAMIBI: HCPCS

## 2023-08-08 PROCEDURE — G1004 CDSM NDSC: HCPCS

## 2023-09-07 ENCOUNTER — TELEPHONE (OUTPATIENT)
Dept: NEPHROLOGY | Facility: CLINIC | Age: 68
End: 2023-09-07

## 2023-09-07 NOTE — TELEPHONE ENCOUNTER
New Patient Intake Form   Patient Details   Brit Meneses     1955     [id number]     Insurance Information   Name of [employer]    Does the patient need an insurance referral? no   If patient has [employer], please ask if they will be using their [employer]. Appointment Information   Who is calling to schedule? If not patient, what is callers name? Physician Office   Referring Provider Naga Grigsby   Reason for Appt (Diagnosis) N17.9 (ICD-10-CM) - Acute kidney injury ([address])  E11.22, N18.30 (ICD-10-CM) - Type 2 diabetes mellitus with stage 3 chronic kidney disease, without long-term current use of insulin, unspecified whether stage 3a or 3b CKD ([address] )   Does Patient have labs/urine done at [facility]? If not, where do they go? List the date of last lab / urine  *Please try to get labs 2 years back if not at  No  [facility]   Has patient been hospitalized recently? If yes, list name and location of hospital they were in no   Has patient been seen by a Nephrologist before? If yes, list name, location and phone number no   Has the patient had renal imaging done? If so, list the most recent date and type of imaging no    Does patient have a history of Kidney Stones? no   Appointment Details   Is there a referral on file?  yes    Appointment Date 1/5/24    Location  [place]   Miscellaneous   [place]/Placed on wait list for sooner appointment

## 2024-01-04 ENCOUNTER — HOSPITAL ENCOUNTER (OUTPATIENT)
Dept: MRI IMAGING | Facility: HOSPITAL | Age: 69
Discharge: HOME | End: 2024-01-04
Payer: MEDICARE

## 2024-01-04 DIAGNOSIS — M24.651 ANKYLOSIS OF RIGHT HIP: ICD-10-CM

## 2024-01-04 PROCEDURE — 73721 MRI JNT OF LWR EXTRE W/O DYE: CPT

## 2024-01-04 PROCEDURE — G1004 CDSM NDSC: HCPCS

## 2024-01-08 ENCOUNTER — CONSULT (OUTPATIENT)
Dept: NEPHROLOGY | Facility: CLINIC | Age: 69
End: 2024-01-08
Payer: MEDICARE

## 2024-01-08 VITALS
BODY MASS INDEX: 22.73 KG/M2 | WEIGHT: 150 LBS | HEART RATE: 80 BPM | OXYGEN SATURATION: 99 % | SYSTOLIC BLOOD PRESSURE: 120 MMHG | HEIGHT: 68 IN | DIASTOLIC BLOOD PRESSURE: 66 MMHG

## 2024-01-08 DIAGNOSIS — N18.31 STAGE 3A CHRONIC KIDNEY DISEASE (HCC): ICD-10-CM

## 2024-01-08 DIAGNOSIS — N17.9 ACUTE KIDNEY INJURY (HCC): ICD-10-CM

## 2024-01-08 DIAGNOSIS — F17.219 CIGARETTE NICOTINE DEPENDENCE WITH NICOTINE-INDUCED DISORDER: ICD-10-CM

## 2024-01-08 DIAGNOSIS — E78.00 PURE HYPERCHOLESTEROLEMIA: ICD-10-CM

## 2024-01-08 DIAGNOSIS — G89.4 CHRONIC PAIN SYNDROME: ICD-10-CM

## 2024-01-08 DIAGNOSIS — J44.1 CHRONIC OBSTRUCTIVE PULMONARY DISEASE WITH ACUTE EXACERBATION (HCC): ICD-10-CM

## 2024-01-08 DIAGNOSIS — E11.22 TYPE 2 DIABETES MELLITUS WITH STAGE 3 CHRONIC KIDNEY DISEASE, WITHOUT LONG-TERM CURRENT USE OF INSULIN, UNSPECIFIED WHETHER STAGE 3A OR 3B CKD (HCC): ICD-10-CM

## 2024-01-08 DIAGNOSIS — E83.52 HYPERCALCEMIA: Primary | ICD-10-CM

## 2024-01-08 DIAGNOSIS — N18.30 TYPE 2 DIABETES MELLITUS WITH STAGE 3 CHRONIC KIDNEY DISEASE, WITHOUT LONG-TERM CURRENT USE OF INSULIN, UNSPECIFIED WHETHER STAGE 3A OR 3B CKD (HCC): ICD-10-CM

## 2024-01-08 DIAGNOSIS — Z96.642 STATUS POST LEFT HIP REPLACEMENT: ICD-10-CM

## 2024-01-08 PROCEDURE — 99204 OFFICE O/P NEW MOD 45 MIN: CPT | Performed by: INTERNAL MEDICINE

## 2024-01-08 RX ORDER — ACETAMINOPHEN 325 MG/1
2 TABLET ORAL EVERY 6 HOURS PRN
COMMUNITY

## 2024-01-08 RX ORDER — OXYCODONE HYDROCHLORIDE AND ACETAMINOPHEN 5; 325 MG/1; MG/1
1 TABLET ORAL EVERY 8 HOURS PRN
COMMUNITY
Start: 2024-01-05

## 2024-01-08 NOTE — LETTER
January 8, 2024     Lucrecia Crawley MD  [address] [facility] [id number]    Patient: Gregory Dumont   YOB: 1955   Date of Visit: 1/8/2024       Dear Dr. Crawley:    Thank you for referring Gregory Dumont to me for evaluation. Below are my notes for this consultation.    If you have questions, please do not hesitate to call me. I look forward to following your patient along with you.         Sincerely,        Rajat Yee MD        CC: No Recipients    Rajat Yee MD  1/8/2024  2:59 PM  Sign when Signing Visit  OFFICE CONSULT - Nephrology   Gregory Dumont 68 y.o. male MRN: [id number]        ASSESSMENT and PLAN:  Gregory was seen today for consult and chronic kidney disease.    Diagnoses and all orders for this visit:    Hypercalcemia    Stage 3a chronic kidney disease (HCC)    Type 2 diabetes mellitus with stage 3 chronic kidney disease, without long-term current use of insulin, unspecified whether stage 3a or 3b CKD (HCC)  -     Ambulatory Referral to Nephrology    Acute kidney injury (HCC)  -     Ambulatory Referral to Nephrology    Status post left hip replacement    Pure hypercholesterolemia    Chronic pain syndrome    Chronic obstructive pulmonary disease with acute exacerbation (HCC)    Cigarette nicotine dependence with nicotine-induced disorder        This is a 68-year-old gentleman who was referred by his primary care doctor for evaluation of previously abnormal kidney function as well as on and off episode of hypercalcemia.  Patient with history of diabetes, COPD, tobacco abuse, history of left hip replacement.    Prior episode of LORETTA, previous blood test were reviewed in Care Everywhere, noted episode of LORETTA with creatinine up to 1.5 on 9/2023.  Most recent blood test were reviewed with patient, normal noted most recent creatinine down to 0.90 with an estimated GFR of 93 on 12/22/2023.  We discussed about importance to stay well-hydrated, avoid NSAIDs, have good diabetes  "control, quit smoking and to continue follow-up with labs regularly as per primary care doctor.    Prior sporadics on and off episode of mild hypercalcemia, after reviewing Care Everywhere noted patient has some episodes of mild hypercalcemia with calcium as high as 10.5, back in 2019, 2020, 2022 most recent one around July and Oct 2023.  Most recent serum calcium 9.7 on 12/22/2023.  Noted patient has previous unremarkable workup ordered by rheumatologist Dr. Rocha, including normal to low PTH level, SPEP and UPEP did not show any monoclonal gammopathy normal angiotensin-converting enzyme, normal 25-vitamin D level, CT scan of the chest reported \"no etiology for hypercalcemia with nothing to indicate malignancy or sarcoidosis\", noted also patient have a parathyroid scan that was reported as normal.  The patient is not taking any calcium and or vitamin D supplements, discussed about importance to stay well-hydrated, advised to continue close follow-up with primary care doctor with regular labs.  As above we discussed about importance of quitting smoking.    Tobacco abuse with COPD/ mild emphysema, patient currently smoking around 1/2 to 3/4 pack a day, as above recommend about importance of quitting smoking.    Diabetes without microalbuminuria, most recent hemoglobin A1c 6.2% on 12/22/2023, advised to continue close follow-up with primary care doctor.    Hyperlipidemia, management per primary care doctor, noted significant hypertriglyceridemia as well as elevated cholesterol based on last lipid panel from 10/2023.    Primary osteoarthritis of the hip, follows with rheumatology, had a recent MRI, results pending.          Patient Instructions   As we discussed in the office visit and after reviewing your most recent as well as previous blood test you were referred to our office for evaluation of previously worsening kidney function as well as on and off episode of high calcium (hypercalcemia).  Based on the most " recent blood test on 12/22/2023 your kidney function improved and is back to normal as well as your serum calcium normalized.  You have a previous workup for high calcium ordered by your rheumatologist that was unremarkable.  At this moment recommend to stay well-hydrated.  Recommend to keep working on quitting smoking.  Continue close follow-up with your primary care doctor and your rheumatologist.  I would be more than happy to see you back in the office in the future if needed.        HPI:  Gregory Dumont is a 68 y.o.male who was referred by  for evaluation of Consult (Hypercalcemia) and Chronic Kidney Disease  .    Patient with history of tobacco abuse reports smoking between 1/4 to 3/4 pack a day for over 50 years, history of osteoarthrosis status post left hip replacement, diabetes, hyperlipidemia, who referred to our office for evaluation of previously abnormal kidney function test as well as mild hypercalcemia.    Patient was previously evaluated by his rheumatologist, given history of hypercalcemia as well as tobacco abuse had a CAT scan done that was unremarkable for malignancy or sarcoidosis, also had previous hypercalcemia workup that was also unremarkable.  Patient today presents to the office referred by primary care doctor.    In general he is doing well other than significant pain over the right hip that is worsening over the last few weeks, he reports had a recent MRI but results is pending.  He denies any chest pain, no significant shortness of breath, no leg swelling.  Denies any abdominal pain, no recent nausea, no vomiting, no diarrhea or constipation.  Denies any urinary problems, no dysuria, no gross hematuria.    Currently reports is not taking any calcium or vitamin D supplements, he reports he used to take those in the past    I personally spent over half of a total 32 minutes face to face with the patient in counseling and discussion and/or coordination of care as described  above.    ROS: All the systems were reviewed and were negative except as documented on the HPI.    Allergies: Patient has no known allergies.    Medications:   Current Outpatient Medications:   •  omeprazole (PriLOSEC) 20 mg delayed release capsule, take 1 capsule by mouth twice a day 30 TO 60 MINUTES PRIOR TO EATING, Disp: , Rfl:   •  oxyCODONE-acetaminophen (PERCOCET) 5-325 mg per tablet, Take 1 tablet by mouth every 8 (eight) hours as needed, Disp: , Rfl:   •  acetaminophen (TYLENOL) 325 mg tablet, Take 2 tablets by mouth every 6 (six) hours as needed (Patient not taking: Reported on 1/8/2024), Disp: , Rfl:   •  albuterol (PROVENTIL HFA,VENTOLIN HFA) 90 mcg/act inhaler, Inhale 2 puffs every 4 (four) hours as needed for wheezing, Disp: , Rfl:   •  celecoxib (CeleBREX) 200 mg capsule, Take 200 mg by mouth daily (Patient not taking: Reported on 1/8/2024), Disp: , Rfl:   •  ergocalciferol (ERGOCALCIFEROL) 1.25 MG (85740 UT) capsule, take 1 capsule by mouth every week, Disp: , Rfl:   •  fenofibrate (TRICOR) 145 mg tablet, Take 145 mg by mouth daily, Disp: , Rfl:   •  fluticasone-salmeterol (Advair) 250-50 mcg/dose inhaler, inhale 1 puff by mouth and INTO THE LUNGS every 12 hours SAME TIMES EACH DAY, Disp: , Rfl:   •  guaiFENesin (MUCINEX) 600 mg 12 hr tablet, Take 1 tablet (600 mg total) by mouth 2 (two) times a day, Disp: 14 tablet, Rfl: 0  •  Icosapent Ethyl (Vascepa) 1 g CAPS, Take 2 g by mouth 2 (two) times a day, Disp: , Rfl:   •  ipratropium-albuterol (DUO-NEB) 0.5-2.5 mg/3 mL nebulizer solution, Take 3 mL by nebulization 4 (four) times a day, Disp: 20 mL, Rfl: 2  •  MELATONIN PO, Take 2 mg by mouth, Disp: , Rfl:   •  metFORMIN (GLUCOPHAGE) 1000 MG tablet, Take 1,000 mg by mouth 2 (two) times a day with meals  , Disp: , Rfl:   •  Multiple Vitamin (multivitamin) tablet, TAKE 1 TABLET DAILY., Disp: , Rfl:   •  omeprazole (PriLOSEC OTC) 20 MG tablet, Take 20 mg by mouth daily, Disp: , Rfl:   •  pregabalin (LYRICA)  100 mg capsule, Take 100 mg by mouth 2 (two) times a day, Disp: , Rfl:   •  rosuvastatin (CRESTOR) 5 mg tablet, Take 5 mg by mouth in the morning., Disp: , Rfl:   •  traZODone (DESYREL) 50 mg tablet, Take 1 tablet (50 mg total) by mouth daily at bedtime, Disp: 30 tablet, Rfl: 0    Past Medical History:   Diagnosis Date   • Arthritis     osteoarthritis   • Cervical radiculopathy    • COPD (chronic obstructive pulmonary disease) ([facility])    • CPAP (continuous positive airway pressure) dependence    • Diabetes mellitus (HCC)    • Fibromyalgia    • Fibromyalgia, primary    • GERD (gastroesophageal reflux disease)    • History of transfusion    • Hyperlipidemia    • Osteopenia    • Pneumonia    • Sleep apnea     USES CPAP HS     Past Surgical History:   Procedure Laterality Date   • CHOLECYSTECTOMY     • COLONOSCOPY     • CYST REMOVAL     • FEMUR SURGERY Left    • HIP CLOSE REDUCTION Left 1/20/2022    Procedure: CLOSED REDUCTION HIP;  Surgeon: Larry Castillo MD;  Location: [facility];  Service: Orthopedics   • JOINT REPLACEMENT      T KR LEFT   • KNEE SURGERY Left 1996   • MANDIBLE SURGERY     • HI CONV PREV HIP TOT HIP ARTHRP W/WO AGRFT/ALGRFT Left 12/22/2021    Procedure: ARTHROPLASTY HIP TOTAL- conversion;  Surgeon: Yifan Caraballo MD;  [facility];  Service: Orthopedics   • HI REMOVAL IMPLANT DEEP Left 9/8/2021    Procedure: REMOVAL HARDWARE HIP;  Surgeon: Yifan Caraballo MD;  [facility];  Service: Orthopedics   • ROTATOR CUFF REPAIR Left    • WRIST FRACTURE SURGERY Left     plate in place     Family History   Problem Relation Age of Onset   • Cancer Mother    • Arthritis Father    • Diabetes Sister    • Cancer Brother    • Diabetes Daughter    • Depression Son       reports that he has been smoking cigarettes. He has never used smokeless tobacco. He reports that he does not currently use alcohol. He reports that he does not use drugs.      Physical Exam:   Vitals:    01/08/24 1414   BP:  "120/66   BP Location: Left arm   Patient Position: Sitting   Cuff Size: Adult   Pulse: 80   SpO2: 99%   Weight: 68 kg (150 lb)   Height: 5' 8\" (1.727 m)     Body mass index is 22.81 kg/m².    General: conscious, cooperative, in not acute distress  Eyes: conjunctivae pink, anicteric sclerae  ENT: lips and mucous membranes moist  Neck: supple, no JVD  Chest: clear breath sounds bilateral, no crackles, ronchus or wheezings  CVS: distinct S1 & S2, normal rate, regular rhythm  Abdomen: soft, non-tender, non-distended, normoactive bowel sounds  Back: no CVA tenderness  Extremities: no edema of both legs  Skin: no rash  Neuro: awake, alert, oriented      Lab Results:   Results for orders placed or performed in visit on 06/20/23   Hemoglobin A1C   Result Value Ref Range    Hemoglobin A1C 6.4        Laboratory Results:  Lab Results   Component Value Date    WBC 14.15 (H) 02/20/2023    HGB 11.4 (L) 02/20/2023    HCT 35.4 (L) 02/20/2023    MCV 89 02/20/2023     02/20/2023     Lab Results   Component Value Date    SODIUM 138 02/20/2023    K 4.3 02/20/2023     (H) 02/20/2023    CO2 23 02/20/2023    BUN 31 (H) 02/20/2023    CREATININE 1.07 02/20/2023    GLUC 134 02/20/2023    CALCIUM 9.4 02/20/2023               Portions of the record may have been created with voice recognition software. Occasional wrong word or \"sound a like\" substitutions may have occurred due to the inherent limitations of voice recognition software. Read the chart carefully and recognize, using context, where substitutions have occurred.If you have any questions, please contact the dictating provider.  "

## 2024-01-08 NOTE — PROGRESS NOTES
OFFICE CONSULT - Nephrology   Gregory Dumont 68 y.o. male MRN: [id number]        ASSESSMENT and PLAN:  Gregory was seen today for consult and chronic kidney disease.    Diagnoses and all orders for this visit:    Hypercalcemia    Stage 3a chronic kidney disease (HCC)    Type 2 diabetes mellitus with stage 3 chronic kidney disease, without long-term current use of insulin, unspecified whether stage 3a or 3b CKD (HCC)  -     Ambulatory Referral to Nephrology    Acute kidney injury (HCC)  -     Ambulatory Referral to Nephrology    Status post left hip replacement    Pure hypercholesterolemia    Chronic pain syndrome    Chronic obstructive pulmonary disease with acute exacerbation (HCC)    Cigarette nicotine dependence with nicotine-induced disorder        This is a 68-year-old gentleman who was referred by his primary care doctor for evaluation of previously abnormal kidney function as well as on and off episode of hypercalcemia.  Patient with history of diabetes, COPD, tobacco abuse, history of left hip replacement.    Prior episode of LORETTA, previous blood test were reviewed in Care Everywhere, noted episode of LORETTA with creatinine up to 1.5 on 9/2023.  Most recent blood test were reviewed with patient, normal noted most recent creatinine down to 0.90 with an estimated GFR of 93 on 12/22/2023.  We discussed about importance to stay well-hydrated, avoid NSAIDs, have good diabetes control, quit smoking and to continue follow-up with labs regularly as per primary care doctor.    Prior sporadics on and off episode of mild hypercalcemia, after reviewing Care Everywhere noted patient has some episodes of mild hypercalcemia with calcium as high as 10.5, back in 2019, 2020, 2022 most recent one around July and Oct 2023.  Most recent serum calcium 9.7 on 12/22/2023.  Noted patient has previous unremarkable workup ordered by rheumatologist Dr. Rocha, including normal to low PTH level, SPEP and UPEP did not show any monoclonal  "gammopathy normal angiotensin-converting enzyme, normal 25-vitamin D level, CT scan of the chest reported \"no etiology for hypercalcemia with nothing to indicate malignancy or sarcoidosis\", noted also patient have a parathyroid scan that was reported as normal.  The patient is not taking any calcium and or vitamin D supplements, discussed about importance to stay well-hydrated, advised to continue close follow-up with primary care doctor with regular labs.  As above we discussed about importance of quitting smoking.    Tobacco abuse with COPD/ mild emphysema, patient currently smoking around 1/2 to 3/4 pack a day, as above recommend about importance of quitting smoking.    Diabetes without microalbuminuria, most recent hemoglobin A1c 6.2% on 12/22/2023, advised to continue close follow-up with primary care doctor.    Hyperlipidemia, management per primary care doctor, noted significant hypertriglyceridemia as well as elevated cholesterol based on last lipid panel from 10/2023.    Primary osteoarthritis of the hip, follows with rheumatology, had a recent MRI, results pending.          Patient Instructions   As we discussed in the office visit and after reviewing your most recent as well as previous blood test you were referred to our office for evaluation of previously worsening kidney function as well as on and off episode of high calcium (hypercalcemia).  Based on the most recent blood test on 12/22/2023 your kidney function improved and is back to normal as well as your serum calcium normalized.  You have a previous workup for high calcium ordered by your rheumatologist that was unremarkable.  At this moment recommend to stay well-hydrated.  Recommend to keep working on quitting smoking.  Continue close follow-up with your primary care doctor and your rheumatologist.  I would be more than happy to see you back in the office in the future if needed.        HPI:  Gregory Dumont is a 68 y.o.male who was referred by "  for evaluation of Consult (Hypercalcemia) and Chronic Kidney Disease  .    Patient with history of tobacco abuse reports smoking between 1/4 to 3/4 pack a day for over 50 years, history of osteoarthrosis status post left hip replacement, diabetes, hyperlipidemia, who referred to our office for evaluation of previously abnormal kidney function test as well as mild hypercalcemia.    Patient was previously evaluated by his rheumatologist, given history of hypercalcemia as well as tobacco abuse had a CAT scan done that was unremarkable for malignancy or sarcoidosis, also had previous hypercalcemia workup that was also unremarkable.  Patient today presents to the office referred by primary care doctor.    In general he is doing well other than significant pain over the right hip that is worsening over the last few weeks, he reports had a recent MRI but results is pending.  He denies any chest pain, no significant shortness of breath, no leg swelling.  Denies any abdominal pain, no recent nausea, no vomiting, no diarrhea or constipation.  Denies any urinary problems, no dysuria, no gross hematuria.    Currently reports is not taking any calcium or vitamin D supplements, he reports he used to take those in the past    I personally spent over half of a total 32 minutes face to face with the patient in counseling and discussion and/or coordination of care as described above.    ROS: All the systems were reviewed and were negative except as documented on the HPI.    Allergies: Patient has no known allergies.    Medications:   Current Outpatient Medications:     omeprazole (PriLOSEC) 20 mg delayed release capsule, take 1 capsule by mouth twice a day 30 TO 60 MINUTES PRIOR TO EATING, Disp: , Rfl:     oxyCODONE-acetaminophen (PERCOCET) 5-325 mg per tablet, Take 1 tablet by mouth every 8 (eight) hours as needed, Disp: , Rfl:     acetaminophen (TYLENOL) 325 mg tablet, Take 2 tablets by mouth every 6 (six) hours as needed  (Patient not taking: Reported on 1/8/2024), Disp: , Rfl:     albuterol (PROVENTIL HFA,VENTOLIN HFA) 90 mcg/act inhaler, Inhale 2 puffs every 4 (four) hours as needed for wheezing, Disp: , Rfl:     celecoxib (CeleBREX) 200 mg capsule, Take 200 mg by mouth daily (Patient not taking: Reported on 1/8/2024), Disp: , Rfl:     ergocalciferol (ERGOCALCIFEROL) 1.25 MG (69310 UT) capsule, take 1 capsule by mouth every week, Disp: , Rfl:     fenofibrate (TRICOR) 145 mg tablet, Take 145 mg by mouth daily, Disp: , Rfl:     fluticasone-salmeterol (Advair) 250-50 mcg/dose inhaler, inhale 1 puff by mouth and INTO THE LUNGS every 12 hours SAME TIMES EACH DAY, Disp: , Rfl:     guaiFENesin (MUCINEX) 600 mg 12 hr tablet, Take 1 tablet (600 mg total) by mouth 2 (two) times a day, Disp: 14 tablet, Rfl: 0    Icosapent Ethyl (Vascepa) 1 g CAPS, Take 2 g by mouth 2 (two) times a day, Disp: , Rfl:     ipratropium-albuterol (DUO-NEB) 0.5-2.5 mg/3 mL nebulizer solution, Take 3 mL by nebulization 4 (four) times a day, Disp: 20 mL, Rfl: 2    MELATONIN PO, Take 2 mg by mouth, Disp: , Rfl:     metFORMIN (GLUCOPHAGE) 1000 MG tablet, Take 1,000 mg by mouth 2 (two) times a day with meals  , Disp: , Rfl:     Multiple Vitamin (multivitamin) tablet, TAKE 1 TABLET DAILY., Disp: , Rfl:     omeprazole (PriLOSEC OTC) 20 MG tablet, Take 20 mg by mouth daily, Disp: , Rfl:     pregabalin (LYRICA) 100 mg capsule, Take 100 mg by mouth 2 (two) times a day, Disp: , Rfl:     rosuvastatin (CRESTOR) 5 mg tablet, Take 5 mg by mouth in the morning., Disp: , Rfl:     traZODone (DESYREL) 50 mg tablet, Take 1 tablet (50 mg total) by mouth daily at bedtime, Disp: 30 tablet, Rfl: 0    Past Medical History:   Diagnosis Date    Arthritis     osteoarthritis    Cervical radiculopathy     COPD (chronic obstructive pulmonary disease) (HCC)     CPAP (continuous positive airway pressure) dependence     Diabetes mellitus (HCC)     Fibromyalgia     Fibromyalgia, primary     GERD  "(gastroesophageal reflux disease)     History of transfusion     Hyperlipidemia     Osteopenia     Pneumonia     Sleep apnea     USES CPAP HS     Past Surgical History:   Procedure Laterality Date    CHOLECYSTECTOMY      COLONOSCOPY      CYST REMOVAL      FEMUR SURGERY Left     HIP CLOSE REDUCTION Left 1/20/2022    Procedure: CLOSED REDUCTION HIP;  Surgeon: Larry Castillo MD;  [facility];  Service: Orthopedics    JOINT REPLACEMENT      T KR LEFT    KNEE SURGERY Left 1996    MANDIBLE SURGERY      MI CONV PREV HIP TOT HIP ARTHRP W/WO AGRFT/ALGRFT Left 12/22/2021    Procedure: ARTHROPLASTY HIP TOTAL- conversion;  Surgeon: Yifan Caraballo MD;  [facility];  Service: Orthopedics    MI REMOVAL IMPLANT DEEP Left 9/8/2021    Procedure: REMOVAL HARDWARE HIP;  Surgeon: Yifan Caraballo MD;  [facility];  Service: Orthopedics    ROTATOR CUFF REPAIR Left     WRIST FRACTURE SURGERY Left     plate in place     Family History   Problem Relation Age of Onset    Cancer Mother     Arthritis Father     Diabetes Sister     Cancer Brother     Diabetes Daughter     Depression Son       reports that he has been smoking cigarettes. He has never used smokeless tobacco. He reports that he does not currently use alcohol. He reports that he does not use drugs.      Physical Exam:   Vitals:    01/08/24 1414   BP: 120/66   BP Location: Left arm   Patient Position: Sitting   Cuff Size: Adult   Pulse: 80   SpO2: 99%   Weight: 68 kg (150 lb)   Height: 5' 8\" (1.727 m)     Body mass index is 22.81 kg/m².    General: conscious, cooperative, in not acute distress  Eyes: conjunctivae pink, anicteric sclerae  ENT: lips and mucous membranes moist  Neck: supple, no JVD  Chest: clear breath sounds bilateral, no crackles, ronchus or wheezings  CVS: distinct S1 & S2, normal rate, regular rhythm  Abdomen: soft, non-tender, non-distended, normoactive bowel sounds  Back: no CVA tenderness  Extremities: no edema of both " "legs  Skin: no rash  Neuro: awake, alert, oriented      Lab Results:   Results for orders placed or performed in visit on 06/20/23   Hemoglobin A1C   Result Value Ref Range    Hemoglobin A1C 6.4        Laboratory Results:  Lab Results   Component Value Date    WBC 14.15 (H) 02/20/2023    HGB 11.4 (L) 02/20/2023    HCT 35.4 (L) 02/20/2023    MCV 89 02/20/2023     02/20/2023     Lab Results   Component Value Date    SODIUM 138 02/20/2023    K 4.3 02/20/2023     (H) 02/20/2023    CO2 23 02/20/2023    BUN 31 (H) 02/20/2023    CREATININE 1.07 02/20/2023    GLUC 134 02/20/2023    CALCIUM 9.4 02/20/2023               Portions of the record may have been created with voice recognition software. Occasional wrong word or \"sound a like\" substitutions may have occurred due to the inherent limitations of voice recognition software. Read the chart carefully and recognize, using context, where substitutions have occurred.If you have any questions, please contact the dictating provider.  "

## 2024-01-08 NOTE — PATIENT INSTRUCTIONS
As we discussed in the office visit and after reviewing your most recent as well as previous blood test you were referred to our office for evaluation of previously worsening kidney function as well as on and off episode of high calcium (hypercalcemia).  Based on the most recent blood test on 12/22/2023 your kidney function improved and is back to normal as well as your serum calcium normalized.  You have a previous workup for high calcium ordered by your rheumatologist that was unremarkable.  At this moment recommend to stay well-hydrated.  Recommend to keep working on quitting smoking.  Continue close follow-up with your primary care doctor and your rheumatologist.  I would be more than happy to see you back in the office in the future if needed.

## 2024-02-07 ENCOUNTER — OFFICE VISIT (OUTPATIENT)
Dept: OBGYN CLINIC | Facility: MEDICAL CENTER | Age: 69
End: 2024-02-07
Payer: MEDICARE

## 2024-02-07 ENCOUNTER — APPOINTMENT (OUTPATIENT)
Dept: RADIOLOGY | Facility: MEDICAL CENTER | Age: 69
End: 2024-02-07
Payer: MEDICARE

## 2024-02-07 VITALS
DIASTOLIC BLOOD PRESSURE: 78 MMHG | HEIGHT: 68 IN | BODY MASS INDEX: 22.43 KG/M2 | SYSTOLIC BLOOD PRESSURE: 157 MMHG | HEART RATE: 80 BPM | WEIGHT: 148 LBS

## 2024-02-07 DIAGNOSIS — M25.551 PAIN IN RIGHT HIP: ICD-10-CM

## 2024-02-07 DIAGNOSIS — M16.11 PRIMARY OSTEOARTHRITIS OF ONE HIP, RIGHT: Primary | ICD-10-CM

## 2024-02-07 PROCEDURE — 99214 OFFICE O/P EST MOD 30 MIN: CPT | Performed by: ORTHOPAEDIC SURGERY

## 2024-02-07 PROCEDURE — 73502 X-RAY EXAM HIP UNI 2-3 VIEWS: CPT

## 2024-02-07 NOTE — PROGRESS NOTES
Assessment/Plan     1. Primary osteoarthritis of one hip, right    2. Pain in right hip      Orders Placed This Encounter   Procedures    XR hip/pelv 2-3 vws right if performed    Ambulatory Referral to Orthopedic Surgery       Patient has AVN, right hip arthritis.  Discussed with patient his pain is likely multifactorial as he also is experiencing back pain.  MRI and x-rays were reviewed with patient at today's visit demonstrating AVN of the right hip.  Patient would like to have surgical intervention for the right hip at the [facility]. Referral to Dr. Rodriguez was given to patient at today's visit.  Diclofenac was prescribed to patient at today's visit.  This was discussed with patient that he should be taking this sparingly as his creatinine levels are elevated.  Patient may continue activity as tolerated.    Return if symptoms worsen or fail to improve.    I answered all of the patient's questions during the visit and provided education of the patient's condition during the visit.  The patient verbalized understanding of the information given and agrees with the plan.  This note was dictated using [employer] software.  It may contain errors including improperly dictated words.  Please contact physician directly for any questions.    History of Present Illness   Chief complaint:   Chief Complaint   Patient presents with    Right Hip - Pain       HPI: Gregory Dumont is a 68 y.o. male that c/o right hip pain.    Length of time hip pain has been present: 5 years  Any falls or trauma associated with onset of pain: no AUGUSTUS, been diagnosed with osteopenia.   Location of pain: groin and posterior aspect   Does the pain radiate?: down to the knee and occasionally down to the foot.   Intermittent or constant: constant  Description of pain: sharp achy   Aggravating factors: weightbearing activities   Instability?: yes, uses a cane to help ambulate  Pain medication that has been tried: oxycodone by PCP takes as  needed  Topical mediation that has been tried: no  Has heat/ice been tried: yes  Can NSAIDs be taken?  If not why?: yes  Has PT or home exercises been tried?: no  Have steroid injections been tried?  Had injection done last month with 2 days of relief.   Any history of surgery on that hip?:  no     History of spinal stenosis.    Had MRI right hip done on 1/4/2024 which report read avascular necrosis of right femoral head.  Patient has history of left WOLFGANG done by Dr. Caraballo in 2021.    ROS:    See HPI for musculoskeletal review.   All other systems reviewed are negative     Historical Information   Past Medical History:   Diagnosis Date    Arthritis     osteoarthritis    Cervical radiculopathy     COPD (chronic obstructive pulmonary disease) (HCC)     CPAP (continuous positive airway pressure) dependence     Diabetes mellitus (HCC)     Fibromyalgia     Fibromyalgia, primary     GERD (gastroesophageal reflux disease)     History of transfusion     Hyperlipidemia     Osteopenia     Pneumonia     Sleep apnea     USES CPAP HS     Past Surgical History:   Procedure Laterality Date    CHOLECYSTECTOMY      COLONOSCOPY      CYST REMOVAL      FEMUR SURGERY Left     HIP CLOSE REDUCTION Left 1/20/2022    Procedure: CLOSED REDUCTION HIP;  Surgeon: Larry Castillo MD;  Location: [facility];  Service: Orthopedics    JOINT REPLACEMENT      T KR LEFT    KNEE SURGERY Left 1996    MANDIBLE SURGERY      OH CONV PREV HIP TOT HIP ARTHRP W/WO AGRFT/ALGRFT Left 12/22/2021    Procedure: ARTHROPLASTY HIP TOTAL- conversion;  Surgeon: Yifan Caraballo MD;  [facility];  Service: Orthopedics    OH REMOVAL IMPLANT DEEP Left 9/8/2021    Procedure: REMOVAL HARDWARE HIP;  Surgeon: Yifan Caraballo MD;  Location: [facility];  Service: Orthopedics    ROTATOR CUFF REPAIR Left     WRIST FRACTURE SURGERY Left     plate in place     Social History   Social History     Substance and Sexual Activity   Alcohol Use Not Currently     "Comment: \"at holidays\"     Social History     Substance and Sexual Activity   Drug Use No     Social History     Tobacco Use   Smoking Status Every Day    Current packs/day: 0.50    Types: Cigarettes   Smokeless Tobacco Never     Family History:   Family History   Problem Relation Age of Onset    Cancer Mother     Arthritis Father     Diabetes Sister     Cancer Brother     Diabetes Daughter     Depression Son        Current Outpatient Medications on File Prior to Visit   Medication Sig Dispense Refill    acetaminophen (TYLENOL) 325 mg tablet Take 2 tablets by mouth every 6 (six) hours as needed (Patient not taking: Reported on 1/8/2024)      albuterol (PROVENTIL HFA,VENTOLIN HFA) 90 mcg/act inhaler Inhale 2 puffs every 4 (four) hours as needed for wheezing      celecoxib (CeleBREX) 200 mg capsule Take 200 mg by mouth daily (Patient not taking: Reported on 1/8/2024)      ergocalciferol (ERGOCALCIFEROL) 1.25 MG (57665 UT) capsule take 1 capsule by mouth every week      fenofibrate (TRICOR) 145 mg tablet Take 145 mg by mouth daily      fluticasone-salmeterol (Advair) 250-50 mcg/dose inhaler inhale 1 puff by mouth and INTO THE LUNGS every 12 hours SAME TIMES EACH DAY      guaiFENesin (MUCINEX) 600 mg 12 hr tablet Take 1 tablet (600 mg total) by mouth 2 (two) times a day 14 tablet 0    Icosapent Ethyl (Vascepa) 1 g CAPS Take 2 g by mouth 2 (two) times a day      ipratropium-albuterol (DUO-NEB) 0.5-2.5 mg/3 mL nebulizer solution Take 3 mL by nebulization 4 (four) times a day 20 mL 2    MELATONIN PO Take 2 mg by mouth      metFORMIN (GLUCOPHAGE) 1000 MG tablet Take 1,000 mg by mouth 2 (two) times a day with meals        Multiple Vitamin (multivitamin) tablet TAKE 1 TABLET DAILY.      omeprazole (PriLOSEC OTC) 20 MG tablet Take 20 mg by mouth daily      omeprazole (PriLOSEC) 20 mg delayed release capsule take 1 capsule by mouth twice a day 30 TO 60 MINUTES PRIOR TO EATING      oxyCODONE-acetaminophen (PERCOCET) 5-325 mg per " "tablet Take 1 tablet by mouth every 8 (eight) hours as needed      pregabalin (LYRICA) 100 mg capsule Take 100 mg by mouth 2 (two) times a day      rosuvastatin (CRESTOR) 5 mg tablet Take 5 mg by mouth in the morning.      traZODone (DESYREL) 50 mg tablet Take 1 tablet (50 mg total) by mouth daily at bedtime 30 tablet 0     No current facility-administered medications on file prior to visit.     No Known Allergies    Objective   Vitals: Blood pressure 157/78, pulse 80, height 5' 8\" (1.727 m), weight 67.1 kg (148 lb).,Body mass index is 22.5 kg/m².    PE:  AAOx 3  WDWN  Hearing intact, no drainage from eyes  Regular rate  no audible wheezing  no abdominal distension  LE compartments soft, skin intact    right hip:   No dislocation/deformity  Pos. [facility]  ROM: 100 Flexion, 10 IR, 2 5ER  Unable to perform Katie Test due to pain  + TTP over SIJ    rightLE:    Sensation grossly intact   AT/GS intact     Back:    No TTP over lumbar spinous processes, paraspinal musculature  SLR: Neg.     Imaging Studies: I have personally reviewed pertinent films in PACS  XR right hip: AVN, osteoarthritis of left hip.  MRI right hip: AVN, osteoarthritis of left hip    Scribe Attestation      I,:  Aditya Bay am acting as a scribe while in the presence of the attending physician.:       I,:  Michelle Rojas DO personally performed the services described in this documentation    as scribed in my presence.:                "

## 2024-02-21 PROBLEM — J15.9 COMMUNITY ACQUIRED BACTERIAL PNEUMONIA: Status: RESOLVED | Noted: 2020-02-09 | Resolved: 2024-02-21

## 2024-02-27 ENCOUNTER — TELEPHONE (OUTPATIENT)
Age: 69
End: 2024-02-27

## 2024-02-27 ENCOUNTER — OFFICE VISIT (OUTPATIENT)
Dept: OBGYN CLINIC | Facility: MEDICAL CENTER | Age: 69
End: 2024-02-27
Payer: MEDICARE

## 2024-02-27 VITALS
HEART RATE: 94 BPM | BODY MASS INDEX: 22.43 KG/M2 | HEIGHT: 68 IN | SYSTOLIC BLOOD PRESSURE: 149 MMHG | WEIGHT: 148 LBS | DIASTOLIC BLOOD PRESSURE: 85 MMHG

## 2024-02-27 DIAGNOSIS — M25.59 PAIN IN OTHER JOINT: ICD-10-CM

## 2024-02-27 DIAGNOSIS — M16.11 PRIMARY OSTEOARTHRITIS OF ONE HIP, RIGHT: ICD-10-CM

## 2024-02-27 DIAGNOSIS — M87.051 IDIOPATHIC ASEPTIC NECROSIS OF RIGHT FEMUR (HCC): Primary | ICD-10-CM

## 2024-02-27 PROCEDURE — 99215 OFFICE O/P EST HI 40 MIN: CPT | Performed by: STUDENT IN AN ORGANIZED HEALTH CARE EDUCATION/TRAINING PROGRAM

## 2024-02-27 RX ORDER — DULOXETIN HYDROCHLORIDE 30 MG/1
1 CAPSULE, DELAYED RELEASE ORAL 2 TIMES DAILY
COMMUNITY

## 2024-02-27 RX ORDER — CEFAZOLIN SODIUM 2 G/50ML
2000 SOLUTION INTRAVENOUS ONCE
OUTPATIENT
Start: 2024-02-27 | End: 2024-02-27

## 2024-02-27 RX ORDER — MELATONIN
2000 DAILY
Qty: 60 TABLET | Refills: 1 | Status: SHIPPED | OUTPATIENT
Start: 2024-02-27

## 2024-02-27 RX ORDER — ACETAMINOPHEN 325 MG/1
975 TABLET ORAL ONCE
OUTPATIENT
Start: 2024-02-27 | End: 2024-02-27

## 2024-02-27 RX ORDER — MULTIVIT-MIN/IRON FUM/FOLIC AC 7.5 MG-4
1 TABLET ORAL DAILY
Qty: 30 TABLET | Refills: 3 | Status: SHIPPED | OUTPATIENT
Start: 2024-02-27

## 2024-02-27 RX ORDER — SODIUM CHLORIDE, SODIUM LACTATE, POTASSIUM CHLORIDE, CALCIUM CHLORIDE 600; 310; 30; 20 MG/100ML; MG/100ML; MG/100ML; MG/100ML
125 INJECTION, SOLUTION INTRAVENOUS CONTINUOUS
OUTPATIENT
Start: 2024-02-27

## 2024-02-27 RX ORDER — TRANEXAMIC ACID 10 MG/ML
1000 INJECTION, SOLUTION INTRAVENOUS ONCE
OUTPATIENT
Start: 2024-02-27 | End: 2024-02-27

## 2024-02-27 RX ORDER — CHLORHEXIDINE GLUCONATE ORAL RINSE 1.2 MG/ML
15 SOLUTION DENTAL ONCE
OUTPATIENT
Start: 2024-02-27 | End: 2024-02-27

## 2024-02-27 RX ORDER — FOLIC ACID 1 MG/1
1 TABLET ORAL DAILY
Qty: 30 TABLET | Refills: 3 | Status: SHIPPED | OUTPATIENT
Start: 2024-02-27

## 2024-02-27 RX ORDER — ASCORBIC ACID 500 MG
500 TABLET ORAL 2 TIMES DAILY
Qty: 30 TABLET | Refills: 3 | Status: SHIPPED | OUTPATIENT
Start: 2024-02-27

## 2024-02-27 RX ORDER — CHLORHEXIDINE GLUCONATE 4 G/100ML
SOLUTION TOPICAL DAILY PRN
OUTPATIENT
Start: 2024-02-27

## 2024-02-27 NOTE — TELEPHONE ENCOUNTER
Caller: Patient    Doctor: Michael    Reason for call: Patient stated he picked up al rx's with the exception of the Voltaren. Advised the script was sent in along w/the other medications. The patient will contact [facility] to check status    Call back#: [phone]

## 2024-02-27 NOTE — PROGRESS NOTES
Hip New Office Note    Assessment:     1. Primary osteoarthritis of one hip, right    2. 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 Pain in other joint        Plan:     Problem List Items Addressed This Visit          Musculoskeletal and Integument    Primary osteoarthritis of one hip, right    Relevant Medications    ascorbic acid (VITAMIN C) 500 MG tablet    folic acid (FOLVITE) 1 mg tablet    Multiple Vitamins-Minerals (multivitamin with minerals) tablet    cholecalciferol (VITAMIN D3) 1,000 units tablet    Other Relevant Orders    Case request operating room: ARTHROPLASTY HIP TOTAL ANTERIOR,NAVIGATED- same day (Completed)    Comprehensive metabolic panel    Hemoglobin A1C W/EAG Estimation    CBC and differential    Anemia Panel w/Reflex    Protime-INR    APTT    Type and screen    Ambulatory referral to Family Practice    Ambulatory referral to Physical Therapy    EKG 12 lead    Durable Medical Equipment     Other Visit Diagnoses       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 Imaging and prognosis was reviewed with the patient today. Discussed treatment options including continued observation, low impact exercises, anti-inflammatories, physical therapy, versus surgical intervention. Discussed that IA CSI is relatively CI in setting of AVN due to risk of worsening. Walker fitted and provided to patient today. Refill of diclofenac provided. His pain is even worse than 3 weeks ago when he saw Bob. His pain affects his ADLs.     The patient has elected to proceed with right WOLFGANG through the anterior approach. Risks and benefits of surgery to include but not limited to bleeding, infection, damage to surrounding structures, hardware failure, instability, fracture, dislocation, leg length inequality, need for  further surgery, continued pain, stiffness, blood clots, stroke, heart attack, and LFCN numbness was discussed with the patient. Informed consent was signed today in the office. The patient has met with our surgical schedulers and our preoperative joint replacement pathway has been initiated. All questions were answered. Patient will follow-up 2 weeks post operatively.  Patient is at an appropriate BMI. Discussed smoking cessation 2 weeks prior and 2 weeks following the procedure.    Subjective:     Patient ID: Gregory Dumont is a 69 y.o. male.  Chief Complaint:  HPI:  69 y.o. male presents to the office for evaluation of right hip pain worsening over the past 5 years. He has history of left total hip arthroplasty performed by Dr. Caraballo in 2021 and has had several dislocations. He is experiencing right groin pain. His pain worsens with activities and ambulation. He notes feelings of giving way to the right hip. He has tried oxycodone to control his pain. He feels unsteady with just a cane. He is a current smoker. He was referred by Dr. Rojas to discuss surgical intervention.     Allergy:  No Known Allergies  Medications:  all current active meds have been reviewed  Past Medical History:  Past Medical History:   Diagnosis Date    Arthritis     osteoarthritis    Cervical radiculopathy     COPD (chronic obstructive pulmonary disease) (HCC)     CPAP (continuous positive airway pressure) dependence     Diabetes mellitus (HCC)     Fibromyalgia     Fibromyalgia, primary     GERD (gastroesophageal reflux disease)     History of transfusion     Hyperlipidemia     Osteopenia     Pneumonia     Sleep apnea     USES CPAP HS     Past Surgical History:  Past Surgical History:   Procedure Laterality Date    CHOLECYSTECTOMY      COLONOSCOPY      CYST REMOVAL      FEMUR SURGERY Left     HIP CLOSE REDUCTION Left 1/20/2022    Procedure: CLOSED REDUCTION HIP;  Surgeon: Larry Castillo MD;  Location: [facility];  Service:  "Orthopedics    JOINT REPLACEMENT      T KR LEFT    KNEE SURGERY Left 1996    MANDIBLE SURGERY      KS CONV PREV HIP TOT HIP ARTHRP W/WO AGRFT/ALGRFT Left 12/22/2021    Procedure: ARTHROPLASTY HIP TOTAL- conversion;  Surgeon: Yifan Caraballo MD;  [facility];  Service: Orthopedics    KS REMOVAL IMPLANT DEEP Left 9/8/2021    Procedure: REMOVAL HARDWARE HIP;  Surgeon: Yifan Caraballo MD;  [facility];  Service: Orthopedics    ROTATOR CUFF REPAIR Left     WRIST FRACTURE SURGERY Left     plate in place     Family History:  Family History   Problem Relation Age of Onset    Cancer Mother     Arthritis Father     Diabetes Sister     Cancer Brother     Diabetes Daughter     Depression Son      Social History:  Social History     Substance and Sexual Activity   Alcohol Use Not Currently    Comment: \"at holidays\"     Social History     Substance and Sexual Activity   Drug Use No     Social History     Tobacco Use   Smoking Status Every Day    Current packs/day: 0.50    Types: Cigarettes   Smokeless Tobacco Never         ROS:  General: Per HPI  Skin: Negative, except if noted below  HEENT: Negative  Respiratory: Negative  Cardiovascular: Negative  Gastrointestinal: Negative  Urinary: Negative  Vascular: Negative  Musculoskeletal: Positive per HPI   Neurologic: Positive per HPI  Endocrine: Negative    Objective:  BP Readings from Last 1 Encounters:   02/27/24 149/85      Wt Readings from Last 1 Encounters:   02/27/24 67.1 kg (148 lb)        Respiratory:   non-labored respirations    Lymphatics:  no palpable lymph nodes    Gait and Station:   antalgic    Neurologic:   Alert and oriented times 3  Patient with normal sensation except as noted below  Deep tendon reflexes 2+ except as noted in MSK exam    Bilateral Lower Extremity:    Right Hip     Inspection: skin intact    Range of Motion: limited with pain    + log roll    - Trendelenburg sign    Motor: 5/5 Q/HS/TA/GS/P    Pulses: 2+ DP / 2+ PT    SILT " "DP/SP/S/S/TN    Imaging:  My interpretation XR AP pelvis/ right hip: extensive AVN with joint space narrowing.    MRI right hip: extensive AVN of femoral head with cartilage loss.    BMI:   Estimated body mass index is 22.5 kg/m² as calculated from the following:    Height as of this encounter: 5' 8\" (1.727 m).    Weight as of this encounter: 67.1 kg (148 lb).  BSA:   Estimated body surface area is 1.8 meters squared as calculated from the following:    Height as of this encounter: 5' 8\" (1.727 m).    Weight as of this encounter: 67.1 kg (148 lb).           Scribe Attestation      I,:  Maria Esther Nicole am acting as a scribe while in the presence of the attending physician.:       I,:  Mahendra Rodriguez, DO personally performed the services described in this documentation    as scribed in my presence.:            "

## 2024-03-04 ENCOUNTER — APPOINTMENT (OUTPATIENT)
Dept: LAB | Facility: HOSPITAL | Age: 69
End: 2024-03-04
Payer: MEDICARE

## 2024-03-04 ENCOUNTER — LAB (OUTPATIENT)
Dept: LAB | Facility: HOSPITAL | Age: 69
End: 2024-03-04
Payer: MEDICARE

## 2024-03-04 DIAGNOSIS — M16.11 PRIMARY OSTEOARTHRITIS OF ONE HIP, RIGHT: ICD-10-CM

## 2024-03-04 DIAGNOSIS — M25.59 PAIN IN OTHER JOINT: ICD-10-CM

## 2024-03-04 LAB
ABO GROUP BLD: NORMAL
ALBUMIN SERPL BCP-MCNC: 4.3 G/DL (ref 3.5–5)
ALP SERPL-CCNC: 107 U/L (ref 34–104)
ALT SERPL W P-5'-P-CCNC: 49 U/L (ref 7–52)
ANION GAP SERPL CALCULATED.3IONS-SCNC: 6 MMOL/L
APTT PPP: 27 SECONDS (ref 23–37)
AST SERPL W P-5'-P-CCNC: 36 U/L (ref 13–39)
ATRIAL RATE: 73 BPM
BASOPHILS # BLD AUTO: 0.12 THOUSANDS/ÂΜL (ref 0–0.1)
BASOPHILS NFR BLD AUTO: 1 % (ref 0–1)
BILIRUB SERPL-MCNC: 0.35 MG/DL (ref 0.2–1)
BLD GP AB SCN SERPL QL: NEGATIVE
BUN SERPL-MCNC: 23 MG/DL (ref 5–25)
CALCIUM SERPL-MCNC: 9.6 MG/DL (ref 8.4–10.2)
CHLORIDE SERPL-SCNC: 105 MMOL/L (ref 96–108)
CO2 SERPL-SCNC: 25 MMOL/L (ref 21–32)
CREAT SERPL-MCNC: 0.96 MG/DL (ref 0.6–1.3)
EOSINOPHIL # BLD AUTO: 0.18 THOUSAND/ÂΜL (ref 0–0.61)
EOSINOPHIL NFR BLD AUTO: 2 % (ref 0–6)
ERYTHROCYTE [DISTWIDTH] IN BLOOD BY AUTOMATED COUNT: 13.3 % (ref 11.6–15.1)
EST. AVERAGE GLUCOSE BLD GHB EST-MCNC: 157 MG/DL
GFR SERPL CREATININE-BSD FRML MDRD: 80 ML/MIN/1.73SQ M
GLUCOSE P FAST SERPL-MCNC: 256 MG/DL (ref 65–99)
HBA1C MFR BLD: 7.1 %
HCT VFR BLD AUTO: 43.7 % (ref 36.5–49.3)
HGB BLD-MCNC: 13.7 G/DL (ref 12–17)
IMM GRANULOCYTES # BLD AUTO: 0.02 THOUSAND/UL (ref 0–0.2)
IMM GRANULOCYTES NFR BLD AUTO: 0 % (ref 0–2)
INR PPP: 0.88 (ref 0.84–1.19)
LYMPHOCYTES # BLD AUTO: 2.14 THOUSANDS/ÂΜL (ref 0.6–4.47)
LYMPHOCYTES NFR BLD AUTO: 24 % (ref 14–44)
MCH RBC QN AUTO: 28.8 PG (ref 26.8–34.3)
MCHC RBC AUTO-ENTMCNC: 31.4 G/DL (ref 31.4–37.4)
MCV RBC AUTO: 92 FL (ref 82–98)
MONOCYTES # BLD AUTO: 0.64 THOUSAND/ÂΜL (ref 0.17–1.22)
MONOCYTES NFR BLD AUTO: 7 % (ref 4–12)
NEUTROPHILS # BLD AUTO: 5.88 THOUSANDS/ÂΜL (ref 1.85–7.62)
NEUTS SEG NFR BLD AUTO: 66 % (ref 43–75)
NRBC BLD AUTO-RTO: 0 /100 WBCS
P AXIS: 26 DEGREES
PLATELET # BLD AUTO: 218 THOUSANDS/UL (ref 149–390)
PMV BLD AUTO: 12.4 FL (ref 8.9–12.7)
POTASSIUM SERPL-SCNC: 4.5 MMOL/L (ref 3.5–5.3)
PR INTERVAL: 142 MS
PROT SERPL-MCNC: 7.8 G/DL (ref 6.4–8.4)
PROTHROMBIN TIME: 12.1 SECONDS (ref 11.6–14.5)
QRS AXIS: 82 DEGREES
QRSD INTERVAL: 100 MS
QT INTERVAL: 362 MS
QTC INTERVAL: 398 MS
RBC # BLD AUTO: 4.76 MILLION/UL (ref 3.88–5.62)
RH BLD: NEGATIVE
SODIUM SERPL-SCNC: 136 MMOL/L (ref 135–147)
SPECIMEN EXPIRATION DATE: NORMAL
T WAVE AXIS: 64 DEGREES
VENTRICULAR RATE: 73 BPM
WBC # BLD AUTO: 8.98 THOUSAND/UL (ref 4.31–10.16)

## 2024-03-04 PROCEDURE — 85610 PROTHROMBIN TIME: CPT

## 2024-03-04 PROCEDURE — 93010 ELECTROCARDIOGRAM REPORT: CPT | Performed by: INTERNAL MEDICINE

## 2024-03-04 PROCEDURE — 36415 COLL VENOUS BLD VENIPUNCTURE: CPT

## 2024-03-04 PROCEDURE — 86900 BLOOD TYPING SEROLOGIC ABO: CPT

## 2024-03-04 PROCEDURE — 85025 COMPLETE CBC W/AUTO DIFF WBC: CPT

## 2024-03-04 PROCEDURE — 85730 THROMBOPLASTIN TIME PARTIAL: CPT

## 2024-03-04 PROCEDURE — 86901 BLOOD TYPING SEROLOGIC RH(D): CPT

## 2024-03-04 PROCEDURE — 93005 ELECTROCARDIOGRAM TRACING: CPT

## 2024-03-04 PROCEDURE — 83036 HEMOGLOBIN GLYCOSYLATED A1C: CPT

## 2024-03-04 PROCEDURE — 80053 COMPREHEN METABOLIC PANEL: CPT

## 2024-03-04 PROCEDURE — 86850 RBC ANTIBODY SCREEN: CPT

## 2024-03-06 ENCOUNTER — TELEPHONE (OUTPATIENT)
Dept: OBGYN CLINIC | Facility: HOSPITAL | Age: 69
End: 2024-03-06

## 2024-03-14 ENCOUNTER — HOSPITAL ENCOUNTER (OUTPATIENT)
Dept: RADIOLOGY | Facility: HOSPITAL | Age: 69
Discharge: HOME | End: 2024-03-14
Payer: MEDICARE

## 2024-03-14 DIAGNOSIS — Z01.818 OTHER SPECIFIED PRE-OPERATIVE EXAMINATION: ICD-10-CM

## 2024-03-14 DIAGNOSIS — Z01.818 PRE-PROCEDURAL EXAMINATION: ICD-10-CM

## 2024-03-14 PROCEDURE — 71046 X-RAY EXAM CHEST 2 VIEWS: CPT

## 2024-03-18 ENCOUNTER — TELEPHONE (OUTPATIENT)
Dept: OBGYN CLINIC | Facility: HOSPITAL | Age: 69
End: 2024-03-18

## 2024-03-18 NOTE — TELEPHONE ENCOUNTER
Michael    Pt requested to speak to nurse in regards to upcoming WOLFGANG       Transferred to nurse

## 2024-03-20 ENCOUNTER — PATIENT OUTREACH (OUTPATIENT)
Dept: OBGYN CLINIC | Facility: HOSPITAL | Age: 69
End: 2024-03-20

## 2024-03-20 NOTE — PROGRESS NOTES
REMA received a new referral in regard to pt scheduled for arthroplasty of right hip on 04/11/2024. First SWCM reviewed NN notes prior to calling pt. Pt lives with his spouse and daughter. Pt ambulates with a walker and cane and is independent with ADLs. Pts caregiver support person will be his wife, Diane and his daughter will help also. Pts friend Alli will be taking pt to and from surgery. Pt has no transportation to and from OP PT.   SWCM contacted pt today and introduced self and role. Per pt his wife can provide caregiver support for pt. Pts daughter will help also, but does not live with pt anymore. She moved out in November. SWCM spoke to pts wife who states she plans on talking to her step son to inquire if he would be willing to come help pt postoperatively. SWCM will follow up in regard to same. SWCM and pts wife discussed self pay HHC and pts wife states that is not an option as they are on a fixed income.   Pt has a ride from friend to and from surgery. Pt has no ride planned to and from OP PT. Today pts wife inquired if in home PT is an option for pt. SWCM educated if pt needs in home PT it will be determined postoperatively. SWCM discussed [facility] as an option. Pt and wife seemed unsure but were agreeable to take the phone number to [employer] to request and application be sent to their home.   SWCM inquired if pt is interested in Meals on Wheels and pt declined. At this time, Pt will contact [facility] and request and application be sent to his home. Pt reports no needs in regard to caregiver support and declined self pay HHC options and MOWS. [facility] will remain available.

## 2024-03-22 ENCOUNTER — TELEPHONE (OUTPATIENT)
Age: 69
End: 2024-03-22

## 2024-03-22 RX ORDER — BUDESONIDE, GLYCOPYRROLATE, AND FORMOTEROL FUMARATE 160; 9; 4.8 UG/1; UG/1; UG/1
2 AEROSOL, METERED RESPIRATORY (INHALATION)
COMMUNITY

## 2024-03-22 RX ORDER — LORATADINE 10 MG/1
10 TABLET ORAL DAILY
COMMUNITY

## 2024-03-22 NOTE — PRE-PROCEDURE INSTRUCTIONS
Pre-Surgery Instructions:   Medication Instructions    ascorbic acid (VITAMIN C) 500 MG tablet Hold day of surgery.    Budeson-Glycopyrrol-Formoterol (Breztri Aerosphere) 160-9-4.8 MCG/ACT AERO Take day of surgery.    cholecalciferol (VITAMIN D3) 1,000 units tablet Hold day of surgery.    diclofenac sodium (VOLTAREN) 50 mg EC tablet Stop taking 3 days prior to surgery.    folic acid (FOLVITE) 1 mg tablet Hold day of surgery.    Icosapent Ethyl (Vascepa) 1 g CAPS Take day of surgery.    loratadine (CLARITIN) 10 mg tablet Take day of surgery.    MELATONIN PO Stop taking 7 days prior to surgery.    Multiple Vitamins-Minerals (multivitamin with minerals) tablet Hold day of surgery.    omeprazole (PriLOSEC) 20 mg delayed release capsule Take day of surgery.    rosuvastatin (CRESTOR) 5 mg tablet Take day of surgery.   Medication instructions for day surgery reviewed. Please use only a sip of water to take your instructed medications. Avoid all over the counter vitamins, supplements and NSAIDS for one week prior to surgery per anesthesia guidelines. Tylenol is ok to take as needed.     You will receive a call one business day prior to surgery with an arrival time and hospital directions. If your surgery is scheduled on a Monday, the hospital will be calling you on the Friday prior to your surgery. If you have not heard from anyone by 8pm, please call the hospital supervisor through the hospital  at [phone]. ([place] [phone] or [place] [phone]).    Do not eat or drink anything after midnight the night before your surgery, including candy, mints, lifesavers, or chewing gum. Do not drink alcohol 24hrs before your surgery. Try not to smoke at least 24hrs before your surgery.       Follow the pre surgery showering instructions as listed in the “My Surgical Experience Booklet” or otherwise provided by your surgeon's office. Do not use a blade to shave the surgical area 1 week before surgery. It is okay  to use a clean electric clippers up to 24 hours before surgery. Do not apply any lotions, creams, including makeup, cologne, deodorant, or perfumes after showering on the day of your surgery. Do not use dry shampoo, hair spray, hair gel, or any type of hair products.     No contact lenses, eye make-up, or artificial eyelashes. Remove nail polish, including gel polish, and any artificial, gel, or acrylic nails if possible. Remove all jewelry including rings and body piercing jewelry.     Wear causal clothing that is easy to take on and off. Consider your type of surgery.    Keep any valuables, jewelry, piercings at home. Please bring any specially ordered equipment (sling, braces) if indicated.    Arrange for a responsible person to drive you to and from the hospital on the day of your surgery. Please confirm the visitor policy for the day of your procedure when you receive your phone call with an arrival time.     Call the surgeon's office with any new illnesses, exposures, or additional questions prior to surgery.    Please reference your “My Surgical Experience Booklet” for additional information to prepare for your upcoming surgery.     Pt was advised to bring rolling walker DOS.

## 2024-03-22 NOTE — TELEPHONE ENCOUNTER
Caller: Patient    Doctor: Dr. Rodriguez    Reason for call: Patient calling to let Dr. Rodriguez that he will be requiring Referral for Homehealth PT following his surgery 4/11/24 because he won't be able to drive.      Call back#: [phone]

## 2024-03-25 NOTE — TELEPHONE ENCOUNTER
VM left for patient to call to discuss.     Preoperative assessment already completed. Will discuss DC plan with patient on return call

## 2024-03-26 ENCOUNTER — PATIENT OUTREACH (OUTPATIENT)
Dept: OBGYN CLINIC | Facility: HOSPITAL | Age: 69
End: 2024-03-26

## 2024-03-26 NOTE — PROGRESS NOTES
REMA contacted pt today to follow up in regard to [employer] application. Pts wife spoke to [facility] and advised they are not interested in the [facility]. Pts wife states that she contacted Dr. Rodriguez's office aware that pt will need in home PT. I did educate this cannot be determined until after surgery. At this time, pt and wife are insisting on in home PT and are declining all other transportation. No other needs noted. [facility] will message NN in regard to same. [facility] will remain available.

## 2024-03-28 ENCOUNTER — ANESTHESIA EVENT (OUTPATIENT)
Age: 69
End: 2024-03-28
Payer: MEDICARE

## 2024-04-03 DIAGNOSIS — M16.11 PRIMARY OSTEOARTHRITIS OF ONE HIP, RIGHT: ICD-10-CM

## 2024-04-03 NOTE — TELEPHONE ENCOUNTER
Reason for call:   [x] Refill   [] Prior Auth  [] Other:     Office:   [] PCP/Provider -   [x] Specialty/Provider - ortho/ Danielle Swain PA-C    Medication: diclofenac sodium (VOLTAREN) 50 mg EC tablet     Dose/Frequency:   50 mg, Oral, 2 times daily PRN          Quantity: 30    Pharmacy: [id number] - [place], [place] - [address]     Does the patient have enough for 3 days?  Unknown to pharmacist  [] Yes   [x] No - Send as HP to POD

## 2024-04-11 ENCOUNTER — APPOINTMENT (OUTPATIENT)
Age: 69
End: 2024-04-11
Payer: MEDICARE

## 2024-04-11 ENCOUNTER — HOSPITAL ENCOUNTER (OUTPATIENT)
Age: 69
Discharge: HOME | End: 2024-04-11
Payer: MEDICARE

## 2024-04-11 ENCOUNTER — ANESTHESIA (OUTPATIENT)
Age: 69
End: 2024-04-11
Payer: MEDICARE

## 2024-04-11 ENCOUNTER — HOSPITAL ENCOUNTER (OUTPATIENT)
Age: 69
Setting detail: OUTPATIENT SURGERY
Discharge: HOME | End: 2024-04-11
Attending: STUDENT IN AN ORGANIZED HEALTH CARE EDUCATION/TRAINING PROGRAM | Admitting: STUDENT IN AN ORGANIZED HEALTH CARE EDUCATION/TRAINING PROGRAM
Payer: MEDICARE

## 2024-04-11 VITALS
HEIGHT: 67 IN | RESPIRATION RATE: 22 BRPM | SYSTOLIC BLOOD PRESSURE: 162 MMHG | TEMPERATURE: 98.3 F | OXYGEN SATURATION: 97 % | BODY MASS INDEX: 22.44 KG/M2 | DIASTOLIC BLOOD PRESSURE: 67 MMHG | WEIGHT: 143 LBS | HEART RATE: 68 BPM

## 2024-04-11 DIAGNOSIS — M16.11 PRIMARY OSTEOARTHRITIS OF ONE HIP, RIGHT: ICD-10-CM

## 2024-04-11 DIAGNOSIS — M16.11 PRIMARY OSTEOARTHRITIS OF ONE HIP, RIGHT: Primary | ICD-10-CM

## 2024-04-11 LAB — GLUCOSE SERPL-MCNC: 118 MG/DL (ref 65–140)

## 2024-04-11 PROCEDURE — C1776 JOINT DEVICE (IMPLANTABLE): HCPCS | Performed by: STUDENT IN AN ORGANIZED HEALTH CARE EDUCATION/TRAINING PROGRAM

## 2024-04-11 PROCEDURE — 82948 REAGENT STRIP/BLOOD GLUCOSE: CPT

## 2024-04-11 PROCEDURE — 97167 OT EVAL HIGH COMPLEX 60 MIN: CPT

## 2024-04-11 PROCEDURE — 72170 X-RAY EXAM OF PELVIS: CPT

## 2024-04-11 PROCEDURE — C1713 ANCHOR/SCREW BN/BN,TIS/BN: HCPCS | Performed by: STUDENT IN AN ORGANIZED HEALTH CARE EDUCATION/TRAINING PROGRAM

## 2024-04-11 PROCEDURE — 73501 X-RAY EXAM HIP UNI 1 VIEW: CPT

## 2024-04-11 PROCEDURE — 97163 PT EVAL HIGH COMPLEX 45 MIN: CPT

## 2024-04-11 PROCEDURE — 97530 THERAPEUTIC ACTIVITIES: CPT

## 2024-04-11 DEVICE — PINNACLE CANCELLOUS BONE SCREW 6.5MM X 25MM
Type: IMPLANTABLE DEVICE | Site: HIP | Status: FUNCTIONAL
Brand: PINNACLE

## 2024-04-11 DEVICE — EMPHASYS POLYETHYLENE LINER AOX NEUTRAL 50MM 36MM
Type: IMPLANTABLE DEVICE | Site: HIP | Status: FUNCTIONAL
Brand: EMPHASYS

## 2024-04-11 DEVICE — ACTIS TOTAL HIP SYSTEM ACTIS DUOFIX HIP PROSTHESIS FEMORAL STEM 12/14 TAPER CEMENTLESS HIGH COLLARLESS SIZE 7
Type: IMPLANTABLE DEVICE | Site: HIP | Status: FUNCTIONAL
Brand: ACTIS

## 2024-04-11 DEVICE — BIOLOX DELTA CERAMIC FEMORAL HEAD +5.0 36MM DIA 12/14 TAPER
Type: IMPLANTABLE DEVICE | Site: HIP | Status: FUNCTIONAL
Brand: BIOLOX DELTA

## 2024-04-11 DEVICE — PINNACLE CANCELLOUS BONE SCREW 6.5MM X 20MM
Type: IMPLANTABLE DEVICE | Site: HIP | Status: FUNCTIONAL
Brand: PINNACLE

## 2024-04-11 DEVICE — PINNACLE CANCELLOUS BONE SCREW 6.5MM X 40MM
Type: IMPLANTABLE DEVICE | Site: HIP | Status: FUNCTIONAL
Brand: PINNACLE

## 2024-04-11 DEVICE — EMPHASYS ACETABULAR SHELL THREE-HOLE 50MM CEMENTLESS
Type: IMPLANTABLE DEVICE | Site: HIP | Status: FUNCTIONAL
Brand: EMPHASYS

## 2024-04-11 RX ORDER — DOCUSATE SODIUM 100 MG/1
100 CAPSULE, LIQUID FILLED ORAL 2 TIMES DAILY
Status: DISCONTINUED | OUTPATIENT
Start: 2024-04-11 | End: 2024-04-11 | Stop reason: HOSPADM

## 2024-04-11 RX ORDER — CEFAZOLIN SODIUM 2 G/50ML
2000 SOLUTION INTRAVENOUS EVERY 8 HOURS
Qty: 100 ML | Refills: 0 | Status: DISCONTINUED | OUTPATIENT
Start: 2024-04-11 | End: 2024-04-11 | Stop reason: HOSPADM

## 2024-04-11 RX ORDER — CHLORHEXIDINE GLUCONATE ORAL RINSE 1.2 MG/ML
15 SOLUTION DENTAL ONCE
Status: COMPLETED | OUTPATIENT
Start: 2024-04-11 | End: 2024-04-11

## 2024-04-11 RX ORDER — MIDAZOLAM HYDROCHLORIDE 2 MG/2ML
INJECTION, SOLUTION INTRAMUSCULAR; INTRAVENOUS AS NEEDED
Status: DISCONTINUED | OUTPATIENT
Start: 2024-04-11 | End: 2024-04-11

## 2024-04-11 RX ORDER — LIDOCAINE HYDROCHLORIDE 10 MG/ML
0.5 INJECTION, SOLUTION EPIDURAL; INFILTRATION; INTRACAUDAL; PERINEURAL ONCE AS NEEDED
Status: DISCONTINUED | OUTPATIENT
Start: 2024-04-11 | End: 2024-04-11 | Stop reason: HOSPADM

## 2024-04-11 RX ORDER — HYDROMORPHONE HCL/PF 1 MG/ML
0.5 SYRINGE (ML) INJECTION
Status: DISCONTINUED | OUTPATIENT
Start: 2024-04-11 | End: 2024-04-11 | Stop reason: HOSPADM

## 2024-04-11 RX ORDER — SODIUM CHLORIDE, SODIUM LACTATE, POTASSIUM CHLORIDE, CALCIUM CHLORIDE 600; 310; 30; 20 MG/100ML; MG/100ML; MG/100ML; MG/100ML
125 INJECTION, SOLUTION INTRAVENOUS CONTINUOUS
Status: DISCONTINUED | OUTPATIENT
Start: 2024-04-11 | End: 2024-04-11 | Stop reason: HOSPADM

## 2024-04-11 RX ORDER — SODIUM CHLORIDE, SODIUM LACTATE, POTASSIUM CHLORIDE, CALCIUM CHLORIDE 600; 310; 30; 20 MG/100ML; MG/100ML; MG/100ML; MG/100ML
100 INJECTION, SOLUTION INTRAVENOUS CONTINUOUS
Status: DISCONTINUED | OUTPATIENT
Start: 2024-04-11 | End: 2024-04-11 | Stop reason: HOSPADM

## 2024-04-11 RX ORDER — ACETAMINOPHEN 325 MG/1
975 TABLET ORAL EVERY 8 HOURS
Status: DISCONTINUED | OUTPATIENT
Start: 2024-04-11 | End: 2024-04-11 | Stop reason: HOSPADM

## 2024-04-11 RX ORDER — METOCLOPRAMIDE HYDROCHLORIDE 5 MG/ML
10 INJECTION INTRAMUSCULAR; INTRAVENOUS ONCE AS NEEDED
Status: DISCONTINUED | OUTPATIENT
Start: 2024-04-11 | End: 2024-04-11 | Stop reason: HOSPADM

## 2024-04-11 RX ORDER — OXYCODONE HYDROCHLORIDE 10 MG/1
10 TABLET ORAL EVERY 4 HOURS PRN
Status: DISCONTINUED | OUTPATIENT
Start: 2024-04-11 | End: 2024-04-11 | Stop reason: HOSPADM

## 2024-04-11 RX ORDER — HYDROMORPHONE HCL IN WATER/PF 6 MG/30 ML
0.2 PATIENT CONTROLLED ANALGESIA SYRINGE INTRAVENOUS
Status: DISCONTINUED | OUTPATIENT
Start: 2024-04-11 | End: 2024-04-11 | Stop reason: HOSPADM

## 2024-04-11 RX ORDER — PROPOFOL 10 MG/ML
INJECTION, EMULSION INTRAVENOUS AS NEEDED
Status: DISCONTINUED | OUTPATIENT
Start: 2024-04-11 | End: 2024-04-11

## 2024-04-11 RX ORDER — AMOXICILLIN 250 MG
1 CAPSULE ORAL DAILY
Qty: 30 TABLET | Refills: 0 | Status: SHIPPED | OUTPATIENT
Start: 2024-04-11

## 2024-04-11 RX ORDER — CEFAZOLIN SODIUM 2 G/50ML
2000 SOLUTION INTRAVENOUS ONCE
Status: COMPLETED | OUTPATIENT
Start: 2024-04-11 | End: 2024-04-11

## 2024-04-11 RX ORDER — DIPHENHYDRAMINE HYDROCHLORIDE 50 MG/ML
12.5 INJECTION INTRAMUSCULAR; INTRAVENOUS ONCE AS NEEDED
Status: DISCONTINUED | OUTPATIENT
Start: 2024-04-11 | End: 2024-04-11 | Stop reason: HOSPADM

## 2024-04-11 RX ORDER — ONDANSETRON 2 MG/ML
INJECTION INTRAMUSCULAR; INTRAVENOUS AS NEEDED
Status: DISCONTINUED | OUTPATIENT
Start: 2024-04-11 | End: 2024-04-11

## 2024-04-11 RX ORDER — MAGNESIUM HYDROXIDE/ALUMINUM HYDROXICE/SIMETHICONE 120; 1200; 1200 MG/30ML; MG/30ML; MG/30ML
30 SUSPENSION ORAL EVERY 6 HOURS PRN
Status: DISCONTINUED | OUTPATIENT
Start: 2024-04-11 | End: 2024-04-11 | Stop reason: HOSPADM

## 2024-04-11 RX ORDER — VANCOMYCIN HYDROCHLORIDE 1 G/20ML
INJECTION, POWDER, LYOPHILIZED, FOR SOLUTION INTRAVENOUS AS NEEDED
Status: DISCONTINUED | OUTPATIENT
Start: 2024-04-11 | End: 2024-04-11 | Stop reason: HOSPADM

## 2024-04-11 RX ORDER — TRANEXAMIC ACID 10 MG/ML
1000 INJECTION, SOLUTION INTRAVENOUS ONCE
Status: COMPLETED | OUTPATIENT
Start: 2024-04-11 | End: 2024-04-11

## 2024-04-11 RX ORDER — CHLORHEXIDINE GLUCONATE 4 G/100ML
SOLUTION TOPICAL DAILY PRN
Status: DISCONTINUED | OUTPATIENT
Start: 2024-04-11 | End: 2024-04-11 | Stop reason: HOSPADM

## 2024-04-11 RX ORDER — CALCIUM CARBONATE 500 MG/1
1000 TABLET, CHEWABLE ORAL DAILY PRN
Status: DISCONTINUED | OUTPATIENT
Start: 2024-04-11 | End: 2024-04-11 | Stop reason: HOSPADM

## 2024-04-11 RX ORDER — MAGNESIUM HYDROXIDE 1200 MG/15ML
LIQUID ORAL AS NEEDED
Status: DISCONTINUED | OUTPATIENT
Start: 2024-04-11 | End: 2024-04-11 | Stop reason: HOSPADM

## 2024-04-11 RX ORDER — GABAPENTIN 300 MG/1
300 CAPSULE ORAL
Status: DISCONTINUED | OUTPATIENT
Start: 2024-04-11 | End: 2024-04-11 | Stop reason: HOSPADM

## 2024-04-11 RX ORDER — CEFADROXIL 500 MG/1
500 CAPSULE ORAL EVERY 12 HOURS SCHEDULED
Qty: 10 CAPSULE | Refills: 0 | Status: SHIPPED | OUTPATIENT
Start: 2024-04-11 | End: 2024-04-16

## 2024-04-11 RX ORDER — FENTANYL CITRATE/PF 50 MCG/ML
50 SYRINGE (ML) INJECTION
Status: DISCONTINUED | OUTPATIENT
Start: 2024-04-11 | End: 2024-04-11 | Stop reason: HOSPADM

## 2024-04-11 RX ORDER — ONDANSETRON 2 MG/ML
4 INJECTION INTRAMUSCULAR; INTRAVENOUS EVERY 6 HOURS PRN
Status: DISCONTINUED | OUTPATIENT
Start: 2024-04-11 | End: 2024-04-11 | Stop reason: HOSPADM

## 2024-04-11 RX ORDER — ACETAMINOPHEN 500 MG
1000 TABLET ORAL EVERY 8 HOURS
Qty: 60 TABLET | Refills: 0 | Status: SHIPPED | OUTPATIENT
Start: 2024-04-11

## 2024-04-11 RX ORDER — OXYCODONE HYDROCHLORIDE 5 MG/1
5 TABLET ORAL EVERY 4 HOURS PRN
Status: DISCONTINUED | OUTPATIENT
Start: 2024-04-11 | End: 2024-04-11 | Stop reason: HOSPADM

## 2024-04-11 RX ORDER — SENNOSIDES 8.6 MG
1 TABLET ORAL DAILY
Status: DISCONTINUED | OUTPATIENT
Start: 2024-04-12 | End: 2024-04-11 | Stop reason: HOSPADM

## 2024-04-11 RX ORDER — ONDANSETRON 4 MG/1
4 TABLET, ORALLY DISINTEGRATING ORAL EVERY 6 HOURS PRN
Qty: 20 TABLET | Refills: 0 | Status: SHIPPED | OUTPATIENT
Start: 2024-04-11

## 2024-04-11 RX ORDER — OXYCODONE HYDROCHLORIDE 5 MG/1
5 TABLET ORAL EVERY 4 HOURS PRN
Qty: 42 TABLET | Refills: 0 | Status: SHIPPED | OUTPATIENT
Start: 2024-04-11 | End: 2024-04-21

## 2024-04-11 RX ORDER — ACETAMINOPHEN 325 MG/1
975 TABLET ORAL ONCE
Status: COMPLETED | OUTPATIENT
Start: 2024-04-11 | End: 2024-04-11

## 2024-04-11 RX ORDER — ONDANSETRON 2 MG/ML
4 INJECTION INTRAMUSCULAR; INTRAVENOUS ONCE AS NEEDED
Status: COMPLETED | OUTPATIENT
Start: 2024-04-11 | End: 2024-04-11

## 2024-04-11 RX ADMIN — FENTANYL CITRATE 50 MCG: 50 INJECTION INTRAMUSCULAR; INTRAVENOUS at 14:56

## 2024-04-11 RX ADMIN — PROPOFOL 30 MG: 10 INJECTION, EMULSION INTRAVENOUS at 13:55

## 2024-04-11 RX ADMIN — PHENYLEPHRINE HYDROCHLORIDE 20 MCG/MIN: 10 INJECTION INTRAVENOUS at 13:10

## 2024-04-11 RX ADMIN — HYDROMORPHONE HYDROCHLORIDE 0.5 MG: 1 INJECTION, SOLUTION INTRAMUSCULAR; INTRAVENOUS; SUBCUTANEOUS at 15:25

## 2024-04-11 RX ADMIN — ACETAMINOPHEN 325MG 975 MG: 325 TABLET ORAL at 11:11

## 2024-04-11 RX ADMIN — CHLORHEXIDINE GLUCONATE 0.12% ORAL RINSE 15 ML: 1.2 LIQUID ORAL at 11:11

## 2024-04-11 RX ADMIN — HYDROMORPHONE HYDROCHLORIDE 0.2 MG: 0.2 INJECTION, SOLUTION INTRAMUSCULAR; INTRAVENOUS; SUBCUTANEOUS at 15:06

## 2024-04-11 RX ADMIN — CEFAZOLIN SODIUM 2000 MG: 2 SOLUTION INTRAVENOUS at 12:44

## 2024-04-11 RX ADMIN — ONDANSETRON 4 MG: 2 INJECTION INTRAMUSCULAR; INTRAVENOUS at 14:46

## 2024-04-11 RX ADMIN — PROPOFOL 100 MCG/KG/MIN: 10 INJECTION, EMULSION INTRAVENOUS at 12:53

## 2024-04-11 RX ADMIN — SODIUM CHLORIDE, SODIUM LACTATE, POTASSIUM CHLORIDE, AND CALCIUM CHLORIDE 125 ML/HR: .6; .31; .03; .02 INJECTION, SOLUTION INTRAVENOUS at 11:12

## 2024-04-11 RX ADMIN — MIDAZOLAM 2 MG: 1 INJECTION INTRAMUSCULAR; INTRAVENOUS at 12:37

## 2024-04-11 RX ADMIN — MEPIVACAINE HYDROCHLORIDE 3.5 ML: 15 INJECTION, SOLUTION EPIDURAL; INFILTRATION at 12:47

## 2024-04-11 RX ADMIN — HYDROMORPHONE HYDROCHLORIDE 0.5 MG: 1 INJECTION, SOLUTION INTRAMUSCULAR; INTRAVENOUS; SUBCUTANEOUS at 15:18

## 2024-04-11 RX ADMIN — TRANEXAMIC ACID 1000 MG: 10 INJECTION, SOLUTION INTRAVENOUS at 12:57

## 2024-04-11 RX ADMIN — FENTANYL CITRATE 50 MCG: 50 INJECTION INTRAMUSCULAR; INTRAVENOUS at 14:49

## 2024-04-11 RX ADMIN — PROPOFOL 50 MG: 10 INJECTION, EMULSION INTRAVENOUS at 12:52

## 2024-04-11 RX ADMIN — ONDANSETRON 4 MG: 2 INJECTION INTRAMUSCULAR; INTRAVENOUS at 14:00

## 2024-04-11 RX ADMIN — HYDROMORPHONE HYDROCHLORIDE 0.2 MG: 0.2 INJECTION, SOLUTION INTRAMUSCULAR; INTRAVENOUS; SUBCUTANEOUS at 15:11

## 2024-04-11 NOTE — DISCHARGE INSTR - AVS FIRST PAGE
Dr. Rodriguez Hip Replacement    What to Expect/Activity  You are weight bearing as tolerated to your operative leg unless otherwise instructed. Use your walker or crutches. It is important to get up and walk for 10 minutes every hour, if possible.  Initial recovery therapy is focused on walking. If in your follow-up a referral to formal physical therapy is required, this will be provided based on your recovery.  You may sleep however you would like. Many patients feel more comfortable with a pillow between their legs and find it uncomfortably to lay on the operative side. If you do roll on that side at night you will not damage the replacement.  You may use a regular or elevated toilet seat, whichever is more comfortable.  We do not routinely require any specific precautions in terms of positioning of your leg and if so this will be taught to you by the physical therapists at the hospital. This means that you can dress as you are comfortable, including shoes and socks. You can bend down carefully to get items from the floor.   Swelling and discomfort causes pain. Elevate your surgical leg on 1-2 pillows placed behind your ankle/calf throughout the day, especially when you are laying down.  Please use ice packs for 20-30 minutes every 1-2 hours for 48-72 hours on the operative hip. Please make sure there is a barrier directly between your skin and your ice/ice pack.  Please use incentive spirometer 10 times per hour while awake (see diagram below).    Dressing/Wound Care/Bathing  There is a surgical dressing over your incision that stays in place until follow-up unless it starts to peel off.   You may start showering 24 hours after surgery, the surgical dressing will remain in place. Please pat the dressing dry. If you notice the dressing appears saturated or is starting to come off, please replace with a dry dressing.  Keep the dressing on until your follow-up in the office.   There may be dried blood around the  incision. It is ok to continue showering after removing the dressing but do not scrub the incision. Pat incision dry.  Do not place any creams, ointments or gels on or around the incision.  No baths, swimming or submerging until cleared by Dr. Rodriguez.    Pain Management/Medications  You may resume your usual medications.  Please take the following medications:  Anti-coagulation (blood clot prevention) - aspirin 81mg twice daily for 4 weeks  Pain medication:  Narcotic Medication: Take as directed  NSAID (Anti-inflammatory): Take as directed  Tylenol 1000mg every 8 hours  Zofran (ondasetron) - 4mg every 8 hours as needed for nausea  Stool Softeners (senna/colace)- take daily to help prevent constipation as narcotic pain medication causes constipation  Antibiotic - take as directed if prescribed  If you have questions or pain concerns, please contact the office. Pain medication cannot remove all post-operative pain.    Follow up/Call if:  The findings of your surgery will be explained to you and your family immediately after surgery. However, in the post-operative period, during recovery from anesthesia you may not fully remember or fully understand what was said. We will go over this again when you return for your post-op appointment.  Please contact Dr. Rodriguez's office if you experience the following:  Excessive bleeding (bleeding through your dressing)  Fever greater than 101 degrees F after the first 2 days (a slight fever is normal the first few days after surgery)  Persistent nausea or vomiting  Decreased sensation or discoloration of the operative limb  Pain or swelling that is getting worse and not better with medication    Dr. Rodriguez's Office Contact: [phone]

## 2024-04-11 NOTE — INTERVAL H&P NOTE
H&P reviewed. After examining the patient I find no changes in the patients condition since the H&P had been written. Plan for right WOLFGANG.

## 2024-04-11 NOTE — OCCUPATIONAL THERAPY NOTE
Occupational Therapy Evaluation     Patient Name: Gregory Dumont  Today's Date: 4/11/2024  Problem List  Principal Problem:    Primary osteoarthritis of one hip, right    Past Medical History  Past Medical History:   Diagnosis Date    Arthritis     osteoarthritis    Cervical radiculopathy     COPD (chronic obstructive pulmonary disease) ([facility])     CPAP (continuous positive airway pressure) dependence     Diabetes mellitus (HCC)     pre-diabetic    Fibromyalgia     Fibromyalgia, primary     GERD (gastroesophageal reflux disease)     History of transfusion     Hyperlipidemia     Osteopenia     Pneumonia     Shortness of breath     with exertion    Sleep apnea     USES CPAP HS     Past Surgical History  Past Surgical History:   Procedure Laterality Date    CHOLECYSTECTOMY      COLONOSCOPY      CYST REMOVAL      FEMUR SURGERY Left     HIP CLOSE REDUCTION Left 1/20/2022    Procedure: CLOSED REDUCTION HIP;  Surgeon: Larry Castillo MD;  Location: [facility];  Service: Orthopedics    JOINT REPLACEMENT      T KR LEFT    KNEE SURGERY Left 1996    MANDIBLE SURGERY      TN CONV PREV HIP TOT HIP ARTHRP W/WO AGRFT/ALGRFT Left 12/22/2021    Procedure: ARTHROPLASTY HIP TOTAL- conversion;  Surgeon: Yifan Caraballo MD;  Location: [facility];  Service: Orthopedics    TN REMOVAL IMPLANT DEEP Left 9/8/2021    Procedure: REMOVAL HARDWARE HIP;  Surgeon: Yifan Caraballo MD;  Location: [facility];  Service: Orthopedics    ROTATOR CUFF REPAIR Left     WRIST FRACTURE SURGERY Left     plate in place           04/11/24 1615   OT Last Visit   OT Visit Date 04/11/24   Note Type   Note type Evaluation   Pain Assessment   Pain Assessment Tool 0-10   Pain Score 6   Pain Location/Orientation Orientation: Right;Location: Hip   Hospital Pain Intervention(s) Repositioned;Ambulation/increased activity   Restrictions/Precautions   Weight Bearing Precautions Per Order Yes   RLE Weight Bearing Per Order WBAT   Other Precautions (S)   "Impulsive;Fall Risk;Pain;THR  (WOLFGANG R anterior)   Home Living   Type of Home Apartment   Home Layout One level;Stairs to enter with rails   Bathroom Shower/Tub Tub/shower unit   Bathroom Toilet Raised   Bathroom Equipment Toilet raiser   Home Equipment Walker;Cane;Other (Comment)  (RW and frame walker)   Additional Comments Pt lives with wife Diane in 1 level apt with 4 MARIELENA and unilateral rail. Pt needs to assist wife with ADLs. Wife (-) .   Prior Function   Level of [place] Independent with ADLs;Independent with functional mobility;Independent with IADLS   Lives With Spouse   Receives Help From Family   IADLs Independent with driving;Independent with meal prep;Independent with medication management  (Pt is the  for his family)   Falls in the last 6 months 1 to 4  (x2 due to LLE weakness)   Vocational Retired   Comments PTA, pt reports functioning at mod I with SPC inside home vs. RW in community, independent ADLs, IADLs, has to assist wife, (+) . Reports chronic issues with L knee.   Lifestyle   Autonomy I with ADLs/IADLs/ +/Pt assists spouse at baseline   Reciprocal Relationships Limited support from family   Service to Others Retired   Subjective   Subjective \"I have grab bars everywhere.\"   ADL   Where Assessed Edge of bed   Eating Assistance 7  Independent   Grooming Assistance 5  Supervision/Setup   UB Bathing Assistance 5  Supervision/Setup   LB Bathing Assistance 4  Minimal Assistance   UB Dressing Assistance 5  Supervision/Setup   LB Dressing Assistance 4  Minimal Assistance   LB Dressing Deficit Setup;Thread RLE into underwear;Thread LLE into underwear  (educated Pt 2x on compensatory techniques and Pt reports understanding)   Toileting Assistance  5  Supervision/Setup   Toileting Deficit Setup;Other (Comment);Impulsive  (standing with RW)   Functional Assistance 5  Supervision/Setup   Functional Deficit Setup;Increased time to complete;Supervision/safety;Impulsive   Additional " "Comments Pt provided with WOLFGANG anterior handout and review compensatory techniques with ADLs. 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 I can take care of her.\"   Bed Mobility   Supine to Sit 5  Supervision   Additional items HOB elevated;Increased time required   Sit to Supine 5  Supervision   Additional items Increased time required;HOB elevated   Additional Comments Pt greeted supine in bed.   Transfers   Sit to Stand 5  Supervision   Additional items Impulsive;Increased time required;Verbal cues   Stand to Sit 5  Supervision   Additional items Increased time required;Impulsive;Verbal cues   Additional Comments with RW   Functional Mobility   Functional Mobility 5  Supervision   Additional Comments S with functional mobility with RW0   Additional items Rolling walker   Balance   Static Sitting Good   Dynamic Sitting Fair +   Static Standing Fair   Dynamic Standing Fair -   Ambulatory Fair -   Activity Tolerance   Activity Tolerance Patient tolerated treatment well   Medical Staff Made Aware Co-eval with SHEN Vaughn 2* to Pt's medical complexity, post-surgical, and decreased endurance.   Nurse Made Aware RN cleared/updated.   RUE Assessment   RUE Assessment WFL   LUE Assessment   LUE Assessment WFL   Hand Function   Gross Motor Coordination Functional   Fine Motor Coordination Functional   Sensation   Light Touch No apparent deficits   Vision-Basic Assessment   Current Vision Wears glasses all the time   Psychosocial   Psychosocial (WDL) X   Patient Behaviors/Mood Anxious   Cognition   Overall Cognitive Status WFL   Arousal/Participation Uncooperative;Responsive;Alert   Attention Difficulty attending to directions   Orientation Level Oriented X4   Memory Decreased recall of precautions;Decreased recall of recent events   Following Commands Follows one step " commands with increased time or repetition   Comments Pt pleasant, however, impulsive throughout session with poor carryover of cues. Pt continually wandering during education and requires redirection to task. Pt unsafe and requires cues for safety and technique. Pt continues with limited insight into safety throught session. Pt educated on importance of having assist upon DC home, however, Pt reports limited social support.   Assessment   Limitation Decreased ADL status;Decreased UE ROM;Decreased Safe judgement during ADL;Decreased cognition;Decreased UE strength;Decreased endurance;Decreased self-care trans;Decreased high-level ADLs   Prognosis Fair   Assessment Pt is a 69 y.o. male who presented to [facility] on 4/11/2024 with R hip OA. Pt s/p R WOLFGANG anterior on 4/11. Pt WBAT on RLE. Pt  has a past medical history of Arthritis, Cervical radiculopathy, COPD (chronic obstructive pulmonary disease) (HCC), CPAP (continuous positive airway pressure) dependence, Diabetes mellitus (HCC), Fibromyalgia, Fibromyalgia, primary, GERD (gastroesophageal reflux disease), History of transfusion, Hyperlipidemia, Osteopenia, Pneumonia, Shortness of breath, and Sleep apnea. Pt greeted bedside for OT evaluation on 04/11/24. Pt lives with wife Diane in 1 level apt with 4 MARIELENA and unilateral rail. Pt needs to assist wife with ADLs. Wife (-) . PTA, pt reports functioning at mod I with SPC inside home vs. RW in community, independent ADLs, IADLs, has to assist wife, (+) . Reports chronic issues with L knee. Pt demonstrating the following occupational performance levels: S with UB ADL, min A with LB ADLs, S with bed mobility, S with functional transfers, and S with functional mobility with RW. Pt currently limited by impulsivity and poor carryover of safety cues and technique. Limitations that impact functional performance include decreased ADL status, decreased UE ROM, decreased UE strength, decreased safe judgement during ADLs,  decreased cognition, decreased endurance, decreased self care transfers, decreased high level ADLs, and pain. Occupational performance areas to address ADL retraining, functional transfer training, UE strengthening/ROM, endurance training, cognitive reorientation, Pt/caregiver education, equipment evaluation/education, compensatory technique education, energy conservation, and activity engagement . Pt would benefit from continued skilled OT services while in hospital to maximize independence with ADLs. Will continue to follow Pt's progress. Pt would benefit from level III resources (minimal intensity) (home health) upon DC to maximize safety and independence with ADLs and functional tasks of choice.   Goals   Patient Goals To go home.   LTG Time Frame 3-7   Long Term Goal #1 See goals listed below.   Plan   Treatment Interventions ADL retraining;Functional transfer training;UE strengthening/ROM;Endurance training;Cognitive reorientation;Patient/family training;Equipment evaluation/education;Compensatory technique education;Energy conservation;Activityengagement   Goal Expiration Date 04/18/24   OT Frequency 3-5x/wk   Discharge Recommendation   Rehab Resource Intensity Level, OT (S)  III (Minimum Resource Intensity)  (home health- TT with MD DARIO aware & RN aware)   Equipment Recommended Reacher ($);Sock aid ($)   Additional Comments  The patient's raw score on the AM-PAC Daily Activity Inpatient Short Form is 20. A raw score of greater than or equal to 19 suggests the patient may benefit from discharge to home. Please refer to the recommendation of the Occupational Therapist for safe discharge planning.   AM-PAC Daily Activity Inpatient   Lower Body Dressing 3   Bathing 3   Toileting 3   Upper Body Dressing 3   Grooming 4   Eating 4   Daily Activity Raw Score 20   Daily Activity Standardized Score (Calc for Raw Score >=11) 42.03   AM-PAC Applied Cognition Inpatient   Following a Speech/Presentation 3   Understanding  Ordinary Conversation 4   Taking Medications 3   Remembering Where Things Are Placed or Put Away 3   Remembering List of 4-5 Errands 3   Taking Care of Complicated Tasks 3   Applied Cognition Raw Score 19   Applied Cognition Standardized Score 39.77   End of Consult   Education Provided Yes   Patient Position at End of Consult All needs within reach;Supine   Nurse Communication Nurse aware of consult  (Left in care of RN)         GOALS:  Pt will complete UB ADLs with I in order to maximize participation with ADLs.   Pt will complete LB ADLs with I in order to maximize safety with ADLs.   Pt will complete toileting routine (transfer, hygiene, and clothing management) with I in order to return to prior level of function.  Pt will complete bed mobility with I in order to maximize participation with ADLs.   Pt will complete functional transfers at I level in order to increase participation with ADLs.  Pt will increase dynamic standing balance to F+ in order to increase safety with ADLs.  Pt will increase standing tolerance x10 min in order to increase participation with ADLs.   Pt will complete functional mobility with AD PRN for item retrieval task at [place]jandra level in order to increase participation with ADLs.  Pt will complete IADL tasks/simulation of IADLs tasks with I in order to return to PLOF.  Pt will demonstrate G energy conservation techniques with ADLs/IADLs in order to reduce the risk of falls.  Pt will be attentive 100% of the time for ongoing functional/formal cognitive assessment to assist with safe dc planning prn.    Sinai Chaudhary MS, OTR/L

## 2024-04-11 NOTE — PHYSICAL THERAPY NOTE
PHYSICAL THERAPY EVALUATION and Treatment    NAME:  Gregory Dumont  DATE: 04/11/24    AGE:   69 y.o.  Mrn:   [id number]  ADMIT DX:  Primary osteoarthritis of one hip, right [M16.11]    Patient Active Problem List   Diagnosis    Hyperlipidemia    GERD (gastroesophageal reflux disease)    Chronic obstructive pulmonary disease with acute exacerbation ([facility])    Chest pain    Closed fracture of one rib of left side    Nicotine dependence    CKD (chronic kidney disease) stage 3, GFR 30-59 ml/min ([facility])    Cervical disc disorder with radiculopathy of mid-cervical region    Cervical spondylosis    Cervical facet joint syndrome    Chronic pain syndrome    Prediabetes    Femoral neck fracture ([facility])    Left hip pain    Status post left hip replacement    Orthopedic aftercare    Type 2 diabetes mellitus with stage 3 chronic kidney disease, without long-term current use of insulin ([facility])    Sleep apnea    Acute respiratory failure with hypoxia ([facility])    SIRS (systemic inflammatory response syndrome) ([facility])    Insomnia    Hypercalcemia    Primary osteoarthritis of one hip, right       Past Medical History:   Diagnosis Date    Arthritis     osteoarthritis    Cervical radiculopathy     COPD (chronic obstructive pulmonary disease) ([facility])     CPAP (continuous positive airway pressure) dependence     Diabetes mellitus ([facility])     pre-diabetic    Fibromyalgia     Fibromyalgia, primary     GERD (gastroesophageal reflux disease)     History of transfusion     Hyperlipidemia     Osteopenia     Pneumonia     Shortness of breath     with exertion    Sleep apnea     USES CPAP HS       Past Surgical History:   Procedure Laterality Date    CHOLECYSTECTOMY      COLONOSCOPY      CYST REMOVAL      FEMUR SURGERY Left     HIP CLOSE REDUCTION Left 1/20/2022    Procedure: CLOSED REDUCTION HIP;  Surgeon: Larry Castillo MD;  Location: [facility];  Service: Orthopedics    JOINT REPLACEMENT      T KR LEFT    KNEE SURGERY Left 1996    MANDIBLE SURGERY       NH CONV PREV HIP TOT HIP ARTHRP W/WO AGRFT/ALGRFT Left 12/22/2021    Procedure: ARTHROPLASTY HIP TOTAL- conversion;  Surgeon: Yifan Caraballo MD;  [facility]: [facility];  Service: Orthopedics    NH REMOVAL IMPLANT DEEP Left 9/8/2021    Procedure: REMOVAL HARDWARE HIP;  Surgeon: Yifan Caraballo MD;  Location: [facility];  Service: Orthopedics    ROTATOR CUFF REPAIR Left     WRIST FRACTURE SURGERY Left     plate in place       Imaging Studies:  XR pelvis ap only 1 or 2 vw    (Results Pending)       Past Medical History:   Diagnosis Date    Arthritis     osteoarthritis    Cervical radiculopathy     COPD (chronic obstructive pulmonary disease) ([facility])     CPAP (continuous positive airway pressure) dependence     Diabetes mellitus ([facility])     pre-diabetic    Fibromyalgia     Fibromyalgia, primary     GERD (gastroesophageal reflux disease)     History of transfusion     Hyperlipidemia     Osteopenia     Pneumonia     Shortness of breath     with exertion    Sleep apnea     USES CPAP HS     Length Of Stay: 0  Performed at least 2 patient identifiers during session: Name and Birthday  PHYSICAL THERAPY EVALUATION :        04/11/24 1622   PT Last Visit   PT Visit Date 04/11/24   Note Type   Note type Evaluation  (and treatment)   Additional Comments Pt greeted seated at EOB.   Pain Assessment   Pain Assessment Tool 0-10   Pain Score 6   Pain Location/Orientation Orientation: Right;Location: Hip   Hospital Pain Intervention(s) Repositioned;Ambulation/increased activity   Restrictions/Precautions   Weight Bearing Precautions Per Order Yes   RLE Weight Bearing Per Order WBAT   Other Precautions Impulsive;WBS;Fall Risk;Pain   Home Living   Type of Home Apartment   Home Layout One level;Stairs to enter with rails  (4STE with R rail)   Bathroom Shower/Tub Tub/shower unit   Bathroom Toilet Raised   Bathroom Equipment Toilet raiser   Home Equipment Walker;Cane;Other (Comment)  (RW and frame walker)   Additional Comments Pt lives  "with wife Diane in 1 level apt with 4 MARIELENA and unilateral rail. Pt needs to assist wife with ADLs. Wife (-) .   Prior Function   Level of [place] Independent with ADLs;Independent with IADLS;Independent with functional mobility   Lives With Spouse   Receives Help From Family  (dtr, friends)   IADLs Independent with driving;Independent with meal prep;Independent with medication management   Falls in the last 6 months 1 to 4  (2 falls)   Comments PTA, pt reports functioning at mod I with SPC inside home vs. RW in community, independent ADLs, IADLs, has to assist wife, (+) . Reports chronic issues with L knee.   General   Additional Pertinent History POD0 s/p R anterior WOLFGANG. WBS: WBAT RLE. Significant pmhx per chart: HLD, DM2, CKD3, c/s spondylosis, OA R hip, chronic pain syndrome, Acute respiratory failure with hypoxia, COPD, BETHANY, h/o L hip replacement, c/s disc disorder with radiculopathy   Family/Caregiver Present No   Cognition   Overall Cognitive Status WFL   Arousal/Participation Alert   Orientation Level Oriented X4   Memory Decreased recall of precautions   Following Commands Follows one step commands without difficulty   Comments Impulsive, impaired judgement, highly distractable requiring cues to redirect. Max cues for safety.   Subjective   Subjective \"I'm really good at improvising\" Pt reporting inability to access outpatient PT services 2/2 lack of social support.   RUE Assessment   RUE Assessment   (refer to OT)   LUE Assessment   LUE Assessment   (refer to OT)   RLE Assessment   RLE Assessment X  (knee ext atleast 3/5, ankle 4/5)   LLE Assessment   LLE Assessment WFL  (4/5 grossly)   Vision-Basic Assessment   Current Vision Wears glasses all the time   Coordination   Sensation WFL   Bed Mobility   Supine to Sit 5  Supervision   Additional items HOB elevated;Increased time required   Sit to Supine 5  Supervision   Additional items Increased time required;HOB elevated   Additional Comments " Greeted supine in bed.   Transfers   Sit to Stand 5  Supervision   Additional items Impulsive;Increased time required;Verbal cues   Stand to Sit 5  Supervision   Additional items Increased time required;Impulsive;Verbal cues   Car transfer 5  Supervision   Additional items Assist x 1;Increased time required;Impulsive;Verbal cues   Additional Comments RW for stability. Impulsive with attempts to stand without RW.Cued for safe technique/hand placement/RW management.   Ambulation/Elevation   Gait pattern Improper Weight shift;Antalgic;Narrow THAI;Step through pattern  ((+) toe walking b/l)   Gait Assistance 5  Supervision   Additional items Assist x 1;Verbal cues;Tactile cues   Assistive Device Rolling walker   Distance 140'x1 + 10'x1; tx session 140'x1 + 10'x1   Stair Management Assistance 5  Supervision  (supervision but CG for safety 2/2 impulsivity)   Additional items Assist x 1;Verbal cues;Tactile cues;Increased time required   Stair Management Technique Two rails;Step to pattern;Foreward;One rail R  (4 with b/l rails, 4 with unilateral rails. Declining trial with SPC.)   Number of Stairs 8   Ambulation/Elevation Additional Comments semi-steady, step through patterning without gross LOB. Cued for safe technique/proper sequencing/gait mechanics/RW management, upright posture and step length. Increased hesitancy with stair descent with unilateral rail (+) Fatigue   Balance   Static Sitting Good   Dynamic Sitting Fair +   Static Standing Fair   Dynamic Standing Fair -   Ambulatory Fair -  (RW)   Endurance Deficit   Endurance Deficit Yes   Endurance Deficit Description fatigue   Activity Tolerance   Activity Tolerance Patient tolerated treatment well  (BP: /95, stand 152/97, post 113/96. Amb SpO2 95% HR 87. Denied sxs througout.)   Medical Staff Made Aware MD Michael Garcia CM   Nurse Made Aware RN cleared/updated   Assessment   Prognosis Good   Problem List Decreased strength;Decreased endurance;Impaired  balance;Decreased mobility;Decreased safety awareness;Impaired judgement;Decreased skin integrity;Pain   Assessment Pt is a 69 y.o. male y/o presenting to [facility]  on 4/11/2024 with Primary dx: Primary osteoarthritis of one hip, right.  POD0 s/p R anterior WOLFGANG. WBS: WBAT RLE. Significant pmhx per chart: HLD, DM2, CKD3, c/s spondylosis, OA R hip, chronic pain syndrome, Acute respiratory failure with hypoxia, COPD, BETHANY, h/o L hip replacement, c/s disc disorder with radiculopathy. PT consulted to assess strength/functional mobility, activity tolerance and d/c needs. Active PT orders and activity orders for Activity as tolerated. PTA, pt reports functioning at mod I with SPC inside home vs. RW in community, independent ADLs, IADLs, has to assist wife, (+) .      Pt greeted supine in bed. Pt amenable to PT session. During PT IE, pt presenting with above (see flowsheet) outlined functional impairments including dec strength, balance, gait, and deficits that limit functional mobility and activity tolerance relative to baseline. Pt currently requires supervision assistance x1 for bed mobility, supervision assistance x1 for transfers, supervision assistance x1 for ambulation with Rolling Walker, and supervision assistance x1 for elevations. Pt currently demonstrating inc fall risk 2/2 hx of falls, impulsivity, impaired balance, use of ambulatory aid, WBS, impaired judgement, decreased safety awareness, multiple co-morbidities, and acuity of medical illness.  Fall risk education provided with good understanding. Asymptomatic drop in BP noted pre vs. Post session. Denied additional sxs throughout session. Recommend continued mobilization of pt with nsg staff as tolerated to prevent further decline in function. Pt will benefit from continued PT services to progress mobility independence necessary for return to PLOF. Based on pt presentation and impairments, pt would most appropriately  benefit from d/c to home with level III (min) rehab intensity resources , . Pt demonstrated ability to complete functional mobility required to for safe d/c to home at supervision level. Pt expresses no c/f d/c to home at this time, except expresses desire to have therapy services but reports barriers to access.   Additional tx session below.   Barriers to Discharge None   Goals   Patient Goals To go home   STG Expiration Date 04/18/24   Short Term Goal #1 1).  Pt will perform bed mobility with Alejandra demonstrating appropriate technique 100% of the time in order to improve function.2)  Perform all transfers with Alejandra demonstrating safe and appropriate technique 100% of the time in order to improve ability to negotiate safely in home environment.3) Amb with least restrictive AD > 200'x1 with mod I in order to demonstrate ability to negotiate in home environment.4)  Improve overall strength and balance 1/2 grade in order to optimize ability to perform functional tasks and reduce fall risk.5) Increase activity tolerance to 45 minutes in order to improve endurance to functional tasks. 6)  Negotiate stairs using most appropriate technique and S in order to be able to negotiate safely in home environment. 7) PT for ongoing patient and family/caregiver education, DME needs and d/c planning in order to promote highest level of function in least restrictive environment   PT Treatment Day 0   Plan   Treatment/Interventions Functional transfer training;LE strengthening/ROM;Elevations;Therapeutic exercise;Equipment eval/education;Bed mobility;Gait training;Compensatory technique education;Spoke to MD;Spoke to nursing;OT;Spoke to case management   PT Frequency Twice a day   Discharge Recommendation   Rehab Resource Intensity Level, PT III (Minimum Resource Intensity)  (May benefit from mobile outpatient 2/2 barriers to access care)   Equipment Recommended Walker  (pt owns)   Additional Comments The patient's AM-PAC Basic Mobility  Inpatient Short Form Raw Score is 18. A Raw score of greater than 16 suggests the patient may benefit from discharge to home. Please also refer to the recommendation of the Physical Therapist for safe discharge planning.   AM-PAC Basic Mobility Inpatient   Turning in Flat Bed Without Bedrails 3   Lying on Back to Sitting on Edge of Flat Bed Without Bedrails 3   Moving Bed to Chair 3   Standing Up From Chair Using Arms 3   Walk in Room 3   Climb 3-5 Stairs With Railing 3   Basic Mobility Inpatient Raw Score 18   Basic Mobility Standardized Score 41.05   [facility] Highest Level Of Mobility   -HLM Goal 6: Walk 10 steps or more   -HLM Achieved 7: Walk 25 feet or more   Additional Treatment Session   Start Time 1604   End Time 1622   Treatment Assessment Following IE, additional tx session performed with focus on gait training, functional transfers, stair negotiation, HEP instruction, and pt education per PT POC.  Pt with good tolerance to session with limitations of fatigue. Pt demonstrating progress towards functional goals with ability to complete functional mobility simulating home environment. Currently requires supervision with skilled cues for technique/safety and use of RW. Impulsivity continues to limit pt safety and increasing risk for falls. Pt verbalizes understading. Denies reports of  dizziness or SOB t/o session. Please refer to flowsheet for objective data above. Pt continues to demonstrate deficits relative to PLOF and would benefit from continued skilled PT services per POC to improve indep and safety with mobility. PT d/c recommendations: Anticipate d/c to home with level III (min) rehab intensity resources  at d/c when medically appropriate. [facility] 18.   Equipment Use RW, car simulator, stairs, HEP/edu   Additional Treatment Day 1   Exercises   THR   (EDU handout and written HEP provided.)   Balance training  edu: PT goals/POC, involved anatomy/physiology , fall risk , WBS, post-operative  "precautions , proper use of AD, HEP instruction/review , activity pacing/energy conservation techniques, \"talk test\" for relative exercise intensity , benefits of EOB/OOB mobility , progressive walking/exercise program, potential negative effects of immobility , modalities for sx management, and positioning for sx management   End of Consult   Patient Position at End of Consult Supine;All needs within reach  (RN in room.)     Pt requires PT/OT co-eval for pt's best interest due to medical complexity, safety concerns, fall risk, dec activity tolerance which is decline from PLOF, significant assistance with mobility and/or cognitive-behavioral impairments.    (Please find full objective findings from PT assessment regarding body systems outlined above).     Hx/personal factors: unable to perform caregiver support/tasks, impulsivity, inaccessible home environment, step(s) to enter environment, limited home support, past experience, behavioral pattern, recent fall(s)/fall history, and use of more restrictive AD, co-morbidities, dec caregiver support, use of AD, pain, recent orthopedic procedure , fall risk, and h/o of falls, coping styles, social background, past experience, behavior pattern, post op precautions  Examination: assessed/impairments of systems including multiple body structures involved; dec mobility, dec balance, dec endurance, dec amb, risk for falls, pain, impairements in locomotion, musculoskeletal, balance, endurance, posture, coordination, assessed cognition, activity limitations (difficulties executing an action) , participation restrictions (problems associate w/ involvement in life situations), AM-PAC score suggesting inc assistance/supervision vs baseline, use of more restrictive AD/brace/orthosis/prosthesis, impaired judgment/safety awareness/impulsivity, gait deviations ,   Clinical: unpredictable (risk for falls, POD #0, need for input for mobility technique/safety, WBS, and BP monitoring , " )  Complexity: high       Roderick Johnson, PT,DPT   04/11/24

## 2024-04-11 NOTE — ANESTHESIA PROCEDURE NOTES
Spinal Block    Patient location during procedure: OR  Start time: 4/11/2024 12:47 PM  Reason for block: procedure for pain, at surgeon's request and primary anesthetic  Staffing  Performed by: Subhash Haile CRNA  Authorized by: Manfred Treadwell MD    Preanesthetic Checklist  Completed: patient identified, IV checked, site marked, risks and benefits discussed, surgical consent, monitors and equipment checked, pre-op evaluation and timeout performed  Spinal Block  Patient position: sitting  Prep: ChloraPrep and site prepped and draped  Patient monitoring: frequent blood pressure checks, continuous pulse ox and heart rate  Approach: midline  Location: L3-4  Needle  Needle type: Pencan   Needle gauge: 24 G  Needle length: 4 in  Assessment  Sensory level: T4  Injection Assessment:  negative aspiration for heme, no paresthesia on injection and positive aspiration for clear CSF.  Post-procedure:  site cleaned

## 2024-04-11 NOTE — NURSING NOTE
Received patient in PACU, trying to get out of bed and not listening to instruction.  Patient with impulsivity, rolling on his operative side reaching for his phone and drink.  I instructed him to relax and I would assist him with any of his needs.   Patient crossing his legs multiple times despite instruction to not do that post op.  MD at bedside spoke to patient.  Pt requesting home health services at home.   Patient medicated for pain and stable transferred to phase II.  Eating and drinking no complaints pain improved see flow sheet.  PT at bedside, I advised them on this patient's impulsivity as well as his repeated attempts of crossing his leg.  PT evaluation completed.  They instructed him on post hip precautions as well as exercise.  Patient dressed with assistance instructed on not bending or over doing it.  He refused to listen ambulated on his own to the bathroom and voided.  He ambulated with walker to front door as he refused wheel chair.   Patient rushing to get out got walker to the lobby area.  I instructed him to take his time with ambulation and safety reviewed.  MD was made aware.

## 2024-04-11 NOTE — PLAN OF CARE
Problem: OCCUPATIONAL THERAPY ADULT  Goal: Performs self-care activities at highest level of function for planned discharge setting.  See evaluation for individualized goals.  Description: Treatment Interventions: ADL retraining, Functional transfer training, UE strengthening/ROM, Endurance training, Cognitive reorientation, Patient/family training, Equipment evaluation/education, Compensatory technique education, Energy conservation, Activityengagement  Equipment Recommended: Reacher ($), Sock aid ($)       See flowsheet documentation for full assessment, interventions and recommendations.   4/11/2024 1826 by Sinai Chaudhary OT  Note: Limitation: Decreased ADL status, Decreased UE ROM, Decreased Safe judgement during ADL, Decreased cognition, Decreased UE strength, Decreased endurance, Decreased self-care trans, Decreased high-level ADLs  Prognosis: Fair  Assessment: Pt is a 69 y.o. male who presented to [facility] on 4/11/2024 with R hip OA. Pt s/p R WOLFGANG anterior on 4/11. Pt WBAT on RLE. Pt  has a past medical history of Arthritis, Cervical radiculopathy, COPD (chronic obstructive pulmonary disease) ([facility]), CPAP (continuous positive airway pressure) dependence, Diabetes mellitus ([facility]), Fibromyalgia, Fibromyalgia, primary, GERD (gastroesophageal reflux disease), History of transfusion, Hyperlipidemia, Osteopenia, Pneumonia, Shortness of breath, and Sleep apnea. Pt greeted bedside for OT evaluation on 04/11/24. Pt lives with wife Diane in 1 level apt with 4 MARIELENA and unilateral rail. Pt needs to assist wife with ADLs. Wife (-) . PTA, pt reports functioning at mod I with SPC inside home vs. RW in community, independent ADLs, IADLs, has to assist wife, (+) . Reports chronic issues with L knee. Pt demonstrating the following occupational performance levels: S with UB ADL, min A with LB ADLs, S with bed mobility, S with functional transfers, and S with functional mobility with RW. Pt currently limited by  impulsivity and poor carryover of safety cues and technique. Limitations that impact functional performance include decreased ADL status, decreased UE ROM, decreased UE strength, decreased safe judgement during ADLs, decreased cognition, decreased endurance, decreased self care transfers, decreased high level ADLs, and pain. Occupational performance areas to address ADL retraining, functional transfer training, UE strengthening/ROM, endurance training, cognitive reorientation, Pt/caregiver education, equipment evaluation/education, compensatory technique education, energy conservation, and activity engagement . Pt would benefit from continued skilled OT services while in hospital to maximize independence with ADLs. Will continue to follow Pt's progress. Pt would benefit from level III resources (minimal intensity) (home health) upon DC to maximize safety and independence with ADLs and functional tasks of choice.     Rehab Resource Intensity Level, OT: (S) III (Minimum Resource Intensity) (home health- TT with MD DARIO aware & RN aware)       4/11/2024 1826 by Sinai Chaudhary OT  Note: Limitation: Decreased ADL status, Decreased UE ROM, Decreased Safe judgement during ADL, Decreased cognition, Decreased UE strength, Decreased endurance, Decreased self-care trans, Decreased high-level ADLs  Prognosis: Fair  Assessment: Pt is a 69 y.o. male who presented to [facility] on 4/11/2024 with R hip OA. Pt s/p R WOLFGANG anterior on 4/11. Pt WBAT on RLE. Pt  has a past medical history of Arthritis, Cervical radiculopathy, COPD (chronic obstructive pulmonary disease) ([facility]), CPAP (continuous positive airway pressure) dependence, Diabetes mellitus ([facility]), Fibromyalgia, Fibromyalgia, primary, GERD (gastroesophageal reflux disease), History of transfusion, Hyperlipidemia, Osteopenia, Pneumonia, Shortness of breath, and Sleep apnea. Pt greeted bedside for OT evaluation on 04/11/24. Pt lives with wife Diane in 1 level apt with 4 MARIELENA and  unilateral rail. Pt needs to assist wife with ADLs. Wife (-) . PTA, pt reports functioning at mod I with SPC inside home vs. RW in community, independent ADLs, IADLs, has to assist wife, (+) . Reports chronic issues with L knee. Pt demonstrating the following occupational performance levels: S with UB ADL, min A with LB ADLs, S with bed mobility, S with functional transfers, and S with functional mobility with RW. Pt currently limited by impulsivity and poor carryover of safety cues and technique. Limitations that impact functional performance include decreased ADL status, decreased UE ROM, decreased UE strength, decreased safe judgement during ADLs, decreased cognition, decreased endurance, decreased self care transfers, decreased high level ADLs, and pain. Occupational performance areas to address ADL retraining, functional transfer training, UE strengthening/ROM, endurance training, cognitive reorientation, Pt/caregiver education, equipment evaluation/education, compensatory technique education, energy conservation, and activity engagement . Pt would benefit from continued skilled OT services while in hospital to maximize independence with ADLs. Will continue to follow Pt's progress. Pt would benefit from level III resources (minimal intensity) (home health) upon DC to maximize safety and independence with ADLs and functional tasks of choice.     Rehab Resource Intensity Level, OT: (S) III (Minimum Resource Intensity) (home health- TT with MD DARIO aware & RN aware)

## 2024-04-11 NOTE — ANESTHESIA POSTPROCEDURE EVALUATION
Post-Op Assessment Note    CV Status:  Stable  Pain Score: 0    Pain management: adequate       Mental Status:  Awake   Hydration Status:  Euvolemic   PONV Controlled:  Controlled   Airway Patency:  Patent     Post Op Vitals Reviewed: Yes    No anethesia notable event occurred.    Staff: CRNA               BP   114/56   Temp   98.3   Pulse  70   Resp   20   SpO2   95%

## 2024-04-11 NOTE — ANESTHESIA PREPROCEDURE EVALUATION
Procedure:  ARTHROPLASTY HIP TOTAL ANTERIOR,NAVIGATED- same day (Right: Hip)    Relevant Problems   ANESTHESIA (within normal limits)      CARDIO   (+) Chest pain   (+) Hyperlipidemia      ENDO   (+) Type 2 diabetes mellitus with stage 3 chronic kidney disease, without long-term current use of insulin (Formerly [facility])      GI/HEPATIC   (+) GERD (gastroesophageal reflux disease)      /RENAL   (+) CKD (chronic kidney disease) stage 3, GFR 30-59 ml/min (Formerly [facility])      HEMATOLOGY (within normal limits)      MUSCULOSKELETAL   (+) Cervical spondylosis   (+) Primary osteoarthritis of one hip, right      NEURO/PSYCH   (+) Chronic pain syndrome      PULMONARY   (+) Acute respiratory failure with hypoxia (Formerly [facility])   (+) Chronic obstructive pulmonary disease with acute exacerbation (Formerly [facility])   (+) Sleep apnea      Behavioral Health   (+) Nicotine dependence      Rheumatology   (+) Cervical disc disorder with radiculopathy of mid-cervical region      Orthopedic/Musculoskeletal   (+) Cervical facet joint syndrome   (+) Status post left hip replacement      Other   (+) Orthopedic aftercare        Physical Exam    Airway    Mallampati score: II  TM Distance: >3 FB  Neck ROM: full     Dental   No notable dental hx     Cardiovascular  Rhythm: regular, Rate: normal, Cardiovascular exam normal    Pulmonary  Pulmonary exam normal Breath sounds clear to auscultation    Other Findings        Anesthesia Plan  ASA Score- 3     Anesthesia Type- spinal with ASA Monitors.         Additional Monitors:     Airway Plan:            Plan Factors-Exercise tolerance (METS): >4 METS.    Chart reviewed. EKG reviewed. Imaging results reviewed. Existing labs reviewed. Patient summary reviewed.                  Induction- intravenous.    Postoperative Plan-     Informed Consent- Anesthetic plan and risks discussed with patient.  I personally reviewed this patient with the CRNA. Discussed and agreed on the Anesthesia Plan with the CRNA..            Recent labs personally  reviewed:  Lab Results   Component Value Date    WBC 8.98 03/04/2024    HGB 13.7 03/04/2024     03/04/2024     Lab Results   Component Value Date     (L) 03/09/2015    K 4.5 03/04/2024    BUN 23 03/04/2024    CREATININE 0.96 03/04/2024    GLUCOSE 114 03/09/2015     Lab Results   Component Value Date    PTT 27 03/04/2024      Lab Results   Component Value Date    INR 0.88 03/04/2024       Blood type O    Lab Results   Component Value Date    HGBA1C 7.1 (H) 03/04/2024       I, Manfred Treadwell MD, have personally seen and evaluated the patient prior to anesthetic care.  I have reviewed the pre-anesthetic record, and other medical records if appropriate to the anesthetic care.  If a CRNA is involved in the case, I have reviewed the CRNA assessment, if present, and agree. Risks/benefits and alternatives discussed with patient including possible PONV, sore throat, and possibility of rare anesthetic and surgical emergencies.

## 2024-04-11 NOTE — OP NOTE
OPERATIVE REPORT  PATIENT NAME: Gregory Dumont  : 1955  MRN: [id number]  Pt Location:  [facility] OR ROOM 05    Surgery Date: 2024    Surgeons and Role:     * Mahendra Rodriguez, DO - Primary     * NLOAN Mora-C - Assisting      * NAOMI Osei-C - Assisting     Preop Diagnosis:  Primary osteoarthritis of one hip, right [M16.11]    Post-Op Diagnosis Codes:     * Primary osteoarthritis of one hip, right [M16.11]    Procedure(s):  Right - ARTHROPLASTY HIP TOTAL ANTERIOR.NAVIGATED- same day    Specimens:  * No specimens in log *    Estimated Blood Loss:   Minimal    Drains:  * No LDAs found *    Anesthesia Type:   Spinal     Operative Indications:  Primary osteoarthritis of one hip, right [M16.11]    69M with right hip extensive AVN with osteoarthritis. He has failed extensive non-operative management. Discussed that IA CSI is relatively CI in setting of AVN due to risk of worsening. His pain is affecting his ADLs and has been using a walker due to the right hip pain. The patient has elected to proceed with right WOLFGANG through the anterior approach. Risks and benefits of surgery to include but not limited to bleeding, infection, damage to surrounding structures, hardware failure, instability, fracture, dislocation, leg length inequality, need for further surgery, continued pain, stiffness, blood clots, stroke, heart attack, and LFCN numbness was discussed with the patient.     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 Item Serial No.  Lot No. LRB No. Used Action   ACETABULAR SHELL 3HL 48MM CMNTLS EMPHASYS - [id number]  ACETABULAR SHELL 3HL 48MM CMNTLS EMPHASCompute  DEPUY [id number] Right 1 Wasted   ACETABULAR SHELL 3HL 50MM CMNTLS EMPHASYS - [id number]  ACETABULAR SHELL 3HL 50MM CMNTLS EMPHASCompute  [employer] [id number] Right 1 Implanted   SCREW SACHI 6.5 X 20MM SLF TAP PINNACLE - [id number]  SCREW SACHI 6.5 X 20MM SLF TAP  PINNACLE  DEPUY [id number] Right 1 Implanted   SCREW SACHI 6.5 X 25MM SLF TAP PINNACLE - [id number]  SCREW SACHI 6.5 X 25MM SLF TAP PINNACLE  DEPUY [id number] Right 1 Implanted   LINER AOX NEUTRAL 50 X 36MM EMPHASYS - [id number]  LINER AOX NEUTRAL 50 X 36MM EMPHASYS  DEPUY [id number] Right 1 Implanted   SCREW CANC 6.5 X 40MM SLF TAP PINNACLE - [id number]  SCREW CANC 6.5 X 40MM SLF TAP PINNACLE  DEPUY [id number] Right 1 Implanted   STEM FEM SZ 7 TAPER CMNTLS HI COLLARLESS ACTIS - [id number]  STEM FEM SZ 7 TAPER CMNTLS HI COLLARLESS ACTIS  DEPUY [id number] Right 1 Implanted   HEAD FEMORAL CMNTLS 12/14 TPR 36MM +5MM BIOLOX DELTA ARTICULEZE - [id number]  HEAD FEMORAL CMNTLS 12/14 TPR 36MM +5MM BIOLOX DELTA ARTICULEZE  DEPUY [id number] Right 1 Implanted       Complications:   None    Hip Approach: Direct anterior    Procedure and Technique:  The patient seen in the preoperative area. The informed consent was confirmed.  The operative site was confirmed and marked.  Patient was taken to the operating room and anesthesia was performed by the anesthesiologist.  The patient's bilateral lower extremities were well padded and placed in the in the hana table boots.  Patient was secured to the operating room table and all bony prominences were well padded.  Patient was given perioperative antibiotics per scip protocol.  A formal time-out was performed identifying the patient and correct surgical site.  The right lower extremity was prepped and draped in usual sterile fashion.  A 10 cm incision was made anteriorly approximately 2 fingerbreadths lateral and 3 fingerbreadths distal to ASIS avoiding the hip flexion crease sharply with a 10 blade.  Electrocautery was used to cauterize skin bleeders.  This was carried down through subcutaneous tissues to the level of the tensor fascia mabel fascia.  Fascia was incised in line with the muscle fibers.  The tensor fascia mabel muscle was then moved laterally and the inferior fascia was incised.  The  branches of the lateral femoral cutaneous vessles were coagulated with electrocautery.  Retractors were placed carefully around the femoral neck.  Pericapsular fat was removed.  The anterior capsule was removed in its entirety.  Retractors were then carefully placed around the femoral neck.  Femoral neck was resected in line with our template.  A napkin ring was performed to better facilitate removal of the head.  Femoral head was removed with corkscrew.  Femoral head measured approximately 48 mm.  At this time retractors were carefully placed around the acetabulum.  The labrum was removed sharply. The inferior capsule was released.  Pulvinar was was removed to expose the medial wall of the acetabulum.  We started sequentially reaming at a size 45 medialized the medial wall.  We then sequentially reamed up to a size 49 with excellent chatter the Reamer.  We then impacted the Emphasys cluster hole 50 mm cup into place with the guidance of the [employer] hip navigation. We drilled, measured and placed 3 screws to augment fixation due to his overall bone bone quality. Our cup was placed at approximately 43° of abduction and 23 anteversion per navigation guidance.  Cup was well fixed and we placed the neutral Emphasys liner.  The liner was inspected and assured to be well seated.  At this time all the retractors were removed and attention was drawn to the femur.  Retractors were placed over the greater trochanter and along the medial calcar exposing the top of the femur. Medial capsule was released off the calcar and lateral capsule was resected allowing elevation of the femur into the wound.  We used the canal finer to localize the canal.  We then started broaching sequentially up to a size 7 Actis.  The size 7 had excellent rotational stability and canal fill.  We calcar planed down to the level of stem.  We trialed the hip with both std offset and high offset with a +1.5 and +5 trial head.  The hip had excellent stability  to 30° of internal rotation and 90° of external rotation with restoration of leg length and offset with the high offset and +5 head.  This was confirmed with intraoperative navigation via the sourceasy hip navigation platform.  The trial femoral stem was removed and the real size 7 high offset stem was impacted into place.  The +5 36 mm ceramic head was impacted onto a dry trunnion.  At this time the hip was lavaged with Irrisept solution.  Hip was then irrigated with remainder of 3 L of normal saline solution.  The deep tissues were again inspected for any bleeding from the lateral femoral cutaneous vessels or muscle bleeders.  Any bleeders were cauterized with electrocautery.  The fascia was then closed with interrupted 0 Vicryl followed by running #1 Stratafix suture.  The subcutaneous tissue was closed with interrupted 2-0 Vicryl.  The skin was then closed with running 4-0 Stratafix.  A Prineo dressing was placed sterilely.  The patient was awoken from anesthesia and taken to recovery room in stable condition.        Pre-surgical planning Xray  2.   Intraoperative use of sourceasy Navigation system  3.   Intraoperative use of fluoroscopy and saved images     I was present for the entire case incision to closure.  Danielle Swain PA-C was necessary for positioning, retracting, and suturing.  No qualified resident was available for the case.     Patient Disposition:  PACU       69M s/p R WOLFGANG 4/11  - multi-modal pain control   - nisha-op abx x 24hrs, duricef x 5 days as planned same day discharge   - DVT ppx: asprin 81mg BID x 4 weeks  - PT/OT  - WBAT  - No formal hip precautions, no extremes of motion  - keep dressing in place until follow-up  - f/u 10-14 days     I was present for all critical portions of the procedure., A qualified resident physician was not available., and A physician assistant was required during the procedure for retraction, tissue handling, dissection and suturing.    Patient Disposition:  PACU          SIGNATURE: Mahendra Rodriguez, DO  DATE: April 11, 2024  TIME: 2:12 PM

## 2024-04-12 ENCOUNTER — HOME HEALTH ADMISSION (OUTPATIENT)
Dept: HOME HEALTH SERVICES | Facility: HOME HEALTHCARE | Age: 69
End: 2024-04-12
Payer: MEDICARE

## 2024-04-12 ENCOUNTER — HOME CARE VISIT (OUTPATIENT)
Dept: HOME HEALTH SERVICES | Facility: HOME HEALTHCARE | Age: 69
End: 2024-04-12
Payer: MEDICARE

## 2024-04-12 ENCOUNTER — TELEPHONE (OUTPATIENT)
Dept: OBGYN CLINIC | Facility: HOSPITAL | Age: 69
End: 2024-04-12

## 2024-04-12 VITALS — SYSTOLIC BLOOD PRESSURE: 124 MMHG | HEART RATE: 96 BPM | OXYGEN SATURATION: 98 % | DIASTOLIC BLOOD PRESSURE: 80 MMHG

## 2024-04-12 PROCEDURE — 10330081 VN NO-PAY CLAIM PROCEDURE

## 2024-04-12 PROCEDURE — G0151 HHCP-SERV OF PT,EA 15 MIN: HCPCS

## 2024-04-12 NOTE — CASE MANAGEMENT
Case Management Progress Note    Patient name Gregory Dumont  Location OR POOL/OR POOL MRN [id number]  : 1955 Date 2024       LOS (days): 0  Geometric Mean LOS (GMLOS) (days):   Days to GMLOS:        OBJECTIVE:        Current admission status: Outpatient Surgery  Preferred Pharmacy:   [id number] - [place] - [address] [address]  Phone: [phone] Fax: [phone]    [facility] [place]  [place], [place] - [address]  [address]  [place] [address]  Phone: [phone] Fax: [phone]    [facility] [place] [facility] [address]  [place] [facility] [address]  Phone: [phone] Fax: [phone]    Primary Care Provider: Lucrecia Crawley MD    Primary Insurance: MEDICARE  Secondary Insurance:     PROGRESS NOTE:    Late consult received yesterday at 1625 that patient needed HHC.  Call to patient and spouse this am to discuss HHC.  Left VM for both.  Await call back.

## 2024-04-12 NOTE — PROGRESS NOTES
Patient:    MRN:  [id number]    Aidin Request ID:  [id number]    Level of care reserved:    Partner Reserved:    Clinical needs requested:    Geography searched:  [address]    Start of Service:    Request sent:  10:02am EDT on 4/12/2024 by Geraldine Santizo    Partner reserved:  by Geraldine Santizo    Choice list shared:  12:18pm EDT on 4/12/2024 by Geraldine Santizo

## 2024-04-12 NOTE — CASE MANAGEMENT
Case Management Progress Note    Patient name Gregory Dumont  Location OR POOL/OR POOL MRN [id number]  : 1955 Date 2024       LOS (days): 0  Geometric Mean LOS (GMLOS) (days):   Days to GMLOS:        OBJECTIVE:        Current admission status: Outpatient Surgery  Preferred Pharmacy:   [id number] - [place] - [address] [facility] [address]  Phone: [phone] Fax: [phone]    [facility] [place] - [place] - [address]  [address]  [place] [address]  Phone: [phone] Fax: [phone]    [facility] [place] - [place] [address]  [place] [facility] [address]  Phone: [phone] Fax: [phone]    Primary Care Provider: Lucrecia Crawley MD    Primary Insurance: MEDICARE  Secondary Insurance:     PROGRESS NOTE:    Entered 24 at 1257    Spoke with spouse and patient and agreeable to [facility] referrals made in am.  Call received from spouse that a therapist was in the home now from [facility].  [facility] agency reserved.  No other CM needs

## 2024-04-12 NOTE — TELEPHONE ENCOUNTER
"Patient contacted for a postoperative follow up assessment. Patient states current pain level of a  10/10  when sitting and 10/10 when walking with RW. Patient denies walking today as it \"hurts too much\"Patient states they have increased pain and it is relieved with medication regimen. Patient denies increase in swelling and dressing is clean, dry and intact. Patient is icing the site regularly.     We reviewed patients AVS medication list. Patient is taking Tylenol 1000mg every 8 hours, Oxycodone 5mg PRN, Duricef 500mg BID, ASA 81mg BID, Senakot daily. Patient has not yet had a BM but is passing gas.      Patient denies nausea, vomiting, abdominal pain, chest pain, shortness of breath, fever, dizziness and calf pain. Patient confirmed post-op appointment with surgeon on 4/23 at 1:15PM. Patient does not have any other questions or concerns at this time. Pt was encouraged to call with any questions, concerns or issues.    "

## 2024-04-12 NOTE — PROGRESS NOTES
Patient:    MRN:  [id number]    Aidin Request ID:  [id number]    Level of care reserved:    Partner Reserved:    Clinical needs requested:    Geography searched:  [id number]    Start of Service:    Request sent:  10:02am EDT on 4/12/2024 by Geraldine Santizo    Partner reserved:  by Geraldine Santizo    Choice list shared:  12:18pm EDT on 4/12/2024 by Geraldine Santizo

## 2024-04-15 ENCOUNTER — HOME CARE VISIT (OUTPATIENT)
Dept: HOME HEALTH SERVICES | Facility: HOME HEALTHCARE | Age: 69
End: 2024-04-15
Payer: MEDICARE

## 2024-04-15 PROCEDURE — G0321 AUDIO-ONLY HHS: HCPCS

## 2024-04-16 ENCOUNTER — HOME CARE VISIT (OUTPATIENT)
Dept: HOME HEALTH SERVICES | Facility: HOME HEALTHCARE | Age: 69
End: 2024-04-16
Payer: MEDICARE

## 2024-04-16 VITALS — HEART RATE: 98 BPM | OXYGEN SATURATION: 99 %

## 2024-04-16 PROCEDURE — G0151 HHCP-SERV OF PT,EA 15 MIN: HCPCS

## 2024-04-18 ENCOUNTER — PATIENT OUTREACH (OUTPATIENT)
Dept: OBGYN CLINIC | Facility: HOSPITAL | Age: 69
End: 2024-04-18

## 2024-04-18 NOTE — PROGRESS NOTES
SWCM contacted pt today for follow up postoperatively. Pt reports doing well and recovering well. Pts wife has been providing caregiver support and pt has started in ome PT as planned. No other need noted. SWCM will remain available as needed.

## 2024-04-19 ENCOUNTER — HOME CARE VISIT (OUTPATIENT)
Dept: HOME HEALTH SERVICES | Facility: HOME HEALTHCARE | Age: 69
End: 2024-04-19
Payer: MEDICARE

## 2024-04-19 VITALS — HEART RATE: 86 BPM | SYSTOLIC BLOOD PRESSURE: 148 MMHG | OXYGEN SATURATION: 99 % | DIASTOLIC BLOOD PRESSURE: 78 MMHG

## 2024-04-19 PROCEDURE — G0151 HHCP-SERV OF PT,EA 15 MIN: HCPCS

## 2024-04-22 ENCOUNTER — HOME CARE VISIT (OUTPATIENT)
Dept: HOME HEALTH SERVICES | Facility: HOME HEALTHCARE | Age: 69
End: 2024-04-22
Payer: MEDICARE

## 2024-04-22 VITALS — HEART RATE: 76 BPM | OXYGEN SATURATION: 100 %

## 2024-04-22 PROCEDURE — G0151 HHCP-SERV OF PT,EA 15 MIN: HCPCS

## 2024-04-23 ENCOUNTER — OFFICE VISIT (OUTPATIENT)
Dept: OBGYN CLINIC | Facility: MEDICAL CENTER | Age: 69
End: 2024-04-23

## 2024-04-23 ENCOUNTER — APPOINTMENT (OUTPATIENT)
Dept: RADIOLOGY | Facility: MEDICAL CENTER | Age: 69
End: 2024-04-23
Payer: MEDICARE

## 2024-04-23 VITALS
WEIGHT: 143 LBS | HEART RATE: 86 BPM | SYSTOLIC BLOOD PRESSURE: 124 MMHG | BODY MASS INDEX: 22.44 KG/M2 | HEIGHT: 67 IN | DIASTOLIC BLOOD PRESSURE: 77 MMHG

## 2024-04-23 DIAGNOSIS — Z96.641 STATUS POST TOTAL HIP REPLACEMENT, RIGHT: ICD-10-CM

## 2024-04-23 DIAGNOSIS — Z96.641 STATUS POST TOTAL HIP REPLACEMENT, RIGHT: Primary | ICD-10-CM

## 2024-04-23 PROCEDURE — 73502 X-RAY EXAM HIP UNI 2-3 VIEWS: CPT

## 2024-04-23 PROCEDURE — 99024 POSTOP FOLLOW-UP VISIT: CPT | Performed by: STUDENT IN AN ORGANIZED HEALTH CARE EDUCATION/TRAINING PROGRAM

## 2024-04-23 NOTE — PROGRESS NOTES
Subjective: 69 y.o. male presents to the office 2 weeks s/p right anterior total hip arthroplasty performed on 04/11/2024. He experiences a sharp pain around the incision with certain movements. Pain well-controlled otherwise. Progressing well, ambulating with the assistance of a walker. Incision without drainage. Denies fevers or chills    Physical Exam:  Incision: clean, dry, and intact  ROM: as expected without pain  5/5 IP/Q/HS/TA/GS, 2+ DP/PT, SILT DP/SP/S/S/TN    XR right hip: s/p press-fit total hip arthroplasty, components in good position. No fracture or dislocation    Assessment/Plan:  2 weeks s/p right anterior total hip arthroplasty performed on 04/11/2024    - continue multi-modal pain control   - Weight bearing status: as tolerated  - DVT ppx: aspirin 81 mg BID for an additional 2 weeks (4 weeks total)  - Incision care: avoid scrubbing or submerging  - Continue PT/OT, referral provided to transition to outpatient PT  - F/U in 4 weeks    Scribe Attestation      I,:  Maria Esther Nicole am acting as a scribe while in the presence of the attending physician.:       I,:  Mahendra Rodriguez, DO personally performed the services described in this documentation    as scribed in my presence.:

## 2024-04-25 ENCOUNTER — HOME CARE VISIT (OUTPATIENT)
Dept: HOME HEALTH SERVICES | Facility: HOME HEALTHCARE | Age: 69
End: 2024-04-25
Payer: MEDICARE

## 2024-04-25 VITALS — OXYGEN SATURATION: 97 % | HEART RATE: 90 BPM

## 2024-04-25 PROCEDURE — G0151 HHCP-SERV OF PT,EA 15 MIN: HCPCS

## 2024-04-26 ENCOUNTER — TELEPHONE (OUTPATIENT)
Age: 69
End: 2024-04-26

## 2024-04-26 NOTE — TELEPHONE ENCOUNTER
Caller: Damion from [facility]    Doctor: Dr. Rodriguez     Reason for call: Damion calling to verify what type of approach was used for the Right WOLFGANG and what type of precautions they should be following?   He can be reached at the number below.    Call back#: [phone]

## 2024-05-07 ENCOUNTER — APPOINTMENT (EMERGENCY)
Dept: RADIOLOGY | Facility: HOSPITAL | Age: 69
End: 2024-05-07
Payer: MEDICARE

## 2024-05-07 ENCOUNTER — APPOINTMENT (EMERGENCY)
Dept: NON INVASIVE DIAGNOSTICS | Facility: HOSPITAL | Age: 69
End: 2024-05-07
Payer: MEDICARE

## 2024-05-07 ENCOUNTER — HOSPITAL ENCOUNTER (EMERGENCY)
Facility: HOSPITAL | Age: 69
Discharge: HOME | End: 2024-05-07
Attending: EMERGENCY MEDICINE
Payer: MEDICARE

## 2024-05-07 VITALS
HEART RATE: 78 BPM | SYSTOLIC BLOOD PRESSURE: 144 MMHG | RESPIRATION RATE: 16 BRPM | DIASTOLIC BLOOD PRESSURE: 70 MMHG | TEMPERATURE: 97.3 F | WEIGHT: 149.03 LBS | BODY MASS INDEX: 23.34 KG/M2 | OXYGEN SATURATION: 99 %

## 2024-05-07 DIAGNOSIS — M25.551 RIGHT HIP PAIN: Primary | ICD-10-CM

## 2024-05-07 PROCEDURE — 99284 EMERGENCY DEPT VISIT MOD MDM: CPT

## 2024-05-07 PROCEDURE — 73502 X-RAY EXAM HIP UNI 2-3 VIEWS: CPT

## 2024-05-07 PROCEDURE — 99024 POSTOP FOLLOW-UP VISIT: CPT

## 2024-05-07 PROCEDURE — 99284 EMERGENCY DEPT VISIT MOD MDM: CPT | Performed by: EMERGENCY MEDICINE

## 2024-05-07 PROCEDURE — 93971 EXTREMITY STUDY: CPT

## 2024-05-07 PROCEDURE — 93971 EXTREMITY STUDY: CPT | Performed by: SURGERY

## 2024-05-07 NOTE — ED PROVIDER NOTES
History  Chief Complaint   Patient presents with    Hip Pain     Patient had right hip surgery 3 weeks ago. Pt had physical therapy yesterday and reports waking up with right hip pain and swelling today.        History provided by:  Patient   used: No    Hip Pain  Location:  Right hip  Quality:  Pain and subjective swelling  Severity:  Moderate  Onset quality:  Gradual  Duration:  2 days  Timing:  Intermittent  Progression:  Waxing and waning  Chronicity:  New  Context:  Patient with recent right hip replacement, been getting physical therapy, comes in with complaints of right hip pain, subjective swelling over the right hip laterally  Relieved by:  Nothing  Worsened by:  Movement  Ineffective treatments:  None  Associated symptoms: no abdominal pain, no chest pain, no cough, no diarrhea, no fever, no headaches, no nausea, no rash, no shortness of breath, no sore throat and no vomiting    Risk factors:  Recent right hip replacement      Prior to Admission Medications   Prescriptions Last Dose Informant Patient Reported? Taking?   Budeson-Glycopyrrol-Formoterol (Breztri Aerosphere) 160-9-4.8 MCG/ACT AERO   Yes No   Sig: Inhale 2 puffs Rinse mouth after use.   DULoxetine (CYMBALTA) 30 mg delayed release capsule   Yes No   Sig: Take 1 capsule by mouth 2 (two) times a day pt reports not taking    Icosapent Ethyl (Vascepa) 1 g CAPS   Yes No   Sig: Take 2 g by mouth 2 (two) times a day   MELATONIN PO   Yes No   Sig: Take 2 mg by mouth daily at bedtime   Multiple Vitamin (multivitamin) tablet   Yes No   Sig: TAKE 1 TABLET DAILY.   Multiple Vitamins-Minerals (multivitamin with minerals) tablet   No No   Sig: Take 1 tablet by mouth daily   Patient not taking: Reported on 4/15/2024   acetaminophen (TYLENOL) 500 mg tablet   No No   Sig: Take 2 tablets (1,000 mg total) by mouth every 8 (eight) hours   albuterol (PROVENTIL HFA,VENTOLIN HFA) 90 mcg/act inhaler   Yes No   Sig: Inhale 2 puffs every 4 (four)  hours as needed for wheezing pt reports not using   ascorbic acid (VITAMIN C) 500 MG tablet   No No   Sig: Take 1 tablet (500 mg total) by mouth 2 (two) times a day   aspirin (ECOTRIN LOW STRENGTH) 81 mg EC tablet   No No   Sig: Take 1 tablet (81 mg total) by mouth 2 (two) times a day   cholecalciferol (VITAMIN D3) 1,000 units tablet   No No   Sig: Take 2 tablets (2,000 Units total) by mouth daily   diclofenac sodium (VOLTAREN) 50 mg EC tablet   No No   Sig: Take 1 tablet (50 mg total) by mouth 2 (two) times a day as needed (hip pain) Take with food   diclofenac sodium (VOLTAREN) 50 mg EC tablet   No No   Sig: Take 1 tablet (50 mg total) by mouth 2 (two) times a day   ergocalciferol (ERGOCALCIFEROL) 1.25 MG (07458 UT) capsule   Yes No   Sig: take 1 capsule by mouth every week   fenofibrate (TRICOR) 145 mg tablet   Yes No   Sig: Take 145 mg by mouth daily pt reports not taking    fluticasone-salmeterol (Advair) 250-50 mcg/dose inhaler   Yes No   Sig: inhale 1 puff by mouth and INTO THE LUNGS every 12 hours SAME TIMES EACH DAY   folic acid (FOLVITE) 1 mg tablet   No No   Sig: Take 1 tablet (1 mg total) by mouth daily   guaiFENesin (MUCINEX) 600 mg 12 hr tablet   No No   Sig: Take 1 tablet (600 mg total) by mouth 2 (two) times a day   Patient not taking: Reported on 4/15/2024   ipratropium-albuterol (DUO-NEB) 0.5-2.5 mg/3 mL nebulizer solution   No No   Sig: Take 3 mL by nebulization 4 (four) times a day   Patient not taking: Reported on 4/15/2024   loratadine (CLARITIN) 10 mg tablet   Yes No   Sig: Take 10 mg by mouth daily   metFORMIN (GLUCOPHAGE) 1000 MG tablet   Yes No   Sig: Take 1,000 mg by mouth 2 (two) times a day with meals pt reports not taking    omeprazole (PriLOSEC OTC) 20 MG tablet   Yes No   Sig: Take 20 mg by mouth daily   omeprazole (PriLOSEC) 20 mg delayed release capsule   Yes No   Sig: take 1 capsule by mouth twice a day 30 TO 60 MINUTES PRIOR TO EATING   ondansetron (ZOFRAN-ODT) 4 mg disintegrating  tablet   No No   Sig: Take 1 tablet (4 mg total) by mouth every 6 (six) hours as needed for nausea or vomiting   Patient not taking: Reported on 4/23/2024   pregabalin (LYRICA) 100 mg capsule   Yes No   Sig: Take 100 mg by mouth 2 (two) times a day pt reports not taking    rosuvastatin (CRESTOR) 5 mg tablet   Yes No   Sig: Take 5 mg by mouth in the morning.   senna-docusate sodium (SENOKOT S) 8.6-50 mg per tablet   No No   Sig: Take 1 tablet by mouth daily   traZODone (DESYREL) 50 mg tablet   No No   Sig: Take 1 tablet (50 mg total) by mouth daily at bedtime   Patient not taking: Reported on 4/15/2024      Facility-Administered Medications: None       Past Medical History:   Diagnosis Date    Arthritis     osteoarthritis    Cervical radiculopathy     COPD (chronic obstructive pulmonary disease) ([facility])     CPAP (continuous positive airway pressure) dependence     Diabetes mellitus (HCC)     pre-diabetic    Fibromyalgia     Fibromyalgia, primary     GERD (gastroesophageal reflux disease)     History of transfusion     Hyperlipidemia     Osteopenia     Pneumonia     Shortness of breath     with exertion    Sleep apnea     USES CPAP HS       Past Surgical History:   Procedure Laterality Date    CHOLECYSTECTOMY      COLONOSCOPY      CYST REMOVAL      FEMUR SURGERY Left     HIP CLOSE REDUCTION Left 1/20/2022    Procedure: CLOSED REDUCTION HIP;  Surgeon: Larry Castillo MD;  Location: [facility];  Service: Orthopedics    JOINT REPLACEMENT      T KR LEFT    KNEE SURGERY Left 1996    MANDIBLE SURGERY      UT ARTHRP ACETBLR/PROX FEM PROSTC AGRFT/ALGRFT Right 4/11/2024    Procedure: ARTHROPLASTY HIP TOTAL ANTERIOR,NAVIGATED- same day;  Surgeon: Mahendra Rodriguez DO;  Location:  MAIN OR;  Service: Orthopedics    UT CONV PREV HIP TOT HIP ARTHRP W/WO AGRFT/ALGRFT Left 12/22/2021    Procedure: ARTHROPLASTY HIP TOTAL- conversion;  Surgeon: Yifan Caraballo MD;  Location: [facility];  Service: Orthopedics    UT  "REMOVAL IMPLANT DEEP Left 9/8/2021    Procedure: REMOVAL HARDWARE HIP;  Surgeon: Yifan Caraballo MD;  Location: [facility];  Service: Orthopedics    ROTATOR CUFF REPAIR Left     WRIST FRACTURE SURGERY Left     plate in place       Family History   Problem Relation Age of Onset    Cancer Mother     Arthritis Father     Diabetes Sister     Cancer Brother     Diabetes Daughter     Depression Son      I have reviewed and agree with the history as documented.    E-Cigarette/Vaping    E-Cigarette Use Never User      E-Cigarette/Vaping Substances    Nicotine No     THC No     CBD No     Flavoring No     Other No     Unknown No      Social History     Tobacco Use    Smoking status: Every Day     Current packs/day: 0.50     Types: Cigarettes    Smokeless tobacco: Never   Vaping Use    Vaping status: Never Used   Substance Use Topics    Alcohol use: Not Currently     Comment: \"at holidays\"    Drug use: No       Review of Systems   Constitutional:  Negative for chills and fever.   HENT:  Negative for facial swelling, sore throat and trouble swallowing.    Eyes:  Negative for pain and visual disturbance.   Respiratory:  Negative for cough, chest tightness and shortness of breath.    Cardiovascular:  Negative for chest pain and leg swelling.   Gastrointestinal:  Negative for abdominal pain, blood in stool, diarrhea, nausea and vomiting.   Genitourinary:  Negative for dysuria and flank pain.   Musculoskeletal:  Negative for back pain, neck pain and neck stiffness.        Right hip pain   Skin:  Negative for pallor and rash.   Allergic/Immunologic: Negative for environmental allergies and immunocompromised state.   Neurological:  Negative for dizziness and headaches.   Hematological:  Negative for adenopathy. Does not bruise/bleed easily.   Psychiatric/Behavioral:  Negative for agitation and behavioral problems.    All other systems reviewed and are negative.      Physical Exam  Physical Exam  Vitals and nursing note reviewed. "   Constitutional:       General: He is not in acute distress.     Appearance: He is well-developed.   HENT:      Head: Normocephalic and atraumatic.   Eyes:      Extraocular Movements: Extraocular movements intact.   Cardiovascular:      Rate and Rhythm: Normal rate and regular rhythm.   Pulmonary:      Effort: Pulmonary effort is normal. No respiratory distress.   Abdominal:      Palpations: Abdomen is soft.      Tenderness: There is no abdominal tenderness. There is no guarding or rebound.   Musculoskeletal:         General: Normal range of motion.      Cervical back: Normal range of motion and neck supple.      Comments: Right hip: Healed scar of recent right hip replacement surgery, no erythema, no significant swelling appreciated, no fluctuance, intact range of movement however with pain of right hip and right lower extremity, no calf tenderness or swelling, motor/sensory exam intact distally   Skin:     General: Skin is warm and dry.   Neurological:      General: No focal deficit present.      Mental Status: He is alert and oriented to person, place, and time.   Psychiatric:         Mood and Affect: Mood normal.         Behavior: Behavior normal.         Vital Signs  ED Triage Vitals   Temperature Pulse Respirations Blood Pressure SpO2   05/07/24 1138 05/07/24 1139 05/07/24 1139 05/07/24 1139 05/07/24 1139   (!) 97.3 °F (36.3 °C) 80 17 145/70 98 %      Temp Source Heart Rate Source Patient Position - Orthostatic VS BP Location FiO2 (%)   05/07/24 1138 05/07/24 1139 05/07/24 1139 05/07/24 1139 --   Oral Monitor Sitting Right arm       Pain Score       05/07/24 1139       7           Vitals:    05/07/24 1139 05/07/24 1331   BP: 145/70 144/70   Pulse: 80 78   Patient Position - Orthostatic VS: Sitting Lying         Visual Acuity      ED Medications  Medications - No data to display    Diagnostic Studies  Results Reviewed       None                   VAS lower limb venous duplex study, unilateral/limited   Final  Result by Avery Ann DO (05/07 1624)      XR hip/pelv 2-3 vws right   Final Result by Slim Duncan MD (05/08 0820)      No acute osseous abnormality. Unremarkable total right hip arthroplasty.            Workstation performed: [id number]                    Procedures  Procedures         ED Course  ED Course as of 05/08/24 1316   Tue May 07, 2024   1310 XR hip/pelv 2-3 vws right  X-ray right hip independently reviewed, no significant acute osseous abnormality noted.   1311 Message sent to Ortho on call (Louise Martins) for review due to recent surgery.   1516 Duplex negative for DVT as per primary vascular tech report (Margarita Norman).  Patient informed about the results.   1535 Seen by orthopedics on-call, no acute intervention, stable for outpatient follow-up.                               SBIRT 22yo+      Flowsheet Row Most Recent Value   Initial Alcohol Screen: US AUDIT-C     1. How often do you have a drink containing alcohol? 0 Filed at: 05/07/2024 1328   2. How many drinks containing alcohol do you have on a typical day you are drinking?  0 Filed at: 05/07/2024 1328   3a. Male UNDER 65: How often do you have five or more drinks on one occasion? 0 Filed at: 05/07/2024 1328   3b. FEMALE Any Age, or MALE 65+: How often do you have 4 or more drinks on one occassion? 0 Filed at: 05/07/2024 1328   Audit-C Score 0 Filed at: 05/07/2024 1328   STACY: How many times in the past year have you...    Used an illegal drug or used a prescription medication for non-medical reasons? Never Filed at: 05/07/2024 1328                      Medical Decision Making  Patient is a 69-year-old male, recent right hip replacement, comes in with complaints of right hip pain, subjective swelling to the right hip laterally, going on for past 2 days, patient has been undergoing physical therapy, denies any recent fall or injury, no fever or chills, no numbness or tingling, no lower leg pain or swelling.  Exam, patient is conscious,  alert, vital signs stable, no acute distress, right hip exam with healed scar, no swelling or fluctuance noted, no peripheral edema, NV exam intact distally.  Impression: Right hip pain, subjective swelling, recent hip replacement, no fever or chills to suggest infection, nonacute exam as above, will get x-ray, contact orthopedics for recommendations and follow-up.    Problems Addressed:  Right hip pain: acute illness or injury    Amount and/or Complexity of Data Reviewed  Radiology: ordered and independent interpretation performed. Decision-making details documented in ED Course.  Discussion of management or test interpretation with external provider(s): Case discussed with orthopedics on-call, Ortho consult done             Disposition  Final diagnoses:   Right hip pain     Time reflects when diagnosis was documented in both MDM as applicable and the Disposition within this note       Time User Action Codes Description Comment    5/7/2024  3:25 PM Ruben Stahl Add [M25.551] Right hip pain           ED Disposition       ED Disposition   Discharge    Condition   Stable    Date/Time   Tue May 7, 2024 0598    Comment   Gregory Dumont discharge to home/self care.                   Follow-up Information       Follow up With Specialties Details Why Contact Info    Mahendra Rodriguez, DO Orthopedic Surgery Schedule an appointment as soon as possible for a visit   [address] [place] [address]  [phone]              Discharge Medication List as of 5/7/2024  3:36 PM        CONTINUE these medications which have NOT CHANGED    Details   acetaminophen (TYLENOL) 500 mg tablet Take 2 tablets (1,000 mg total) by mouth every 8 (eight) hours, Starting Thu 4/11/2024, Normal      albuterol (PROVENTIL HFA,VENTOLIN HFA) 90 mcg/act inhaler Inhale 2 puffs every 4 (four) hours as needed for wheezing pt reports not using, Historical Med      ascorbic acid (VITAMIN C) 500 MG tablet Take 1 tablet (500 mg total) by  mouth 2 (two) times a day, Starting Tue 2/27/2024, Normal      aspirin (ECOTRIN LOW STRENGTH) 81 mg EC tablet Take 1 tablet (81 mg total) by mouth 2 (two) times a day, Starting Thu 4/11/2024, Normal      Budeson-Glycopyrrol-Formoterol (Breztri Aerosphere) 160-9-4.8 MCG/ACT AERO Inhale 2 puffs Rinse mouth after use., Historical Med      cholecalciferol (VITAMIN D3) 1,000 units tablet Take 2 tablets (2,000 Units total) by mouth daily, Starting Tue 2/27/2024, Normal      !! diclofenac sodium (VOLTAREN) 50 mg EC tablet Take 1 tablet (50 mg total) by mouth 2 (two) times a day as needed (hip pain) Take with food, Starting Wed 4/3/2024, Normal      !! diclofenac sodium (VOLTAREN) 50 mg EC tablet Take 1 tablet (50 mg total) by mouth 2 (two) times a day, Starting Thu 4/11/2024, Normal      DULoxetine (CYMBALTA) 30 mg delayed release capsule Take 1 capsule by mouth 2 (two) times a day pt reports not taking , Historical Med      ergocalciferol (ERGOCALCIFEROL) 1.25 MG (38357 UT) capsule take 1 capsule by mouth every week, Historical Med      fenofibrate (TRICOR) 145 mg tablet Take 145 mg by mouth daily pt reports not taking , Historical Med      fluticasone-salmeterol (Advair) 250-50 mcg/dose inhaler inhale 1 puff by mouth and INTO THE LUNGS every 12 hours SAME TIMES EACH DAY, Historical Med      folic acid (FOLVITE) 1 mg tablet Take 1 tablet (1 mg total) by mouth daily, Starting Tue 2/27/2024, Normal      guaiFENesin (MUCINEX) 600 mg 12 hr tablet Take 1 tablet (600 mg total) by mouth 2 (two) times a day, Starting Mon 2/20/2023, Normal      Icosapent Ethyl (Vascepa) 1 g CAPS Take 2 g by mouth 2 (two) times a day, Historical Med      ipratropium-albuterol (DUO-NEB) 0.5-2.5 mg/3 mL nebulizer solution Take 3 mL by nebulization 4 (four) times a day, Starting Mon 2/20/2023, Normal      loratadine (CLARITIN) 10 mg tablet Take 10 mg by mouth daily, Historical Med      MELATONIN PO Take 2 mg by mouth daily at bedtime, Historical Med       metFORMIN (GLUCOPHAGE) 1000 MG tablet Take 1,000 mg by mouth 2 (two) times a day with meals pt reports not taking , Starting Fri 8/13/2021, Historical Med      Multiple Vitamin (multivitamin) tablet TAKE 1 TABLET DAILY., Historical Med      Multiple Vitamins-Minerals (multivitamin with minerals) tablet Take 1 tablet by mouth daily, Starting Tue 2/27/2024, Normal      omeprazole (PriLOSEC OTC) 20 MG tablet Take 20 mg by mouth daily, Starting Fri 2/5/2021, Until Sat 2/5/2022, Historical Med      omeprazole (PriLOSEC) 20 mg delayed release capsule take 1 capsule by mouth twice a day 30 TO 60 MINUTES PRIOR TO EATING, Historical Med      ondansetron (ZOFRAN-ODT) 4 mg disintegrating tablet Take 1 tablet (4 mg total) by mouth every 6 (six) hours as needed for nausea or vomiting, Starting Thu 4/11/2024, Normal      pregabalin (LYRICA) 100 mg capsule Take 100 mg by mouth 2 (two) times a day pt reports not taking , Historical Med      rosuvastatin (CRESTOR) 5 mg tablet Take 5 mg by mouth in the morning., Starting Tue 3/15/2022, Historical Med      senna-docusate sodium (SENOKOT S) 8.6-50 mg per tablet Take 1 tablet by mouth daily, Starting Thu 4/11/2024, Normal      traZODone (DESYREL) 50 mg tablet Take 1 tablet (50 mg total) by mouth daily at bedtime, Starting Mon 2/20/2023, Normal       !! - Potential duplicate medications found. Please discuss with provider.          No discharge procedures on file.    PDMP Review         Value Time User    PDMP Reviewed  Yes 4/11/2024  6:07 AM Danielle Swain PA-C            ED Provider  Electronically Signed by             Ruben Stahl MD  05/08/24 1513

## 2024-05-07 NOTE — CONSULTS
Orthopedics   Gregory Dumont 69 y.o. male MRN: [id number]  Unit/Bed#: [facility] NON INV      Chief Complaint:   right hip pain    HPI:   69 y.o.male with a recent PMH significant for right hip WOLFGANG with Dr. Rodriguez on 4/11/2024.  He was having a little increased pain and swelling over the lateral aspect of the right hip today, so he decided to come into the ED.  He did not contact he office first.  He reports that he had 5/10 pain for which he did not take any pain medication.  He tried some ice which did not help.  He denies any subjective fevers or chills.  No recent falls or trauma.  No numbness or tingling.  No other complaints at this time.      Review Of Systems:   Skin: Normal  Neuro: See HPI  Musculoskeletal: See HPI  14 point review of systems negative except as stated above     Past Medical History:   Past Medical History:   Diagnosis Date    Arthritis     osteoarthritis    Cervical radiculopathy     COPD (chronic obstructive pulmonary disease) ([facility])     CPAP (continuous positive airway pressure) dependence     Diabetes mellitus ([facility])     pre-diabetic    Fibromyalgia     Fibromyalgia, primary     GERD (gastroesophageal reflux disease)     History of transfusion     Hyperlipidemia     Osteopenia     Pneumonia     Shortness of breath     with exertion    Sleep apnea     USES CPAP HS       Past Surgical History:   Past Surgical History:   Procedure Laterality Date    CHOLECYSTECTOMY      COLONOSCOPY      CYST REMOVAL      FEMUR SURGERY Left     HIP CLOSE REDUCTION Left 1/20/2022    Procedure: CLOSED REDUCTION HIP;  Surgeon: Larry Castillo MD;  Location: [facility];  Service: Orthopedics    JOINT REPLACEMENT      T KR LEFT    KNEE SURGERY Left 1996    MANDIBLE SURGERY      IL ARTHRP ACETBLR/PROX FEM PROSTC AGRFT/ALGRFT Right 4/11/2024    Procedure: ARTHROPLASTY HIP TOTAL ANTERIOR,NAVIGATED- same day;  Surgeon: Mahendra Rodriguez DO;  Location:  MAIN OR;  Service: Orthopedics    IL CONV PREV HIP TOT HIP  "ARTHRP W/WO AGRFT/ALGRFT Left 12/22/2021    Procedure: ARTHROPLASTY HIP TOTAL- conversion;  Surgeon: Yifan Caraballo MD;  Location: [facility];  Service: Orthopedics    MO REMOVAL IMPLANT DEEP Left 9/8/2021    Procedure: REMOVAL HARDWARE HIP;  Surgeon: Yifan Caraballo MD;  Location: [facility];  Service: Orthopedics    ROTATOR CUFF REPAIR Left     WRIST FRACTURE SURGERY Left     plate in place       Family History:  Family history reviewed and non-contributory  Family History   Problem Relation Age of Onset    Cancer Mother     Arthritis Father     Diabetes Sister     Cancer Brother     Diabetes Daughter     Depression Son        Social History:  Social History     Socioeconomic History    Marital status: /Civil Union     Spouse name: None    Number of children: None    Years of education: None    Highest education level: None   Occupational History    None   Tobacco Use    Smoking status: Every Day     Current packs/day: 0.50     Types: Cigarettes    Smokeless tobacco: Never   Vaping Use    Vaping status: Never Used   Substance and Sexual Activity    Alcohol use: Not Currently     Comment: \"at holidays\"    Drug use: No    Sexual activity: Not Currently   Other Topics Concern    None   Social History Narrative    None     Social Determinants of Health     Financial Resource Strain: Not on file   Food Insecurity: No Food Insecurity (12/22/2021)    Hunger Vital Sign     Worried About Running Out of Food in the Last Year: Never true     Ran Out of Food in the Last Year: Never true   Transportation Needs: Unmet Transportation Needs (12/22/2021)    PRAPARE - Transportation     Lack of Transportation (Medical): Yes     Lack of Transportation (Non-Medical): Yes   Physical Activity: Not on file   Stress: Not on file   Social Connections: Not on file   Intimate Partner Violence: Not on file   Housing Stability: Low Risk  (12/22/2021)    Housing Stability Vital Sign     Unable to Pay for Housing in the Last " Year: No     Number of Places Lived in the Last Year: 1     Unstable Housing in the Last Year: No       Allergies:   No Known Allergies        Labs:  0   Lab Value Date/Time    HCT 43.7 03/04/2024 0931    HCT 35.4 (L) 02/20/2023 0632    HCT 36.4 (L) 02/19/2023 0530    HCT 28.8 (L) 03/09/2015 0534    HCT 28.2 (L) 03/08/2015 0605    HCT 28.7 (L) 03/07/2015 0534    HGB 13.7 03/04/2024 0931    HGB 11.4 (L) 02/20/2023 0632    HGB 11.9 (L) 02/19/2023 0530    HGB 9.4 (L) 03/09/2015 0534    HGB 9.3 (L) 03/08/2015 0605    HGB 9.8 (L) 03/07/2015 0534    INR 0.88 03/04/2024 0931    INR 0.99 03/05/2015 1315    WBC 8.98 03/04/2024 0931    WBC 14.15 (H) 02/20/2023 0632    WBC 17.88 (H) 02/19/2023 0530    WBC 12.05 (H) 03/09/2015 0534    WBC 13.61 (H) 03/08/2015 0605    WBC 15.84 (H) 03/07/2015 0534    ESR 27 (H) 01/18/2022 1724    CRP 6.1 (H) 01/18/2022 1724       Meds:  No current facility-administered medications for this encounter.    Current Outpatient Medications:     acetaminophen (TYLENOL) 500 mg tablet, Take 2 tablets (1,000 mg total) by mouth every 8 (eight) hours, Disp: 60 tablet, Rfl: 0    albuterol (PROVENTIL HFA,VENTOLIN HFA) 90 mcg/act inhaler, Inhale 2 puffs every 4 (four) hours as needed for wheezing pt reports not using, Disp: , Rfl:     ascorbic acid (VITAMIN C) 500 MG tablet, Take 1 tablet (500 mg total) by mouth 2 (two) times a day, Disp: 30 tablet, Rfl: 3    aspirin (ECOTRIN LOW STRENGTH) 81 mg EC tablet, Take 1 tablet (81 mg total) by mouth 2 (two) times a day, Disp: 60 tablet, Rfl: 0    Budeson-Glycopyrrol-Formoterol (Breztri Aerosphere) 160-9-4.8 MCG/ACT AERO, Inhale 2 puffs Rinse mouth after use., Disp: , Rfl:     cholecalciferol (VITAMIN D3) 1,000 units tablet, Take 2 tablets (2,000 Units total) by mouth daily, Disp: 60 tablet, Rfl: 1    diclofenac sodium (VOLTAREN) 50 mg EC tablet, Take 1 tablet (50 mg total) by mouth 2 (two) times a day as needed (hip pain) Take with food, Disp: 30 tablet, Rfl: 5     diclofenac sodium (VOLTAREN) 50 mg EC tablet, Take 1 tablet (50 mg total) by mouth 2 (two) times a day, Disp: 60 tablet, Rfl: 1    DULoxetine (CYMBALTA) 30 mg delayed release capsule, Take 1 capsule by mouth 2 (two) times a day pt reports not taking , Disp: , Rfl:     ergocalciferol (ERGOCALCIFEROL) 1.25 MG (38497 UT) capsule, take 1 capsule by mouth every week, Disp: , Rfl:     fenofibrate (TRICOR) 145 mg tablet, Take 145 mg by mouth daily pt reports not taking , Disp: , Rfl:     fluticasone-salmeterol (Advair) 250-50 mcg/dose inhaler, inhale 1 puff by mouth and INTO THE LUNGS every 12 hours SAME TIMES EACH DAY, Disp: , Rfl:     folic acid (FOLVITE) 1 mg tablet, Take 1 tablet (1 mg total) by mouth daily, Disp: 30 tablet, Rfl: 3    guaiFENesin (MUCINEX) 600 mg 12 hr tablet, Take 1 tablet (600 mg total) by mouth 2 (two) times a day (Patient not taking: Reported on 4/15/2024), Disp: 14 tablet, Rfl: 0    Icosapent Ethyl (Vascepa) 1 g CAPS, Take 2 g by mouth 2 (two) times a day, Disp: , Rfl:     ipratropium-albuterol (DUO-NEB) 0.5-2.5 mg/3 mL nebulizer solution, Take 3 mL by nebulization 4 (four) times a day (Patient not taking: Reported on 4/15/2024), Disp: 20 mL, Rfl: 2    loratadine (CLARITIN) 10 mg tablet, Take 10 mg by mouth daily, Disp: , Rfl:     MELATONIN PO, Take 2 mg by mouth daily at bedtime, Disp: , Rfl:     metFORMIN (GLUCOPHAGE) 1000 MG tablet, Take 1,000 mg by mouth 2 (two) times a day with meals pt reports not taking , Disp: , Rfl:     Multiple Vitamin (multivitamin) tablet, TAKE 1 TABLET DAILY., Disp: , Rfl:     Multiple Vitamins-Minerals (multivitamin with minerals) tablet, Take 1 tablet by mouth daily (Patient not taking: Reported on 4/15/2024), Disp: 30 tablet, Rfl: 3    omeprazole (PriLOSEC OTC) 20 MG tablet, Take 20 mg by mouth daily, Disp: , Rfl:     omeprazole (PriLOSEC) 20 mg delayed release capsule, take 1 capsule by mouth twice a day 30 TO 60 MINUTES PRIOR TO EATING, Disp: , Rfl:      "ondansetron (ZOFRAN-ODT) 4 mg disintegrating tablet, Take 1 tablet (4 mg total) by mouth every 6 (six) hours as needed for nausea or vomiting (Patient not taking: Reported on 4/23/2024), Disp: 20 tablet, Rfl: 0    pregabalin (LYRICA) 100 mg capsule, Take 100 mg by mouth 2 (two) times a day pt reports not taking , Disp: , Rfl:     rosuvastatin (CRESTOR) 5 mg tablet, Take 5 mg by mouth in the morning., Disp: , Rfl:     senna-docusate sodium (SENOKOT S) 8.6-50 mg per tablet, Take 1 tablet by mouth daily, Disp: 30 tablet, Rfl: 0    traZODone (DESYREL) 50 mg tablet, Take 1 tablet (50 mg total) by mouth daily at bedtime (Patient not taking: Reported on 4/15/2024), Disp: 30 tablet, Rfl: 0    Blood Culture:   Lab Results   Component Value Date    BLOODCX No Growth After 5 Days. 01/19/2022    BLOODCX No Growth After 5 Days. 01/19/2022       Wound Culture:   No results found for: \"WOUNDCULT\"    Ins and Outs:  No intake/output data recorded.          Physical Exam:   /70 (BP Location: Right arm)   Pulse 78   Temp (!) 97.3 °F (36.3 °C) (Oral)   Resp 16   Wt 67.6 kg (149 lb 0.5 oz)   SpO2 99%   BMI 23.34 kg/m²   Gen: No acute distress, resting comfortably in bed  HEENT: Eyes clear, moist mucus membranes, hearing intact  Respiratory: No audible wheezing or stridor  Cardiovascular: Well Perfused peripherally, 2+ distal pulse  Abdomen: nondistended, no peritoneal signs  Musculoskeletal: right lower extremity  Incision is well-approximated and well-healing without erythema or purulent material indicative of infection.  He is not tender with palpation of the lateral aspect of the hip.  Sensation intact over the toes  5/5 motor strength to hip flexion/extension, knee flexion/extension, ankle dorsi/plantar flexion  Toes WWP.  Musculature is soft and compressible  Leg lengths equal     Radiology:   I personally reviewed the films.  X-rays AP/Lateral views of right hip show implants held in stable alignment.  There is no " evidence of hardware loosening or failure.   VAS Venous Duplex was negative for blood clot.    Assessment:  69 y.o.male with right hip pain and swelling s/p right hip WOLFGANG with Dr. Rodriguez on 4/11/2024.  Imaging is normal.    Plan:   A discussion was had with the patient today that what he is describing sounds like normal post-op pain and swelling.  We discussed that some degree of swelling can be normal for up to six months to a year after surgery.  He may take OTC medications PRN pain.  He can use topical creams for pain over the lateral aspect of his hip if desired, but we discussed that he must avoid putting any creams on his incision site until it is completely healed.  WBAT right lower extremity  PT/OT as directed by Dr. Rodriguez's team  Pain control  Body mass index is 23.34 kg/m².   Dispo: Ok for discharge from ortho perspective  The patient should call Dr. Rodriguez's office with any further questions or concerns.      Louise Martins PA-C

## 2024-05-23 ENCOUNTER — TELEPHONE (OUTPATIENT)
Age: 69
End: 2024-05-23

## 2024-05-23 NOTE — TELEPHONE ENCOUNTER
Caller: [facility]    Doctor: Michael    Reason for call: Patient has appt at [facility] tomorrow at noon and they need the Plan of Care signed off for the patient to continue PT. I provided the direct fax number to the [place] office and they will send it right away.   Please sign and return ASAP    Call back#: [phone]

## 2024-05-28 ENCOUNTER — OFFICE VISIT (OUTPATIENT)
Dept: OBGYN CLINIC | Facility: MEDICAL CENTER | Age: 69
End: 2024-05-28

## 2024-05-28 VITALS
DIASTOLIC BLOOD PRESSURE: 66 MMHG | SYSTOLIC BLOOD PRESSURE: 130 MMHG | HEART RATE: 82 BPM | HEIGHT: 67 IN | WEIGHT: 147 LBS | BODY MASS INDEX: 23.07 KG/M2

## 2024-05-28 DIAGNOSIS — Z96.641 STATUS POST TOTAL HIP REPLACEMENT, RIGHT: Primary | ICD-10-CM

## 2024-05-28 PROCEDURE — 99024 POSTOP FOLLOW-UP VISIT: CPT | Performed by: STUDENT IN AN ORGANIZED HEALTH CARE EDUCATION/TRAINING PROGRAM

## 2024-05-28 NOTE — PROGRESS NOTES
Subjective: 69 y.o. male presents to the office 7 weeks s/p right anterior total hip arthroplasty performed on 04/11/2024. He notes that he may be overdoing his activities and has some lateral hip soreness. Patient seen and examined. Pain controlled. Progressing well in physical therapy. Incision without drainage. Denies fevers or chills    Physical Exam:  Incision: delayed healing at distal 1/3 without erythema or drainage.   ROM: as expected with some IT band soreness  5/5 IP/Q/HS/TA/GS, 2+ DP/PT, SILT DP/SP/S/S/TN    No new imaging obtained today    Assessment/Plan:  7 weeks s/p right anterior total hip arthroplasty performed on 04/11/2024     - continue multi-modal pain control   - Weight bearing status: as tolerated  - DVT ppx: completed  - Incision care: continue observation as it is healing with small delayed area distal 1/3  - Continue PT/OT  - F/U in 5-6 weeks    Scribe Attestation      I,:  Maria Esther Nicole am acting as a scribe while in the presence of the attending physician.:       I,:  Mahendra Rodriguez DO personally performed the services described in this documentation    as scribed in my presence.:

## 2024-06-14 ENCOUNTER — TELEPHONE (OUTPATIENT)
Age: 69
End: 2024-06-14

## 2024-06-14 ENCOUNTER — HOSPITAL ENCOUNTER (EMERGENCY)
Facility: HOSPITAL | Age: 69
Discharge: HOME | End: 2024-06-14
Attending: INTERNAL MEDICINE | Admitting: INTERNAL MEDICINE
Payer: MEDICARE

## 2024-06-14 VITALS
DIASTOLIC BLOOD PRESSURE: 76 MMHG | SYSTOLIC BLOOD PRESSURE: 140 MMHG | WEIGHT: 151.46 LBS | TEMPERATURE: 98 F | OXYGEN SATURATION: 96 % | HEART RATE: 102 BPM | RESPIRATION RATE: 20 BRPM | BODY MASS INDEX: 23.72 KG/M2

## 2024-06-14 DIAGNOSIS — Z51.89 VISIT FOR WOUND CHECK: Primary | ICD-10-CM

## 2024-06-14 PROCEDURE — 99282 EMERGENCY DEPT VISIT SF MDM: CPT

## 2024-06-14 PROCEDURE — 99283 EMERGENCY DEPT VISIT LOW MDM: CPT | Performed by: INTERNAL MEDICINE

## 2024-06-14 NOTE — TELEPHONE ENCOUNTER
Caller: Chelsi Agarwal [facility]     Doctor: Michael    Reason for call: Had Right hip replacement 9 weeks ago and he has a scar that looks a little bit infected and she sees what looks like a piece of suture coming out    Call back#: [phone]

## 2024-06-14 NOTE — ED PROVIDER NOTES
History  Chief Complaint   Patient presents with    Wound Check     Pt had hip replacement on 4/11, concerned for wound infection at incision site. Denies fevers     HPI  69-year-old man presents to ED for wound check.  Patient reports his physical therapist told him his wound should be healed by now.  He reached out to his orthopedic surgeon who asked him to send a photo.  He was unable to send the photos, so he came to the ER so that we could send the photo to his orthopedic surgeon.  He reports the wound was leaking previously,  but no longer is.  Denies any redness surrounding the wound.  He denies any worsening pain.  He has had no fevers, chills, abdominal pain, nausea or vomiting.  He denies any other complaints or concerns at this time.    Prior to Admission Medications   Prescriptions Last Dose Informant Patient Reported? Taking?   Budeson-Glycopyrrol-Formoterol (Breztri Aerosphere) 160-9-4.8 MCG/ACT AERO   Yes No   Sig: Inhale 2 puffs Rinse mouth after use.   DULoxetine (CYMBALTA) 30 mg delayed release capsule   Yes No   Sig: Take 1 capsule by mouth 2 (two) times a day pt reports not taking    Icosapent Ethyl (Vascepa) 1 g CAPS   Yes No   Sig: Take 2 g by mouth 2 (two) times a day   MELATONIN PO   Yes No   Sig: Take 2 mg by mouth daily at bedtime   Multiple Vitamin (multivitamin) tablet   Yes No   Sig: TAKE 1 TABLET DAILY.   Multiple Vitamins-Minerals (multivitamin with minerals) tablet   No No   Sig: Take 1 tablet by mouth daily   Patient not taking: Reported on 4/15/2024   acetaminophen (TYLENOL) 500 mg tablet   No No   Sig: Take 2 tablets (1,000 mg total) by mouth every 8 (eight) hours   albuterol (PROVENTIL HFA,VENTOLIN HFA) 90 mcg/act inhaler   Yes No   Sig: Inhale 2 puffs every 4 (four) hours as needed for wheezing pt reports not using   ascorbic acid (VITAMIN C) 500 MG tablet   No No   Sig: Take 1 tablet (500 mg total) by mouth 2 (two) times a day   aspirin (ECOTRIN LOW STRENGTH) 81 mg EC tablet    No No   Sig: Take 1 tablet (81 mg total) by mouth 2 (two) times a day   cholecalciferol (VITAMIN D3) 1,000 units tablet   No No   Sig: Take 2 tablets (2,000 Units total) by mouth daily   diclofenac sodium (VOLTAREN) 50 mg EC tablet   No No   Sig: Take 1 tablet (50 mg total) by mouth 2 (two) times a day as needed (hip pain) Take with food   diclofenac sodium (VOLTAREN) 50 mg EC tablet   No No   Sig: Take 1 tablet (50 mg total) by mouth 2 (two) times a day   ergocalciferol (ERGOCALCIFEROL) 1.25 MG (51881 UT) capsule   Yes No   Sig: take 1 capsule by mouth every week   fenofibrate (TRICOR) 145 mg tablet   Yes No   Sig: Take 145 mg by mouth daily pt reports not taking    fluticasone-salmeterol (Advair) 250-50 mcg/dose inhaler   Yes No   Sig: inhale 1 puff by mouth and INTO THE LUNGS every 12 hours SAME TIMES EACH DAY   folic acid (FOLVITE) 1 mg tablet   No No   Sig: Take 1 tablet (1 mg total) by mouth daily   guaiFENesin (MUCINEX) 600 mg 12 hr tablet   No No   Sig: Take 1 tablet (600 mg total) by mouth 2 (two) times a day   Patient not taking: Reported on 4/15/2024   ipratropium-albuterol (DUO-NEB) 0.5-2.5 mg/3 mL nebulizer solution   No No   Sig: Take 3 mL by nebulization 4 (four) times a day   Patient not taking: Reported on 4/15/2024   loratadine (CLARITIN) 10 mg tablet   Yes No   Sig: Take 10 mg by mouth daily   metFORMIN (GLUCOPHAGE) 1000 MG tablet   Yes No   Sig: Take 1,000 mg by mouth 2 (two) times a day with meals pt reports not taking    omeprazole (PriLOSEC OTC) 20 MG tablet   Yes No   Sig: Take 20 mg by mouth daily   omeprazole (PriLOSEC) 20 mg delayed release capsule   Yes No   Sig: take 1 capsule by mouth twice a day 30 TO 60 MINUTES PRIOR TO EATING   ondansetron (ZOFRAN-ODT) 4 mg disintegrating tablet   No No   Sig: Take 1 tablet (4 mg total) by mouth every 6 (six) hours as needed for nausea or vomiting   Patient not taking: Reported on 4/23/2024   pregabalin (LYRICA) 100 mg capsule   Yes No   Sig: Take  100 mg by mouth 2 (two) times a day pt reports not taking    rosuvastatin (CRESTOR) 5 mg tablet   Yes No   Sig: Take 5 mg by mouth in the morning.   senna-docusate sodium (SENOKOT S) 8.6-50 mg per tablet   No No   Sig: Take 1 tablet by mouth daily   traZODone (DESYREL) 50 mg tablet   No No   Sig: Take 1 tablet (50 mg total) by mouth daily at bedtime   Patient not taking: Reported on 4/15/2024      Facility-Administered Medications: None       Past Medical History:   Diagnosis Date    Arthritis     osteoarthritis    Cervical radiculopathy     COPD (chronic obstructive pulmonary disease) ([facility])     CPAP (continuous positive airway pressure) dependence     Diabetes mellitus ([facility])     pre-diabetic    Fibromyalgia     Fibromyalgia, primary     GERD (gastroesophageal reflux disease)     History of transfusion     Hyperlipidemia     Osteopenia     Pneumonia     Shortness of breath     with exertion    Sleep apnea     USES CPAP HS       Past Surgical History:   Procedure Laterality Date    CHOLECYSTECTOMY      COLONOSCOPY      CYST REMOVAL      FEMUR SURGERY Left     HIP CLOSE REDUCTION Left 1/20/2022    Procedure: CLOSED REDUCTION HIP;  Surgeon: Larry Castillo MD;  Location: [facility];  Service: Orthopedics    JOINT REPLACEMENT      T KR LEFT    KNEE SURGERY Left 1996    MANDIBLE SURGERY      RI ARTHRP ACETBLR/PROX FEM PROSTC AGRFT/ALGRFT Right 4/11/2024    Procedure: ARTHROPLASTY HIP TOTAL ANTERIOR,NAVIGATED- same day;  Surgeon: Mahendra Rodriguez DO;  Location:  MAIN OR;  Service: Orthopedics    RI CONV PREV HIP TOT HIP ARTHRP W/WO AGRFT/ALGRFT Left 12/22/2021    Procedure: ARTHROPLASTY HIP TOTAL- conversion;  Surgeon: Yifan Caraballo MD;  Location: [facility];  Service: Orthopedics    RI REMOVAL IMPLANT DEEP Left 9/8/2021    Procedure: REMOVAL HARDWARE HIP;  Surgeon: Yifan Caraballo MD;  Location: [facility];  Service: Orthopedics    ROTATOR CUFF REPAIR Left     WRIST FRACTURE SURGERY Left     plate  "in place       Family History   Problem Relation Age of Onset    Cancer Mother     Arthritis Father     Diabetes Sister     Cancer Brother     Diabetes Daughter     Depression Son      I have reviewed and agree with the history as documented.    E-Cigarette/Vaping    E-Cigarette Use Never User      E-Cigarette/Vaping Substances    Nicotine No     THC No     CBD No     Flavoring No     Other No     Unknown No      Social History     Tobacco Use    Smoking status: Every Day     Current packs/day: 0.50     Types: Cigarettes    Smokeless tobacco: Never   Vaping Use    Vaping status: Never Used   Substance Use Topics    Alcohol use: Not Currently     Comment: \"at holidays\"    Drug use: No       Review of Systems   All other systems reviewed and are negative.      Physical Exam  Physical Exam  On exam the patient is awake, alert, and comfortable. Mucous membranes are moist. The patient's heart is regular. No respiratory distress.  Abdomen non-distended. Moves all extremities. Patient neurologically nonfocal. Right hip wound as below, no surrounding erythema, no skin streaking, no cellulitis, no crepitus, no tenderness to palpation. Back without deformities.               Vital Signs  ED Triage Vitals [06/14/24 1411]   Temperature Pulse Respirations Blood Pressure SpO2   98 °F (36.7 °C) 102 20 140/76 96 %      Temp Source Heart Rate Source Patient Position - Orthostatic VS BP Location FiO2 (%)   Oral Monitor Lying Left arm --      Pain Score       --           Vitals:    06/14/24 1411   BP: 140/76   Pulse: 102   Patient Position - Orthostatic VS: Lying         Visual Acuity      ED Medications  Medications - No data to display    Diagnostic Studies  Results Reviewed       None                   No orders to display              Procedures  Procedures         ED Course  ED Course as of 06/14/24 1614   Fri Jun 14, 2024   1552 Discussed patient with orthopedic surgeon, Dr. Rodriguez, who stated wound looked good and no further " treatment is necessary                                SBIRT 22yo+      Flowsheet Row Most Recent Value   Initial Alcohol Screen: US AUDIT-C     1. How often do you have a drink containing alcohol? 0 Filed at: 06/14/2024 1425   2. How many drinks containing alcohol do you have on a typical day you are drinking?  0 Filed at: 06/14/2024 1425   3b. FEMALE Any Age, or MALE 65+: How often do you have 4 or more drinks on one occassion? 0 Filed at: 06/14/2024 1425   Audit-C Score 0 Filed at: 06/14/2024 1425   STACY: How many times in the past year have you...    Used an illegal drug or used a prescription medication for non-medical reasons? Never Filed at: 06/14/2024 1425                      Medical Decision Making  Problems Addressed:  Visit for wound check: self-limited or minor problem             Disposition  Final diagnoses:   Visit for wound check     Time reflects when diagnosis was documented in both MDM as applicable and the Disposition within this note       Time User Action Codes Description Comment    6/14/2024  3:56 PM Kendy Fontanez Add [Z51.89] Visit for wound check           ED Disposition       ED Disposition   Discharge    Condition   Stable    Date/Time   Fri Jun 14, 2024 1556    Comment   Gregory Dumont discharge to home/self care.                   Follow-up Information       Follow up With Specialties Details Why Contact Info    Mahendra Rodriguez, DO Orthopedic Surgery Call  As needed [address] [place] [id number]  [phone]              Patient's Medications   Discharge Prescriptions    No medications on file       No discharge procedures on file.    PDMP Review         Value Time User    PDMP Reviewed  Yes 4/11/2024  6:07 AM Danielle Swain PA-C            ED Provider  Electronically Signed by             Kendy Fontanez MD  06/14/24 2841

## 2024-07-05 ENCOUNTER — TELEPHONE (OUTPATIENT)
Age: 69
End: 2024-07-05

## 2024-07-05 NOTE — TELEPHONE ENCOUNTER
Caller: Octavia    Doctor: Margarette    Reason for call: Checking to see if most recent plan of care was received from 7/3/24. Can it be signed and sent back when received    Call back#: [phone]

## 2024-07-09 ENCOUNTER — APPOINTMENT (OUTPATIENT)
Dept: RADIOLOGY | Facility: MEDICAL CENTER | Age: 69
End: 2024-07-09
Payer: MEDICARE

## 2024-07-09 ENCOUNTER — TELEPHONE (OUTPATIENT)
Dept: OBGYN CLINIC | Facility: HOSPITAL | Age: 69
End: 2024-07-09

## 2024-07-09 ENCOUNTER — OFFICE VISIT (OUTPATIENT)
Dept: OBGYN CLINIC | Facility: MEDICAL CENTER | Age: 69
End: 2024-07-09

## 2024-07-09 VITALS
HEART RATE: 101 BPM | WEIGHT: 148 LBS | DIASTOLIC BLOOD PRESSURE: 84 MMHG | HEIGHT: 67 IN | SYSTOLIC BLOOD PRESSURE: 132 MMHG | BODY MASS INDEX: 23.23 KG/M2

## 2024-07-09 DIAGNOSIS — Z96.641 STATUS POST TOTAL HIP REPLACEMENT, RIGHT: ICD-10-CM

## 2024-07-09 DIAGNOSIS — M16.11 PRIMARY OSTEOARTHRITIS OF ONE HIP, RIGHT: ICD-10-CM

## 2024-07-09 DIAGNOSIS — M76.31 IT BAND SYNDROME, RIGHT: Primary | ICD-10-CM

## 2024-07-09 PROCEDURE — 99024 POSTOP FOLLOW-UP VISIT: CPT | Performed by: STUDENT IN AN ORGANIZED HEALTH CARE EDUCATION/TRAINING PROGRAM

## 2024-07-09 PROCEDURE — 73502 X-RAY EXAM HIP UNI 2-3 VIEWS: CPT

## 2024-07-09 NOTE — TELEPHONE ENCOUNTER
Caller: Patient    Doctor: Michael    Reason for call: Patient wants to know what he can take to relieve hip pain. Please advise.    Call back#: [phone]

## 2024-07-09 NOTE — PROGRESS NOTES
Subjective: 69 y.o. male presents to the office 3 months s/p right anterior total hip arthroplasty performed on 04/11/2024. He has been experiencing worsening lateral thigh pain, worse distally at the IT band insertion. He gets relief with use of heat. Patient seen and examined. Pain controlled. Progressing well. Incision without drainage. Denies fevers or chills    Physical Exam:  Incision: healing well  ROM: as expected without pain  5/5 IP/Q/HS/TA/GS, 2+ DP/PT, SILT DP/SP/S/S/TN    No new imaging obtained today    Assessment/Plan:  3 months s/p right anterior total hip arthroplasty performed on 04/11/2024   Right IT band syndrome    - continue multi-modal pain control   - Weight bearing status: as tolerated  - DVT ppx: completed  - Continue PT/OT exercises  - F/U in 9 months for annual check. New x-rays upon arrival. Patient notes that his insurance will be changing and may not be able to follow up with us as he will need to transition his care to [facility].    Scribe Attestation      I,:  Maria Esther Nicole am acting as a scribe while in the presence of the attending physician.:       I,:  Mahendra Rodriguez, DO personally performed the services described in this documentation    as scribed in my presence.:

## 2024-07-29 ENCOUNTER — TELEPHONE (OUTPATIENT)
Age: 69
End: 2024-07-29

## 2024-07-29 DIAGNOSIS — Z96.641 STATUS POST TOTAL HIP REPLACEMENT, RIGHT: Primary | ICD-10-CM

## 2024-07-29 NOTE — TELEPHONE ENCOUNTER
Updated PT script was faxed to [facility] at provided fax number ([phone])

## 2024-07-29 NOTE — TELEPHONE ENCOUNTER
Caller: Patient     Doctor: Dr. Rodriguez     Reason for call: Patient asking if an updated script for PT can be faxed to [facility].  Last session scheduled for 7/31/24.     Attn: [facility]  Fax: [phone]    Call back#: [phone]

## 2024-08-07 NOTE — TELEPHONE ENCOUNTER
Caller: Damion from [facility], [place], operated by [facility]    Doctor: Dr. Rodriguez     Reason for call: Damion calling stating patient is currently going on 4 months post op from Right WOLFGANG.  Damion is asking if there are any current restrictions.  Asking if it is ok to start stretcing into hip extension and IR, past 90 degrees of hip flexion, and if joint mobilizations can be started.  Any current restrictions at this time?  Order can be faxed to number below.    Fax: [phone]    Call back#: [phone]

## 2024-10-21 ENCOUNTER — APPOINTMENT (EMERGENCY)
Dept: RADIOLOGY | Facility: HOSPITAL | Age: 69
End: 2024-10-21
Payer: COMMERCIAL

## 2024-10-21 ENCOUNTER — HOSPITAL ENCOUNTER (EMERGENCY)
Facility: HOSPITAL | Age: 69
Discharge: HOME | End: 2024-10-21
Attending: EMERGENCY MEDICINE
Payer: COMMERCIAL

## 2024-10-21 ENCOUNTER — HOSPITAL ENCOUNTER (OUTPATIENT)
Dept: RADIOLOGY | Facility: HOSPITAL | Age: 69
Discharge: HOME | End: 2024-10-21
Payer: COMMERCIAL

## 2024-10-21 ENCOUNTER — TELEPHONE (OUTPATIENT)
Age: 69
End: 2024-10-21

## 2024-10-21 VITALS
SYSTOLIC BLOOD PRESSURE: 159 MMHG | RESPIRATION RATE: 20 BRPM | HEART RATE: 76 BPM | OXYGEN SATURATION: 97 % | TEMPERATURE: 98.5 F | DIASTOLIC BLOOD PRESSURE: 83 MMHG

## 2024-10-21 DIAGNOSIS — R07.9 CHEST PAIN WITH LOW RISK OF ACUTE CORONARY SYNDROME: Primary | ICD-10-CM

## 2024-10-21 DIAGNOSIS — Z96.641 STATUS POST TOTAL HIP REPLACEMENT, RIGHT: ICD-10-CM

## 2024-10-21 DIAGNOSIS — N17.9 AKI (ACUTE KIDNEY INJURY) (HCC): ICD-10-CM

## 2024-10-21 DIAGNOSIS — Z96.641 STATUS POST TOTAL HIP REPLACEMENT, RIGHT: Primary | ICD-10-CM

## 2024-10-21 LAB
ALBUMIN SERPL BCG-MCNC: 4.3 G/DL (ref 3.5–5)
ALP SERPL-CCNC: 104 U/L (ref 34–104)
ALT SERPL W P-5'-P-CCNC: 25 U/L (ref 7–52)
ANION GAP SERPL CALCULATED.3IONS-SCNC: 6 MMOL/L (ref 4–13)
AST SERPL W P-5'-P-CCNC: 24 U/L (ref 13–39)
BASOPHILS # BLD AUTO: 0.12 THOUSANDS/ΜL (ref 0–0.1)
BASOPHILS NFR BLD AUTO: 1 % (ref 0–1)
BILIRUB SERPL-MCNC: 0.33 MG/DL (ref 0.2–1)
BNP SERPL-MCNC: 23 PG/ML (ref 0–100)
BUN SERPL-MCNC: 28 MG/DL (ref 5–25)
CALCIUM SERPL-MCNC: 9.5 MG/DL (ref 8.4–10.2)
CARDIAC TROPONIN I PNL SERPL HS: 4 NG/L
CHLORIDE SERPL-SCNC: 105 MMOL/L (ref 96–108)
CO2 SERPL-SCNC: 23 MMOL/L (ref 21–32)
CREAT SERPL-MCNC: 1.43 MG/DL (ref 0.6–1.3)
EOSINOPHIL # BLD AUTO: 0.32 THOUSAND/ΜL (ref 0–0.61)
EOSINOPHIL NFR BLD AUTO: 3 % (ref 0–6)
ERYTHROCYTE [DISTWIDTH] IN BLOOD BY AUTOMATED COUNT: 12.7 % (ref 11.6–15.1)
GFR SERPL CREATININE-BSD FRML MDRD: 49 ML/MIN/1.73SQ M
GLUCOSE SERPL-MCNC: 96 MG/DL (ref 65–140)
HCT VFR BLD AUTO: 42.8 % (ref 36.5–49.3)
HGB BLD-MCNC: 14.1 G/DL (ref 12–17)
IMM GRANULOCYTES # BLD AUTO: 0.04 THOUSAND/UL (ref 0–0.2)
IMM GRANULOCYTES NFR BLD AUTO: 0 % (ref 0–2)
LYMPHOCYTES # BLD AUTO: 3.27 THOUSANDS/ΜL (ref 0.6–4.47)
LYMPHOCYTES NFR BLD AUTO: 26 % (ref 14–44)
MCH RBC QN AUTO: 29.8 PG (ref 26.8–34.3)
MCHC RBC AUTO-ENTMCNC: 32.9 G/DL (ref 31.4–37.4)
MCV RBC AUTO: 91 FL (ref 82–98)
MONOCYTES # BLD AUTO: 0.94 THOUSAND/ΜL (ref 0.17–1.22)
MONOCYTES NFR BLD AUTO: 8 % (ref 4–12)
NEUTROPHILS # BLD AUTO: 7.69 THOUSANDS/ΜL (ref 1.85–7.62)
NEUTS SEG NFR BLD AUTO: 62 % (ref 43–75)
NRBC BLD AUTO-RTO: 0 /100 WBCS
PLATELET # BLD AUTO: 235 THOUSANDS/UL (ref 149–390)
PMV BLD AUTO: 12.6 FL (ref 8.9–12.7)
POTASSIUM SERPL-SCNC: 4.4 MMOL/L (ref 3.5–5.3)
PROT SERPL-MCNC: 8 G/DL (ref 6.4–8.4)
RBC # BLD AUTO: 4.73 MILLION/UL (ref 3.88–5.62)
SODIUM SERPL-SCNC: 134 MMOL/L (ref 135–147)
WBC # BLD AUTO: 12.38 THOUSAND/UL (ref 4.31–10.16)

## 2024-10-21 PROCEDURE — 80053 COMPREHEN METABOLIC PANEL: CPT | Performed by: EMERGENCY MEDICINE

## 2024-10-21 PROCEDURE — 83880 ASSAY OF NATRIURETIC PEPTIDE: CPT | Performed by: EMERGENCY MEDICINE

## 2024-10-21 PROCEDURE — 85025 COMPLETE CBC W/AUTO DIFF WBC: CPT | Performed by: EMERGENCY MEDICINE

## 2024-10-21 PROCEDURE — 73502 X-RAY EXAM HIP UNI 2-3 VIEWS: CPT

## 2024-10-21 PROCEDURE — 71046 X-RAY EXAM CHEST 2 VIEWS: CPT

## 2024-10-21 PROCEDURE — 36415 COLL VENOUS BLD VENIPUNCTURE: CPT | Performed by: EMERGENCY MEDICINE

## 2024-10-21 PROCEDURE — 93005 ELECTROCARDIOGRAM TRACING: CPT

## 2024-10-21 PROCEDURE — 96372 THER/PROPH/DIAG INJ SC/IM: CPT

## 2024-10-21 PROCEDURE — 84484 ASSAY OF TROPONIN QUANT: CPT | Performed by: EMERGENCY MEDICINE

## 2024-10-21 PROCEDURE — 99285 EMERGENCY DEPT VISIT HI MDM: CPT

## 2024-10-21 PROCEDURE — 99285 EMERGENCY DEPT VISIT HI MDM: CPT | Performed by: EMERGENCY MEDICINE

## 2024-10-21 RX ORDER — KETOROLAC TROMETHAMINE 30 MG/ML
15 INJECTION, SOLUTION INTRAMUSCULAR; INTRAVENOUS ONCE
Status: COMPLETED | OUTPATIENT
Start: 2024-10-21 | End: 2024-10-21

## 2024-10-21 RX ADMIN — KETOROLAC TROMETHAMINE 15 MG: 30 INJECTION, SOLUTION INTRAMUSCULAR; INTRAVENOUS at 21:47

## 2024-10-21 NOTE — TELEPHONE ENCOUNTER
Also, is it his left or Right hip?  We replaced his right hip 6 months ago, but the phone call says left hip...

## 2024-10-21 NOTE — TELEPHONE ENCOUNTER
Caller: Patient     Doctor: Michael    Reason for call: Patient is 6 mth PO from left WOLFGANG and he is still having pain. He states it is really bad today.  He will have his PCP place XR order so he can go today. He is asking for the doctor and PA to review the XR and reach out to him  I tried to schedule appt for today at 3pm with Danielle but I got a msg that it was not an available appt spot even tho it was open. He said he would come in today, if something was open  Please advise ASAP    Call back#: [phone]

## 2024-10-21 NOTE — TELEPHONE ENCOUNTER
Spoke to patient-  he reports RIGHT hip pain, radiating down the leg and up the body. He reports he needs an XR order and would like that reviewed. No XR placed by PCP at this time

## 2024-10-21 NOTE — TELEPHONE ENCOUNTER
Xray order placed.  It sounds like it is more likely from his lumbar spine, but he can get the xray and I will take a look at it. Thanks!

## 2024-10-21 NOTE — TELEPHONE ENCOUNTER
PCP or myself can place order for an xray and I am happy to take a look at it.  It looks like the 3pm slot was taken by another patient, which is why it is no longer available.

## 2024-10-22 LAB
ATRIAL RATE: 76 BPM
ATRIAL RATE: 78 BPM
P AXIS: 50 DEGREES
P AXIS: 56 DEGREES
PR INTERVAL: 156 MS
PR INTERVAL: 160 MS
QRS AXIS: 81 DEGREES
QRS AXIS: 82 DEGREES
QRSD INTERVAL: 116 MS
QRSD INTERVAL: 120 MS
QT INTERVAL: 358 MS
QT INTERVAL: 360 MS
QTC INTERVAL: 405 MS
QTC INTERVAL: 408 MS
T WAVE AXIS: 67 DEGREES
T WAVE AXIS: 71 DEGREES
VENTRICULAR RATE: 76 BPM
VENTRICULAR RATE: 78 BPM

## 2024-10-22 PROCEDURE — 93010 ELECTROCARDIOGRAM REPORT: CPT

## 2024-10-22 NOTE — ED PROVIDER NOTES
Time reflects when diagnosis was documented in both MDM as applicable and the Disposition within this note       Time User Action Codes Description Comment    10/21/2024  9:16 PM Kit Cartwright Add [R07.9] Chest pain with low risk of acute coronary syndrome     10/21/2024  9:16 PM Kit Cartwright Add [N17.9] LORETTA (acute kidney injury) (HCC)           ED Disposition       ED Disposition   Discharge    Condition   Stable    Date/Time   Mon Oct 21, 2024  9:15 PM    Comment   Gregory Dumont discharge to home/self care.                   Assessment & Plan       Medical Decision Making  Chest pain-no history exam for just pulmonary embolism and workup for this is not indicated.  Will do cardiac workup to rule ACS, pneumonia, pneumothorax, chest heart failure, dysrhythmia, mild/pericarditis, treat symptoms, reassess.    Amount and/or Complexity of Data Reviewed  Labs: ordered.  Radiology: ordered and independent interpretation performed.    Risk  Prescription drug management.        ED Course as of 10/21/24 2136   Mon Oct 21, 2024   2108 Medical workup is reviewed and benign.  Will reassure, , discharged home.       Medications   ketorolac (TORADOL) injection 15 mg (has no administration in time range)       ED Risk Strat Scores   HEART Risk Score      Flowsheet Row Most Recent Value   Heart Score Risk Calculator    History 0 Filed at: 10/21/2024 2115   ECG 1 Filed at: 10/21/2024 2115   Age 2 Filed at: 10/21/2024 2115   Risk Factors 2 Filed at: 10/21/2024 2115   Troponin 0 Filed at: 10/21/2024 2115   HEART Score 5 Filed at: 10/21/2024 2115                                                   History of Present Illness       Chief Complaint   Patient presents with    Chest Pain     Pt reports chest pain that feels like heaviness since yesterday. Also reports sob and dizziness. Also reports right sided jaw pain         Past Medical History:   Diagnosis Date    Arthritis     osteoarthritis    Cervical radiculopathy     COPD  "(chronic obstructive pulmonary disease) (HCC)     CPAP (continuous positive airway pressure) dependence     Diabetes mellitus (HCC)     pre-diabetic    Fibromyalgia     Fibromyalgia, primary     GERD (gastroesophageal reflux disease)     History of transfusion     Hyperlipidemia     Osteopenia     Pneumonia     Shortness of breath     with exertion    Sleep apnea     USES CPAP HS      Past Surgical History:   Procedure Laterality Date    CHOLECYSTECTOMY      COLONOSCOPY      CYST REMOVAL      FEMUR SURGERY Left     HIP CLOSE REDUCTION Left 1/20/2022    Procedure: CLOSED REDUCTION HIP;  Surgeon: Larry Castillo MD;  Location: [facility];  Service: Orthopedics    JOINT REPLACEMENT      T KR LEFT    KNEE SURGERY Left 1996    MANDIBLE SURGERY      NC ARTHRP ACETBLR/PROX FEM PROSTC AGRFT/ALGRFT Right 4/11/2024    Procedure: ARTHROPLASTY HIP TOTAL ANTERIOR,NAVIGATED- same day;  Surgeon: Mahendra Rodriguez DO;  Location: [facility];  Service: Orthopedics    NC CONV PREV HIP TOT HIP ARTHRP W/WO AGRFT/ALGRFT Left 12/22/2021    Procedure: ARTHROPLASTY HIP TOTAL- conversion;  Surgeon: Yifan Caraballo MD;  Location: [facility];  Service: Orthopedics    NC REMOVAL IMPLANT DEEP Left 9/8/2021    Procedure: REMOVAL HARDWARE HIP;  Surgeon: Yifan Caraballo MD;  Location: [facility];  Service: Orthopedics    ROTATOR CUFF REPAIR Left     WRIST FRACTURE SURGERY Left     plate in place      Family History   Problem Relation Age of Onset    Cancer Mother     Arthritis Father     Diabetes Sister     Cancer Brother     Diabetes Daughter     Depression Son       Social History     Tobacco Use    Smoking status: Every Day     Current packs/day: 0.50     Types: Cigarettes    Smokeless tobacco: Never   Vaping Use    Vaping status: Never Used   Substance Use Topics    Alcohol use: Not Currently     Comment: \"at holidays\"    Drug use: No      E-Cigarette/Vaping    E-Cigarette Use Never User       E-Cigarette/Vaping Substances    " Nicotine No     THC No     CBD No     Flavoring No     Other No     Unknown No       I have reviewed and agree with the history as documented.       History provided by:  Patient  Chest Pain  Pain location:  Substernal area  Pain quality: pressure    Pain radiates to:  Upper back  Pain radiates to the back: yes    Pain severity:  Moderate  Onset quality:  Gradual  Duration:  2 days  Timing:  Constant  Progression:  Unchanged  Chronicity:  New  Relieved by:  Nothing  Worsened by:  Nothing tried  Associated symptoms: no abdominal pain, no fatigue, no fever, no nausea, no numbness, no palpitations, no shortness of breath, not vomiting and no weakness        Review of Systems   Constitutional:  Negative for activity change, appetite change, fatigue and fever.   HENT:  Negative for congestion, dental problem, ear pain, rhinorrhea and sore throat.    Eyes:  Negative for pain and redness.   Respiratory:  Negative for chest tightness, shortness of breath and wheezing.    Cardiovascular:  Positive for chest pain. Negative for palpitations.   Gastrointestinal:  Negative for abdominal pain, blood in stool, constipation, diarrhea, nausea and vomiting.   Endocrine: Negative for cold intolerance and heat intolerance.   Genitourinary:  Negative for dysuria, frequency and hematuria.   Musculoskeletal:  Negative for arthralgias and myalgias.   Skin:  Negative for color change, pallor and rash.   Neurological:  Negative for weakness and numbness.   Hematological:  Does not bruise/bleed easily.   Psychiatric/Behavioral:  Negative for agitation, hallucinations and suicidal ideas.            Objective       ED Triage Vitals   Temperature Pulse Blood Pressure Respirations SpO2 Patient Position - Orthostatic VS   10/21/24 1907 10/21/24 1907 10/21/24 1907 10/21/24 1907 10/21/24 1910 10/21/24 1907   98.5 °F (36.9 °C) 78 (!) 171/88 22 100 % Lying      Temp Source Heart Rate Source BP Location FiO2 (%) Pain Score    10/21/24 1907 10/21/24  1907 10/21/24 1907 -- 10/21/24 2025    Oral Monitor Right arm  7      Vitals      Date and Time Temp Pulse SpO2 Resp BP Pain Score FACES Pain Rating User   10/21/24 2025 -- 76 97 % 20 159/83 7 -- KU   10/21/24 1910 -- -- 100 % -- -- -- -- LP   10/21/24 1907 98.5 °F (36.9 °C) 78 -- 22 171/88 -- -- LP            Physical Exam  Constitutional:       Appearance: He is well-developed.   HENT:      Head: Normocephalic and atraumatic.   Eyes:      General: No scleral icterus.     Conjunctiva/sclera: Conjunctivae normal.   Neck:      Vascular: No JVD.      Trachea: No tracheal deviation.   Cardiovascular:      Rate and Rhythm: Normal rate and regular rhythm.      Heart sounds: Normal heart sounds.   Pulmonary:      Effort: Pulmonary effort is normal. No respiratory distress.   Abdominal:      General: There is no distension.      Tenderness: There is no abdominal tenderness.   Musculoskeletal:         General: No deformity. Normal range of motion.      Cervical back: Normal range of motion.   Skin:     Coloration: Skin is not pale.      Findings: No erythema or rash.   Neurological:      Mental Status: He is alert and oriented to person, place, and time.   Psychiatric:         Behavior: Behavior normal.         Results Reviewed       Procedure Component Value Units Date/Time    HS Troponin I 2hr [[id number]]     Lab Status: No result Specimen: Blood     HS Troponin 0hr (reflex protocol) [[id number]]  (Normal) Collected: 10/21/24 2022    Lab Status: Final result Specimen: Blood from Arm, Right Updated: 10/21/24 2054     hs TnI 0hr 4 ng/L     B-Type Natriuretic Peptide(BNP) [[id number]]  (Normal) Collected: 10/21/24 2022    Lab Status: Final result Specimen: Blood from Arm, Right Updated: 10/21/24 2053     BNP 23 pg/mL     Comprehensive metabolic panel [[id number]]  (Abnormal) Collected: 10/21/24 2022    Lab Status: Final result Specimen: Blood from Arm, Right Updated: 10/21/24 2050     Sodium 134 mmol/L      Potassium 4.4 mmol/L       Chloride 105 mmol/L      CO2 23 mmol/L      ANION GAP 6 mmol/L      BUN 28 mg/dL      Creatinine 1.43 mg/dL      Glucose 96 mg/dL      Calcium 9.5 mg/dL      AST 24 U/L      ALT 25 U/L      Alkaline Phosphatase 104 U/L      Total Protein 8.0 g/dL      Albumin 4.3 g/dL      Total Bilirubin 0.33 mg/dL      eGFR 49 ml/min/1.73sq m     Narrative:      National Kidney Disease Foundation guidelines for Chronic Kidney Disease (CKD):     Stage 1 with normal or high GFR (GFR > 90 mL/min/1.73 square meters)    Stage 2 Mild CKD (GFR = 60-89 mL/min/1.73 square meters)    Stage 3A Moderate CKD (GFR = 45-59 mL/min/1.73 square meters)    Stage 3B Moderate CKD (GFR = 30-44 mL/min/1.73 square meters)    Stage 4 Severe CKD (GFR = 15-29 mL/min/1.73 square meters)    Stage 5 End Stage CKD (GFR <15 mL/min/1.73 square meters)  Note: GFR calculation is accurate only with a steady state creatinine    CBC and differential [[id number]]  (Abnormal) Collected: 10/21/24 2022    Lab Status: Final result Specimen: Blood from Arm, Right Updated: 10/21/24 2029     WBC 12.38 Thousand/uL      RBC 4.73 Million/uL      Hemoglobin 14.1 g/dL      Hematocrit 42.8 %      MCV 91 fL      MCH 29.8 pg      MCHC 32.9 g/dL      RDW 12.7 %      MPV 12.6 fL      Platelets 235 Thousands/uL      nRBC 0 /100 WBCs      Segmented % 62 %      Immature Grans % 0 %      Lymphocytes % 26 %      Monocytes % 8 %      Eosinophils Relative 3 %      Basophils Relative 1 %      Absolute Neutrophils 7.69 Thousands/µL      Absolute Immature Grans 0.04 Thousand/uL      Absolute Lymphocytes 3.27 Thousands/µL      Absolute Monocytes 0.94 Thousand/µL      Eosinophils Absolute 0.32 Thousand/µL      Basophils Absolute 0.12 Thousands/µL             XR chest 2 views   ED Interpretation by Kit Cartwright MD (10/21 2108)   Primary reviewed.  No acute abnormality.          ECG 12 Lead Documentation Only    Date/Time: 10/21/2024 9:00 PM    Performed by: Kit Cartwright MD  Authorized  by: Kit Cartwright MD    ECG reviewed by me, the ED Provider: yes    Patient location:  ED  Rate:     ECG rate:  76    ECG rate assessment: normal    Rhythm:     Rhythm: sinus rhythm    Ectopy:     Ectopy: none    QRS:     QRS axis:  Normal    QRS intervals:  Normal  Conduction:     Conduction: abnormal      Abnormal conduction: complete RBBB    ST segments:     ST segments:  Normal  T waves:     T waves: normal        ED Medication and Procedure Management   Prior to Admission Medications   Prescriptions Last Dose Informant Patient Reported? Taking?   Budeson-Glycopyrrol-Formoterol (Breztri Aerosphere) 160-9-4.8 MCG/ACT AERO   Yes No   Sig: Inhale 2 puffs Rinse mouth after use.   DULoxetine (CYMBALTA) 30 mg delayed release capsule   Yes No   Sig: Take 1 capsule by mouth 2 (two) times a day pt reports not taking    Icosapent Ethyl (Vascepa) 1 g CAPS   Yes No   Sig: Take 2 g by mouth 2 (two) times a day   MELATONIN PO   Yes No   Sig: Take 2 mg by mouth daily at bedtime   Multiple Vitamin (multivitamin) tablet   Yes No   Sig: TAKE 1 TABLET DAILY.   Multiple Vitamins-Minerals (multivitamin with minerals) tablet   No No   Sig: Take 1 tablet by mouth daily   Patient not taking: Reported on 4/15/2024   acetaminophen (TYLENOL) 500 mg tablet   No No   Sig: Take 2 tablets (1,000 mg total) by mouth every 8 (eight) hours   albuterol (PROVENTIL HFA,VENTOLIN HFA) 90 mcg/act inhaler   Yes No   Sig: Inhale 2 puffs every 4 (four) hours as needed for wheezing pt reports not using   ascorbic acid (VITAMIN C) 500 MG tablet   No No   Sig: Take 1 tablet (500 mg total) by mouth 2 (two) times a day   Patient not taking: Reported on 7/9/2024   aspirin (ECOTRIN LOW STRENGTH) 81 mg EC tablet   No No   Sig: Take 1 tablet (81 mg total) by mouth 2 (two) times a day   cholecalciferol (VITAMIN D3) 1,000 units tablet   No No   Sig: Take 2 tablets (2,000 Units total) by mouth daily   diclofenac sodium (VOLTAREN) 50 mg EC tablet   No No   Sig:  Take 1 tablet (50 mg total) by mouth 2 (two) times a day   diclofenac sodium (VOLTAREN) 50 mg EC tablet   No No   Sig: Take 1 tablet (50 mg total) by mouth 2 (two) times a day as needed (hip pain) Take with food   ergocalciferol (ERGOCALCIFEROL) 1.25 MG (02271 UT) capsule   Yes No   Sig: take 1 capsule by mouth every week   fenofibrate (TRICOR) 145 mg tablet   Yes No   Sig: Take 145 mg by mouth daily pt reports not taking    fluticasone-salmeterol (Advair) 250-50 mcg/dose inhaler   Yes No   Sig: inhale 1 puff by mouth and INTO THE LUNGS every 12 hours SAME TIMES EACH DAY   folic acid (FOLVITE) 1 mg tablet   No No   Sig: Take 1 tablet (1 mg total) by mouth daily   Patient not taking: Reported on 7/9/2024   guaiFENesin (MUCINEX) 600 mg 12 hr tablet   No No   Sig: Take 1 tablet (600 mg total) by mouth 2 (two) times a day   Patient not taking: Reported on 4/15/2024   ipratropium-albuterol (DUO-NEB) 0.5-2.5 mg/3 mL nebulizer solution   No No   Sig: Take 3 mL by nebulization 4 (four) times a day   Patient not taking: Reported on 4/15/2024   loratadine (CLARITIN) 10 mg tablet   Yes No   Sig: Take 10 mg by mouth daily   metFORMIN (GLUCOPHAGE) 1000 MG tablet   Yes No   Sig: Take 1,000 mg by mouth 2 (two) times a day with meals pt reports not taking    omeprazole (PriLOSEC OTC) 20 MG tablet   Yes No   Sig: Take 20 mg by mouth daily   omeprazole (PriLOSEC) 20 mg delayed release capsule   Yes No   Sig: take 1 capsule by mouth twice a day 30 TO 60 MINUTES PRIOR TO EATING   ondansetron (ZOFRAN-ODT) 4 mg disintegrating tablet   No No   Sig: Take 1 tablet (4 mg total) by mouth every 6 (six) hours as needed for nausea or vomiting   Patient not taking: Reported on 4/23/2024   pregabalin (LYRICA) 100 mg capsule   Yes No   Sig: Take 100 mg by mouth 2 (two) times a day pt reports not taking    rosuvastatin (CRESTOR) 5 mg tablet   Yes No   Sig: Take 5 mg by mouth in the morning.   senna-docusate sodium (SENOKOT S) 8.6-50 mg per tablet    No No   Sig: Take 1 tablet by mouth daily   traZODone (DESYREL) 50 mg tablet   No No   Sig: Take 1 tablet (50 mg total) by mouth daily at bedtime   Patient not taking: Reported on 4/15/2024      Facility-Administered Medications: None     Current Discharge Medication List        CONTINUE these medications which have NOT CHANGED    Details   acetaminophen (TYLENOL) 500 mg tablet Take 2 tablets (1,000 mg total) by mouth every 8 (eight) hours  Qty: 60 tablet, Refills: 0    Associated Diagnoses: Primary osteoarthritis of one hip, right      albuterol (PROVENTIL HFA,VENTOLIN HFA) 90 mcg/act inhaler Inhale 2 puffs every 4 (four) hours as needed for wheezing pt reports not using      ascorbic acid (VITAMIN C) 500 MG tablet Take 1 tablet (500 mg total) by mouth 2 (two) times a day  Qty: 30 tablet, Refills: 3    Associated Diagnoses: Primary osteoarthritis of one hip, right      aspirin (ECOTRIN LOW STRENGTH) 81 mg EC tablet Take 1 tablet (81 mg total) by mouth 2 (two) times a day  Qty: 60 tablet, Refills: 0    Associated Diagnoses: Primary osteoarthritis of one hip, right      Budeson-Glycopyrrol-Formoterol (Breztri Aerosphere) 160-9-4.8 MCG/ACT AERO Inhale 2 puffs Rinse mouth after use.      cholecalciferol (VITAMIN D3) 1,000 units tablet Take 2 tablets (2,000 Units total) by mouth daily  Qty: 60 tablet, Refills: 1    Associated Diagnoses: Primary osteoarthritis of one hip, right      !! diclofenac sodium (VOLTAREN) 50 mg EC tablet Take 1 tablet (50 mg total) by mouth 2 (two) times a day  Qty: 60 tablet, Refills: 1    Associated Diagnoses: Primary osteoarthritis of one hip, right      !! diclofenac sodium (VOLTAREN) 50 mg EC tablet Take 1 tablet (50 mg total) by mouth 2 (two) times a day as needed (hip pain) Take with food  Qty: 60 tablet, Refills: 1    Associated Diagnoses: Primary osteoarthritis of one hip, right      DULoxetine (CYMBALTA) 30 mg delayed release capsule Take 1 capsule by mouth 2 (two) times a day pt  reports not taking       ergocalciferol (ERGOCALCIFEROL) 1.25 MG (65408 UT) capsule take 1 capsule by mouth every week    Comments: pt reports not taking       fenofibrate (TRICOR) 145 mg tablet Take 145 mg by mouth daily pt reports not taking       fluticasone-salmeterol (Advair) 250-50 mcg/dose inhaler inhale 1 puff by mouth and INTO THE LUNGS every 12 hours SAME TIMES EACH DAY      folic acid (FOLVITE) 1 mg tablet Take 1 tablet (1 mg total) by mouth daily  Qty: 30 tablet, Refills: 3    Associated Diagnoses: Primary osteoarthritis of one hip, right      guaiFENesin (MUCINEX) 600 mg 12 hr tablet Take 1 tablet (600 mg total) by mouth 2 (two) times a day  Qty: 14 tablet, Refills: 0    Associated Diagnoses: COPD exacerbation (HCC)      Icosapent Ethyl (Vascepa) 1 g CAPS Take 2 g by mouth 2 (two) times a day      ipratropium-albuterol (DUO-NEB) 0.5-2.5 mg/3 mL nebulizer solution Take 3 mL by nebulization 4 (four) times a day  Qty: 20 mL, Refills: 2    Associated Diagnoses: COPD exacerbation (HCC)      loratadine (CLARITIN) 10 mg tablet Take 10 mg by mouth daily      MELATONIN PO Take 2 mg by mouth daily at bedtime      metFORMIN (GLUCOPHAGE) 1000 MG tablet Take 1,000 mg by mouth 2 (two) times a day with meals pt reports not taking       Multiple Vitamin (multivitamin) tablet TAKE 1 TABLET DAILY.    Comments: reports not taking       Multiple Vitamins-Minerals (multivitamin with minerals) tablet Take 1 tablet by mouth daily  Qty: 30 tablet, Refills: 3    Associated Diagnoses: Primary osteoarthritis of one hip, right      omeprazole (PriLOSEC OTC) 20 MG tablet Take 20 mg by mouth daily      omeprazole (PriLOSEC) 20 mg delayed release capsule take 1 capsule by mouth twice a day 30 TO 60 MINUTES PRIOR TO EATING      ondansetron (ZOFRAN-ODT) 4 mg disintegrating tablet Take 1 tablet (4 mg total) by mouth every 6 (six) hours as needed for nausea or vomiting  Qty: 20 tablet, Refills: 0    Associated Diagnoses: Primary  osteoarthritis of one hip, right      pregabalin (LYRICA) 100 mg capsule Take 100 mg by mouth 2 (two) times a day pt reports not taking       rosuvastatin (CRESTOR) 5 mg tablet Take 5 mg by mouth in the morning.      senna-docusate sodium (SENOKOT S) 8.6-50 mg per tablet Take 1 tablet by mouth daily  Qty: 30 tablet, Refills: 0    Associated Diagnoses: Primary osteoarthritis of one hip, right      traZODone (DESYREL) 50 mg tablet Take 1 tablet (50 mg total) by mouth daily at bedtime  Qty: 30 tablet, Refills: 0    Associated Diagnoses: Insomnia       !! - Potential duplicate medications found. Please discuss with provider.        No discharge procedures on file.  ED SEPSIS DOCUMENTATION   Time reflects when diagnosis was documented in both MDM as applicable and the Disposition within this note       Time User Action Codes Description Comment    10/21/2024  9:16 PM Kit Cartwright Add [R07.9] Chest pain with low risk of acute coronary syndrome     10/21/2024  9:16 PM Kit Cartwright Add [N17.9] LORETTA (acute kidney injury) ([facility])                  Kit Cartwright MD  10/21/24 0342

## 2024-11-01 ENCOUNTER — CONSULT (OUTPATIENT)
Dept: PULMONOLOGY | Facility: CLINIC | Age: 69
End: 2024-11-01
Payer: COMMERCIAL

## 2024-11-01 VITALS
TEMPERATURE: 98.7 F | HEART RATE: 77 BPM | SYSTOLIC BLOOD PRESSURE: 144 MMHG | OXYGEN SATURATION: 97 % | HEIGHT: 67 IN | WEIGHT: 152.6 LBS | DIASTOLIC BLOOD PRESSURE: 70 MMHG | BODY MASS INDEX: 23.95 KG/M2

## 2024-11-01 DIAGNOSIS — F17.210 CIGARETTE NICOTINE DEPENDENCE WITHOUT COMPLICATION: ICD-10-CM

## 2024-11-01 DIAGNOSIS — J44.9 CHRONIC OBSTRUCTIVE PULMONARY DISEASE, UNSPECIFIED COPD TYPE (HCC): ICD-10-CM

## 2024-11-01 DIAGNOSIS — G47.33 SEVERE OBSTRUCTIVE SLEEP APNEA: Primary | ICD-10-CM

## 2024-11-01 PROBLEM — Z96.642 STATUS POST LEFT HIP REPLACEMENT: Status: RESOLVED | Noted: 2021-12-23 | Resolved: 2024-11-01

## 2024-11-01 PROBLEM — S72.009A FEMORAL NECK FRACTURE (HCC): Status: RESOLVED | Noted: 2021-08-16 | Resolved: 2024-11-01

## 2024-11-01 PROBLEM — E78.2 MIXED HYPERLIPIDEMIA: Status: ACTIVE | Noted: 2019-01-24

## 2024-11-01 PROBLEM — M25.552 LEFT HIP PAIN: Status: RESOLVED | Noted: 2021-11-03 | Resolved: 2024-11-01

## 2024-11-01 PROBLEM — Z47.89 ORTHOPEDIC AFTERCARE: Status: RESOLVED | Noted: 2021-12-29 | Resolved: 2024-11-01

## 2024-11-01 PROBLEM — J96.01 ACUTE RESPIRATORY FAILURE WITH HYPOXIA (HCC): Status: RESOLVED | Noted: 2023-02-18 | Resolved: 2024-11-01

## 2024-11-01 PROBLEM — R73.03 PREDIABETES: Status: RESOLVED | Noted: 2021-08-16 | Resolved: 2024-11-01

## 2024-11-01 PROBLEM — R65.10 SIRS (SYSTEMIC INFLAMMATORY RESPONSE SYNDROME) (HCC): Status: RESOLVED | Noted: 2023-02-18 | Resolved: 2024-11-01

## 2024-11-01 PROCEDURE — 99204 OFFICE O/P NEW MOD 45 MIN: CPT | Performed by: INTERNAL MEDICINE

## 2024-11-01 RX ORDER — EMPAGLIFLOZIN 10 MG/1
10 TABLET, FILM COATED ORAL DAILY
COMMUNITY
Start: 2024-08-23

## 2024-11-01 RX ORDER — ROSUVASTATIN CALCIUM 10 MG/1
1 TABLET, COATED ORAL DAILY
COMMUNITY
Start: 2024-08-27

## 2024-11-01 RX ORDER — DICLOFENAC SODIUM 75 MG/1
1 TABLET, DELAYED RELEASE ORAL 2 TIMES DAILY
COMMUNITY
Start: 2024-10-11

## 2024-11-01 RX ORDER — CELECOXIB 200 MG/1
1 CAPSULE ORAL 2 TIMES DAILY
COMMUNITY

## 2024-11-04 NOTE — ASSESSMENT & PLAN NOTE
Current smoker  Not interested in quitting at this time  70+ PY smoker and meets criteria for lung cancer screening. Discussed benefits, rationale and potential for additional unexpected findings. He is agreeable to proceed

## 2024-11-04 NOTE — PROGRESS NOTES
Consultation - Pulmonary Medicine   Gregory Dumont 69 y.o. male MRN: [id number]    Physician Requesting Consult: Maryann Gilliam  Reason for Consult: COPD    COPD (chronic obstructive pulmonary disease) (HCC)  Severe COPD, recent exacerbation  Continue Breztri  Continue Duoneb if needed.  Would update PFT and compalre with prior      Severe obstructive sleep apnea  Has [employer] CPAP  Using nightly  Continue current therapy    Nicotine dependence  Current smoker  Not interested in quitting at this time  70+ PY smoker and meets criteria for lung cancer screening. Discussed benefits, rationale and potential for additional unexpected findings. He is agreeable to proceed    Return in about 3 months (around 2/1/2025).      ______________________________________________________________________    HPI:    Gregory Dumont is a 69 y.o. male who presents for evaluation of COPD.  He has history of hospitalization last year for COPD exacerbation but since that time has remained out of the hospital.  He does have symptomatic COPD.  He takes Breztri twice daily.  He has DuoNeb for rescue.  He does still smoke.  He has over a 70-pack-year history of smoking.  While he has cut back somewhat, he is not interested in quitting.    He does get short of breath with exertion.  He does not typically wheeze.  No chronic cough.  No hemoptysis.  No unexplained weight loss.  No fever, chills, or sick contacts..        Review of Systems:    Aside from what is mentioned in the HPI, the review of systems otherwise negative.    Current Medications:    Current Outpatient Medications:     acetaminophen (TYLENOL) 500 mg tablet, Take 2 tablets (1,000 mg total) by mouth every 8 (eight) hours, Disp: 60 tablet, Rfl: 0    Budeson-Glycopyrrol-Formoterol (Breztri Aerosphere) 160-9-4.8 MCG/ACT AERO, Inhale 2 puffs Rinse mouth after use., Disp: , Rfl:     celecoxib (CeleBREX) 200 mg capsule, Take 1 capsule by mouth 2 (two) times a day, Disp: , Rfl:      diclofenac (VOLTAREN) 75 mg EC tablet, Take 1 tablet by mouth 2 (two) times a day, Disp: , Rfl:     diclofenac sodium (VOLTAREN) 50 mg EC tablet, Take 1 tablet (50 mg total) by mouth 2 (two) times a day as needed (hip pain) Take with food, Disp: 60 tablet, Rfl: 1    Jardiance 10 MG TABS tablet, Take 10 mg by mouth daily, Disp: , Rfl:     omeprazole (PriLOSEC) 20 mg delayed release capsule, take 1 capsule by mouth twice a day 30 TO 60 MINUTES PRIOR TO EATING, Disp: , Rfl:     rosuvastatin (CRESTOR) 10 MG tablet, Take 1 tablet by mouth in the morning, Disp: , Rfl:     albuterol (PROVENTIL HFA,VENTOLIN HFA) 90 mcg/act inhaler, Inhale 2 puffs every 4 (four) hours as needed for wheezing pt reports not using, Disp: , Rfl:     ipratropium-albuterol (DUO-NEB) 0.5-2.5 mg/3 mL nebulizer solution, Take 3 mL by nebulization 4 (four) times a day (Patient not taking: Reported on 4/15/2024), Disp: 20 mL, Rfl: 2    loratadine (CLARITIN) 10 mg tablet, Take 10 mg by mouth daily, Disp: , Rfl:     Multiple Vitamins-Minerals (multivitamin with minerals) tablet, Take 1 tablet by mouth daily (Patient not taking: Reported on 4/15/2024), Disp: 30 tablet, Rfl: 3    omeprazole (PriLOSEC OTC) 20 MG tablet, Take 20 mg by mouth daily, Disp: , Rfl:     Historical Information   Past Medical History:   Diagnosis Date    Arthritis     osteoarthritis    Cervical radiculopathy     COPD (chronic obstructive pulmonary disease) (HCC)     CPAP (continuous positive airway pressure) dependence     Diabetes mellitus (HCC)     pre-diabetic    Femoral neck fracture (HCC) 08/16/2021    Fibromyalgia     Fibromyalgia, primary     GERD (gastroesophageal reflux disease)     History of transfusion     Hyperlipidemia     Osteopenia     Pneumonia     Shortness of breath     with exertion    Sleep apnea     USES CPAP HS    Status post left hip replacement 12/23/2021     Past Surgical History:   Procedure Laterality Date    CHOLECYSTECTOMY      COLONOSCOPY      CYST  "REMOVAL      FEMUR SURGERY Left     HIP CLOSE REDUCTION Left 1/20/2022    Procedure: CLOSED REDUCTION HIP;  Surgeon: Larry Castillo MD;  [facility];  Service: Orthopedics    JOINT REPLACEMENT      T KR LEFT    KNEE SURGERY Left 1996    MANDIBLE SURGERY      WA ARTHRP ACETBLR/PROX FEM PROSTC AGRFT/ALGRFT Right 4/11/2024    Procedure: ARTHROPLASTY HIP TOTAL ANTERIOR,NAVIGATED- same day;  Surgeon: Mahendra Rodriguez DO;  [facility];  Service: Orthopedics    WA CONV PREV HIP TOT HIP ARTHRP W/WO AGRFT/ALGRFT Left 12/22/2021    Procedure: ARTHROPLASTY HIP TOTAL- conversion;  Surgeon: Yifan Caraballo MD;  [facility];  Service: Orthopedics    WA REMOVAL IMPLANT DEEP Left 9/8/2021    Procedure: REMOVAL HARDWARE HIP;  Surgeon: Yifan Caraballo MD;  [facility];  Service: Orthopedics    ROTATOR CUFF REPAIR Left     WRIST FRACTURE SURGERY Left     plate in place     Social History   Social History     Tobacco Use   Smoking Status Every Day    Current packs/day: 0.50    Average packs/day: 1.3 packs/day for 54.8 years (72.5 ttl pk-yrs)    Types: Cigarettes    Start date: 1970   Smokeless Tobacco Never         Family History:   Family History   Problem Relation Age of Onset    Cancer Mother     Arthritis Father     Diabetes Sister     Cancer Brother     Diabetes Daughter     Depression Son          PhysicalExamination:  Vitals:   /70 (BP Location: Left arm, Patient Position: Sitting, Cuff Size: Standard)   Pulse 77   Temp 98.7 °F (37.1 °C) (Tympanic)   Ht 5' 7\" (1.702 m)   Wt 69.2 kg (152 lb 9.6 oz)   SpO2 97%   BMI 23.90 kg/m²     Gen:  Comfortable on room air.  No conversational dyspnea  HEENT:  Conjugate gaze.  sclerae anicteric.  Oropharynx moist  Neck: Trachea is midline. No JVD. No adenopathy  Chest: Slightly distant breath sounds.  Hyperresonant.  No wheezes  Cardiac: Distant heart tones, regular. no murmur  Abdomen:  benign  Extremities: No edema  Neuro:  " "Normal speech and mentation      Diagnostic Data:  Labs:  I personally reviewed the most recent laboratory data pertinent to today's visit    Lab Results   Component Value Date    WBC 12.38 (H) 10/21/2024    HGB 14.1 10/21/2024    HCT 42.8 10/21/2024    MCV 91 10/21/2024     10/21/2024     Lab Results   Component Value Date    GLUCOSE 114 03/09/2015    CALCIUM 9.5 10/21/2024     (L) 03/09/2015    K 4.4 10/21/2024    CO2 23 10/21/2024     10/21/2024    BUN 28 (H) 10/21/2024    CREATININE 1.43 (H) 10/21/2024     No results found for: \"IGE\"  Lab Results   Component Value Date    ALT 25 10/21/2024    AST 24 10/21/2024    ALKPHOS 104 10/21/2024    BILITOT 0.4 03/05/2015       PFT results:  He has had prior pulmonary function studies at [facility].  These were remote in 2019.  This was only a spirometry and it was performed quite poorly.      Imaging:  CT scan of the chest from August 2023 was viewed on the PACS system.  Lung fields did not show any infiltrate or effusion.  Mild emphysema noted.  5 mm right upper lobe nodule.  Per radiologist report, this was stable in comparison with the study from 2017    [facility] report of lung cancer screening study from April 2021 did not show any evidence of suspicious nodule.  Minimal emphysema noted at the time    Deepak José MD  "

## 2024-11-04 NOTE — ASSESSMENT & PLAN NOTE
Severe COPD, recent exacerbation  Continue Breztri  Continue Duoneb if needed.  Would update PFT and compalre with prior

## 2024-11-05 ENCOUNTER — HOSPITAL ENCOUNTER (OUTPATIENT)
Dept: NON INVASIVE DIAGNOSTICS | Facility: HOSPITAL | Age: 69
Discharge: HOME | End: 2024-11-05
Payer: COMMERCIAL

## 2024-11-05 ENCOUNTER — HOSPITAL ENCOUNTER (OUTPATIENT)
Dept: NUCLEAR MEDICINE | Facility: HOSPITAL | Age: 69
Discharge: HOME | End: 2024-11-05
Payer: COMMERCIAL

## 2024-11-05 VITALS — WEIGHT: 152 LBS | BODY MASS INDEX: 23.86 KG/M2 | HEIGHT: 67 IN

## 2024-11-05 DIAGNOSIS — R07.9 CHEST PAIN, UNSPECIFIED TYPE: ICD-10-CM

## 2024-11-05 LAB
CHEST PAIN STATEMENT: NORMAL
MAX DIASTOLIC BP: 73 MMHG
MAX HR PERCENT: 64 %
MAX HR: 98 BPM
MAX PREDICTED HEART RATE: 152 BPM
NUC STRESS EJECTION FRACTION: 73 %
PROTOCOL NAME: NORMAL
RATE PRESSURE PRODUCT: NORMAL
REASON FOR TERMINATION: NORMAL
SL CV REST NUCLEAR ISOTOPE DOSE: 8.29 MCI
SL CV STRESS NUCLEAR ISOTOPE DOSE: 26.4 MCI
SL CV STRESS RECOVERY BP: NORMAL MMHG
SL CV STRESS RECOVERY HR: 80 BPM
SL CV STRESS RECOVERY O2 SAT: 99 %
STRESS ANGINA INDEX: 0
STRESS BASELINE BP: NORMAL MMHG
STRESS BASELINE HR: 63 BPM
STRESS O2 SAT REST: 99 %
STRESS PEAK HR: 98 BPM
STRESS POST ESTIMATED WORKLOAD: 1 METS
STRESS POST EXERCISE DUR MIN: 3 MIN
STRESS POST EXERCISE DUR MIN: 3 MIN
STRESS POST EXERCISE DUR SEC: 1 SEC
STRESS POST EXERCISE DUR SEC: 1 SEC
STRESS POST O2 SAT PEAK: 100 %
STRESS POST PEAK BP: 148 MMHG
STRESS POST PEAK HR: 98 BPM
STRESS POST PEAK SYSTOLIC BP: 163 MMHG
STRESS/REST PERFUSION RATIO: 1.07
TARGET HR FORMULA: NORMAL
TEST INDICATION: NORMAL

## 2024-11-05 PROCEDURE — 93016 CV STRESS TEST SUPVJ ONLY: CPT | Performed by: INTERNAL MEDICINE

## 2024-11-05 PROCEDURE — 93017 CV STRESS TEST TRACING ONLY: CPT

## 2024-11-05 PROCEDURE — 93018 CV STRESS TEST I&R ONLY: CPT | Performed by: INTERNAL MEDICINE

## 2024-11-05 PROCEDURE — A9502 TC99M TETROFOSMIN: HCPCS

## 2024-11-05 PROCEDURE — 78452 HT MUSCLE IMAGE SPECT MULT: CPT | Performed by: INTERNAL MEDICINE

## 2024-11-05 PROCEDURE — 78452 HT MUSCLE IMAGE SPECT MULT: CPT

## 2024-11-05 RX ORDER — REGADENOSON 0.08 MG/ML
0.4 INJECTION, SOLUTION INTRAVENOUS ONCE
Status: COMPLETED | OUTPATIENT
Start: 2024-11-05 | End: 2024-11-05

## 2024-11-05 RX ADMIN — REGADENOSON 0.4 MG: 0.08 INJECTION, SOLUTION INTRAVENOUS at 09:15

## 2024-11-20 ENCOUNTER — HOSPITAL ENCOUNTER (OUTPATIENT)
Dept: CT IMAGING | Facility: HOSPITAL | Age: 69
Discharge: HOME | End: 2024-11-20
Attending: INTERNAL MEDICINE
Payer: COMMERCIAL

## 2024-11-20 ENCOUNTER — HOSPITAL ENCOUNTER (OUTPATIENT)
Dept: PULMONOLOGY | Facility: HOSPITAL | Age: 69
Discharge: HOME | End: 2024-11-20
Attending: INTERNAL MEDICINE
Payer: COMMERCIAL

## 2024-11-20 DIAGNOSIS — F17.210 CIGARETTE NICOTINE DEPENDENCE WITHOUT COMPLICATION: ICD-10-CM

## 2024-11-20 DIAGNOSIS — J44.9 CHRONIC OBSTRUCTIVE PULMONARY DISEASE, UNSPECIFIED COPD TYPE (HCC): ICD-10-CM

## 2024-11-20 PROCEDURE — 94729 DIFFUSING CAPACITY: CPT

## 2024-11-20 PROCEDURE — 94060 EVALUATION OF WHEEZING: CPT

## 2024-11-20 PROCEDURE — 94726 PLETHYSMOGRAPHY LUNG VOLUMES: CPT | Performed by: INTERNAL MEDICINE

## 2024-11-20 PROCEDURE — 94726 PLETHYSMOGRAPHY LUNG VOLUMES: CPT

## 2024-11-20 PROCEDURE — 94729 DIFFUSING CAPACITY: CPT | Performed by: INTERNAL MEDICINE

## 2024-11-20 PROCEDURE — 94760 N-INVAS EAR/PLS OXIMETRY 1: CPT

## 2024-11-20 PROCEDURE — 94060 EVALUATION OF WHEEZING: CPT | Performed by: INTERNAL MEDICINE

## 2024-11-20 RX ORDER — ALBUTEROL SULFATE 0.83 MG/ML
2.5 SOLUTION RESPIRATORY (INHALATION) ONCE AS NEEDED
Status: COMPLETED | OUTPATIENT
Start: 2024-11-20 | End: 2024-11-20

## 2024-11-20 RX ADMIN — ALBUTEROL SULFATE 2.5 MG: 2.5 SOLUTION RESPIRATORY (INHALATION) at 08:47

## 2024-11-21 ENCOUNTER — RESULTS FOLLOW-UP (OUTPATIENT)
Dept: PULMONOLOGY | Facility: CLINIC | Age: 69
End: 2024-11-21

## 2024-12-09 ENCOUNTER — APPOINTMENT (OUTPATIENT)
Dept: RADIOLOGY | Facility: MEDICAL CENTER | Age: 69
End: 2024-12-09
Payer: COMMERCIAL

## 2024-12-09 ENCOUNTER — OFFICE VISIT (OUTPATIENT)
Dept: PAIN MEDICINE | Facility: MEDICAL CENTER | Age: 69
End: 2024-12-09
Payer: COMMERCIAL

## 2024-12-09 VITALS
HEART RATE: 80 BPM | DIASTOLIC BLOOD PRESSURE: 74 MMHG | WEIGHT: 153 LBS | BODY MASS INDEX: 24.01 KG/M2 | SYSTOLIC BLOOD PRESSURE: 152 MMHG | HEIGHT: 67 IN

## 2024-12-09 DIAGNOSIS — M54.50 CHRONIC BILATERAL LOW BACK PAIN WITHOUT SCIATICA: ICD-10-CM

## 2024-12-09 DIAGNOSIS — G89.29 CHRONIC BILATERAL LOW BACK PAIN WITHOUT SCIATICA: ICD-10-CM

## 2024-12-09 DIAGNOSIS — M54.50 CHRONIC BILATERAL LOW BACK PAIN WITHOUT SCIATICA: Primary | ICD-10-CM

## 2024-12-09 DIAGNOSIS — M46.1 SACROILIITIS (HCC): ICD-10-CM

## 2024-12-09 DIAGNOSIS — G89.29 CHRONIC BILATERAL LOW BACK PAIN WITHOUT SCIATICA: Primary | ICD-10-CM

## 2024-12-09 PROCEDURE — 99204 OFFICE O/P NEW MOD 45 MIN: CPT | Performed by: PHYSICAL MEDICINE & REHABILITATION

## 2024-12-09 PROCEDURE — G2211 COMPLEX E/M VISIT ADD ON: HCPCS | Performed by: PHYSICAL MEDICINE & REHABILITATION

## 2024-12-09 PROCEDURE — 72114 X-RAY EXAM L-S SPINE BENDING: CPT

## 2024-12-09 RX ORDER — TRAZODONE HYDROCHLORIDE 50 MG/1
TABLET, FILM COATED ORAL
COMMUNITY
Start: 2024-11-13

## 2024-12-09 RX ORDER — ICOSAPENT ETHYL 1000 MG/1
CAPSULE ORAL
COMMUNITY
Start: 2024-11-20

## 2024-12-09 NOTE — PROGRESS NOTES
Assessment  1. Chronic bilateral low back pain without sciatica    2. Sacroiliitis (HCC) - Bilateral        Plan  X-ray lumbar spine  Initiate physical therapy  Patient to schedule follow-up visit at the conclusion of physical therapy if he continues to have pain.  Could consider sacroiliac joint injections at that time.    My impressions and treatment recommendations were discussed in detail with the patient who verbalized understanding and had no further questions.  Discharge instructions were provided. I personally saw and examined the patient and I agree with the above discussed plan of care.    Orders Placed This Encounter   Procedures    XR spine lumbar complete w bending minimum 6 views     Standing Status:   Future     Expected Date:   12/9/2024     Expiration Date:   12/9/2028     Scheduling Instructions:      Bring along any outside films relating to this procedure.          Ambulatory referral to Physical Therapy     Standing Status:   Future     Expiration Date:   12/9/2025     Referral Priority:   Routine     Referral Type:   Physical Therapy     Referral Reason:   Specialty Services Required     Requested Specialty:   Physical Therapy     Number of Visits Requested:   1     Expiration Date:   12/9/2025     New Medications Ordered This Visit   Medications    Vascepa 1 g CAPS     Sig: TAKE TWO CAPSULES BY MOUTH TWICE A DAY WITH FOOD SWALLOWING WHOLE: DO NOT CHEW, OPEN, DISSOLVE AND/OR CRUSH    traZODone (DESYREL) 50 mg tablet     Sig: take 1-1/2 tablet by oral route  every day at bedtime for Insomnia       History of Present Illness    Gregory Dumont is a 69 y.o. male seen in consultation regarding chronic lower back pain.  The patient's been experiencing symptoms for about 10 years without any inciting event or trauma.  He describes pain that is moderate to severe in intensity currently rated as a 4-5/10.  Pain is constant without any typical pattern described as burning sharp and pins and needle  sensation localized to the back right worse than the left.  He denies any radicular features currently.    Aggravating factors include lying down standing bending sitting walking exercise coughing and sneezing.  Alleviating factors include relaxation and bowel movements.    No recent diagnostic imaging of the lumbar spine is available for review.    He did notice moderate relief with prior injections and physical therapy.    Social history positive for tobacco use negative for marijuana and alcohol.    Currently using diclofenac.    I have personally reviewed and/or updated the patient's past medical history, past surgical history, family history, social history, current medications, allergies, and vital signs today.     Review of Systems   Constitutional:  Negative for fever and unexpected weight change.   HENT:  Negative for trouble swallowing.    Eyes:  Positive for pain. Negative for visual disturbance.   Respiratory:  Positive for cough. Negative for shortness of breath and wheezing.    Cardiovascular:  Negative for chest pain and palpitations.   Gastrointestinal:  Negative for constipation, diarrhea, nausea and vomiting.   Endocrine: Positive for polyuria. Negative for cold intolerance, heat intolerance and polydipsia.   Genitourinary:  Negative for difficulty urinating and frequency.   Musculoskeletal:  Positive for back pain, joint swelling and myalgias. Negative for arthralgias and gait problem.   Skin:  Negative for rash.   Neurological:  Positive for headaches. Negative for dizziness, seizures, syncope and weakness.   Hematological:  Does not bruise/bleed easily.   Psychiatric/Behavioral:  Negative for dysphoric mood.    All other systems reviewed and are negative.      Patient Active Problem List   Diagnosis    Mixed hyperlipidemia    GERD (gastroesophageal reflux disease)    COPD (chronic obstructive pulmonary disease) (HCC)    Chest pain    Closed fracture of one rib of left side    Nicotine dependence     CKD (chronic kidney disease) stage 3, GFR 30-59 ml/min ([facility])    Cervical disc disorder with radiculopathy of mid-cervical region    Cervical spondylosis    Cervical facet joint syndrome    Chronic pain syndrome    Type 2 diabetes mellitus with stage 3 chronic kidney disease, without long-term current use of insulin ([facility])    Severe obstructive sleep apnea    Insomnia    Hypercalcemia    Primary osteoarthritis of one hip, right       Past Medical History:   Diagnosis Date    Arthritis     osteoarthritis    Cervical radiculopathy     COPD (chronic obstructive pulmonary disease) ([facility])     CPAP (continuous positive airway pressure) dependence     Diabetes mellitus ([facility])     pre-diabetic    Emphysema of lung ([facility])     Femoral neck fracture ([facility]) 08/16/2021    Fibromyalgia     Fibromyalgia, primary     GERD (gastroesophageal reflux disease)     History of transfusion     Hyperlipidemia     Osteoarthritis     Osteopenia     Pneumonia     Shortness of breath     with exertion    Sleep apnea     USES CPAP HS    Status post left hip replacement 12/23/2021       Past Surgical History:   Procedure Laterality Date    CHOLECYSTECTOMY      COLONOSCOPY      CYST REMOVAL      EPIDURAL BLOCK INJECTION      FEMUR SURGERY Left     FRACTURE SURGERY      HIP CLOSE REDUCTION Left 01/20/2022    Procedure: CLOSED REDUCTION HIP;  Surgeon: Larry Castillo MD;  [facility];  Service: Orthopedics    JOINT REPLACEMENT      T KR LEFT    KNEE SURGERY Left 1996    MANDIBLE SURGERY      ORTHOPEDIC SURGERY      MI ARTHRP ACETBLR/PROX FEM PROSTC AGRFT/ALGRFT Right 04/11/2024    Procedure: ARTHROPLASTY HIP TOTAL ANTERIOR,NAVIGATED- same day;  Surgeon: Mahendra Rodriguez DO;  [facility];  Service: Orthopedics    MI CONV PREV HIP TOT HIP ARTHRP W/WO AGRFT/ALGRFT Left 12/22/2021    Procedure: ARTHROPLASTY HIP TOTAL- conversion;  Surgeon: Yifan Caraballo MD;  [facility];  Service: Orthopedics    MI REMOVAL  "IMPLANT DEEP Left 09/08/2021    Procedure: REMOVAL HARDWARE HIP;  Surgeon: Yifan Caraballo MD;  [facility];  Service: Orthopedics    ROTATOR CUFF REPAIR Left     WRIST FRACTURE SURGERY Left     plate in place       Family History   Problem Relation Age of Onset    Cancer Mother     COPD Mother     Arthritis Father     Stroke Father     Diabetes Sister     Cancer Brother     Diabetes Daughter     Depression Son        Social History     Occupational History    Not on file   Tobacco Use    Smoking status: Every Day     Current packs/day: 0.50     Average packs/day: 1.3 packs/day for 54.9 years (72.6 ttl pk-yrs)     Types: Cigarettes     Start date: 1970    Smokeless tobacco: Never   Vaping Use    Vaping status: Never Used   Substance and Sexual Activity    Alcohol use: Not Currently     Comment: \"at holidays\"    Drug use: No    Sexual activity: Not Currently     Partners: Female       Current Outpatient Medications on File Prior to Visit   Medication Sig    acetaminophen (TYLENOL) 500 mg tablet Take 2 tablets (1,000 mg total) by mouth every 8 (eight) hours    albuterol (PROVENTIL HFA,VENTOLIN HFA) 90 mcg/act inhaler Inhale 2 puffs every 4 (four) hours as needed for wheezing pt reports not using    Budeson-Glycopyrrol-Formoterol (Breztri Aerosphere) 160-9-4.8 MCG/ACT AERO Inhale 2 puffs Rinse mouth after use.    diclofenac (VOLTAREN) 75 mg EC tablet Take 1 tablet by mouth 2 (two) times a day    Jardiance 10 MG TABS tablet Take 10 mg by mouth daily    loratadine (CLARITIN) 10 mg tablet Take 10 mg by mouth daily    omeprazole (PriLOSEC) 20 mg delayed release capsule take 1 capsule by mouth twice a day 30 TO 60 MINUTES PRIOR TO EATING    rosuvastatin (CRESTOR) 10 MG tablet Take 1 tablet by mouth in the morning    traZODone (DESYREL) 50 mg tablet take 1-1/2 tablet by oral route  every day at bedtime for Insomnia    Vascepa 1 g CAPS TAKE TWO CAPSULES BY MOUTH TWICE A DAY WITH FOOD SWALLOWING WHOLE: DO NOT CHEW, " "OPEN, DISSOLVE AND/OR CRUSH    celecoxib (CeleBREX) 200 mg capsule Take 1 capsule by mouth 2 (two) times a day (Patient not taking: Reported on 12/9/2024)    diclofenac sodium (VOLTAREN) 50 mg EC tablet Take 1 tablet (50 mg total) by mouth 2 (two) times a day as needed (hip pain) Take with food (Patient not taking: Reported on 12/9/2024)    ipratropium-albuterol (DUO-NEB) 0.5-2.5 mg/3 mL nebulizer solution Take 3 mL by nebulization 4 (four) times a day (Patient not taking: Reported on 4/15/2024)    Multiple Vitamins-Minerals (multivitamin with minerals) tablet Take 1 tablet by mouth daily (Patient not taking: Reported on 4/15/2024)    omeprazole (PriLOSEC OTC) 20 MG tablet Take 20 mg by mouth daily     No current facility-administered medications on file prior to visit.       No Known Allergies    Physical Exam    /74   Pulse 80   Ht 5' 7\" (1.702 m)   Wt 69.4 kg (153 lb)   BMI 23.96 kg/m²     Constitutional: normal, well developed, well nourished, alert, in no distress and non-toxic and no overt pain behavior.  Eyes: anicteric  HEENT: grossly intact  Neck: supple, symmetric, trachea midline and no masses   Pulmonary:even and unlabored  Cardiovascular:No edema or pitting edema present  Skin:Normal without rashes or lesions and well hydrated  Psychiatric:Mood and affect appropriate  Neurologic:Cranial Nerves II-XII grossly intact  Musculoskeletal:normal, except for significant tenderness to palpation over the sacroiliac joints right worse than left.    Sacroiliac joint testing is positive bilaterally for the following tests:  + Albina finger sign  + Yifan's test  + Gaenslen's test  + Posterior pelvic pain provocation  + Sacroiliac joint compression test    Imaging    tudy Result    Narrative & Impression   LUMBAR SPINE     INDICATION:   M54.50: Low back pain, unspecified.     COMPARISON:  8/31/2020     VIEWS:  XR SPINE LUMBAR 2 OR 3 VIEWS INJURY  Images: 2     FINDINGS:     There are 5 non rib bearing " lumbar vertebral bodies.      There is no evidence of acute fracture or destructive osseous lesion.     Persistent mild levoscoliosis, apex L2-3     Mild degenerative spondylosis L4-5 and L5-S1, stable     The pedicles appear intact.     Soft tissues are unremarkable.  Partially visualized left hip arthroplasty performed in the interim     IMPRESSION:  Mild multilevel degenerative spondylosis. Levoscoliosis     Stable exam     Workstation performed: [id number]

## 2024-12-10 ENCOUNTER — RESULTS FOLLOW-UP (OUTPATIENT)
Dept: PAIN MEDICINE | Facility: MEDICAL CENTER | Age: 69
End: 2024-12-10

## 2024-12-11 NOTE — TELEPHONE ENCOUNTER
Caller: cyndy    Doctor: jessy    Reason for call: I read pt the results. Pt had no questions.    Call back#: [phone]

## 2025-03-27 ENCOUNTER — TELEPHONE (OUTPATIENT)
Age: 70
End: 2025-03-27

## 2025-03-27 NOTE — TELEPHONE ENCOUNTER
Patient called to schedule new patient appointment for memory issues but was denied via intake. Referred to [facility].

## 2025-04-12 ENCOUNTER — HOSPITAL ENCOUNTER (OUTPATIENT)
Dept: MRI IMAGING | Facility: HOSPITAL | Age: 70
Discharge: HOME | End: 2025-04-12
Payer: COMMERCIAL

## 2025-04-12 DIAGNOSIS — G31.84 MILD COGNITIVE IMPAIRMENT OF UNCERTAIN OR UNKNOWN ETIOLOGY: ICD-10-CM

## 2025-04-12 PROCEDURE — 70553 MRI BRAIN STEM W/O & W/DYE: CPT

## 2025-04-12 PROCEDURE — A9585 GADOBUTROL INJECTION: HCPCS | Performed by: RADIOLOGY

## 2025-04-12 RX ORDER — GADOBUTROL 604.72 MG/ML
7 INJECTION INTRAVENOUS
Status: COMPLETED | OUTPATIENT
Start: 2025-04-12 | End: 2025-04-12

## 2025-04-12 RX ADMIN — GADOBUTROL 7 ML: 604.72 INJECTION INTRAVENOUS at 15:03

## 2025-04-13 ENCOUNTER — HOSPITAL ENCOUNTER (EMERGENCY)
Facility: HOSPITAL | Age: 70
Discharge: HOME | End: 2025-04-13
Attending: EMERGENCY MEDICINE
Payer: COMMERCIAL

## 2025-04-13 ENCOUNTER — APPOINTMENT (EMERGENCY)
Dept: RADIOLOGY | Facility: HOSPITAL | Age: 70
End: 2025-04-13
Payer: COMMERCIAL

## 2025-04-13 VITALS
DIASTOLIC BLOOD PRESSURE: 58 MMHG | TEMPERATURE: 97.5 F | OXYGEN SATURATION: 96 % | HEART RATE: 87 BPM | RESPIRATION RATE: 22 BRPM | SYSTOLIC BLOOD PRESSURE: 125 MMHG

## 2025-04-13 DIAGNOSIS — R07.89 ATYPICAL CHEST PAIN: Primary | ICD-10-CM

## 2025-04-13 LAB
2HR DELTA HS TROPONIN: 0 NG/L
ANION GAP SERPL CALCULATED.3IONS-SCNC: 11 MMOL/L (ref 4–13)
ATRIAL RATE: 79 BPM
ATRIAL RATE: 92 BPM
BASOPHILS # BLD AUTO: 0.14 THOUSANDS/ÂΜL (ref 0–0.1)
BASOPHILS NFR BLD AUTO: 1 % (ref 0–1)
BUN SERPL-MCNC: 44 MG/DL (ref 5–25)
CALCIUM SERPL-MCNC: 9.8 MG/DL (ref 8.4–10.2)
CARDIAC TROPONIN I PNL SERPL HS: 5 NG/L (ref ?–50)
CARDIAC TROPONIN I PNL SERPL HS: 5 NG/L (ref ?–50)
CHLORIDE SERPL-SCNC: 98 MMOL/L (ref 96–108)
CO2 SERPL-SCNC: 22 MMOL/L (ref 21–32)
CREAT SERPL-MCNC: 1.61 MG/DL (ref 0.6–1.3)
EOSINOPHIL # BLD AUTO: 0.1 THOUSAND/ÂΜL (ref 0–0.61)
EOSINOPHIL NFR BLD AUTO: 1 % (ref 0–6)
ERYTHROCYTE [DISTWIDTH] IN BLOOD BY AUTOMATED COUNT: 13.1 % (ref 11.6–15.1)
GFR SERPL CREATININE-BSD FRML MDRD: 42 ML/MIN/1.73SQ M
GLUCOSE SERPL-MCNC: 72 MG/DL (ref 65–140)
HCT VFR BLD AUTO: 45.1 % (ref 36.5–49.3)
HGB BLD-MCNC: 14.9 G/DL (ref 12–17)
IMM GRANULOCYTES # BLD AUTO: 0.06 THOUSAND/UL (ref 0–0.2)
IMM GRANULOCYTES NFR BLD AUTO: 1 % (ref 0–2)
LYMPHOCYTES # BLD AUTO: 3.37 THOUSANDS/ÂΜL (ref 0.6–4.47)
LYMPHOCYTES NFR BLD AUTO: 35 % (ref 14–44)
MCH RBC QN AUTO: 28.5 PG (ref 26.8–34.3)
MCHC RBC AUTO-ENTMCNC: 33 G/DL (ref 31.4–37.4)
MCV RBC AUTO: 86 FL (ref 82–98)
MONOCYTES # BLD AUTO: 1.19 THOUSAND/ÂΜL (ref 0.17–1.22)
MONOCYTES NFR BLD AUTO: 12 % (ref 4–12)
NEUTROPHILS # BLD AUTO: 4.9 THOUSANDS/ÂΜL (ref 1.85–7.62)
NEUTS SEG NFR BLD AUTO: 50 % (ref 43–75)
NRBC BLD AUTO-RTO: 0 /100 WBCS
P AXIS: 21 DEGREES
P AXIS: 25 DEGREES
PLATELET # BLD AUTO: 256 THOUSANDS/UL (ref 149–390)
PMV BLD AUTO: 11.3 FL (ref 8.9–12.7)
POTASSIUM SERPL-SCNC: 4.4 MMOL/L (ref 3.5–5.3)
PR INTERVAL: 144 MS
PR INTERVAL: 146 MS
QRS AXIS: -22 DEGREES
QRS AXIS: -22 DEGREES
QRSD INTERVAL: 110 MS
QRSD INTERVAL: 110 MS
QT INTERVAL: 344 MS
QT INTERVAL: 372 MS
QTC INTERVAL: 425 MS
QTC INTERVAL: 426 MS
RBC # BLD AUTO: 5.23 MILLION/UL (ref 3.88–5.62)
SODIUM SERPL-SCNC: 131 MMOL/L (ref 135–147)
T WAVE AXIS: 0 DEGREES
T WAVE AXIS: 3 DEGREES
VENTRICULAR RATE: 79 BPM
VENTRICULAR RATE: 92 BPM
WBC # BLD AUTO: 9.76 THOUSAND/UL (ref 4.31–10.16)

## 2025-04-13 PROCEDURE — 71046 X-RAY EXAM CHEST 2 VIEWS: CPT

## 2025-04-13 PROCEDURE — 85025 COMPLETE CBC W/AUTO DIFF WBC: CPT | Performed by: EMERGENCY MEDICINE

## 2025-04-13 PROCEDURE — 93010 ELECTROCARDIOGRAM REPORT: CPT | Performed by: INTERNAL MEDICINE

## 2025-04-13 PROCEDURE — 99284 EMERGENCY DEPT VISIT MOD MDM: CPT

## 2025-04-13 PROCEDURE — 36415 COLL VENOUS BLD VENIPUNCTURE: CPT | Performed by: EMERGENCY MEDICINE

## 2025-04-13 PROCEDURE — 93005 ELECTROCARDIOGRAM TRACING: CPT

## 2025-04-13 PROCEDURE — 84484 ASSAY OF TROPONIN QUANT: CPT | Performed by: EMERGENCY MEDICINE

## 2025-04-13 PROCEDURE — 80048 BASIC METABOLIC PNL TOTAL CA: CPT | Performed by: EMERGENCY MEDICINE

## 2025-04-13 PROCEDURE — 96374 THER/PROPH/DIAG INJ IV PUSH: CPT

## 2025-04-13 PROCEDURE — 99285 EMERGENCY DEPT VISIT HI MDM: CPT | Performed by: EMERGENCY MEDICINE

## 2025-04-13 RX ORDER — KETOROLAC TROMETHAMINE 30 MG/ML
15 INJECTION, SOLUTION INTRAMUSCULAR; INTRAVENOUS ONCE
Status: COMPLETED | OUTPATIENT
Start: 2025-04-13 | End: 2025-04-13

## 2025-04-13 RX ORDER — FUROSEMIDE 20 MG/1
1 TABLET ORAL DAILY
COMMUNITY
Start: 2025-04-09

## 2025-04-13 RX ADMIN — KETOROLAC TROMETHAMINE 15 MG: 30 INJECTION, SOLUTION INTRAMUSCULAR; INTRAVENOUS at 13:47

## 2025-04-13 NOTE — ED PROVIDER NOTES
Time reflects when diagnosis was documented in both MDM as applicable and the Disposition within this note       Time User Action Codes Description Comment    4/13/2025  3:54 PM Jennifer Lion Add [R07.89] Atypical chest pain           ED Disposition       ED Disposition   Discharge    Condition   Stable    Date/Time   Sun Apr 13, 2025  3:54 PM    Comment   Gregory [place] discharge to home/self care.                   Assessment & Plan       Medical Decision Making  Will get EKG to r/o arrhythmia, ischemic changes.  Will get labs to r/o acute life threatening metabolic abnl, cardiac ischemia, significant anemia.  Will get CXR to r/o occult pna, ptx, effusion    Problems Addressed:  Atypical chest pain: acute illness or injury that poses a threat to life or bodily functions     Details: No evidence of arrhythmia or ischemia, no ptx/pna    Amount and/or Complexity of Data Reviewed  Labs: ordered. Decision-making details documented in ED Course.  Radiology: ordered and independent interpretation performed.  ECG/medicine tests: ordered and independent interpretation performed.    Risk  Prescription drug management.        ED Course as of 04/13/25 1558   Sun Apr 13, 2025   1334 Creatinine(!): 1.61  1.43 five months ago   1552 Delta 2hr hsTnI: 0  HEART score 3    Will have pt f/u w/ PCP       Medications   ketorolac (TORADOL) injection 15 mg (15 mg Intravenous Given 4/13/25 1347)       ED Risk Strat Scores   HEART Risk Score      Flowsheet Row Most Recent Value   Heart Score Risk Calculator    History 0 Filed at: 04/13/2025 1553   ECG 0 Filed at: 04/13/2025 1553   Age 2 Filed at: 04/13/2025 1553   Risk Factors 1 Filed at: 04/13/2025 1553   Troponin 0 Filed at: 04/13/2025 1553   HEART Score 3 Filed at: 04/13/2025 1553          HEART Risk Score      Flowsheet Row Most Recent Value   Heart Score Risk Calculator    History 0 Filed at: 04/13/2025 1553   ECG 0 Filed at: 04/13/2025 1553   Age 2 Filed at: 04/13/2025 1553  "  Risk Factors 1 Filed at: 04/13/2025 1553   Troponin 0 Filed at: 04/13/2025 1553   HEART Score 3 Filed at: 04/13/2025 1553                      No data recorded        SBIRT 20yo+      Flowsheet Row Most Recent Value   Initial Alcohol Screen: US AUDIT-C     1. How often do you have a drink containing alcohol? 0 Filed at: 04/13/2025 1229   2. How many drinks containing alcohol do you have on a typical day you are drinking?  0 Filed at: 04/13/2025 1229   3a. Male UNDER 65: How often do you have five or more drinks on one occasion? 0 Filed at: 04/13/2025 1229   3b. FEMALE Any Age, or MALE 65+: How often do you have 4 or more drinks on one occassion? 0 Filed at: 04/13/2025 1229   Audit-C Score 0 Filed at: 04/13/2025 1229   STACY: How many times in the past year have you...    Used an illegal drug or used a prescription medication for non-medical reasons? Never Filed at: 04/13/2025 1229                            History of Present Illness       Chief Complaint   Patient presents with    Medical Problem     Pt reports right femur/upper leg pain, lightheadedness, shaking, feeling hot, \"breathing heavily,\" and right rib cage pain that started earlier today. Reports pain started earlier while he was sitting playing video games and would come and go. Had MRI yesterday of brain due to memory issues, has not gotten results yet.        Past Medical History:   Diagnosis Date    Arthritis     osteoarthritis    Cervical radiculopathy     COPD (chronic obstructive pulmonary disease) ([facility])     CPAP (continuous positive airway pressure) dependence     Diabetes mellitus (HCC)     pre-diabetic    Emphysema of lung (HCC)     Femoral neck fracture ([facility]) 08/16/2021    Fibromyalgia     Fibromyalgia, primary     GERD (gastroesophageal reflux disease)     History of transfusion     Hyperlipidemia     Osteoarthritis     Osteopenia     Pneumonia     Shortness of breath     with exertion    Sleep apnea     USES CPAP HS    Status post left hip " "replacement 12/23/2021      Past Surgical History:   Procedure Laterality Date    CHOLECYSTECTOMY      COLONOSCOPY      CYST REMOVAL      EPIDURAL BLOCK INJECTION      FEMUR SURGERY Left     FRACTURE SURGERY      HIP CLOSE REDUCTION Left 01/20/2022    Procedure: CLOSED REDUCTION HIP;  Surgeon: Larry Castillo MD;  Location: [facility];  Service: Orthopedics    JOINT REPLACEMENT      T KR LEFT    KNEE SURGERY Left 1996    MANDIBLE SURGERY      ORTHOPEDIC SURGERY      NC ARTHRP ACETBLR/PROX FEM PROSTC AGRFT/ALGRFT Right 04/11/2024    Procedure: ARTHROPLASTY HIP TOTAL ANTERIOR,NAVIGATED- same day;  Surgeon: Mahendra Rodriguez DO;  Location: [facility];  Service: Orthopedics    NC CONV PREV HIP TOT HIP ARTHRP W/WO AGRFT/ALGRFT Left 12/22/2021    Procedure: ARTHROPLASTY HIP TOTAL- conversion;  Surgeon: Yifan Caraballo MD;  Location: [facility];  Service: Orthopedics    NC REMOVAL IMPLANT DEEP Left 09/08/2021    Procedure: REMOVAL HARDWARE HIP;  Surgeon: Yifan Caraballo MD;  Location: [facility];  Service: Orthopedics    ROTATOR CUFF REPAIR Left     WRIST FRACTURE SURGERY Left     plate in place      Family History   Problem Relation Age of Onset    Cancer Mother     COPD Mother     Arthritis Father     Stroke Father     Diabetes Sister     Cancer Brother     Diabetes Daughter     Depression Son       Social History     Tobacco Use    Smoking status: Every Day     Current packs/day: 0.50     Average packs/day: 1.3 packs/day for 55.3 years (72.7 ttl pk-yrs)     Types: Cigarettes     Start date: 1970    Smokeless tobacco: Never   Vaping Use    Vaping status: Never Used   Substance Use Topics    Alcohol use: Not Currently     Comment: \"at holidays\"    Drug use: No      E-Cigarette/Vaping    E-Cigarette Use Never User       E-Cigarette/Vaping Substances    Nicotine No     THC No     CBD No     Flavoring No     Other No     Unknown No       I have reviewed and agree with the history as documented.     Patient " "presents with:  Medical Problem: Pt reports right femur/upper leg pain, lightheadedness, shaking, feeling hot, \"breathing heavily,\" and right rib cage pain that started earlier today. Reports pain started earlier while he was sitting playing video games and would come and go. Had MRI yesterday of brain due to memory issues, has not gotten results yet.     70y M here for evaluation of multiple complaints.  Approx 3-4h pta, pt was sitting playing a baseball video game on his [employer] when he had a sudden, cramping pain in the right thigh region.  Cramping waxed/waned.  Then started having         History provided by:  Medical records and patient   used: No    Medical Problem      Review of Systems   All other systems reviewed and are negative.          Objective       ED Triage Vitals   Temperature Pulse Blood Pressure Respirations SpO2 Patient Position - Orthostatic VS   04/13/25 1218 04/13/25 1218 04/13/25 1220 04/13/25 1218 04/13/25 1218 04/13/25 1218   99.9 °F (37.7 °C) 71 148/78 20 100 % Sitting      Temp Source Heart Rate Source BP Location FiO2 (%) Pain Score    04/13/25 1218 04/13/25 1218 04/13/25 1218 -- 04/13/25 1218    Oral Monitor Right arm  5      Vitals      Date and Time Temp Pulse SpO2 Resp BP Pain Score FACES Pain Rating User   04/13/25 1400 -- 87 96 % 22 125/58 -- -- EP   04/13/25 1347 -- -- -- -- -- 7 -- EP   04/13/25 1345 -- 87 96 % 21 120/67 -- -- EP   04/13/25 1250 97.5 °F (36.4 °C) -- -- -- -- -- -- EP   04/13/25 1220 -- -- -- -- 148/78 -- -- JS   04/13/25 1218 99.9 °F (37.7 °C) 71 100 % 20 -- 5 -- JS            Physical Exam  Vitals and nursing note reviewed.   Constitutional:       Appearance: Normal appearance.   HENT:      Mouth/Throat:      Mouth: Mucous membranes are moist.   Eyes:      Conjunctiva/sclera: Conjunctivae normal.   Cardiovascular:      Rate and Rhythm: Normal rate and regular rhythm.   Pulmonary:      Effort: Pulmonary effort is normal. No respiratory " distress.      Breath sounds: Normal breath sounds. No stridor. No wheezing, rhonchi or rales.   Musculoskeletal:         General: No swelling.      Cervical back: Normal range of motion.   Skin:     General: Skin is warm.   Neurological:      Mental Status: He is alert.   Psychiatric:         Mood and Affect: Mood normal.         Results Reviewed       Procedure Component Value Units Date/Time    HS Troponin I 2hr [[id number]]  (Normal) Collected: 04/13/25 1522    Lab Status: Final result Specimen: Blood from Hand, Right Updated: 04/13/25 1548     hs TnI 2hr 5 ng/L      Delta 2hr hsTnI 0 ng/L     HS Troponin I 4hr [[id number]]     Lab Status: No result Specimen: Blood     HS Troponin 0hr (reflex protocol) [[id number]]  (Normal) Collected: 04/13/25 1302    Lab Status: Final result Specimen: Blood from Arm, Right Updated: 04/13/25 1333     hs TnI 0hr 5 ng/L     Basic metabolic panel [[id number]]  (Abnormal) Collected: 04/13/25 1302    Lab Status: Final result Specimen: Blood from Arm, Right Updated: 04/13/25 1325     Sodium 131 mmol/L      Potassium 4.4 mmol/L      Chloride 98 mmol/L      CO2 22 mmol/L      ANION GAP 11 mmol/L      BUN 44 mg/dL      Creatinine 1.61 mg/dL      Glucose 72 mg/dL      Calcium 9.8 mg/dL      eGFR 42 ml/min/1.73sq m     Narrative:      National Kid[employer] guidelines for Chronic Kidney Disease (CKD):     Stage 1 with normal or high GFR (GFR > 90 mL/min/1.73 square meters)    Stage 2 Mild CKD (GFR = 60-89 mL/min/1.73 square meters)    Stage 3A Moderate CKD (GFR = 45-59 mL/min/1.73 square meters)    Stage 3B Moderate CKD (GFR = 30-44 mL/min/1.73 square meters)    Stage 4 Severe CKD (GFR = 15-29 mL/min/1.73 square meters)    Stage 5 End Stage CKD (GFR <15 mL/min/1.73 square meters)  Note: GFR calculation is accurate only with a steady state creatinine    CBC and differential [[id number]]  (Abnormal) Collected: 04/13/25 1302    Lab Status: Final result Specimen: Blood from Arm, Right  Updated: 04/13/25 1312     WBC 9.76 Thousand/uL      RBC 5.23 Million/uL      Hemoglobin 14.9 g/dL      Hematocrit 45.1 %      MCV 86 fL      MCH 28.5 pg      MCHC 33.0 g/dL      RDW 13.1 %      MPV 11.3 fL      Platelets 256 Thousands/uL      nRBC 0 /100 WBCs      Segmented % 50 %      Immature Grans % 1 %      Lymphocytes % 35 %      Monocytes % 12 %      Eosinophils Relative 1 %      Basophils Relative 1 %      Absolute Neutrophils 4.90 Thousands/µL      Absolute Immature Grans 0.06 Thousand/uL      Absolute Lymphocytes 3.37 Thousands/µL      Absolute Monocytes 1.19 Thousand/µL      Eosinophils Absolute 0.10 Thousand/µL      Basophils Absolute 0.14 Thousands/µL             XR chest 2 views   ED Interpretation by Jennifer Lion DO (04/13 1335)   Xray reviewed and independently interpreted by me: no acute findings.  Formal reading per radiology            Procedures    ED Medication and Procedure Management   Prior to Admission Medications   Prescriptions Last Dose Informant Patient Reported? Taking?   Budeson-Glycopyrrol-Formoterol (Breztri Aerosphere) 160-9-4.8 MCG/ACT AERO   Yes Yes   Sig: Inhale 2 puffs Rinse mouth after use.   Jardiance 10 MG TABS tablet   Yes Yes   Sig: Take 10 mg by mouth daily   Multiple Vitamins-Minerals (multivitamin with minerals) tablet Not Taking  No No   Sig: Take 1 tablet by mouth daily   Patient not taking: Reported on 4/13/2025   Vascepa 1 g CAPS   Yes Yes   Sig: TAKE TWO CAPSULES BY MOUTH TWICE A DAY WITH FOOD SWALLOWING WHOLE: DO NOT CHEW, OPEN, DISSOLVE AND/OR CRUSH   acetaminophen (TYLENOL) 500 mg tablet   No No   Sig: Take 2 tablets (1,000 mg total) by mouth every 8 (eight) hours   albuterol (PROVENTIL HFA,VENTOLIN HFA) 90 mcg/act inhaler   Yes Yes   Sig: Inhale 2 puffs every 4 (four) hours as needed for wheezing pt reports not using   furosemide (LASIX) 20 mg tablet   Yes Yes   Sig: Take 1 tablet by mouth daily   ipratropium-albuterol (DUO-NEB) 0.5-2.5 mg/3 mL nebulizer  solution Not Taking  No No   Sig: Take 3 mL by nebulization 4 (four) times a day   Patient not taking: Reported on 4/13/2025   loratadine (CLARITIN) 10 mg tablet Not Taking  Yes No   Sig: Take 10 mg by mouth daily   Patient not taking: Reported on 4/13/2025   omeprazole (PriLOSEC) 20 mg delayed release capsule   Yes Yes   Sig: take 1 capsule by mouth twice a day 30 TO 60 MINUTES PRIOR TO EATING   oxaprozin (DAYPRO) 600 MG tablet Unknown  Yes No   Sig: Take 600 mg by mouth 2 (two) times a day   rosuvastatin (CRESTOR) 10 MG tablet   Yes Yes   Sig: Take 1 tablet by mouth in the morning   traZODone (DESYREL) 50 mg tablet   Yes Yes   Sig: Take 50 mg by mouth daily at bedtime      Facility-Administered Medications: None     Patient's Medications   Discharge Prescriptions    No medications on file     No discharge procedures on file.  ED SEPSIS DOCUMENTATION   Time reflects when diagnosis was documented in both MDM as applicable and the Disposition within this note       Time User Action Codes Description Comment    4/13/2025  3:54 PM Jennifer Lion Add [R07.89] Atypical chest pain                  Jennifer Lion,   04/13/25 1558

## 2025-04-13 NOTE — ED NOTES
Patient provided with sandwich, pretzels and pepsi.     Omar Kamara, RN  04/13/25 1963     [facility] / [facility]

## 2025-05-06 ENCOUNTER — HOSPITAL ENCOUNTER (OUTPATIENT)
Dept: MRI IMAGING | Facility: HOSPITAL | Age: 70
Discharge: HOME | End: 2025-05-06
Payer: COMMERCIAL

## 2025-05-06 DIAGNOSIS — M48.062 SPINAL STENOSIS, LUMBAR REGION WITH NEUROGENIC CLAUDICATION: ICD-10-CM

## 2025-05-06 PROCEDURE — 72148 MRI LUMBAR SPINE W/O DYE: CPT

## 2025-07-08 ENCOUNTER — HOSPITAL ENCOUNTER (EMERGENCY)
Facility: HOSPITAL | Age: 70
Discharge: HOME | End: 2025-07-08
Attending: EMERGENCY MEDICINE
Payer: COMMERCIAL

## 2025-07-08 ENCOUNTER — APPOINTMENT (EMERGENCY)
Dept: CT IMAGING | Facility: HOSPITAL | Age: 70
End: 2025-07-08
Payer: COMMERCIAL

## 2025-07-08 VITALS
HEART RATE: 65 BPM | TEMPERATURE: 98 F | SYSTOLIC BLOOD PRESSURE: 130 MMHG | RESPIRATION RATE: 20 BRPM | DIASTOLIC BLOOD PRESSURE: 83 MMHG | OXYGEN SATURATION: 96 %

## 2025-07-08 DIAGNOSIS — R51.9 CEPHALALGIA: Primary | ICD-10-CM

## 2025-07-08 LAB
ANION GAP SERPL CALCULATED.3IONS-SCNC: 7 MMOL/L (ref 4–13)
ATRIAL RATE: 81 BPM
BASOPHILS # BLD AUTO: 0.1 THOUSANDS/ÂΜL (ref 0–0.1)
BASOPHILS NFR BLD AUTO: 1 % (ref 0–1)
BUN SERPL-MCNC: 25 MG/DL (ref 5–25)
CALCIUM SERPL-MCNC: 9.9 MG/DL (ref 8.4–10.2)
CHLORIDE SERPL-SCNC: 102 MMOL/L (ref 96–108)
CO2 SERPL-SCNC: 25 MMOL/L (ref 21–32)
CREAT SERPL-MCNC: 1.26 MG/DL (ref 0.6–1.3)
EOSINOPHIL # BLD AUTO: 0.15 THOUSAND/ÂΜL (ref 0–0.61)
EOSINOPHIL NFR BLD AUTO: 2 % (ref 0–6)
ERYTHROCYTE [DISTWIDTH] IN BLOOD BY AUTOMATED COUNT: 13.2 % (ref 11.6–15.1)
GFR SERPL CREATININE-BSD FRML MDRD: 57 ML/MIN/1.73SQ M
GLUCOSE SERPL-MCNC: 149 MG/DL (ref 65–140)
HCT VFR BLD AUTO: 41.9 % (ref 36.5–49.3)
HGB BLD-MCNC: 13.9 G/DL (ref 12–17)
IMM GRANULOCYTES # BLD AUTO: 0.04 THOUSAND/UL (ref 0–0.2)
IMM GRANULOCYTES NFR BLD AUTO: 1 % (ref 0–2)
LYMPHOCYTES # BLD AUTO: 1.65 THOUSANDS/ÂΜL (ref 0.6–4.47)
LYMPHOCYTES NFR BLD AUTO: 22 % (ref 14–44)
MCH RBC QN AUTO: 28.7 PG (ref 26.8–34.3)
MCHC RBC AUTO-ENTMCNC: 33.2 G/DL (ref 31.4–37.4)
MCV RBC AUTO: 87 FL (ref 82–98)
MONOCYTES # BLD AUTO: 0.87 THOUSAND/ÂΜL (ref 0.17–1.22)
MONOCYTES NFR BLD AUTO: 12 % (ref 4–12)
NEUTROPHILS # BLD AUTO: 4.6 THOUSANDS/ÂΜL (ref 1.85–7.62)
NEUTS SEG NFR BLD AUTO: 62 % (ref 43–75)
NRBC BLD AUTO-RTO: 0 /100 WBCS
P AXIS: 41 DEGREES
PLATELET # BLD AUTO: 197 THOUSANDS/UL (ref 149–390)
PMV BLD AUTO: 11.7 FL (ref 8.9–12.7)
POTASSIUM SERPL-SCNC: 4.4 MMOL/L (ref 3.5–5.3)
PR INTERVAL: 150 MS
QRS AXIS: 83 DEGREES
QRSD INTERVAL: 116 MS
QT INTERVAL: 358 MS
QTC INTERVAL: 415 MS
RBC # BLD AUTO: 4.84 MILLION/UL (ref 3.88–5.62)
SODIUM SERPL-SCNC: 134 MMOL/L (ref 135–147)
T WAVE AXIS: 60 DEGREES
VENTRICULAR RATE: 81 BPM
WBC # BLD AUTO: 7.41 THOUSAND/UL (ref 4.31–10.16)

## 2025-07-08 PROCEDURE — 96361 HYDRATE IV INFUSION ADD-ON: CPT

## 2025-07-08 PROCEDURE — 93005 ELECTROCARDIOGRAM TRACING: CPT

## 2025-07-08 PROCEDURE — 99284 EMERGENCY DEPT VISIT MOD MDM: CPT

## 2025-07-08 PROCEDURE — 80048 BASIC METABOLIC PNL TOTAL CA: CPT | Performed by: EMERGENCY MEDICINE

## 2025-07-08 PROCEDURE — 70498 CT ANGIOGRAPHY NECK: CPT

## 2025-07-08 PROCEDURE — 96375 TX/PRO/DX INJ NEW DRUG ADDON: CPT

## 2025-07-08 PROCEDURE — 96374 THER/PROPH/DIAG INJ IV PUSH: CPT

## 2025-07-08 PROCEDURE — 93010 ELECTROCARDIOGRAM REPORT: CPT | Performed by: INTERNAL MEDICINE

## 2025-07-08 PROCEDURE — 70496 CT ANGIOGRAPHY HEAD: CPT

## 2025-07-08 PROCEDURE — 99284 EMERGENCY DEPT VISIT MOD MDM: CPT | Performed by: EMERGENCY MEDICINE

## 2025-07-08 PROCEDURE — 85025 COMPLETE CBC W/AUTO DIFF WBC: CPT | Performed by: EMERGENCY MEDICINE

## 2025-07-08 PROCEDURE — 36415 COLL VENOUS BLD VENIPUNCTURE: CPT | Performed by: EMERGENCY MEDICINE

## 2025-07-08 RX ORDER — METHOCARBAMOL 500 MG/1
500 TABLET, FILM COATED ORAL ONCE
Status: COMPLETED | OUTPATIENT
Start: 2025-07-08 | End: 2025-07-08

## 2025-07-08 RX ORDER — ONDANSETRON 2 MG/ML
4 INJECTION INTRAMUSCULAR; INTRAVENOUS ONCE
Status: COMPLETED | OUTPATIENT
Start: 2025-07-08 | End: 2025-07-08

## 2025-07-08 RX ORDER — KETOROLAC TROMETHAMINE 30 MG/ML
15 INJECTION, SOLUTION INTRAMUSCULAR; INTRAVENOUS ONCE
Status: COMPLETED | OUTPATIENT
Start: 2025-07-08 | End: 2025-07-08

## 2025-07-08 RX ORDER — METHOCARBAMOL 750 MG/1
750 TABLET, FILM COATED ORAL 3 TIMES DAILY PRN
Qty: 42 TABLET | Refills: 0 | Status: SHIPPED | OUTPATIENT
Start: 2025-07-08

## 2025-07-08 RX ADMIN — IOHEXOL 85 ML: 350 INJECTION, SOLUTION INTRAVENOUS at 12:28

## 2025-07-08 RX ADMIN — METHOCARBAMOL 500 MG: 500 TABLET ORAL at 14:29

## 2025-07-08 RX ADMIN — ONDANSETRON 4 MG: 2 INJECTION INTRAMUSCULAR; INTRAVENOUS at 11:26

## 2025-07-08 RX ADMIN — SODIUM CHLORIDE 500 ML: 0.9 INJECTION, SOLUTION INTRAVENOUS at 11:17

## 2025-07-08 RX ADMIN — KETOROLAC TROMETHAMINE 15 MG: 30 INJECTION, SOLUTION INTRAMUSCULAR at 12:03

## 2025-07-08 NOTE — ED PROVIDER NOTES
"  ED Disposition       None          Assessment & Plan       Medical Decision Making  His MRI findings of multiple subacute lacunar infarcts do add concern that he is experiencing neurologic complications, although that would not be expected to cause recurrent HAs in and of itself.  However, I am considering vascular sources as well.      Amount and/or Complexity of Data Reviewed  Labs: ordered.  Radiology: ordered. Decision-making details documented in ED Course.    Risk  Prescription drug management.        ED Course as of 07/09/25 0809   Tue Jul 08, 2025   1348 CTA head and neck with and without contrast  Imaging reviewed and interpreted myself and concur with radiologist:   \"CT Brain: No acute intracranial abnormality. Chronic bilateral basal ganglia lacunar infarcts. Chronic microangiopathic ischemic changes.     CTA head: Negative for large vessel intracranial occlusion. Focal high-grade stenosis along the left PCA P1 segment.     CTA neck: No extracranial carotid stenosis. The cervical vertebral arteries are patent.\"   1354 Reviewed results with patient at bedside and updated on the plan.  ED workup is reassuring, with no findings requiring intervention.  I think he has chronic HAs, neck pain, from multiple modalities, and dizziness does not correspond to any focal deficit at this time.  I am recommending outpatient followup.         Medications   ondansetron (ZOFRAN) injection 4 mg (4 mg Intravenous Given 7/8/25 1126)   sodium chloride 0.9 % bolus 500 mL (0 mL Intravenous Stopped 7/8/25 1201)   ketorolac (TORADOL) injection 15 mg (15 mg Intravenous Given 7/8/25 1203)   iohexol (OMNIPAQUE) 350 MG/ML injection (MULTI-DOSE) 85 mL (85 mL Intravenous Given 7/8/25 1228)       ED Risk Strat Scores                    No data recorded        SBIRT 22yo+      Flowsheet Row Most Recent Value   Initial Alcohol Screen: US AUDIT-C     1. How often do you have a drink containing alcohol? 0 Filed at: 07/08/2025 112   2. " "How many drinks containing alcohol do you have on a typical day you are drinking?  0 Filed at: 07/08/2025 1124   3a. Male UNDER 65: How often do you have five or more drinks on one occasion? 0 Filed at: 07/08/2025 1124   3b. FEMALE Any Age, or MALE 65+: How often do you have 4 or more drinks on one occassion? 0 Filed at: 07/08/2025 1124   Audit-C Score 0 Filed at: 07/08/2025 1124   STACY: How many times in the past year have you...    Used an illegal drug or used a prescription medication for non-medical reasons? Never Filed at: 07/08/2025 1124                            History of Present Illness       Chief Complaint   Patient presents with   • Headache     Pt reports headache and dizziness since 3 days ago.        Past Medical History[1]   Past Surgical History[2]   Family History[3]   Social History[4]   E-Cigarette/Vaping   • E-Cigarette Use Never User       E-Cigarette/Vaping Substances   • Nicotine No    • THC No    • CBD No    • Flavoring No    • Other No    • Unknown No       I have reviewed and agree with the history as documented.     69 yo M smoker, with HTN, CKD, prediabetes, chronic pain, c/o onset of headache, localizing to left posterior area and left posterior neck area, with sensation of dizziness, described as lightheadedness.  He recalls recurrent similar discomfort, having had MRI brain in April, which I reviewed:  \"1. No acute process or enhancing lesion seen. 2. Multiple remote lacunar-type infarcts are new in the interim.\"        History provided by:  Patient  Headache      Review of Systems   Neurological:  Positive for headaches.           Objective       ED Triage Vitals   Temperature Pulse Blood Pressure Respirations SpO2 Patient Position - Orthostatic VS   07/08/25 1026 07/08/25 1026 07/08/25 1026 07/08/25 1026 07/08/25 1026 --   98 °F (36.7 °C) 100 (!) 185/77 18 98 %       Temp Source Heart Rate Source BP Location FiO2 (%) Pain Score    07/08/25 1026 07/08/25 1026 07/08/25 1026 -- " 07/08/25 1125    Oral Monitor Right arm  8      Vitals      Date and Time Temp Pulse SpO2 Resp BP Pain Score FACES Pain Rating User   07/08/25 1300 -- 65 96 % 20 130/83 -- -- JK   07/08/25 1203 -- -- -- -- -- 7 -- LAB   07/08/25 1200 -- 66 95 % 20 132/64 -- -- LAB   07/08/25 1125 -- -- -- -- -- 8 -- LAB   07/08/25 1100 -- 81 -- 30 132/70 -- -- LAB   07/08/25 1026 98 °F (36.7 °C) 100 98 % 18 185/77 -- -- HMR            Physical Exam  Vitals and nursing note reviewed.   Constitutional:       Appearance: He is well-developed.   HENT:      Head: Normocephalic and atraumatic.      Right Ear: External ear normal.      Left Ear: External ear normal.      Nose: Nose normal.      Mouth/Throat:      Mouth: Mucous membranes are moist.     Eyes:      Conjunctiva/sclera: Conjunctivae normal.      Pupils: Pupils are equal, round, and reactive to light.       Cardiovascular:      Rate and Rhythm: Normal rate.   Pulmonary:      Effort: Pulmonary effort is normal.   Abdominal:      Tenderness: There is no abdominal tenderness.     Musculoskeletal:         General: Normal range of motion.      Cervical back: Normal range of motion and neck supple.     Skin:     General: Skin is warm and dry.      Capillary Refill: Capillary refill takes less than 2 seconds.     Neurological:      Mental Status: He is alert and oriented to person, place, and time.      GCS: GCS eye subscore is 4. GCS verbal subscore is 5. GCS motor subscore is 6.      Cranial Nerves: No cranial nerve deficit.      Coordination: Coordination normal.      Comments: No pronator drift  BUE strength 6/6  BLE strength 6/6  Symmetrical  strength  Bilateral symmetrical rapid alternating movements that cross midline  Bilateral normal finger nose and crosses midline  No nystagmus  CN 2-12 intact      Psychiatric:         Behavior: Behavior normal.         Thought Content: Thought content normal.         Judgment: Judgment normal.         Results Reviewed       Procedure  Component Value Units Date/Time    Basic metabolic panel [[id number]]  (Abnormal) Collected: 07/08/25 1116    Lab Status: Final result Specimen: Blood from Arm, Left Updated: 07/08/25 1142     Sodium 134 mmol/L      Potassium 4.4 mmol/L      Chloride 102 mmol/L      CO2 25 mmol/L      ANION GAP 7 mmol/L      BUN 25 mg/dL      Creatinine 1.26 mg/dL      Glucose 149 mg/dL      Calcium 9.9 mg/dL      eGFR 57 ml/min/1.73sq m     Narrative:      [employer] guidelines for Chronic Kidney Disease (CKD):   •  Stage 1 with normal or high GFR (GFR > 90 mL/min/1.73 square meters)  •  Stage 2 Mild CKD (GFR = 60-89 mL/min/1.73 square meters)  •  Stage 3A Moderate CKD (GFR = 45-59 mL/min/1.73 square meters)  •  Stage 3B Moderate CKD (GFR = 30-44 mL/min/1.73 square meters)  •  Stage 4 Severe CKD (GFR = 15-29 mL/min/1.73 square meters)  •  Stage 5 End Stage CKD (GFR <15 mL/min/1.73 square meters)  Note: GFR calculation is accurate only with a steady state creatinine    CBC and differential [[id number]] Collected: 07/08/25 1116    Lab Status: Final result Specimen: Blood from Arm, Left Updated: 07/08/25 1124     WBC 7.41 Thousand/uL      RBC 4.84 Million/uL      Hemoglobin 13.9 g/dL      Hematocrit 41.9 %      MCV 87 fL      MCH 28.7 pg      MCHC 33.2 g/dL      RDW 13.2 %      MPV 11.7 fL      Platelets 197 Thousands/uL      nRBC 0 /100 WBCs      Segmented % 62 %      Immature Grans % 1 %      Lymphocytes % 22 %      Monocytes % 12 %      Eosinophils Relative 2 %      Basophils Relative 1 %      Absolute Neutrophils 4.60 Thousands/µL      Absolute Immature Grans 0.04 Thousand/uL      Absolute Lymphocytes 1.65 Thousands/µL      Absolute Monocytes 0.87 Thousand/µL      Eosinophils Absolute 0.15 Thousand/µL      Basophils Absolute 0.10 Thousands/µL             CTA head and neck with and without contrast   Final Interpretation by Loki Fraser MD (07/08 1344)   CT Brain: No acute intracranial abnormality.  Chronic bilateral basal ganglia lacunar infarcts. Chronic microangiopathic ischemic changes.      CTA head: Negative for large vessel intracranial occlusion. Focal high-grade stenosis along the left PCA P1 segment.      CTA neck: No extracranial carotid stenosis. The cervical vertebral arteries are patent.                     Workstation performed: [id number]             Procedures    ED Medication and Procedure Management   Prior to Admission Medications   Prescriptions Last Dose Informant Patient Reported? Taking?   Budeson-Glycopyrrol-Formoterol (Breztri Aerosphere) 160-9-4.8 MCG/ACT AERO   Yes No   Sig: Inhale 2 puffs Rinse mouth after use.   Jardiance 10 MG TABS tablet   Yes No   Sig: Take 10 mg by mouth daily   Vascepa 1 g CAPS   Yes No   Sig: TAKE TWO CAPSULES BY MOUTH TWICE A DAY WITH FOOD SWALLOWING WHOLE: DO NOT CHEW, OPEN, DISSOLVE AND/OR CRUSH   acetaminophen (TYLENOL) 500 mg tablet   No No   Sig: Take 2 tablets (1,000 mg total) by mouth every 8 (eight) hours   albuterol (PROVENTIL HFA,VENTOLIN HFA) 90 mcg/act inhaler   Yes No   Sig: Inhale 2 puffs every 4 (four) hours as needed for wheezing pt reports not using   furosemide (LASIX) 20 mg tablet   Yes No   Sig: Take 1 tablet by mouth daily   omeprazole (PriLOSEC) 20 mg delayed release capsule   Yes No   Sig: take 1 capsule by mouth twice a day 30 TO 60 MINUTES PRIOR TO EATING   oxaprozin (DAYPRO) 600 MG tablet   Yes No   Sig: Take 600 mg by mouth 2 (two) times a day   rosuvastatin (CRESTOR) 10 MG tablet   Yes No   Sig: Take 1 tablet by mouth in the morning   traZODone (DESYREL) 50 mg tablet   Yes No   Sig: Take 50 mg by mouth daily at bedtime      Facility-Administered Medications: None     Patient's Medications   Discharge Prescriptions    No medications on file     No discharge procedures on file.  ED SEPSIS DOCUMENTATION                  [1]  Past Medical History:  Diagnosis Date   • Arthritis     osteoarthritis   • Cervical radiculopathy    • COPD  (chronic obstructive pulmonary disease) (HCC)    • CPAP (continuous positive airway pressure) dependence    • Diabetes mellitus (HCC)     pre-diabetic   • Emphysema of lung (HCC)    • Femoral neck fracture (HCC) 08/16/2021   • Fibromyalgia    • Fibromyalgia, primary    • GERD (gastroesophageal reflux disease)    • History of transfusion    • Hyperlipidemia    • Osteoarthritis    • Osteopenia    • Pneumonia    • Shortness of breath     with exertion   • Sleep apnea     USES CPAP HS   • Status post left hip replacement 12/23/2021   [2]  Past Surgical History:  Procedure Laterality Date   • CHOLECYSTECTOMY     • COLONOSCOPY     • CYST REMOVAL     • EPIDURAL BLOCK INJECTION     • FEMUR SURGERY Left    • FRACTURE SURGERY     • HIP CLOSE REDUCTION Left 01/20/2022    Procedure: CLOSED REDUCTION HIP;  Surgeon: Larry Castillo MD;  Location: [facility];  Service: Orthopedics   • JOINT REPLACEMENT      T KR LEFT   • KNEE SURGERY Left 1996   • MANDIBLE SURGERY     • ORTHOPEDIC SURGERY     • NY ARTHRP ACETBLR/PROX FEM PROSTC AGRFT/ALGRFT Right 04/11/2024    Procedure: ARTHROPLASTY HIP TOTAL ANTERIOR,NAVIGATED- same day;  Surgeon: Mahendra Rodriguez DO;  Location:  MAIN OR;  Service: Orthopedics   • NY CONV PREV HIP TOT HIP ARTHRP W/WO AGRFT/ALGRFT Left 12/22/2021    Procedure: ARTHROPLASTY HIP TOTAL- conversion;  Surgeon: Yifan Caraballo MD;  Location: [facility];  Service: Orthopedics   • NY REMOVAL IMPLANT DEEP Left 09/08/2021    Procedure: REMOVAL HARDWARE HIP;  Surgeon: Yifan Caraballo MD;  Location: [facility];  Service: Orthopedics   • ROTATOR CUFF REPAIR Left    • WRIST FRACTURE SURGERY Left     plate in place   [3]  Family History  Problem Relation Name Age of Onset   • Cancer Mother liza    • COPD Mother liza    • Arthritis Father lauren son    • Stroke Father lauren son    • Diabetes Sister 5    • Cancer Brother 5    • Diabetes Daughter Ashley Bradley    • Depression Son Gregory  "Rj Dumont jr    [4]  Social History  Tobacco Use   • Smoking status: Every Day     Current packs/day: 0.50     Average packs/day: 1.3 packs/day for 55.5 years (72.9 ttl pk-yrs)     Types: Cigarettes     Start date: 1970   • Smokeless tobacco: Never   Vaping Use   • Vaping status: Never Used   Substance Use Topics   • Alcohol use: Not Currently     Comment: \"at holidays\"   • Drug use: No      Wiliam Dempsey MD  07/09/25 0809    "

## 2025-07-11 ENCOUNTER — HOSPITAL ENCOUNTER (OUTPATIENT)
Dept: RADIOLOGY | Facility: HOSPITAL | Age: 70
Discharge: HOME | End: 2025-07-11
Payer: COMMERCIAL

## 2025-07-11 DIAGNOSIS — R51.9 CHRONIC INTRACTABLE HEADACHE, UNSPECIFIED HEADACHE TYPE: ICD-10-CM

## 2025-07-11 DIAGNOSIS — G89.29 CHRONIC INTRACTABLE HEADACHE, UNSPECIFIED HEADACHE TYPE: ICD-10-CM

## 2025-07-11 PROCEDURE — 72050 X-RAY EXAM NECK SPINE 4/5VWS: CPT

## (undated) DEVICE — GLOVE SRG BIOGEL 6.5

## (undated) DEVICE — SURGICAL GOWN, XL SMARTSLEEVE: Brand: CONVERTORS

## (undated) DEVICE — CAPIT HIP MOP -METAL ON POLY

## (undated) DEVICE — BETHLEHEM TOTAL HIP, KIT: Brand: CARDINAL HEALTH

## (undated) DEVICE — SPONGE STICK WITH PVP-I: Brand: KENDALL

## (undated) DEVICE — GLOVE SRG BIOGEL 8.5

## (undated) DEVICE — GLOVE SRG BIOGEL 8

## (undated) DEVICE — DRESSING MEPILEX AG BORDER 4 X 10 IN

## (undated) DEVICE — SUT STRATAFIX SPIRAL PDS PLUS 1 CTX 18 IN SXPP1A400

## (undated) DEVICE — SPONGE PVP SCRUB WING STERILE

## (undated) DEVICE — STERILE ORIF HIP PACK: Brand: CARDINAL HEALTH

## (undated) DEVICE — DRAPE C-ARM X-RAY

## (undated) DEVICE — PENCIL ELECTROSURG E-Z CLEAN -0035H

## (undated) DEVICE — INTENDED FOR TISSUE SEPARATION, AND OTHER PROCEDURES THAT REQUIRE A SHARP SURGICAL BLADE TO PUNCTURE OR CUT.: Brand: BARD-PARKER SAFETY BLADES SIZE 10, STERILE

## (undated) DEVICE — DRESSING MEPILEX AG BORDER 4 X 8 IN

## (undated) DEVICE — SYRINGE 50ML LL

## (undated) DEVICE — ASTOUND STANDARD SURGICAL GOWN, XL: Brand: CONVERTORS

## (undated) DEVICE — GLOVE INDICATOR PI UNDERGLOVE SZ 8.5 BLUE

## (undated) DEVICE — WEBRIL 6 IN UNSTERILE

## (undated) DEVICE — ARTHROSCOPY FLOOR MAT

## (undated) DEVICE — SAW BLADE OSCILLATING BRAZOL 167

## (undated) DEVICE — CAPIT UPCHRG EMPHASYS ACETABULAR

## (undated) DEVICE — DRAPE SURGIKIT SADDLE BAG

## (undated) DEVICE — SPONGE SCRUB 4 PCT CHLORHEXIDINE

## (undated) DEVICE — ANTIBACTERIAL UNDYED BRAIDED (POLYGLACTIN 910), SYNTHETIC ABSORBABLE SUTURE: Brand: COATED VICRYL

## (undated) DEVICE — DRESSING MEPILEX AG BORDER 4 X 12 IN

## (undated) DEVICE — SPECIMEN CONTAINER STERILE PEEL PACK

## (undated) DEVICE — SUT VICRYL PLUS 2-0 CTB-1 27 IN VCPB259H

## (undated) DEVICE — ACE WRAP 6 IN UNSTERILE

## (undated) DEVICE — THE SIMPULSE SOLO SYSTEM WITH ULTREX RETRACTABLE SPLASH SHIELD TIP: Brand: SIMPULSE SOLO

## (undated) DEVICE — SUT STRATAFIX SPIRAL MONOCRYL PLUS 4-0 PS-2 45CM SXMP1B118

## (undated) DEVICE — ABDUCTION PILLOW FOAM POSITIONER: Brand: CARDINAL HEALTH

## (undated) DEVICE — ADHESIVE SKIN CLOSURE SYS EXOFIN FUSION 22CM

## (undated) DEVICE — NEEDLE 18 G X 1 1/2

## (undated) DEVICE — 2108 SERIES SAGITTAL BLADE (18.6 X 0.64 X 61.1MM)

## (undated) DEVICE — GLOVE SRG BIOGEL ORTHOPEDIC 8.5

## (undated) DEVICE — CAPIT HIP COP -CMNT/POR-ACTIS

## (undated) DEVICE — EMPHASYS ACETABULAR SHELL THREE-HOLE 48MM CEMENTLESS
Type: IMPLANTABLE DEVICE | Site: HIP | Status: NON-FUNCTIONAL
Brand: EMPHASYS

## (undated) DEVICE — DRAPE EQUIPMENT RF WAND

## (undated) DEVICE — 3M™ STERI-DRAPE™ U-DRAPE 1015: Brand: STERI-DRAPE™

## (undated) DEVICE — TRAY FOLEY 16FR URIMETER SURESTEP

## (undated) DEVICE — ELECTRODE BLADE E-Z CLEAN 4IN -0014A

## (undated) DEVICE — GLOVE INDICATOR PI UNDERGLOVE SZ 6.5 BLUE

## (undated) DEVICE — HOOD: Brand: FLYTE, SURGICOOL

## (undated) DEVICE — 450 ML BOTTLE OF 0.05% CHLORHEXIDINE GLUCONATE IN 99.95% STERILE WATER FOR IRRIGATION, USP AND APPLICATOR.: Brand: IRRISEPT ANTIMICROBIAL WOUND LAVAGE

## (undated) DEVICE — LIGHT HANDLE COVER SLEEVE DISP BLUE STELLAR

## (undated) DEVICE — 6617 IOBAN II PATIENT ISOLATION DRAPE 5/BX,4BX/CS: Brand: STERI-DRAPE™ IOBAN™ 2

## (undated) DEVICE — PLUMEPEN PRO 10FT

## (undated) DEVICE — SUT ETHILON 2-0 FSLX 30 IN 1674H

## (undated) DEVICE — STOCKINETTE REGULAR

## (undated) DEVICE — PINNACLE CANCELLOUS BONE SCREW 6.5MM X 25MM
Type: IMPLANTABLE DEVICE | Site: HIP | Status: NON-FUNCTIONAL
Brand: PINNACLE

## (undated) DEVICE — POSITIONER HANA TABLE PACK

## (undated) DEVICE — SUT VICRYL PLUS 1 CTB-1 36 IN VCPB947H

## (undated) DEVICE — HANDPIECE SET WITH RETRACTABLE COAXIAL FAN SPRAY TIP AND SUCTION TUBE: Brand: INTERPULSE

## (undated) DEVICE — HOOD: Brand: T7PLUS

## (undated) DEVICE — BAG POLY CLEAR 12 X 8 X 30

## (undated) DEVICE — CHLORAPREP HI-LITE 26ML ORANGE